# Patient Record
Sex: FEMALE | Race: WHITE | NOT HISPANIC OR LATINO | Employment: FULL TIME | URBAN - NONMETROPOLITAN AREA
[De-identification: names, ages, dates, MRNs, and addresses within clinical notes are randomized per-mention and may not be internally consistent; named-entity substitution may affect disease eponyms.]

---

## 2017-03-10 ENCOUNTER — HOSPITAL ENCOUNTER (EMERGENCY)
Facility: HOSPITAL | Age: 37
Discharge: HOME | End: 2017-03-10
Attending: PHYSICIAN ASSISTANT | Admitting: PHYSICIAN ASSISTANT
Payer: COMMERCIAL

## 2017-03-10 VITALS
OXYGEN SATURATION: 100 % | TEMPERATURE: 98.6 F | SYSTOLIC BLOOD PRESSURE: 132 MMHG | DIASTOLIC BLOOD PRESSURE: 80 MMHG | RESPIRATION RATE: 20 BRPM | HEART RATE: 78 BPM

## 2017-03-10 DIAGNOSIS — J06.9 VIRAL URI WITH COUGH: ICD-10-CM

## 2017-03-10 DIAGNOSIS — L30.1 DYSHIDROTIC ECZEMA: ICD-10-CM

## 2017-03-10 DIAGNOSIS — R07.89 CHEST WALL PAIN: ICD-10-CM

## 2017-03-10 PROCEDURE — 99213 OFFICE O/P EST LOW 20 MIN: CPT | Performed by: PHYSICIAN ASSISTANT

## 2017-03-10 PROCEDURE — 99213 OFFICE O/P EST LOW 20 MIN: CPT

## 2017-03-10 PROCEDURE — 99213 OFFICE O/P EST LOW 20 MIN: CPT | Mod: 25

## 2017-03-10 PROCEDURE — 71020 ZZHC CHEST TWO VIEWS, FRONT/LAT: CPT | Mod: TC

## 2017-03-10 RX ORDER — PREDNISONE 20 MG/1
40 TABLET ORAL DAILY
Qty: 6 TABLET | Refills: 0 | Status: SHIPPED | OUTPATIENT
Start: 2017-03-10 | End: 2017-03-13

## 2017-03-10 RX ORDER — BENZONATATE 200 MG/1
200 CAPSULE ORAL 3 TIMES DAILY PRN
Qty: 21 CAPSULE | Refills: 0 | Status: SHIPPED | OUTPATIENT
Start: 2017-03-10 | End: 2017-06-29

## 2017-03-10 RX ORDER — TRIAMCINOLONE ACETONIDE 1 MG/G
CREAM TOPICAL 3 TIMES DAILY
Qty: 30 G | Refills: 0 | Status: SHIPPED | OUTPATIENT
Start: 2017-03-10 | End: 2017-03-20

## 2017-03-10 ASSESSMENT — ENCOUNTER SYMPTOMS
COUGH: 1
EYES NEGATIVE: 1
CHEST TIGHTNESS: 1
NEUROLOGICAL NEGATIVE: 1
SHORTNESS OF BREATH: 0
CONSTITUTIONAL NEGATIVE: 1
PSYCHIATRIC NEGATIVE: 1
CHILLS: 0
SORE THROAT: 0
FEVER: 0
CARDIOVASCULAR NEGATIVE: 1
SINUS PRESSURE: 0
WHEEZING: 0

## 2017-03-10 NOTE — ED AVS SNAPSHOT
[facility]    [address] [address]    Phone:  [phone]                                       Kimberley Louis   MRN: [id number]    Department:  [facility]   Date of Visit:  3/10/2017           After Visit Summary Signature Page     I have received my discharge instructions, and my questions have been answered. I have discussed any challenges I see with this plan with the nurse or doctor.    ..........................................................................................................................................  Patient/Patient Representative Signature      ..........................................................................................................................................  Patient Representative Print Name and Relationship to Patient    ..................................................               ................................................  Date                                            Time    ..........................................................................................................................................  Reviewed by Signature/Title    ...................................................              ..............................................  Date                                                            Time

## 2017-03-10 NOTE — ED NOTES
Coughing. Started as tickle in throat. States feels that there is a knot in chest that is burning. Feels like right lung is pinching or stabbing. Sore from coughing. Non-productive cough. No known fevers. Not very congested. Headaches. Right ear feels muffled, no pain. No sore throats.

## 2017-03-10 NOTE — ED AVS SNAPSHOT
[facility]    [address]    [place] [address]    Phone:  [phone]                                       Kimberley Louis   MRN: [id number]    Department[facility]  [facility]   Date of Visit:  3/10/2017           Patient Information     Date Of Birth          1980        Your diagnoses for this visit were:     Viral URI with cough     Chest wall pain        You were seen by Don Morales PA.      Follow-up Information     Follow up with Xiomara Caceres PA In 5 days.    Specialty:  Family Practice    Why:  As needed    Contact information:    [facility]  [address] [facility] [address]  [place] [address]  [phone]          Discharge Instructions       - Prednisone for chest wall pain/throat pain  - Tessalon for cough    - Deep intentional breaths and coughing helps to ensure fresh air to lung bases, so suppress cough only to sleep or if it is causing significant discomfort.   - Plenty of fluids/broth as water is the best expectorant.  - REST to help support immune function.    * Recheck with primary care in 3-7 days with poor improvement. To emergency department with worsening.      Discharge References/Attachments     CHEST WALL PAIN, COSTOCHONDRITIS (ENGLISH)    URI, VIRAL, NO ABX (ADULT) (ENGLISH)         Review of your medicines      START taking        Dose / Directions Last dose taken    benzonatate 200 MG capsule   Commonly known as:  TESSALON   Dose:  200 mg   Quantity:  21 capsule        Take 1 capsule (200 mg) by mouth 3 times daily as needed for cough   Refills:  0        predniSONE 20 MG tablet   Commonly known as:  DELTASONE   Dose:  40 mg   Quantity:  6 tablet        Take 2 tablets (40 mg) by mouth daily for 3 days   Refills:  0          Our records show that you are taking the medicines listed below. If these are incorrect, please call your family doctor or clinic.        Dose / Directions Last dose taken    albuterol 108 (90 BASE) MCG/ACT Inhaler  "  Commonly known as:  albuterol   Quantity:  8.5 g        INHALE 2 PUFFS BY MOUTH EVERY 6 HOURS AS NEEDED FOR SHORTNESS OF BREATH   Refills:  0        metoprolol 50 MG tablet   Commonly known as:  LOPRESSOR   Dose:  50 mg   Quantity:  180 tablet        Take 1 tablet (50 mg) by mouth 2 times daily   Refills:  1                Prescriptions were sent or printed at these locations (2 Prescriptions)                   [facility] - [place], [place] - [address] AT [facility] [address], [place] [address]    Telephone:  [phone]   Fax:  [phone]   Hours:                  E-Prescribed (2 of 2)         benzonatate (TESSALON) 200 MG capsule               predniSONE (DELTASONE) 20 MG tablet                Procedures and tests performed during your visit     Chest XR,  PA & LAT      Orders Needing Specimen Collection     None      Pending Results     Date and Time Order Name Status Description    3/10/2017 1514 Chest XR,  PA & LAT Preliminary             Pending Culture Results     No orders found from 3/8/2017 to 3/11/2017.            Thank you for choosing [facility]       Thank you for choosing [facility] for your care. Our goal is always to provide you with excellent care. Hearing back from our patients is one way we can continue to improve our services. Please take a few minutes to complete the written survey that you may receive in the mail after you visit with us. Thank you!        [employer] Information     [facility] [employer] lets you send messages to your doctor, view your test results, renew your prescriptions, schedule appointments and more. To sign up, go to [email or web address] . Click on \"Log in\" on the left side of the screen, which will take you to the Welcome page. Then click on \"Sign up Now\" on the right side of the page.     You will be asked to enter the access code listed below, as well as some personal information. Please follow the directions to create your username and " password.     Your access code is: [email or web address]  Expires: 2017  3:47 PM     Your access code will  in 90 days. If you need help or a new code, please call your [place] clinic or [phone].        Care EveryWhere ID     This is your Care EveryWhere ID. This could be used by other organizations to access your [place] medical records  [id number]        After Visit Summary       This is your record. Keep this with you and show to your community pharmacist(s) and doctor(s) at your next visit.

## 2017-03-10 NOTE — ED PROVIDER NOTES
History     Chief Complaint   Patient presents with     Cough     X 2 days     Chest Wall Pain     The history is provided by the patient. No  was used.     Kimberley Louis is a 37 year old female who presents on day 3 cough and chest wall pain. Cough is described as hacking and dry. Chest pain described as achy at baseline and in front of the chest, it gets sharper and sometimes in the right shoulder with deep breath or cough. NO Hx DVT/PE. NO Hx Ca. NO use OCPs. NO recent surgery/trauma. Pt is light smoker up to 1/2 PPD.    She also has an ongoing rash on the left hand. It starts with clear itchy blisters and then peels and gets scale-like. OTC moisturizer not helping.     I have reviewed the Medications, Allergies, Past Medical and Surgical History, and Social History in the Epic system.    Review of Systems   Constitutional: Negative.  Negative for chills and fever.   HENT: Positive for postnasal drip. Negative for congestion, ear pain, sinus pressure and sore throat.    Eyes: Negative.    Respiratory: Positive for cough and chest tightness. Negative for shortness of breath and wheezing.    Cardiovascular: Negative.    Skin: Positive for rash.   Neurological: Negative.    Psychiatric/Behavioral: Negative.        Physical Exam   BP: 132/80  Pulse: 78  Temp: 98.6  F (37  C)  Resp: 20  SpO2: 100 %  Physical Exam   Constitutional: She is oriented to person, place, and time. She appears well-developed and well-nourished.   HENT:   Head: Normocephalic and atraumatic.   Right Ear: External ear normal.   Left Ear: External ear normal.   Mouth/Throat: Oropharynx is clear and moist.   Eyes: Conjunctivae are normal.   Neck: Neck supple.   Cardiovascular: Normal rate.    Pulmonary/Chest: Effort normal and breath sounds normal. She has no wheezes. She has no rales. She exhibits tenderness.       LS auscultated as bases and apices.   Musculoskeletal:        Right hand: Normal.         Hands:  Neurological: She is alert and oriented to person, place, and time.   Skin: Skin is warm and dry.   Psychiatric: She has a normal mood and affect.   Nursing note and vitals reviewed.      ED Course     ED Course     Procedures    PERC Rule for risk stratifying PE to low risk (calculator)  Background  Risk stratifies patients to low risk of PE if all 8 criteria are present including age <50, heart rate <100, O2 Sat >94%, no unilateral leg edema, no hemoptysis, no recent surgery or trauma, no prior VTE, and no hormone use.  Data  37 year old  has Essential hypertension; H/O renal calculi; Enlarged kidney; ACP (advance care planning); and Advanced directives, counseling/discussion on her problem list.  @[email or web address]@   has a past surgical history that includes tubal ligation; REMOVAL OF KIDNEY STONE; REMOVAL OF KIDNEY STONE; and REMOVAL OF KIDNEY STONE.  Pulse: 78  SpO2: 100 %  Criteria   Of  8 possible items (all criteria must be present):  NEGATIVE  Age <50 years  Heart rate <100 bpm  Oxygen Saturation >94%  No unilateral leg swelling  No hemoptysis  No surgery or trauma within 4 weeks  No prior DVT or PE  No hormone use (oral, transdermal and intravaginal estrogens)  Interpretation  All eight criteria are met AND low clinical PE suspicion: No further evaluation for PE required      Assessments & Plan (with Medical Decision Making)     I have reviewed the nursing notes.    I have reviewed the findings, diagnosis, plan and need for follow up with the patient.    Discharge Medication List as of 3/10/2017  3:47 PM      START taking these medications    Details   benzonatate (TESSALON) 200 MG capsule Take 1 capsule (200 mg) by mouth 3 times daily as needed for cough, Disp-21 capsule, R-0, E-Prescribe      predniSONE (DELTASONE) 20 MG tablet Take 2 tablets (40 mg) by mouth daily for 3 days, Disp-6 tablet, R-0, E-Prescribe             Final diagnoses:   Viral URI with cough   Chest wall pain   Dyshidrotic eczema   CXR  clear. LS clear. PERC 0. Will treat cough/chest wall pain as viral URI. Pt will trial prednisone above. May use tessalon for cough for 3-5 days. She will use triamcinolone for rash on hand. F/U with PCP 3-7 days with poor progression, seeking attention sooner with worsening despite treatment. Patient verbally educated and given appropriate education sheets for each of the diagnoses and has no questions.    Don Morales PA-C   3/10/2017   4:27 PM    3/10/2017   [facility]     Don Morales PA  03/10/17 1628

## 2017-03-10 NOTE — DISCHARGE INSTRUCTIONS
- Prednisone for chest wall pain/throat pain  - Tessalon for cough    - Deep intentional breaths and coughing helps to ensure fresh air to lung bases, so suppress cough only to sleep or if it is causing significant discomfort.   - Plenty of fluids/broth as water is the best expectorant.  - REST to help support immune function.    * Recheck with primary care in 3-7 days with poor improvement. To emergency department with worsening.

## 2017-03-13 ENCOUNTER — HOSPITAL ENCOUNTER (EMERGENCY)
Facility: HOSPITAL | Age: 37
Discharge: HOME | End: 2017-03-13
Attending: NURSE PRACTITIONER | Admitting: NURSE PRACTITIONER
Payer: COMMERCIAL

## 2017-03-13 VITALS
TEMPERATURE: 97.3 F | DIASTOLIC BLOOD PRESSURE: 87 MMHG | OXYGEN SATURATION: 98 % | SYSTOLIC BLOOD PRESSURE: 129 MMHG | RESPIRATION RATE: 18 BRPM

## 2017-03-13 DIAGNOSIS — J10.1 INFLUENZA B: ICD-10-CM

## 2017-03-13 LAB
DEPRECATED S PYO AG THROAT QL EIA: NORMAL
FLUAV+FLUBV AG SPEC QL: ABNORMAL
FLUAV+FLUBV AG SPEC QL: NEGATIVE
MICRO REPORT STATUS: NORMAL
SPECIMEN SOURCE: ABNORMAL
SPECIMEN SOURCE: NORMAL

## 2017-03-13 PROCEDURE — 99213 OFFICE O/P EST LOW 20 MIN: CPT | Performed by: NURSE PRACTITIONER

## 2017-03-13 PROCEDURE — 99213 OFFICE O/P EST LOW 20 MIN: CPT

## 2017-03-13 PROCEDURE — 87081 CULTURE SCREEN ONLY: CPT | Performed by: NURSE PRACTITIONER

## 2017-03-13 PROCEDURE — 87804 INFLUENZA ASSAY W/OPTIC: CPT | Performed by: FAMILY MEDICINE

## 2017-03-13 PROCEDURE — 87880 STREP A ASSAY W/OPTIC: CPT | Performed by: NURSE PRACTITIONER

## 2017-03-13 RX ORDER — ONDANSETRON 4 MG/1
4 TABLET, ORALLY DISINTEGRATING ORAL EVERY 8 HOURS PRN
Qty: 10 TABLET | Refills: 0 | Status: SHIPPED | OUTPATIENT
Start: 2017-03-13 | End: 2017-03-16

## 2017-03-13 RX ORDER — OSELTAMIVIR PHOSPHATE 75 MG/1
75 CAPSULE ORAL 2 TIMES DAILY
Qty: 10 CAPSULE | Refills: 0 | Status: SHIPPED | OUTPATIENT
Start: 2017-03-13 | End: 2017-03-18

## 2017-03-13 ASSESSMENT — ENCOUNTER SYMPTOMS
NAUSEA: 0
ACTIVITY CHANGE: 0
SORE THROAT: 1
SHORTNESS OF BREATH: 0
COUGH: 1
TROUBLE SWALLOWING: 0
VOMITING: 0
CHILLS: 1
STRIDOR: 0
PSYCHIATRIC NEGATIVE: 1
WHEEZING: 0
DYSURIA: 0
SINUS PRESSURE: 1
APPETITE CHANGE: 0
RHINORRHEA: 1
FEVER: 1
DIARRHEA: 0

## 2017-03-13 NOTE — ED NOTES
Pt presents with worsening cough, sore throat. Was here Friday and had x-ray chest, and was put on steroids per pt. No getting better. Feels sinus congestion now. Took psuedophedrine and ibuprofen this morning. Afebrile.

## 2017-03-13 NOTE — PROVIDER NOTIFICATION
DATE:  3/13/2017   TIME OF RECEIPT FROM LAB:  4704  LAB TEST:  influenza  LAB VALUE:  Positive B   RESULTS GIVEN WITH READ-BACK TO (PROVIDER):  Margarita Moon RN   TIME LAB VALUE REPORTED TO PROVIDER:  9724

## 2017-03-13 NOTE — ED PROVIDER NOTES
History     Chief Complaint   Patient presents with     Cough     c/o cough, h/a and fever     The history is provided by the patient. No  was used.     Kimberley Louis is a 37 year old female who presents with a cough, sore throat, and head congestion. Cough started 5 days ago. Head congestion and sore throat started yesterday. She has taken ibuprofen, sudafed, and tessalon pearls with mild effectiveness. Positive for fever, chills, and night sweats. Eating and drinking well. Bowel and bladder are working well.     I have reviewed the Medications, Allergies, Past Medical and Surgical History, and Social History in the Epic system.    Review of Systems   Constitutional: Positive for chills and fever. Negative for activity change and appetite change.   HENT: Positive for congestion, ear pain, hearing loss, postnasal drip, rhinorrhea, sinus pressure and sore throat. Negative for ear discharge and trouble swallowing.         Muffled hearing.    Respiratory: Positive for cough. Negative for shortness of breath, wheezing and stridor.    Gastrointestinal: Negative for diarrhea, nausea and vomiting.   Genitourinary: Negative for dysuria.   Skin: Negative for rash.   Psychiatric/Behavioral: Negative.        Physical Exam   BP: 129/87  Heart Rate: 84  Temp: 97.3  F (36.3  C)  Resp: 18  SpO2: 98 %  Physical Exam   Constitutional: She is oriented to person, place, and time. She appears well-developed and well-nourished. No distress.   HENT:   Right Ear: External ear normal.   Left Ear: External ear normal.   Mouth/Throat: No oropharyngeal exudate.   Oropharynx with erythema without exudate.    Neck: Normal range of motion. Neck supple.   Cardiovascular: Normal rate, regular rhythm and normal heart sounds.    No murmur heard.  Pulmonary/Chest: Effort normal. No respiratory distress. She has no wheezes. She has no rales.   Abdominal: Soft. She exhibits no distension.   Musculoskeletal: Normal range of  motion.   Lymphadenopathy:     She has no cervical adenopathy.   Neurological: She is alert and oriented to person, place, and time.   Skin: Skin is warm and dry. No rash noted. She is not diaphoretic.   Psychiatric: She has a normal mood and affect. Her behavior is normal.   Nursing note and vitals reviewed.      ED Course     ED Course     Procedures    Labs Ordered and Resulted from Time of ED Arrival Up to the Time of Departure from the ED   INFLUENZA A/B ANTIGEN - Abnormal; Notable for the following:        Result Value    Influenza B   (*)     Value: Positive   Critical Value called to and read back by  Elizabeth Salcedo at 1355 by [facility]  Test results must be correlated with clinical data. If necessary, results should   be confirmed by a molecular assay or viral culture.      All other components within normal limits   RAPID STREP SCREEN   BETA STREP GROUP A CULTURE     Results for orders placed or performed during the hospital encounter of 03/13/17   Influenza A/B antigen   Result Value Ref Range    Influenza A/B Agn Specimen Nares     Influenza A Negative NEG    Influenza B (A) NEG     Positive   Critical Value called to and read back by  Elizabeth Salceod at 1355 by [facility]  Test results must be correlated with clinical data. If necessary, results should   be confirmed by a molecular assay or viral culture.     Rapid strep screen   Result Value Ref Range    Specimen Description Throat     Rapid Strep A Screen       NEGATIVE: No Group A streptococcal antigen detected by immunoassay, await   culture report.      Micro Report Status FINAL 03/13/2017        Assessments & Plan (with Medical Decision Making)     Discussed plan of care. She verbalized understanding. All questions answered.     I have reviewed the nursing notes.    I have reviewed the findings, diagnosis, plan and need for follow up with the patient.  Discharged in stable condition.     New Prescriptions    ONDANSETRON (ZOFRAN ODT) 4 MG ODT TAB    Take 1  tablet (4 mg) by mouth every 8 hours as needed for nausea    OSELTAMIVIR (TAMIFLU) 75 MG CAPSULE    Take 1 capsule (75 mg) by mouth 2 times daily for 5 days       Final diagnoses:   Influenza B     Take Tamiflu as ordered.   Take Zofran as needed for nausea.   Increase fluid intake.   Take tylenol and or ibuprofen for fever or pain.   Follow up with PCP with any increase in symptoms or concerns.  Return to urgent care or emergency department with any increase in symptoms or concerns.     LORENZO Wick  3/13/2017  3:56 PM  [facility]       Jazmyne Villafana NP  03/13/17 0509

## 2017-03-13 NOTE — ED AVS SNAPSHOT
[facility]    [address]    [place] [address]    Phone:  [phone]                                       Kimberley Louis   MRN: [id number]    Department[facility]  [facility]   Date of Visit:  3/13/2017           Patient Information     Date Of Birth          1980        Your diagnoses for this visit were:     Influenza B        You were seen by Jazmyne Villafana NP.      Follow-up Information     Follow up with Xiomara Caceres PA.    Specialty:  Family Practice    Why:  As needed, If symptoms worsen    Contact information:    [facility]  [address]  [place] [address] [phone]          Follow up with [facility].    Specialty:  EMERGENCY MEDICINE    Why:  As needed, If symptoms worsen    Contact information:    [address]  [place] [address] [phone]    Additional information:    From [facility]: Take [address]. Turn left at [address]. Turn left at the first stoplight on [address] [facility] [address]. At the first stop sign, take a right onto [address]. Take a left into the parking lot and continue through until you reach the North enterance of the building.       From [place]: Take [address]. Take the [address] ramp towards [place]. Turn left onto [address]. Take a slight right onto [address]. Turn left at the first stoplight on [address]. At the first stop sign, take a right onto [address]. Take a left into the parking lot and continue through until you reach the North enterance of the building.       From [place]: Take [address]. Take a right at [address]. At the first stop sign, take a right onto [address]. Take a left into the parking lot and continue through until you reach the North enterance of the building.         Discharge Instructions       Take Tamiflu as ordered.   Take Zofran as needed for nausea.   Increase fluid  intake.   Take tylenol and or ibuprofen for fever or pain.   Follow up with PCP with any increase in symptoms or concerns.  Return to urgent care or emergency department with any increase in symptoms or concerns.     Discharge References/Attachments     INFLUENZA  (ENGLISH)         Review of your medicines      START taking        Dose / Directions Last dose taken    ondansetron 4 MG ODT tab   Commonly known as:  ZOFRAN ODT   Dose:  4 mg   Quantity:  10 tablet        Take 1 tablet (4 mg) by mouth every 8 hours as needed for nausea   Refills:  0        oseltamivir 75 MG capsule   Commonly known as:  TAMIFLU   Dose:  75 mg   Quantity:  10 capsule        Take 1 capsule (75 mg) by mouth 2 times daily for 5 days   Refills:  0          Our records show that you are taking the medicines listed below. If these are incorrect, please call your family doctor or clinic.        Dose / Directions Last dose taken    albuterol 108 (90 BASE) MCG/ACT Inhaler   Commonly known as:  albuterol   Quantity:  8.5 g        INHALE 2 PUFFS BY MOUTH EVERY 6 HOURS AS NEEDED FOR SHORTNESS OF BREATH   Refills:  0        benzonatate 200 MG capsule   Commonly known as:  TESSALON   Dose:  200 mg   Quantity:  21 capsule        Take 1 capsule (200 mg) by mouth 3 times daily as needed for cough   Refills:  0        metoprolol 50 MG tablet   Commonly known as:  LOPRESSOR   Dose:  50 mg   Quantity:  180 tablet        Take 1 tablet (50 mg) by mouth 2 times daily   Refills:  1        predniSONE 20 MG tablet   Commonly known as:  DELTASONE   Dose:  40 mg   Quantity:  6 tablet        Take 2 tablets (40 mg) by mouth daily for 3 days   Refills:  0        triamcinolone 0.1 % cream   Commonly known as:  KENALOG   Quantity:  30 g        Apply topically 3 times daily for 10 days   Refills:  0                Prescriptions were sent or printed at these locations (2 Prescriptions)                   [facility] [address] [place], [place] - [address] AT [facility]  [address]   [address], [place] [address]    Telephone:  [phone]   Fax:  [phone]   Hours:                  E-Prescribed (2 of 2)         oseltamivir (TAMIFLU) 75 MG capsule               ondansetron (ZOFRAN ODT) 4 MG ODT tab                Procedures and tests performed during your visit     Beta strep group A culture    Influenza A/B antigen    Rapid strep screen      Orders Needing Specimen Collection     None      Pending Results     Date and Time Order Name Status Description    3/13/2017 1600 Beta strep group A culture In process             Pending Culture Results     Date and Time Order Name Status Description    3/13/2017 1600 Beta strep group A culture In process             Thank you for choosing [facility]       Thank you for choosing [facility] for your care. Our goal is always to provide you with excellent care. Hearing back from our patients is one way we can continue to improve our services. Please take a few minutes to complete the written survey that you may receive in the mail after you visit with us. Thank you!        [email or web address] Information     [email or web address] lets you send messages to your doctor, view your test results, renew your prescriptions, schedule appointments and more. To sign up, go to [email or web address] . Click on \"Log in\" on the left side of the screen, which will take you to the Welcome page. Then click on \"Sign up Now\" on the right side of the page.     You will be asked to enter the access code listed below, as well as some personal information. Please follow the directions to create your username and password.     Your access code is: [email or web address]  Expires: 2017  4:47 PM     Your access code will  in 90 days. If you need help or a new code, please call your [facility] clinic or [phone].        Care EveryWhere ID     This is your Care EveryWhere ID. This could be used by other organizations to access your [facility] medical records  [id number]        After " Visit Summary       This is your record. Keep this with you and show to your community pharmacist(s) and doctor(s) at your next visit.

## 2017-03-13 NOTE — ED AVS SNAPSHOT
[facility]    [address] [address]    Phone:  [phone]                                       Kimberley Louis   MRN: [id number]    Department[facility]  [facility]   Date of Visit:  3/13/2017           After Visit Summary Signature Page     I have received my discharge instructions, and my questions have been answered. I have discussed any challenges I see with this plan with the nurse or doctor.    ..........................................................................................................................................  Patient/Patient Representative Signature      ..........................................................................................................................................  Patient Representative Print Name and Relationship to Patient    ..................................................               ................................................  Date                                            Time    ..........................................................................................................................................  Reviewed by Signature/Title    ...................................................              ..............................................  Date                                                            Time

## 2017-03-15 LAB
BACTERIA SPEC CULT: NORMAL
MICRO REPORT STATUS: NORMAL
SPECIMEN SOURCE: NORMAL

## 2017-05-24 ENCOUNTER — HOSPITAL ENCOUNTER (EMERGENCY)
Facility: HOSPITAL | Age: 37
Discharge: HOME | End: 2017-05-24
Attending: NURSE PRACTITIONER | Admitting: NURSE PRACTITIONER
Payer: COMMERCIAL

## 2017-05-24 VITALS
HEART RATE: 73 BPM | RESPIRATION RATE: 18 BRPM | OXYGEN SATURATION: 98 % | SYSTOLIC BLOOD PRESSURE: 154 MMHG | DIASTOLIC BLOOD PRESSURE: 106 MMHG | TEMPERATURE: 98.2 F

## 2017-05-24 DIAGNOSIS — Z11.3 SCREEN FOR STD (SEXUALLY TRANSMITTED DISEASE): ICD-10-CM

## 2017-05-24 LAB
ALBUMIN UR-MCNC: 10 MG/DL
APPEARANCE UR: CLEAR
BACTERIA #/AREA URNS HPF: ABNORMAL /HPF
BILIRUB UR QL STRIP: NEGATIVE
COLOR UR AUTO: YELLOW
GLUCOSE UR STRIP-MCNC: NEGATIVE MG/DL
HCG UR QL: NEGATIVE
HGB UR QL STRIP: NEGATIVE
KETONES UR STRIP-MCNC: NEGATIVE MG/DL
LEUKOCYTE ESTERASE UR QL STRIP: NEGATIVE
MICRO REPORT STATUS: NORMAL
MUCOUS THREADS #/AREA URNS LPF: PRESENT /LPF
NITRATE UR QL: NEGATIVE
PH UR STRIP: 6 PH (ref 4.7–8)
RBC #/AREA URNS AUTO: 0 /HPF (ref 0–2)
SP GR UR STRIP: 1.01 (ref 1–1.03)
SPECIMEN SOURCE: NORMAL
SQUAMOUS #/AREA URNS AUTO: 1 /HPF (ref 0–1)
URN SPEC COLLECT METH UR: ABNORMAL
UROBILINOGEN UR STRIP-MCNC: NORMAL MG/DL (ref 0–2)
WBC #/AREA URNS AUTO: 3 /HPF (ref 0–2)
WET PREP SPEC: NORMAL

## 2017-05-24 PROCEDURE — 87491 CHLMYD TRACH DNA AMP PROBE: CPT | Performed by: NURSE PRACTITIONER

## 2017-05-24 PROCEDURE — 25000128 H RX IP 250 OP 636: Performed by: NURSE PRACTITIONER

## 2017-05-24 PROCEDURE — 81025 URINE PREGNANCY TEST: CPT | Performed by: NURSE PRACTITIONER

## 2017-05-24 PROCEDURE — 25000132 ZZH RX MED GY IP 250 OP 250 PS 637: Performed by: NURSE PRACTITIONER

## 2017-05-24 PROCEDURE — 99213 OFFICE O/P EST LOW 20 MIN: CPT | Performed by: NURSE PRACTITIONER

## 2017-05-24 PROCEDURE — 81001 URINALYSIS AUTO W/SCOPE: CPT | Performed by: NURSE PRACTITIONER

## 2017-05-24 PROCEDURE — 87210 SMEAR WET MOUNT SALINE/INK: CPT | Performed by: NURSE PRACTITIONER

## 2017-05-24 PROCEDURE — 96372 THER/PROPH/DIAG INJ SC/IM: CPT

## 2017-05-24 PROCEDURE — 87591 N.GONORRHOEAE DNA AMP PROB: CPT | Performed by: NURSE PRACTITIONER

## 2017-05-24 PROCEDURE — 99214 OFFICE O/P EST MOD 30 MIN: CPT | Mod: 25

## 2017-05-24 RX ORDER — AZITHROMYCIN 250 MG/1
1000 TABLET, FILM COATED ORAL ONCE
Status: COMPLETED | OUTPATIENT
Start: 2017-05-24 | End: 2017-05-24

## 2017-05-24 RX ORDER — CEFTRIAXONE SODIUM 1 G
250 VIAL (EA) INJECTION ONCE
Status: COMPLETED | OUTPATIENT
Start: 2017-05-24 | End: 2017-05-24

## 2017-05-24 RX ADMIN — AZITHROMYCIN 1000 MG: 250 TABLET, FILM COATED ORAL at 11:01

## 2017-05-24 RX ADMIN — CEFTRIAXONE SODIUM 250 MG: 1 INJECTION, POWDER, FOR SOLUTION INTRAMUSCULAR; INTRAVENOUS at 11:05

## 2017-05-24 NOTE — ED PROVIDER NOTES
History     Chief Complaint   Patient presents with     Vaginitis     some discharge noticed today     Exposure to STD     concerned for possible exposure     The history is provided by the patient. No  was used.     Kimberley Louis is a 37 year old female who presents with vaginal discharge and possible exposure to STD. She noticed vaginal discharge today. She broke up with her boyfriend in the past month and he was cheating on her. She is in a new relationship and is sexually active. She used a natural vaginal ph solution last week. Denies fever, chills, or night sweats. Eating and drinking well. Bowel and bladder are working well.     I have reviewed the Medications, Allergies, Past Medical and Surgical History, and Social History in the Epic system.    Review of Systems   Constitutional: Negative for activity change, appetite change, chills, fatigue and fever.   HENT: Negative for trouble swallowing.    Respiratory: Negative for cough.    Gastrointestinal: Negative for abdominal pain, diarrhea, nausea and vomiting.   Genitourinary: Positive for vaginal discharge. Negative for dyspareunia, dysuria, flank pain, frequency, hematuria, vaginal bleeding and vaginal pain.   Skin: Negative for rash.   Neurological: Negative.    Psychiatric/Behavioral: Negative.        Physical Exam   BP: (!) 154/106  Pulse: 73  Temp: 98.2  F (36.8  C)  Resp: 18  SpO2: 98 %  Physical Exam   Constitutional: She is oriented to person, place, and time. She appears well-developed and well-nourished. No distress.   HENT:   Head: Normocephalic.   Mouth/Throat: Oropharynx is clear and moist.   Cardiovascular: Normal rate, regular rhythm and normal heart sounds.    No murmur heard.  Pulmonary/Chest: Effort normal. No respiratory distress. She has no wheezes. She has no rales.   Abdominal: Soft. Bowel sounds are normal. She exhibits no distension. There is no tenderness. There is no rebound and no guarding.   Genitourinary:  Vaginal discharge found.   Genitourinary Comments: Pelvic exam completed with AMBER Siddiqi. No cervical motion tenderness. White thick vaginal discharge. Swabs obtained.    Musculoskeletal: Normal range of motion.   Neurological: She is alert and oriented to person, place, and time.   Skin: Skin is warm and dry. No rash noted. She is not diaphoretic.   Psychiatric: She has a normal mood and affect. Her behavior is normal.   Nursing note and vitals reviewed.      ED Course     ED Course     Procedures  Results for orders placed or performed during the hospital encounter of 05/24/17   UA reflex to Microscopic and Culture   Result Value Ref Range    Color Urine Yellow     Appearance Urine Clear     Glucose Urine Negative NEG mg/dL    Bilirubin Urine Negative NEG    Ketones Urine Negative NEG mg/dL    Specific Gravity Urine 1.014 1.003 - 1.035    Blood Urine Negative NEG    pH Urine 6.0 4.7 - 8.0 pH    Protein Albumin Urine 10 (A) NEG mg/dL    Urobilinogen mg/dL Normal 0.0 - 2.0 mg/dL    Nitrite Urine Negative NEG    Leukocyte Esterase Urine Negative NEG    Source Midstream Urine     RBC Urine 0 0 - 2 /HPF    WBC Urine 3 (H) 0 - 2 /HPF    Bacteria Urine None (A) NEG /HPF    Squamous Epithelial /HPF Urine 1 0 - 1 /HPF    Mucous Urine Present (A) NEG /LPF   HCG qualitative urine   Result Value Ref Range    HCG Qual Urine Negative NEG   NEISSERIA GONORRHOEA PCR   Result Value Ref Range    Specimen Descrip Vagina     N Gonorrhea PCR  NEG     Negative   Negative for N. gonorrhoeae rRNA by transcription mediated amplification.   A negative result by transcription mediated amplification does not preclude the   presence of N. gonorrhoeae infection because results are dependent on proper   and adequate collection, absence of inhibitors, and sufficient rRNA to be   detected.     CHLAMYDIA TRACHOMATIS PCR   Result Value Ref Range    Specimen Description Vagina     Chlamydia Trachomatis PCR  NEG     Negative   Negative for C.  trachomatis rRNA by transcription mediated amplification.   A negative result by transcription mediated amplification does not preclude the   presence of C. trachomatis infection because results are dependent on proper   and adequate collection, absence of inhibitors, and sufficient rRNA to be   detected.     Wet prep   Result Value Ref Range    Specimen Description Vagina     Wet Prep       Few WBC'S seen  No Trichomonas seen  No yeast seen  No clue cells seen      Micro Report Status FINAL 05/24/2017        Medications   azithromycin (ZITHROMAX) tablet 1,000 mg (1,000 mg Oral Given 5/24/17 1101)   cefTRIAXone (ROCEPHIN) injection 250 mg (250 mg Intramuscular Given 5/24/17 1105)     Monitored for 20 minutes and tolerated well.     Assessments & Plan (with Medical Decision Making)     Discussed plan of care. She verbalized understanding. All questions answered.     I have reviewed the nursing notes.    I have reviewed the findings, diagnosis, plan and need for follow up with the patient.  Discharged in stable condition.     Discharge Medication List as of 5/24/2017 11:12 AM          Final diagnoses:   Screen for STD (sexually transmitted disease)     You were treated for chlamydia and gonorrhea today.   No sexual intercourse for 10 days if positive.   A nurse will contact you next week on your results.   Follow up with PCP in 3 weeks if testing is positive.   Return to urgent care or emergency department with any increase in symptoms or concerns.     LORENZO Wick  5/24/2017  10:13 AM  URGENT CARE CLINIC       Jazmyne Villafana NP  05/28/17 7113

## 2017-05-24 NOTE — ED AVS SNAPSHOT
[facility]    [address] [address]    Phone:  [phone]                                       Kimberley Louis   MRN: [id number]    Department:  [facility]   Date of Visit:  5/24/2017           Patient Information     Date Of Birth          1980        Your diagnoses for this visit were:     Screen for STD (sexually transmitted disease)        You were seen by Jazmyne Villafana NP.      Follow-up Information     Follow up with [facility].    Specialty:  EMERGENCY MEDICINE    Why:  As needed, If symptoms worsen    Contact information:    [address]  [place] [id number] [phone]    Additional information:    From [facility]: Take [address]. Turn left at [address]. Turn left at the first stoplight on [address]. At the first stop sign, take a right onto [address]. Take a left into the parking lot and continue through until you reach the North enterance of the building.       From [place]: Take [address]. Take the [address] ramp towards [place]. Turn left onto [address]. Take a slight right onto [address]. Turn left at the first stoplight on [address]. At the first stop sign, take a right onto [address]. Take a left into the parking lot and continue through until you reach the North enterance of the building.       From [place]: Take [address]. Take a right at [address]. At the first stop sign, take a right onto [address]. Take a left into the parking lot and continue through until you reach the North enterance of the building.         Discharge Instructions       You were treated for Chlamydia and Gonorrhea today.   No sexual intercourse for 10 days after treatment if you are positive.  Your tests were send out and you will hear from the nurse next week.   Follow up with PCP in 3 weeks if testing is positive.  Return to  urgent care or emergency department with any increase in symptoms or concerns.       Discharge References/Attachments     STD, IF YOU THINK YOU HAVE (ENGLISH)    STDS (SEXUALLY TRANSMITTED DISEASES), WHAT ARE (ENGLISH)    STDS, UNDERSTANDING (ENGLISH)         Review of your medicines      Our records show that you are taking the medicines listed below. If these are incorrect, please call your family doctor or clinic.        Dose / Directions Last dose taken    albuterol 108 (90 BASE) MCG/ACT Inhaler   Commonly known as:  albuterol   Quantity:  8.5 g        INHALE 2 PUFFS BY MOUTH EVERY 6 HOURS AS NEEDED FOR SHORTNESS OF BREATH   Refills:  0        benzonatate 200 MG capsule   Commonly known as:  TESSALON   Dose:  200 mg   Quantity:  21 capsule        Take 1 capsule (200 mg) by mouth 3 times daily as needed for cough   Refills:  0        metoprolol 50 MG tablet   Commonly known as:  LOPRESSOR   Dose:  50 mg   Quantity:  180 tablet        Take 1 tablet (50 mg) by mouth 2 times daily   Refills:  1                Procedures and tests performed during your visit     CHLAMYDIA TRACHOMATIS PCR    HCG qualitative urine    NEISSERIA GONORRHOEA PCR    UA reflex to Microscopic and Culture    Wet prep      Orders Needing Specimen Collection     None      Pending Results     Date and Time Order Name Status Description    5/24/2017 1033 HCG qualitative urine In process     5/24/2017 1012 CHLAMYDIA TRACHOMATIS PCR In process     5/24/2017 1012 NEISSERIA GONORRHOEA PCR In process             Pending Culture Results     Date and Time Order Name Status Description    5/24/2017 1033 HCG qualitative urine In process     5/24/2017 1012 CHLAMYDIA TRACHOMATIS PCR In process     5/24/2017 1012 NEISSERIA GONORRHOEA PCR In process             Thank you for choosing [facility]       Thank you for choosing [facility] for your care. Our goal is always to provide you with excellent care. Hearing back from our patients is one way we can continue to  "improve our services. Please take a few minutes to complete the written survey that you may receive in the mail after you visit with us. Thank you!        [employer] Information     [employer] lets you send messages to your doctor, view your test results, renew your prescriptions, schedule appointments and more. To sign up, go to [email or web address] . Click on \"Log in\" on the left side of the screen, which will take you to the Welcome page. Then click on \"Sign up Now\" on the right side of the page.     You will be asked to enter the access code listed below, as well as some personal information. Please follow the directions to create your username and password.     Your access code is: [email or web address]  Expires: 2017  4:47 PM     Your access code will  in 90 days. If you need help or a new code, please call your [facility] clinic or [phone].        Care EveryWhere ID     This is your Care EveryWhere ID. This could be used by other organizations to access your [facility] medical records  [id number]        After Visit Summary       This is your record. Keep this with you and show to your community pharmacist(s) and doctor(s) at your next visit.                  "

## 2017-05-24 NOTE — ED AVS SNAPSHOT
[facility]    [address]    [facility] [address]    Phone:  [phone]                                       Kimberley Louis   MRN: [id number]    Department[facility]  [facility]   Date of Visit:  5/24/2017           After Visit Summary Signature Page     I have received my discharge instructions, and my questions have been answered. I have discussed any challenges I see with this plan with the nurse or doctor.    ..........................................................................................................................................  Patient/Patient Representative Signature      ..........................................................................................................................................  Patient Representative Print Name and Relationship to Patient    ..................................................               ................................................  Date                                            Time    ..........................................................................................................................................  Reviewed by Signature/Title    ...................................................              ..............................................  Date                                                            Time

## 2017-05-24 NOTE — DISCHARGE INSTRUCTIONS
You were treated for Chlamydia and Gonorrhea today.   No sexual intercourse for 10 days after treatment if you are positive.  Your tests were send out and you will hear from the nurse next week.   Follow up with PCP in 3 weeks if testing is positive.  Return to urgent care or emergency department with any increase in symptoms or concerns.

## 2017-05-24 NOTE — ED NOTES
Pt in for complaints of vaginal discharge today and is concerned about possible exposure to STI and would like to be tested.

## 2017-05-25 LAB
C TRACH DNA SPEC QL NAA+PROBE: NORMAL
N GONORRHOEA DNA SPEC QL NAA+PROBE: NORMAL
SPECIMEN SOURCE: NORMAL
SPECIMEN SOURCE: NORMAL

## 2017-05-28 ASSESSMENT — ENCOUNTER SYMPTOMS
VOMITING: 0
ACTIVITY CHANGE: 0
NAUSEA: 0
FLANK PAIN: 0
TROUBLE SWALLOWING: 0
ABDOMINAL PAIN: 0
FEVER: 0
COUGH: 0
APPETITE CHANGE: 0
FREQUENCY: 0
PSYCHIATRIC NEGATIVE: 1
HEMATURIA: 0
CHILLS: 0
NEUROLOGICAL NEGATIVE: 1
FATIGUE: 0
DYSURIA: 0
DIARRHEA: 0

## 2017-05-31 DIAGNOSIS — I10 HTN (HYPERTENSION): ICD-10-CM

## 2017-06-01 RX ORDER — METOPROLOL TARTRATE 50 MG
TABLET ORAL
Qty: 180 TABLET | Refills: 0 | Status: SHIPPED | OUTPATIENT
Start: 2017-06-01 | End: 2017-09-09

## 2017-06-05 DIAGNOSIS — E73.9 LACTOSE INTOLERANCE IN ADULT: ICD-10-CM

## 2017-06-07 RX ORDER — LACTASE 3000 UNIT
TABLET ORAL
Qty: 120 TABLET | Refills: 0 | Status: SHIPPED | OUTPATIENT
Start: 2017-06-07 | End: 2018-06-28

## 2017-06-07 NOTE — TELEPHONE ENCOUNTER
Lactaid  Last visit: 5.5.16 - No appointment scheduled  Last refill: Not on med list. Please advise. Thank you

## 2017-06-29 ENCOUNTER — HOSPITAL ENCOUNTER (EMERGENCY)
Facility: HOSPITAL | Age: 37
Discharge: HOME | End: 2017-06-29
Attending: NURSE PRACTITIONER | Admitting: NURSE PRACTITIONER
Payer: COMMERCIAL

## 2017-06-29 VITALS
SYSTOLIC BLOOD PRESSURE: 130 MMHG | RESPIRATION RATE: 18 BRPM | HEART RATE: 54 BPM | DIASTOLIC BLOOD PRESSURE: 82 MMHG | OXYGEN SATURATION: 99 % | TEMPERATURE: 97.9 F

## 2017-06-29 DIAGNOSIS — N76.0 BACTERIAL VAGINOSIS: ICD-10-CM

## 2017-06-29 DIAGNOSIS — B96.89 BACTERIAL VAGINOSIS: ICD-10-CM

## 2017-06-29 LAB
MICRO REPORT STATUS: ABNORMAL
SPECIMEN SOURCE: ABNORMAL
WET PREP SPEC: ABNORMAL

## 2017-06-29 PROCEDURE — 99213 OFFICE O/P EST LOW 20 MIN: CPT | Performed by: NURSE PRACTITIONER

## 2017-06-29 PROCEDURE — 87210 SMEAR WET MOUNT SALINE/INK: CPT | Performed by: NURSE PRACTITIONER

## 2017-06-29 PROCEDURE — 99214 OFFICE O/P EST MOD 30 MIN: CPT

## 2017-06-29 RX ORDER — FLUCONAZOLE 150 MG/1
TABLET ORAL
Qty: 1 TABLET | Refills: 0 | Status: SHIPPED | OUTPATIENT
Start: 2017-06-29 | End: 2017-07-02

## 2017-06-29 RX ORDER — METRONIDAZOLE 500 MG/1
500 TABLET ORAL 2 TIMES DAILY
Qty: 14 TABLET | Refills: 0 | Status: SHIPPED | OUTPATIENT
Start: 2017-06-29 | End: 2017-07-06

## 2017-06-29 ASSESSMENT — ENCOUNTER SYMPTOMS
GASTROINTESTINAL NEGATIVE: 1
CONSTITUTIONAL NEGATIVE: 1
DYSURIA: 0

## 2017-06-29 NOTE — ED AVS SNAPSHOT
[facility]    [address]    [facility] [address]    Phone:  [phone]                                       Kimberley Louis   MRN: [id number]    Department[facility]  [facility]   Date of Visit:  6/29/2017           After Visit Summary Signature Page     I have received my discharge instructions, and my questions have been answered. I have discussed any challenges I see with this plan with the nurse or doctor.    ..........................................................................................................................................  Patient/Patient Representative Signature      ..........................................................................................................................................  Patient Representative Print Name and Relationship to Patient    ..................................................               ................................................  Date                                            Time    ..........................................................................................................................................  Reviewed by Signature/Title    ...................................................              ..............................................  Date                                                            Time

## 2017-06-29 NOTE — ED AVS SNAPSHOT
[facility]    [address] [place] [address]    [place] [address]    Phone:  [phone]                                       Kimberley Louis   MRN: [id number]    Department:  [facility]   Date of Visit:  2017           Patient Information     Date Of Birth          1980        Your diagnoses for this visit were:     Bacterial vaginosis        You were seen by Maryann Silva NP.      Follow-up Information     Follow up with Xiomara Caceres PA In 3 days.    Specialty:  Family Practice    Why:  if symptoms not improving    Contact information:    [facility]  [address] [facility]  [place] [address]  [phone]          Discharge Instructions         Bacterial Vaginosis    You have a vaginal infection called bacterial vaginosis (BV). Both good and bad bacteria are present in a healthy vagina. BV occurs when these bacteria get out of balance. The number of bad bacteria increase. And the number of good bacteria decrease.  BV may or may not cause symptoms. If symptoms do occur, they can include:    Thin, gray, milky-white, or sometimes green discharge    Unpleasant odor or  fishy  smell    Itching, burning, or pain in or around the vagina  It is not known what causes BV, but certain factors can make the problem more likely. This can include:    Douching    Having sex with a new partner    Having sex with more than one partner  BV will sometimes go away on its own. But treatment is usually recommended. This is because untreated BV can increase the risk of more serious health problems such as:    Pelvic inflammatory disease (PID)     delivery (giving birth to a baby early if you re pregnant)    HIV and certain other sexually transmitted diseases (STDs)    Infection after surgery on the reproductive organs  Home care  General care    BV is most often treated with medicines called antibiotics. These may be given as pills or as a vaginal cream. If antibiotics are  prescribed, be sure to use them exactly as directed. Also, be sure to complete all of the medicine, even if your symptoms go away.    Avoid douching or having sex during treatment.    If you have sex with a female partner, ask your healthcare provider if she should also be treated.  Prevention    Limit or avoid douching.    Avoid having sex. If you do have sex, then take steps to lower your risk:    Use condoms when having sex.    Limit the number of partners you have sex with.  Follow-up care  Follow up with your healthcare provider, or as advised.  When to seek medical advice  Call your healthcare provider right away if:    You have a fever of 100.4 F (38 C) or higher, or as directed by your provider.    Your symptoms worsen, or they don t go away within a few days of starting treatment.    You have new pain in the lower belly or pelvic region.    You have side effects that bother you or a reaction to the pills or cream you re prescribed.    You or any partners you have sex with have new symptoms, such as a rash, joint pain, or sores.  Date Last Reviewed: 7/30/2015 2000-2017 [employer]. [address] [place] [address]. All rights reserved. This information is not intended as a substitute for professional medical care. Always follow your healthcare professional's instructions.             Review of your medicines      START taking        Dose / Directions Last dose taken    metroNIDAZOLE 500 MG tablet   Commonly known as:  FLAGYL   Dose:  500 mg   Quantity:  14 tablet        Take 1 tablet (500 mg) by mouth 2 times daily for 7 days   Refills:  0          Our records show that you are taking the medicines listed below. If these are incorrect, please call your family doctor or clinic.        Dose / Directions Last dose taken    albuterol 108 (90 BASE) MCG/ACT Inhaler   Commonly known as:  albuterol   Quantity:  8.5 g        INHALE 2 PUFFS BY MOUTH EVERY 6 HOURS AS NEEDED FOR SHORTNESS OF  "BREATH   Refills:  0        LACTAID 3000 UNIT tablet   Quantity:  120 tablet   Generic drug:  lactase        TAKE 1 TABLET BY MOUTH THREE TIMES DAILY WITH MEALS   Refills:  0        metoprolol 50 MG tablet   Commonly known as:  LOPRESSOR   Quantity:  180 tablet        TAKE ONE TABLET BY MOUTH TWICE DAILY   Refills:  0                Prescriptions were sent or printed at these locations (1 Prescription)                   [id number] - [place], [place] - [address] AT [facility] of [facility] [address]   [address], [place] [address]    Telephone:  [phone]   Fax:  [phone]   Hours:                  E-Prescribed (1 of 1)         metroNIDAZOLE (FLAGYL) 500 MG tablet                Procedures and tests performed during your visit     Wet prep      Orders Needing Specimen Collection     None      Pending Results     No orders found from 2017 to 2017.            Pending Culture Results     No orders found from 2017 to 2017.            Thank you for choosing [facility]       Thank you for choosing [facility] for your care. Our goal is always to provide you with excellent care. Hearing back from our patients is one way we can continue to improve our services. Please take a few minutes to complete the written survey that you may receive in the mail after you visit with us. Thank you!        [employer] Information     [employer] lets you send messages to your doctor, view your test results, renew your prescriptions, schedule appointments and more. To sign up, go to [email or web address] . Click on \"Log in\" on the left side of the screen, which will take you to the Welcome page. Then click on \"Sign up Now\" on the right side of the page.     You will be asked to enter the access code listed below, as well as some personal information. Please follow the directions to create your username and password.     Your access code is: [email or web address]  Expires: 2017 11:36 AM     Your access code will  " in 90 days. If you need help or a new code, please call your [facility] clinic or [phone].        Care EveryWhere ID     This is your Care EveryWhere ID. This could be used by other organizations to access your [facility] medical records  [id number]        Equal Access to Services     DEE CAMPA : Lexis chu Soolya, waaxda luqadaha, qaybta kaalmada lucille, nikki nguyen. So [facility] [phone].    ATENCIÓN: Si habla español, tiene a alex disposición servicios gratuitos de asistencia lingüística. Llame al [phone].    We comply with applicable federal civil rights laws and [place] laws. We do not discriminate on the basis of race, color, national origin, age, disability sex, sexual orientation or gender identity.            After Visit Summary       This is your record. Keep this with you and show to your community pharmacist(s) and doctor(s) at your next visit.

## 2017-06-29 NOTE — ED PROVIDER NOTES
History     Chief Complaint   Patient presents with     Vaginitis     was on antibiotics for poss std and now has a yeast infection     The history is provided by the patient. No  was used.     Kimberley Louis is a 37 year old female who presents with odorous vaginal discharge and irritation for the past 3 days. Was treated for STI recently and thinks it may have caused a yeast infection. STI results did come back negative. See chart note from [facility] 5/24/17. No belly pain or rash.     I have reviewed the Medications, Allergies, Past Medical and Surgical History, and Social History in the Epic system.      Review of Systems   Constitutional: Negative.    Gastrointestinal: Negative.    Genitourinary: Positive for vaginal discharge. Negative for dysuria, genital sores, pelvic pain, urgency, vaginal bleeding and vaginal pain.       Physical Exam   BP: 130/82  Pulse: 54  Temp: 97.9  F (36.6  C)  Resp: 18  SpO2: 99 %  Physical Exam   Constitutional: She is oriented to person, place, and time. She appears well-developed and well-nourished. No distress.   HENT:   Head: Normocephalic and atraumatic.   Nose: Nose normal.   Eyes: Conjunctivae are normal. No scleral icterus.   Cardiovascular: Normal rate.    Pulmonary/Chest: Effort normal.   Abdominal: Soft.   Genitourinary: There is no rash on the right labia. There is no rash on the left labia. Cervix exhibits discharge. Cervix exhibits no friability. There is tenderness in the vagina. No erythema or bleeding in the vagina. Vaginal discharge found.   Genitourinary Comments: Wet prep collected   Neurological: She is alert and oriented to person, place, and time.   Skin: She is not diaphoretic.   Nursing note and vitals reviewed.      ED Course     ED Course     Procedures            Labs Ordered and Resulted from Time of ED Arrival Up to the Time of Departure from the ED   WET PREP - Abnormal; Notable for the following:        Result Value    Wet Prep    (*)     Value: Few WBC'S seen  Clue cells seen  No Trichomonas seen  No yeast seen      All other components within normal limits       Assessments & Plan (with Medical Decision Making)     I have reviewed the nursing notes.  I have reviewed the findings, diagnosis, plan and need for follow up with the patient.  See discharge instructions.   New Prescriptions    FLUCONAZOLE (DIFLUCAN) 150 MG TABLET    Take 1 tablet if symptoms develo    METRONIDAZOLE (FLAGYL) 500 MG TABLET    Take 1 tablet (500 mg) by mouth 2 times daily for 7 days       Final diagnoses:   Bacterial vaginosis       6/29/2017   [facility]     Maryann Silva, GOMEZ  06/29/17 1145

## 2017-06-29 NOTE — DISCHARGE INSTRUCTIONS
Bacterial Vaginosis    You have a vaginal infection called bacterial vaginosis (BV). Both good and bad bacteria are present in a healthy vagina. BV occurs when these bacteria get out of balance. The number of bad bacteria increase. And the number of good bacteria decrease.  BV may or may not cause symptoms. If symptoms do occur, they can include:    Thin, gray, milky-white, or sometimes green discharge    Unpleasant odor or  fishy  smell    Itching, burning, or pain in or around the vagina  It is not known what causes BV, but certain factors can make the problem more likely. This can include:    Douching    Having sex with a new partner    Having sex with more than one partner  BV will sometimes go away on its own. But treatment is usually recommended. This is because untreated BV can increase the risk of more serious health problems such as:    Pelvic inflammatory disease (PID)     delivery (giving birth to a baby early if you re pregnant)    HIV and certain other sexually transmitted diseases (STDs)    Infection after surgery on the reproductive organs  Home care  General care    BV is most often treated with medicines called antibiotics. These may be given as pills or as a vaginal cream. If antibiotics are prescribed, be sure to use them exactly as directed. Also, be sure to complete all of the medicine, even if your symptoms go away.    Avoid douching or having sex during treatment.    If you have sex with a female partner, ask your healthcare provider if she should also be treated.  Prevention    Limit or avoid douching.    Avoid having sex. If you do have sex, then take steps to lower your risk:    Use condoms when having sex.    Limit the number of partners you have sex with.  Follow-up care  Follow up with your healthcare provider, or as advised.  When to seek medical advice  Call your healthcare provider right away if:    You have a fever of 100.4 F (38 C) or higher, or as directed by your  provider.    Your symptoms worsen, or they don t go away within a few days of starting treatment.    You have new pain in the lower belly or pelvic region.    You have side effects that bother you or a reaction to the pills or cream you re prescribed.    You or any partners you have sex with have new symptoms, such as a rash, joint pain, or sores.  Date Last Reviewed: 7/30/2015 2000-2017 [employer]. [address] [place] [address], [place] [address]. All rights reserved. This information is not intended as a substitute for professional medical care. Always follow your healthcare professional's instructions.

## 2017-07-09 DIAGNOSIS — J20.8 ACUTE BRONCHITIS DUE TO OTHER SPECIFIED ORGANISMS: ICD-10-CM

## 2017-07-10 RX ORDER — ALBUTEROL SULFATE 90 UG/1
AEROSOL, METERED RESPIRATORY (INHALATION)
Qty: 18 G | Refills: 0 | Status: SHIPPED | OUTPATIENT
Start: 2017-07-10 | End: 2018-04-26

## 2017-08-21 ENCOUNTER — HOSPITAL ENCOUNTER (EMERGENCY)
Facility: HOSPITAL | Age: 37
Discharge: HOME | End: 2017-08-21
Attending: PHYSICIAN ASSISTANT | Admitting: PHYSICIAN ASSISTANT
Payer: COMMERCIAL

## 2017-08-21 VITALS
SYSTOLIC BLOOD PRESSURE: 135 MMHG | OXYGEN SATURATION: 99 % | TEMPERATURE: 98.5 F | HEART RATE: 54 BPM | RESPIRATION RATE: 20 BRPM | DIASTOLIC BLOOD PRESSURE: 84 MMHG

## 2017-08-21 DIAGNOSIS — R10.9 LEFT FLANK PAIN: ICD-10-CM

## 2017-08-21 LAB
ALBUMIN UR-MCNC: NEGATIVE MG/DL
ANION GAP SERPL CALCULATED.3IONS-SCNC: 8 MMOL/L (ref 3–14)
APPEARANCE UR: CLEAR
BASOPHILS # BLD AUTO: 0.1 10E9/L (ref 0–0.2)
BASOPHILS NFR BLD AUTO: 0.5 %
BILIRUB UR QL STRIP: NEGATIVE
BUN SERPL-MCNC: 13 MG/DL (ref 7–30)
CALCIUM SERPL-MCNC: 8.8 MG/DL (ref 8.5–10.1)
CHLORIDE SERPL-SCNC: 103 MMOL/L (ref 94–109)
CO2 SERPL-SCNC: 26 MMOL/L (ref 20–32)
COLOR UR AUTO: YELLOW
CREAT SERPL-MCNC: 0.64 MG/DL (ref 0.52–1.04)
DIFFERENTIAL METHOD BLD: ABNORMAL
EOSINOPHIL # BLD AUTO: 0.2 10E9/L (ref 0–0.7)
EOSINOPHIL NFR BLD AUTO: 2 %
ERYTHROCYTE [DISTWIDTH] IN BLOOD BY AUTOMATED COUNT: 18.8 % (ref 10–15)
GFR SERPL CREATININE-BSD FRML MDRD: >90 ML/MIN/1.7M2
GLUCOSE SERPL-MCNC: 80 MG/DL (ref 70–99)
GLUCOSE UR STRIP-MCNC: NEGATIVE MG/DL
HCG UR QL: NEGATIVE
HCT VFR BLD AUTO: 34.8 % (ref 35–47)
HGB BLD-MCNC: 11.3 G/DL (ref 11.7–15.7)
HGB UR QL STRIP: NEGATIVE
IMM GRANULOCYTES # BLD: 0 10E9/L (ref 0–0.4)
IMM GRANULOCYTES NFR BLD: 0.2 %
KETONES UR STRIP-MCNC: NEGATIVE MG/DL
LEUKOCYTE ESTERASE UR QL STRIP: NEGATIVE
LYMPHOCYTES # BLD AUTO: 3.4 10E9/L (ref 0.8–5.3)
LYMPHOCYTES NFR BLD AUTO: 33.8 %
MCH RBC QN AUTO: 26 PG (ref 26.5–33)
MCHC RBC AUTO-ENTMCNC: 32.5 G/DL (ref 31.5–36.5)
MCV RBC AUTO: 80 FL (ref 78–100)
MONOCYTES # BLD AUTO: 0.6 10E9/L (ref 0–1.3)
MONOCYTES NFR BLD AUTO: 5.5 %
NEUTROPHILS # BLD AUTO: 5.8 10E9/L (ref 1.6–8.3)
NEUTROPHILS NFR BLD AUTO: 58 %
NITRATE UR QL: NEGATIVE
NRBC # BLD AUTO: 0 10*3/UL
NRBC BLD AUTO-RTO: 0 /100
PH UR STRIP: 6 PH (ref 4.7–8)
PLATELET # BLD AUTO: 480 10E9/L (ref 150–450)
POTASSIUM SERPL-SCNC: 3.5 MMOL/L (ref 3.4–5.3)
RBC # BLD AUTO: 4.35 10E12/L (ref 3.8–5.2)
SODIUM SERPL-SCNC: 137 MMOL/L (ref 133–144)
SOURCE: NORMAL
SP GR UR STRIP: 1.02 (ref 1–1.03)
UROBILINOGEN UR STRIP-MCNC: NORMAL MG/DL (ref 0–2)
WBC # BLD AUTO: 9.9 10E9/L (ref 4–11)

## 2017-08-21 PROCEDURE — 36415 COLL VENOUS BLD VENIPUNCTURE: CPT | Performed by: PHYSICIAN ASSISTANT

## 2017-08-21 PROCEDURE — 85025 COMPLETE CBC W/AUTO DIFF WBC: CPT | Performed by: PHYSICIAN ASSISTANT

## 2017-08-21 PROCEDURE — 25000128 H RX IP 250 OP 636: Performed by: PHYSICIAN ASSISTANT

## 2017-08-21 PROCEDURE — 96361 HYDRATE IV INFUSION ADD-ON: CPT

## 2017-08-21 PROCEDURE — 80048 BASIC METABOLIC PNL TOTAL CA: CPT | Performed by: PHYSICIAN ASSISTANT

## 2017-08-21 PROCEDURE — 99284 EMERGENCY DEPT VISIT MOD MDM: CPT | Performed by: PHYSICIAN ASSISTANT

## 2017-08-21 PROCEDURE — 81003 URINALYSIS AUTO W/O SCOPE: CPT | Performed by: PHYSICIAN ASSISTANT

## 2017-08-21 PROCEDURE — 81025 URINE PREGNANCY TEST: CPT | Performed by: PHYSICIAN ASSISTANT

## 2017-08-21 PROCEDURE — 99285 EMERGENCY DEPT VISIT HI MDM: CPT | Mod: 25

## 2017-08-21 PROCEDURE — 96374 THER/PROPH/DIAG INJ IV PUSH: CPT

## 2017-08-21 PROCEDURE — 74176 CT ABD & PELVIS W/O CONTRAST: CPT | Mod: TC

## 2017-08-21 RX ORDER — HYDROCODONE BITARTRATE AND ACETAMINOPHEN 5; 325 MG/1; MG/1
1-2 TABLET ORAL EVERY 4 HOURS PRN
Qty: 15 TABLET | Refills: 0 | Status: SHIPPED | OUTPATIENT
Start: 2017-08-21 | End: 2017-12-10

## 2017-08-21 RX ORDER — SODIUM CHLORIDE 9 MG/ML
1000 INJECTION, SOLUTION INTRAVENOUS CONTINUOUS
Status: DISCONTINUED | OUTPATIENT
Start: 2017-08-21 | End: 2017-08-21 | Stop reason: HOSPADM

## 2017-08-21 RX ORDER — LIDOCAINE 40 MG/G
CREAM TOPICAL
Status: DISCONTINUED | OUTPATIENT
Start: 2017-08-21 | End: 2017-08-21 | Stop reason: HOSPADM

## 2017-08-21 RX ORDER — KETOROLAC TROMETHAMINE 30 MG/ML
30 INJECTION, SOLUTION INTRAMUSCULAR; INTRAVENOUS ONCE
Status: COMPLETED | OUTPATIENT
Start: 2017-08-21 | End: 2017-08-21

## 2017-08-21 RX ADMIN — KETOROLAC TROMETHAMINE 30 MG: 30 INJECTION, SOLUTION INTRAMUSCULAR; INTRAVENOUS at 13:41

## 2017-08-21 RX ADMIN — SODIUM CHLORIDE 1000 ML: 9 INJECTION, SOLUTION INTRAVENOUS at 13:40

## 2017-08-21 ASSESSMENT — ENCOUNTER SYMPTOMS
CHILLS: 0
FREQUENCY: 1
SHORTNESS OF BREATH: 0
DIFFICULTY URINATING: 0
ABDOMINAL PAIN: 0
FLANK PAIN: 1
FEVER: 0
VOMITING: 0
NAUSEA: 1

## 2017-08-21 NOTE — DISCHARGE INSTRUCTIONS
Pain medications as needed.    Please follow-up with Xiomara Caceres for persistent pain.    Return HERE for ANY worsening symptoms or other concerns.

## 2017-08-21 NOTE — ED AVS SNAPSHOT
[facility]    [address]    [place] [address]    Phone:  [phone]                                       Kimberley Louis   MRN: [id number]    Department:  [facility]   Date of Visit:  8/21/2017           Patient Information     Date Of Birth          1980        Your diagnoses for this visit were:     Left flank pain        You were seen by Ruth Dixon PA-C.      Follow-up Information     Follow up with Xiomara Caceres PA.    Specialty:  Family Practice    Contact information:    [facility]  [address]  [place] [address] [phone]          Follow up with [facility].    Specialty:  EMERGENCY MEDICINE    Why:  If symptoms worsen    Contact information:    [address]  [place] [address] [phone]    Additional information:    From [facility]: Take [address]. Turn left at [address]. Turn left at the first stoplight on [address]. At the first stop sign, take a right onto [address]. Take a left into the parking lot and continue through until you reach the North enterance of the building.       From [place]: Take [address]. Take the [address] ramp towards [place]. Turn left onto [address]. Take a slight right onto [address]. Turn left at the first stoplight on [address]. At the first stop sign, take a right onto [address]. Take a left into the parking lot and continue through until you reach the North enterance of the building.       From [place]: Take [address]. Take a right at [address]. At the first stop sign, take a right onto [address]. Take a left into the parking lot and continue through until you reach the North enterance of the building.         Discharge Instructions       Pain medications as needed.    Please follow-up with Xiomara Caceres for persistent pain.    Return HERE for ANY worsening  symptoms or other concerns.      Discharge References/Attachments     FLANK PAIN, UNCERTAIN CAUSE (ENGLISH)         Review of your medicines      START taking        Dose / Directions Last dose taken    HYDROcodone-acetaminophen 5-325 MG per tablet   Commonly known as:  NORCO   Dose:  1-2 tablet   Quantity:  15 tablet        Take 1-2 tablets by mouth every 4 hours as needed for moderate to severe pain   Refills:  0          Our records show that you are taking the medicines listed below. If these are incorrect, please call your family doctor or clinic.        Dose / Directions Last dose taken    LACTAID 3000 UNIT tablet   Quantity:  120 tablet   Generic drug:  lactase        TAKE 1 TABLET BY MOUTH THREE TIMES DAILY WITH MEALS   Refills:  0        metoprolol 50 MG tablet   Commonly known as:  LOPRESSOR   Quantity:  180 tablet        TAKE ONE TABLET BY MOUTH TWICE DAILY   Refills:  0        VENTOLIN  (90 BASE) MCG/ACT Inhaler   Quantity:  18 g   Generic drug:  albuterol        INHALE TWO PUFFS INTO THE LUNGS EVERY 6 HOURS AS NEEDED FOR SHORTNESS OF BREATH   Refills:  0                Prescriptions were sent or printed at these locations (1 Prescription)                   [facility] [address] [place] [address] [address] AT [facility] [address]   [address] [facility] [address]    Telephone:  [phone]   Fax:  [phone]   Hours:                  Printed at Department/Unit printer (1 of 1)         HYDROcodone-acetaminophen (NORCO) 5-325 MG per tablet                Procedures and tests performed during your visit     Basic metabolic panel    CBC with platelets differential    CT Abdomen Pelvis w/o Contrast (stone protocol)    HCG qualitative urine    Peripheral IV catheter    UA reflex to Microscopic      Orders Needing Specimen Collection     None      Pending Results     Date and Time Order Name Status Description    8/21/2017 1339 CT Abdomen Pelvis w/o Contrast (stone protocol) Preliminary  "            Pending Culture Results     No orders found from 2017 to 2017.            Thank you for choosing [facility]       Thank you for choosing [facility] for your care. Our goal is always to provide you with excellent care. Hearing back from our patients is one way we can continue to improve our services. Please take a few minutes to complete the written survey that you may receive in the mail after you visit with us. Thank you!        [employer] Information     [employer] lets you send messages to your doctor, view your test results, renew your prescriptions, schedule appointments and more. To sign up, go to [email or web address] . Click on \"Log in\" on the left side of the screen, which will take you to the Welcome page. Then click on \"Sign up Now\" on the right side of the page.     You will be asked to enter the access code listed below, as well as some personal information. Please follow the directions to create your username and password.     Your access code is: [email or web address]  Expires: 2017 11:36 AM     Your access code will  in 90 days. If you need help or a new code, please call your [facility] clinic or [phone].        Care EveryWhere ID     This is your Care EveryWhere ID. This could be used by other organizations to access your [facility] medical records  [id number]        Equal Access to Services     DEE CAMPA AH: Hadganesh chu Soolya, waaxda luqadaha, qaybta kaalmada adeopal, nikki manning . So [facility] [phone].    ATENCIÓN: Si habla español, tiene a alex disposición servicios gratuitos de asistencia lingüística. Llame al [phone].    We comply with applicable federal civil rights laws and [place] laws. We do not discriminate on the basis of race, color, national origin, age, disability sex, sexual orientation or gender identity.            After Visit Summary       This is your record. Keep this with you and show to your community " pharmacist(s) and doctor(s) at your next visit.

## 2017-08-21 NOTE — ED PROVIDER NOTES
"  History     Chief Complaint   Patient presents with     Flank Pain     Hx of calculi. Pain since 0600 today and frequency yesterday     The history is provided by the patient.     Kimberley Louis is a 37 year old female who presented to the ED ambulatory for evaluation of left flank pain and urinary frequency.  No fevers or chills.  No vomiting.  No GI symptoms.  Symptoms started last night.  Long hx of kidney stones.  Pain is sharp, 6/10, and radiated to the groin.     I have reviewed the Medications, Allergies, Past Medical and Surgical History, and Social History in the Epic system.    Allergies: No Known Allergies      No current facility-administered medications on file prior to encounter.   Current Outpatient Prescriptions on File Prior to Encounter:  VENTOLIN  (90 BASE) MCG/ACT Inhaler INHALE TWO PUFFS INTO THE LUNGS EVERY 6 HOURS AS NEEDED FOR SHORTNESS OF BREATH   LACTAID 3000 UNITS tablet TAKE 1 TABLET BY MOUTH THREE TIMES DAILY WITH MEALS   metoprolol (LOPRESSOR) 50 MG tablet TAKE ONE TABLET BY MOUTH TWICE DAILY       Patient Active Problem List   Diagnosis     Essential hypertension     H/O renal calculi     Enlarged kidney     ACP (advance care planning)     Advanced directives, counseling/discussion       Past Surgical History:   Procedure Laterality Date     AS REMOVAL OF KIDNEY STONE       AS REMOVAL OF KIDNEY STONE       C REMOVAL OF KIDNEY STONE       TUBAL LIGATION         Social History   Substance Use Topics     Smoking status: Current Every Day Smoker     Packs/day: 0.50     Smokeless tobacco: Not on file     Alcohol use No         There is no immunization history on file for this patient.    BMI: Estimated body mass index is 31.46 kg/(m^2) as calculated from the following:    Height as of 5/5/16: 1.575 m (5' 2\").    Weight as of 5/5/16: 78 kg (172 lb).      Review of Systems   Constitutional: Negative for chills and fever.   Respiratory: Negative for shortness of breath.  "   Gastrointestinal: Positive for nausea. Negative for abdominal pain and vomiting.   Genitourinary: Positive for flank pain, frequency and urgency. Negative for difficulty urinating, pelvic pain and vaginal discharge.   Skin: Negative.        Physical Exam   BP: 137/94  Pulse: 71  Temp: 97.6  F (36.4  C)  Resp: 16  SpO2: 99 %  Physical Exam   Constitutional: She is oriented to person, place, and time. She appears well-developed and well-nourished. No distress.   Cardiovascular: Normal rate and regular rhythm.    Pulmonary/Chest: Effort normal.   Abdominal: Soft. There is no tenderness.   No CVA tenderness    Neurological: She is alert and oriented to person, place, and time.   Skin: Skin is warm and dry.   Psychiatric: She has a normal mood and affect.   Nursing note and vitals reviewed.      ED Course     ED Course     Procedures        Medications   lidocaine 1 % 1 mL (not administered)   lidocaine (LMX4) kit (not administered)   sodium chloride (PF) 0.9% PF flush 3 mL (not administered)   sodium chloride (PF) 0.9% PF flush 3 mL (not administered)   0.9% sodium chloride BOLUS (1,000 mLs Intravenous New Bag 8/21/17 1340)     Followed by   0.9% sodium chloride infusion (not administered)   ketorolac (TORADOL) injection 30 mg (30 mg Intravenous Given 8/21/17 1341)     Results for orders placed or performed during the hospital encounter of 08/21/17 (from the past 24 hour(s))   CBC with platelets differential   Result Value Ref Range    WBC 9.9 4.0 - 11.0 10e9/L    RBC Count 4.35 3.8 - 5.2 10e12/L    Hemoglobin 11.3 (L) 11.7 - 15.7 g/dL    Hematocrit 34.8 (L) 35.0 - 47.0 %    MCV 80 78 - 100 fl    MCH 26.0 (L) 26.5 - 33.0 pg    MCHC 32.5 31.5 - 36.5 g/dL    RDW 18.8 (H) 10.0 - 15.0 %    Platelet Count 480 (H) 150 - 450 10e9/L    Diff Method Automated Method     % Neutrophils 58.0 %    % Lymphocytes 33.8 %    % Monocytes 5.5 %    % Eosinophils 2.0 %    % Basophils 0.5 %    % Immature Granulocytes 0.2 %    Nucleated RBCs  0 0 /100    Absolute Neutrophil 5.8 1.6 - 8.3 10e9/L    Absolute Lymphocytes 3.4 0.8 - 5.3 10e9/L    Absolute Monocytes 0.6 0.0 - 1.3 10e9/L    Absolute Eosinophils 0.2 0.0 - 0.7 10e9/L    Absolute Basophils 0.1 0.0 - 0.2 10e9/L    Abs Immature Granulocytes 0.0 0 - 0.4 10e9/L    Absolute Nucleated RBC 0.0    Basic metabolic panel   Result Value Ref Range    Sodium 137 133 - 144 mmol/L    Potassium 3.5 3.4 - 5.3 mmol/L    Chloride 103 94 - 109 mmol/L    Carbon Dioxide 26 20 - 32 mmol/L    Anion Gap 8 3 - 14 mmol/L    Glucose 80 70 - 99 mg/dL    Urea Nitrogen 13 7 - 30 mg/dL    Creatinine 0.64 0.52 - 1.04 mg/dL    GFR Estimate >90 >60 mL/min/1.7m2    GFR Estimate If Black >90 >60 mL/min/1.7m2    Calcium 8.8 8.5 - 10.1 mg/dL   UA reflex to Microscopic   Result Value Ref Range    Color Urine Yellow     Appearance Urine Clear     Glucose Urine Negative NEG^Negative mg/dL    Bilirubin Urine Negative NEG^Negative    Ketones Urine Negative NEG^Negative mg/dL    Specific Gravity Urine 1.016 1.003 - 1.035    Blood Urine Negative NEG^Negative    pH Urine 6.0 4.7 - 8.0 pH    Protein Albumin Urine Negative NEG^Negative mg/dL    Urobilinogen mg/dL Normal 0.0 - 2.0 mg/dL    Nitrite Urine Negative NEG^Negative    Leukocyte Esterase Urine Negative NEG^Negative    Source Midstream Urine    HCG qualitative urine   Result Value Ref Range    HCG Qual Urine Negative NEG^Negative        Critical Care time:  none               Labs Ordered and Resulted from Time of ED Arrival Up to the Time of Departure from the ED   CBC WITH PLATELETS DIFFERENTIAL - Abnormal; Notable for the following:        Result Value    Hemoglobin 11.3 (*)     Hematocrit 34.8 (*)     MCH 26.0 (*)     RDW 18.8 (*)     Platelet Count 480 (*)     All other components within normal limits   BASIC METABOLIC PANEL   URINE MACROSCOPIC WITH REFLEX TO MICRO   HCG QUALITATIVE URINE   PERIPHERAL IV CATHETER       Assessments & Plan (with Medical Decision Making)   CT negative  for ureteral stones.  Labs as noted.  Flank pain of unknown etiology.  No chest pain or dyspnea.  Pain medications for home and close follow-up.  Return here for any worsening.  Ms. balderas voiced complete understanding and was agreeable.  She was discharged in stable and good condition, ambulatory without assistance.       I have reviewed the nursing notes.    I have reviewed the findings, diagnosis, plan and need for follow up with the patient.       New Prescriptions    HYDROCODONE-ACETAMINOPHEN (NORCO) 5-325 MG PER TABLET    Take 1-2 tablets by mouth every 4 hours as needed for moderate to severe pain       Final diagnoses:   Left flank pain       8/21/2017   [facility]     Ruth Dixon PA-C  08/21/17 4861

## 2017-08-21 NOTE — ED NOTES
Patient presents by self with complaint of flank pain that started at 0600 this am. Currently rating the pain 6/10 to left flank area, radiating to left abdomen/groin. Patient reports a history of kidney stones with several surgeries for calculi removal. Patient also reports urinary frequency with dysuria since yesterday. Denies hematuria. Denies fevers at home. Attached to monitors, IV placed and call light within reach.

## 2017-08-23 NOTE — PROGRESS NOTES
CT Abdomen Pelvis w/o Contrast (stone protocol) report routed to PCP, TOMER Bhatti.  Impression - small bilateral renal calculi, decreased in size when compared to the prior exam.  No ureteral stone or hydronephrosis.   Advised to have close follow-up.

## 2017-09-09 DIAGNOSIS — I10 ESSENTIAL HYPERTENSION: Primary | ICD-10-CM

## 2017-09-09 DIAGNOSIS — I10 HTN (HYPERTENSION): ICD-10-CM

## 2017-09-11 RX ORDER — METOPROLOL TARTRATE 50 MG
TABLET ORAL
Qty: 180 TABLET | Refills: 0 | Status: SHIPPED | OUTPATIENT
Start: 2017-09-11 | End: 2017-12-26

## 2017-09-11 NOTE — TELEPHONE ENCOUNTER
Metoprolol  Last office visit: 5/5/16  Last refill: 6/1/17 #180, 0 R.  Pharmacy requesting 90 day supply.  Medication pended.  Please advise.  Thank you.

## 2017-12-10 ENCOUNTER — HOSPITAL ENCOUNTER (EMERGENCY)
Facility: HOSPITAL | Age: 37
Discharge: HOME | End: 2017-12-10
Attending: FAMILY MEDICINE | Admitting: FAMILY MEDICINE
Payer: COMMERCIAL

## 2017-12-10 ENCOUNTER — APPOINTMENT (OUTPATIENT)
Dept: GENERAL RADIOLOGY | Facility: HOSPITAL | Age: 37
End: 2017-12-10
Attending: FAMILY MEDICINE
Payer: COMMERCIAL

## 2017-12-10 VITALS
TEMPERATURE: 98 F | RESPIRATION RATE: 16 BRPM | SYSTOLIC BLOOD PRESSURE: 109 MMHG | HEART RATE: 74 BPM | DIASTOLIC BLOOD PRESSURE: 75 MMHG | OXYGEN SATURATION: 98 %

## 2017-12-10 DIAGNOSIS — R07.89 CHEST WALL PAIN: ICD-10-CM

## 2017-12-10 LAB
ANION GAP SERPL CALCULATED.3IONS-SCNC: 7 MMOL/L (ref 3–14)
BASOPHILS # BLD AUTO: 0.1 10E9/L (ref 0–0.2)
BASOPHILS NFR BLD AUTO: 0.6 %
BUN SERPL-MCNC: 16 MG/DL (ref 7–30)
CALCIUM SERPL-MCNC: 8.4 MG/DL (ref 8.5–10.1)
CHLORIDE SERPL-SCNC: 108 MMOL/L (ref 94–109)
CO2 SERPL-SCNC: 24 MMOL/L (ref 20–32)
CREAT SERPL-MCNC: 0.53 MG/DL (ref 0.52–1.04)
DIFFERENTIAL METHOD BLD: ABNORMAL
EOSINOPHIL # BLD AUTO: 0.3 10E9/L (ref 0–0.7)
EOSINOPHIL NFR BLD AUTO: 2.4 %
ERYTHROCYTE [DISTWIDTH] IN BLOOD BY AUTOMATED COUNT: 15.8 % (ref 10–15)
GFR SERPL CREATININE-BSD FRML MDRD: >90 ML/MIN/1.7M2
GLUCOSE SERPL-MCNC: 92 MG/DL (ref 70–99)
HCT VFR BLD AUTO: 35.2 % (ref 35–47)
HGB BLD-MCNC: 11.9 G/DL (ref 11.7–15.7)
IMM GRANULOCYTES # BLD: 0 10E9/L (ref 0–0.4)
IMM GRANULOCYTES NFR BLD: 0.3 %
LIPASE SERPL-CCNC: 120 U/L (ref 73–393)
LYMPHOCYTES # BLD AUTO: 2.6 10E9/L (ref 0.8–5.3)
LYMPHOCYTES NFR BLD AUTO: 22.5 %
MCH RBC QN AUTO: 29 PG (ref 26.5–33)
MCHC RBC AUTO-ENTMCNC: 33.8 G/DL (ref 31.5–36.5)
MCV RBC AUTO: 86 FL (ref 78–100)
MONOCYTES # BLD AUTO: 0.7 10E9/L (ref 0–1.3)
MONOCYTES NFR BLD AUTO: 6.1 %
NEUTROPHILS # BLD AUTO: 7.9 10E9/L (ref 1.6–8.3)
NEUTROPHILS NFR BLD AUTO: 68.1 %
NRBC # BLD AUTO: 0 10*3/UL
NRBC BLD AUTO-RTO: 0 /100
PLATELET # BLD AUTO: 427 10E9/L (ref 150–450)
POTASSIUM SERPL-SCNC: 4.1 MMOL/L (ref 3.4–5.3)
RBC # BLD AUTO: 4.11 10E12/L (ref 3.8–5.2)
SODIUM SERPL-SCNC: 139 MMOL/L (ref 133–144)
TROPONIN I SERPL-MCNC: <0.015 UG/L (ref 0–0.04)
TROPONIN I SERPL-MCNC: <0.015 UG/L (ref 0–0.04)
WBC # BLD AUTO: 11.6 10E9/L (ref 4–11)

## 2017-12-10 PROCEDURE — 85025 COMPLETE CBC W/AUTO DIFF WBC: CPT | Performed by: FAMILY MEDICINE

## 2017-12-10 PROCEDURE — 25000132 ZZH RX MED GY IP 250 OP 250 PS 637: Performed by: FAMILY MEDICINE

## 2017-12-10 PROCEDURE — 99285 EMERGENCY DEPT VISIT HI MDM: CPT | Mod: 25

## 2017-12-10 PROCEDURE — 99285 EMERGENCY DEPT VISIT HI MDM: CPT | Performed by: FAMILY MEDICINE

## 2017-12-10 PROCEDURE — 93010 ELECTROCARDIOGRAM REPORT: CPT | Performed by: INTERNAL MEDICINE

## 2017-12-10 PROCEDURE — 71020 XR CHEST 2 VW: CPT | Mod: TC

## 2017-12-10 PROCEDURE — 80048 BASIC METABOLIC PNL TOTAL CA: CPT | Performed by: FAMILY MEDICINE

## 2017-12-10 PROCEDURE — 36415 COLL VENOUS BLD VENIPUNCTURE: CPT | Performed by: FAMILY MEDICINE

## 2017-12-10 PROCEDURE — 84484 ASSAY OF TROPONIN QUANT: CPT | Performed by: FAMILY MEDICINE

## 2017-12-10 PROCEDURE — 83690 ASSAY OF LIPASE: CPT | Performed by: FAMILY MEDICINE

## 2017-12-10 PROCEDURE — 25000125 ZZHC RX 250: Performed by: FAMILY MEDICINE

## 2017-12-10 PROCEDURE — 94640 AIRWAY INHALATION TREATMENT: CPT

## 2017-12-10 PROCEDURE — 25000128 H RX IP 250 OP 636: Performed by: FAMILY MEDICINE

## 2017-12-10 PROCEDURE — 93005 ELECTROCARDIOGRAM TRACING: CPT

## 2017-12-10 PROCEDURE — 96374 THER/PROPH/DIAG INJ IV PUSH: CPT

## 2017-12-10 PROCEDURE — 96375 TX/PRO/DX INJ NEW DRUG ADDON: CPT

## 2017-12-10 RX ORDER — ALBUTEROL SULFATE 0.83 MG/ML
2.5 SOLUTION RESPIRATORY (INHALATION) ONCE
Status: COMPLETED | OUTPATIENT
Start: 2017-12-10 | End: 2017-12-10

## 2017-12-10 RX ORDER — MORPHINE SULFATE 2 MG/ML
2 INJECTION, SOLUTION INTRAMUSCULAR; INTRAVENOUS ONCE
Status: COMPLETED | OUTPATIENT
Start: 2017-12-10 | End: 2017-12-10

## 2017-12-10 RX ORDER — ONDANSETRON 2 MG/ML
4 INJECTION INTRAMUSCULAR; INTRAVENOUS ONCE
Status: COMPLETED | OUTPATIENT
Start: 2017-12-10 | End: 2017-12-10

## 2017-12-10 RX ORDER — KETOROLAC TROMETHAMINE 30 MG/ML
30 INJECTION, SOLUTION INTRAMUSCULAR; INTRAVENOUS ONCE
Status: COMPLETED | OUTPATIENT
Start: 2017-12-10 | End: 2017-12-10

## 2017-12-10 RX ORDER — KETOROLAC TROMETHAMINE 10 MG/1
10 TABLET, FILM COATED ORAL EVERY 6 HOURS PRN
Qty: 20 TABLET | Refills: 0 | Status: SHIPPED | OUTPATIENT
Start: 2017-12-10 | End: 2018-01-02

## 2017-12-10 RX ORDER — ASPIRIN 81 MG/1
324 TABLET, CHEWABLE ORAL ONCE
Status: DISCONTINUED | OUTPATIENT
Start: 2017-12-10 | End: 2017-12-10 | Stop reason: HOSPADM

## 2017-12-10 RX ADMIN — ALBUTEROL SULFATE 2.5 MG: 2.5 SOLUTION RESPIRATORY (INHALATION) at 11:28

## 2017-12-10 RX ADMIN — LIDOCAINE HYDROCHLORIDE 30 ML: 20 SOLUTION ORAL; TOPICAL at 10:41

## 2017-12-10 RX ADMIN — MORPHINE SULFATE 2 MG: 2 INJECTION, SOLUTION INTRAMUSCULAR; INTRAVENOUS at 12:07

## 2017-12-10 RX ADMIN — KETOROLAC TROMETHAMINE 30 MG: 30 INJECTION, SOLUTION INTRAMUSCULAR at 13:32

## 2017-12-10 RX ADMIN — ONDANSETRON 4 MG: 2 INJECTION INTRAMUSCULAR; INTRAVENOUS at 12:02

## 2017-12-10 ASSESSMENT — ENCOUNTER SYMPTOMS
NAUSEA: 0
FATIGUE: 0
DIARRHEA: 0
COLOR CHANGE: 1
NERVOUS/ANXIOUS: 1
DIZZINESS: 0
VOMITING: 0
ABDOMINAL PAIN: 0
FEVER: 0
DIAPHORESIS: 0
WEAKNESS: 0
CONSTIPATION: 0
ACTIVITY CHANGE: 1
MUSCULOSKELETAL NEGATIVE: 1
DYSURIA: 0
DYSPHORIC MOOD: 1
LIGHT-HEADEDNESS: 0
SHORTNESS OF BREATH: 1

## 2017-12-10 NOTE — LETTER
[facility]  [address] [address]  Phone: [phone]    December 10, 2017        Kimberley CELAYA Kenroykaci  [address] [address]          To whom it may concern:    RE: Kimberley Louis    Kimberley Louis was seen in the emergency room and can return to work with a 10 pound lifting restriction for 5 days.    Please contact me for questions or concerns.      Sincerely,        Kiki Dye MD

## 2017-12-10 NOTE — ED AVS SNAPSHOT
[facility]    [address]    [facility] [address]    Phone:  [phone]                                       Kimberley Louis   MRN: [id number]    Department:  [facility]   Date of Visit:  12/10/2017           After Visit Summary Signature Page     I have received my discharge instructions, and my questions have been answered. I have discussed any challenges I see with this plan with the nurse or doctor.    ..........................................................................................................................................  Patient/Patient Representative Signature      ..........................................................................................................................................  Patient Representative Print Name and Relationship to Patient    ..................................................               ................................................  Date                                            Time    ..........................................................................................................................................  Reviewed by Signature/Title    ...................................................              ..............................................  Date                                                            Time

## 2017-12-10 NOTE — ED AVS SNAPSHOT
[facility]    [address] [place] [address]    [place] [address]    Phone:  [phone]                                       Kimberley Louis   MRN: [id number]    Department:  [facility]   Date of Visit:  12/10/2017           Patient Information     Date Of Birth          1980        Your diagnoses for this visit were:     Chest wall pain        You were seen by Kiki Whittaker MD.      Follow-up Information     Follow up with Xiomara Caceres PA In 3 days.    Specialty:  Family Practice    Why:  If symptoms worsen, or fail to improve    Contact information:    [facility]  [address]  [place] [address]  [phone]          Discharge Instructions         Chest Wall Pain: Costochondritis    The chest pain that you have had today is caused by costochondritis. This condition is caused by an inflammation of the cartilage joining your ribs to your breastbone. It is not caused by heart or lung problems. Your healthcare team has made sure that the chest pain you feel is not from a life threatening cause of chest pain such as heart attack, collapsed lung, blood clot in the lung, tear in the aorta, or esophageal rupture. The inflammation may have been brought on by a blow to the chest, lifting heavy objects, intense exercise, or an illness that made you cough and sneeze a lot. It often occurs during times of emotional stress. It can be painful, but it is not dangerous. It usually goes away in 1 to 2 weeks. But it may happen again. Rarely, a more serious condition may cause symptoms similar to costochondritis. That s why it s important to watch for the warning signs listed below.  Home care  Follow these guidelines when caring for yourself at home:    If you feel that emotional stress is a cause of your condition, try to figure out the sources of that stress. It may not be obvious. Learn ways to deal with the stress in your life. This can include regular  exercise, muscle relaxation, meditation, or simply taking time out for yourself.    You may use acetaminophen, ibuprofen, or naproxen to control pain, unless another pain medicine was prescribed. If you have liver or kidney disease or ever had a stomach ulcer, talk with your healthcare provider before using these medicines.    You can also help ease pain by using a hot, wet compress or heating pad. Use this with or without a medicated skin cream that helps relieves pain.    Do stretching exercise as advised by your provider.    Take any prescribed medicines as directed.  Follow-up care  Follow up with your healthcare provider, or as advised, if you do not start to get better in the next 2 days.  When to seek medical advice  Call your healthcare provider right away if any of these occur:    A change in the type of pain. Call if it feels different, becomes more serious, lasts longer, or spreads into your shoulder, arm, neck, jaw, or back.    Shortness of breath or pain gets worse when you breathe    Weakness, dizziness, or fainting    Cough with dark-colored sputum (phlegm) or blood    Abdominal pain    Dark red or black stools    Fever of 100.4 F (38 C) or higher, or as directed by your healthcare provider  Date Last Reviewed: 12/1/2016 2000-2017 [employer]. [address] [place] [address], [place] [address]. All rights reserved. This information is not intended as a substitute for professional medical care. Always follow your healthcare professional's instructions.             Review of your medicines      START taking        Dose / Directions Last dose taken    ketorolac 10 MG tablet   Commonly known as:  TORADOL   Dose:  10 mg   Quantity:  20 tablet        Take 1 tablet (10 mg) by mouth every 6 hours as needed for moderate pain   Refills:  0          Our records show that you are taking the medicines listed below. If these are incorrect, please call your family doctor or clinic.        Dose / Directions  "Last dose taken    LACTAID 3000 UNIT tablet   Quantity:  120 tablet   Generic drug:  lactase        TAKE 1 TABLET BY MOUTH THREE TIMES DAILY WITH MEALS   Refills:  0        metoprolol 50 MG tablet   Commonly known as:  LOPRESSOR   Quantity:  180 tablet        TAKE ONE TABLET BY MOUTH TWICE DAILY   Refills:  0        VENTOLIN  (90 BASE) MCG/ACT Inhaler   Quantity:  18 g   Generic drug:  albuterol        INHALE TWO PUFFS INTO THE LUNGS EVERY 6 HOURS AS NEEDED FOR SHORTNESS OF BREATH   Refills:  0                Prescriptions were sent or printed at these locations (1 Prescription)                   [facility] - [place], [place] - [address] AT [facility] [address]   [address], [facility] [address]    Telephone:  [phone]   Fax:  [phone]   Hours:                  E-Prescribed (1 of 1)         ketorolac (TORADOL) 10 MG tablet                Procedures and tests performed during your visit     Basic metabolic panel    CBC with platelets differential    Cardiac Continuous Monitoring    EKG 12-lead, tracing only    Lipase    Pulse oximetry nursing    Troponin I    Troponin I (second draw)    XR Chest 2 Views      Orders Needing Specimen Collection     None      Pending Results     No orders found from 12/8/2017 to 12/11/2017.            Pending Culture Results     No orders found from 12/8/2017 to 12/11/2017.            Thank you for choosing [facility]       Thank you for choosing [facility] for your care. Our goal is always to provide you with excellent care. Hearing back from our patients is one way we can continue to improve our services. Please take a few minutes to complete the written survey that you may receive in the mail after you visit with us. Thank you!        Roller Information     Roller lets you send messages to your doctor, view your test results, renew your prescriptions, schedule appointments and more. To sign up, go to [email or web address] . Click on \"Log in\" on " "the left side of the screen, which will take you to the Welcome page. Then click on \"Sign up Now\" on the right side of the page.     You will be asked to enter the access code listed below, as well as some personal information. Please follow the directions to create your username and password.     Your access code is: [email or web address]  Expires: 3/10/2018  1:28 PM     Your access code will  in 90 days. If you need help or a new code, please call your [facility] or [phone].        Care EveryWhere ID     This is your Care EveryWhere ID. This could be used by other organizations to access your [facility] medical records  [id number]        Equal Access to Services     DEE CAMPA : Lexis Skinner, celsa hutchins, matilde adkins, nikki nguyen. So [facility] [phone].    ATENCIÓN: Si habla español, tiene a alex disposición servicios gratuitos de asistencia lingüística. Llame al [phone].    We comply with applicable federal civil rights laws and [place] laws. We do not discriminate on the basis of race, color, national origin, age, disability, sex, sexual orientation, or gender identity.            After Visit Summary       This is your record. Keep this with you and show to your community pharmacist(s) and doctor(s) at your next visit.                  "

## 2017-12-10 NOTE — ED PROVIDER NOTES
History     Chief Complaint   Patient presents with     Chest Pain     4/10 chest to shoulder to neck pain     HPI  Kimberley Louis is a 37 year old female who has pain in her substernal area that has radiated to her jaw and left shoulder.  She has not had dyspnea, diaphoresis, syncope or nausea.  She has no history of cardiac problems, has never had pain of this sort before.  She has also had GERD recently, has had to buy TUMS to handle it.  She denies any family history of heart disease.  She does not believe her metoprolol is working as it used to, she feels she has palpitations during the day, took an extra tablet of 50mg yesterday due to this.  She has not seen her primary care in some time.    Problem List:    Patient Active Problem List    Diagnosis Date Noted     Advanced directives, counseling/discussion 05/05/2016     Priority: Medium     Advance Care Planning 5/5/2016: ACP Review of Chart / Resources Provided:  Reviewed chart for advance care plan.  Kimberley Louis has no plan or code status on file. Discussed available resources and provided with information. Confirmed code status reflects current choices pending further ACP discussions.  Confirmed/documented legally designated decision makers.  Added by SAYDA CABRERA             ACP (advance care planning) 04/13/2016     Priority: Medium     Advance Care Planning 4/13/2016: ACP Review of Chart / Resources Provided:  Reviewed chart for advance care plan.  Kimberley Louis has no plan or code status on file. Discussed available resources and provided with information. Confirmed code status reflects current choices pending further ACP discussions.  Confirmed/documented legally designated decision makers.  Added by Sandra Anne             Essential hypertension 11/19/2015     Priority: Medium     H/O renal calculi 11/19/2015     Priority: Medium     Enlarged kidney 11/19/2015     Priority: Medium        Past Medical History:    Past Medical  History:   Diagnosis Date     Enlarged kidney 11/19/2015     Essential hypertension 11/19/2015     H/O renal calculi 11/19/2015     H/O renal calculi 11/19/2015       Past Surgical History:    Past Surgical History:   Procedure Laterality Date     AS REMOVAL OF KIDNEY STONE       AS REMOVAL OF KIDNEY STONE       C REMOVAL OF KIDNEY STONE       TUBAL LIGATION         Family History:    Family History   Problem Relation Age of Onset     DIABETES Mother      Emphysema Mother      Cervical Cancer Mother      DIABETES Father      Hypertension Father      Hypertension Maternal Grandmother      DIABETES Maternal Grandmother      Schizophrenia Maternal Grandmother        Social History:  Marital Status:  Single [1]  Social History   Substance Use Topics     Smoking status: Current Every Day Smoker     Packs/day: 0.50     Smokeless tobacco: Never Used     Alcohol use Yes      Comment: occasional        Medications:      ketorolac (TORADOL) 10 MG tablet   metoprolol (LOPRESSOR) 50 MG tablet   VENTOLIN  (90 BASE) MCG/ACT Inhaler   LACTAID 3000 UNITS tablet         Review of Systems   Constitutional: Positive for activity change. Negative for diaphoresis, fatigue and fever.   HENT: Negative.    Respiratory: Positive for shortness of breath.    Cardiovascular: Positive for chest pain.        See HPI   Gastrointestinal: Negative for abdominal pain, constipation, diarrhea, nausea and vomiting.   Genitourinary: Negative for dysuria.   Musculoskeletal: Negative.    Skin: Positive for color change.   Neurological: Negative for dizziness, syncope, weakness and light-headedness.   Psychiatric/Behavioral: Positive for dysphoric mood. The patient is nervous/anxious.        Physical Exam   BP: 115/81  Pulse: 74  Heart Rate: 57  Temp: 98.7  F (37.1  C)  Resp: 18  SpO2: 97 %      Physical Exam   Constitutional: She is oriented to person, place, and time. She appears well-developed and well-nourished. No distress.   HENT:   Head:  Normocephalic and atraumatic.   Neck: Normal range of motion. Neck supple.   Cardiovascular: Normal rate, regular rhythm, normal heart sounds and intact distal pulses.    No murmur heard.  Pulmonary/Chest: Effort normal and breath sounds normal. No respiratory distress. She has no wheezes.   Abdominal: Soft. Bowel sounds are normal. She exhibits no distension. There is no tenderness.   Musculoskeletal: Normal range of motion. She exhibits no edema.   Neurological: She is alert and oriented to person, place, and time.   Skin: Skin is warm and dry.   Psychiatric: Judgment normal. Her mood appears anxious. Her affect is blunt. Cognition and memory are normal.   Nursing note and vitals reviewed.      ED Course     ED Course     Procedures         EKG Interpretation:      Interpreted by Kiki Dye  Time reviewed: 0952  Symptoms at time of EKG: chest pain   Rhythm: sinus bradycardia  Rate: 50-60  Axis: normal  Ectopy: none  Conduction: normal  ST Segments/ T Waves: No ST-T wave changes  Q Waves: none  Comparison to prior: No old EKG available    Clinical Impression: normal EKG excepting mild bradycardia    Labs Ordered and Resulted from Time of ED Arrival Up to the Time of Departure from the ED   BASIC METABOLIC PANEL - Abnormal; Notable for the following:        Result Value    Calcium 8.4 (*)     All other components within normal limits   CBC WITH PLATELETS DIFFERENTIAL - Abnormal; Notable for the following:     WBC 11.6 (*)     RDW 15.8 (*)     All other components within normal limits   TROPONIN I   LIPASE   TROPONIN I   PULSE OXIMETRY NURSING   CARDIAC CONTINUOUS MONITORING       Assessments & Plan (with Medical Decision Making)   Given NTG in ambulance which partially relieved the pain, but did not completely alleviate it.  Given GI cocktail with same result.  Given Morphine (, too low for ntg) and Zofran, which took her pain down to a 1/10.  Troponin negative x2, EKG normal, patient is not  having cardiac pain.  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    I have reviewed the nursing notes.    I have reviewed the findings, diagnosis, plan and need for follow up with the patient.  New Prescriptions    KETOROLAC (TORADOL) 10 MG TABLET    Take 1 tablet (10 mg) by mouth every 6 hours as needed for moderate pain       Final diagnoses:   Chest wall pain       12/10/2017   [facility]     Kiki Whittaker MD  12/10/17 4347

## 2017-12-10 NOTE — ED NOTES
All discharge instructions and avs discussed with patient.  Pt able to verbalize home care, follow up and medication management.  Vitals stable.  Rib pain 1/10.  Work note sent home with patient.  Prescription sent to pharmacy of choice.  All belongings sent home.  pts boyfriend here to drive pt home

## 2017-12-10 NOTE — DISCHARGE INSTRUCTIONS
Chest Wall Pain: Costochondritis    The chest pain that you have had today is caused by costochondritis. This condition is caused by an inflammation of the cartilage joining your ribs to your breastbone. It is not caused by heart or lung problems. Your healthcare team has made sure that the chest pain you feel is not from a life threatening cause of chest pain such as heart attack, collapsed lung, blood clot in the lung, tear in the aorta, or esophageal rupture. The inflammation may have been brought on by a blow to the chest, lifting heavy objects, intense exercise, or an illness that made you cough and sneeze a lot. It often occurs during times of emotional stress. It can be painful, but it is not dangerous. It usually goes away in 1 to 2 weeks. But it may happen again. Rarely, a more serious condition may cause symptoms similar to costochondritis. That s why it s important to watch for the warning signs listed below.  Home care  Follow these guidelines when caring for yourself at home:    If you feel that emotional stress is a cause of your condition, try to figure out the sources of that stress. It may not be obvious. Learn ways to deal with the stress in your life. This can include regular exercise, muscle relaxation, meditation, or simply taking time out for yourself.    You may use acetaminophen, ibuprofen, or naproxen to control pain, unless another pain medicine was prescribed. If you have liver or kidney disease or ever had a stomach ulcer, talk with your healthcare provider before using these medicines.    You can also help ease pain by using a hot, wet compress or heating pad. Use this with or without a medicated skin cream that helps relieves pain.    Do stretching exercise as advised by your provider.    Take any prescribed medicines as directed.  Follow-up care  Follow up with your healthcare provider, or as advised, if you do not start to get better in the next 2 days.  When to seek medical  advice  Call your healthcare provider right away if any of these occur:    A change in the type of pain. Call if it feels different, becomes more serious, lasts longer, or spreads into your shoulder, arm, neck, jaw, or back.    Shortness of breath or pain gets worse when you breathe    Weakness, dizziness, or fainting    Cough with dark-colored sputum (phlegm) or blood    Abdominal pain    Dark red or black stools    Fever of 100.4 F (38 C) or higher, or as directed by your healthcare provider  Date Last Reviewed: 12/1/2016 2000-2017 [employer]. [address] [place] [address]. All rights reserved. This information is not intended as a substitute for professional medical care. Always follow your healthcare professional's instructions.

## 2017-12-11 ENCOUNTER — OFFICE VISIT (OUTPATIENT)
Dept: FAMILY MEDICINE | Facility: OTHER | Age: 37
End: 2017-12-11
Attending: NURSE PRACTITIONER
Payer: COMMERCIAL

## 2017-12-11 VITALS
WEIGHT: 146 LBS | DIASTOLIC BLOOD PRESSURE: 70 MMHG | RESPIRATION RATE: 16 BRPM | BODY MASS INDEX: 26.87 KG/M2 | TEMPERATURE: 97.8 F | HEIGHT: 62 IN | OXYGEN SATURATION: 98 % | HEART RATE: 84 BPM | SYSTOLIC BLOOD PRESSURE: 124 MMHG

## 2017-12-11 DIAGNOSIS — Z71.6 ENCOUNTER FOR SMOKING CESSATION COUNSELING: ICD-10-CM

## 2017-12-11 DIAGNOSIS — R07.9 CHEST PAIN OF UNKNOWN ETIOLOGY: Primary | ICD-10-CM

## 2017-12-11 LAB
ALBUMIN SERPL-MCNC: 3.7 G/DL (ref 3.4–5)
ALP SERPL-CCNC: 84 U/L (ref 40–150)
ALT SERPL W P-5'-P-CCNC: 25 U/L (ref 0–50)
ANION GAP SERPL CALCULATED.3IONS-SCNC: 4 MMOL/L (ref 3–14)
AST SERPL W P-5'-P-CCNC: 13 U/L (ref 0–45)
BILIRUB SERPL-MCNC: 0.9 MG/DL (ref 0.2–1.3)
BUN SERPL-MCNC: 18 MG/DL (ref 7–30)
CALCIUM SERPL-MCNC: 8.4 MG/DL (ref 8.5–10.1)
CHLORIDE SERPL-SCNC: 106 MMOL/L (ref 94–109)
CO2 SERPL-SCNC: 28 MMOL/L (ref 20–32)
CREAT SERPL-MCNC: 0.69 MG/DL (ref 0.52–1.04)
GFR SERPL CREATININE-BSD FRML MDRD: >90 ML/MIN/1.7M2
GLUCOSE SERPL-MCNC: 94 MG/DL (ref 70–99)
POTASSIUM SERPL-SCNC: 4 MMOL/L (ref 3.4–5.3)
PROT SERPL-MCNC: 7.1 G/DL (ref 6.8–8.8)
SODIUM SERPL-SCNC: 138 MMOL/L (ref 133–144)
TSH SERPL DL<=0.005 MIU/L-ACNC: 1.18 MU/L (ref 0.4–4)

## 2017-12-11 PROCEDURE — 99214 OFFICE O/P EST MOD 30 MIN: CPT | Performed by: NURSE PRACTITIONER

## 2017-12-11 PROCEDURE — 36415 COLL VENOUS BLD VENIPUNCTURE: CPT | Mod: ZL | Performed by: NURSE PRACTITIONER

## 2017-12-11 PROCEDURE — 80053 COMPREHEN METABOLIC PANEL: CPT | Mod: ZL | Performed by: NURSE PRACTITIONER

## 2017-12-11 PROCEDURE — 84443 ASSAY THYROID STIM HORMONE: CPT | Mod: ZL | Performed by: NURSE PRACTITIONER

## 2017-12-11 PROCEDURE — 99212 OFFICE O/P EST SF 10 MIN: CPT

## 2017-12-11 ASSESSMENT — ANXIETY QUESTIONNAIRES
2. NOT BEING ABLE TO STOP OR CONTROL WORRYING: NOT AT ALL
1. FEELING NERVOUS, ANXIOUS, OR ON EDGE: NOT AT ALL
GAD7 TOTAL SCORE: 0
4. TROUBLE RELAXING: NOT AT ALL
5. BEING SO RESTLESS THAT IT IS HARD TO SIT STILL: NOT AT ALL
IF YOU CHECKED OFF ANY PROBLEMS ON THIS QUESTIONNAIRE, HOW DIFFICULT HAVE THESE PROBLEMS MADE IT FOR YOU TO DO YOUR WORK, TAKE CARE OF THINGS AT HOME, OR GET ALONG WITH OTHER PEOPLE: NOT DIFFICULT AT ALL
3. WORRYING TOO MUCH ABOUT DIFFERENT THINGS: NOT AT ALL
7. FEELING AFRAID AS IF SOMETHING AWFUL MIGHT HAPPEN: NOT AT ALL
6. BECOMING EASILY ANNOYED OR IRRITABLE: NOT AT ALL

## 2017-12-11 ASSESSMENT — PATIENT HEALTH QUESTIONNAIRE - PHQ9: SUM OF ALL RESPONSES TO PHQ QUESTIONS 1-9: 1

## 2017-12-11 ASSESSMENT — PAIN SCALES - GENERAL: PAINLEVEL: MILD PAIN (3)

## 2017-12-11 NOTE — MR AVS SNAPSHOT
After Visit Summary   12/11/2017    Kimberley Louis    MRN: [id number]           Patient Information     Date Of Birth          1980        Visit Information        Provider Department      12/11/2017 9:00 AM Marina Barrientos APRN CNP [facility] [place]        Today's Diagnoses     Chest pain of unknown etiology    -  1      Care Instructions       *CHEST PAIN, UNCERTAIN CAUSE    Based on your exam today, the exact cause of your chest pain is not certain. Your condition does not seem serious at this time, and your pain does not appear to be coming from your heart. However, sometimes the signs of a serious problem take more time to appear. Therefore, watch for the warning signs listed below.  HOME CARE:  1. Rest today and avoid strenuous activity.  2. Take any prescribed medicine as directed.  FOLLOW UP with your doctor in 1-3 days.   GET PROMPT MEDICAL ATTENTION if any of the following occur:    A change in the type of pain: if it feels different, becomes more severe, lasts longer, or begins to spread into your shoulder, arm, neck, jaw or back    Shortness of breath or increased pain with breathing    Weakness, dizziness, or fainting    Cough with blood or dark colored sputum (phlegm)    Fever over 101  F (38.3  C)    Swelling, pain or redness in one leg    3086-8118 [employer]. [address] [place] [address]. All rights reserved. This information is not intended as a substitute for professional medical care. Always follow your healthcare professional's instructions.  This information has been modified by your health care provider with permission from the publisher.            Follow-ups after your visit        Additional Services     CARDIOLOGY EVAL ADULT REFERRAL       Your provider has referred you to:  [facility]     Please be aware that coverage of these services is subject to the terms and limitations of your health insurance plan.  Call  member services at your health plan with any benefit or coverage questions.      Type of Referral:  New Cardiology Consult - did see cardiologist in her 20's while living in [place] Echo ordered    Timeframe requested:  Less than 1 week    Please bring the following to your appointment:  >>   Any x-rays, CTs or MRIs which have been performed.  Contact the facility where they were done to arrange for  prior to your scheduled appointment.    >>   List of current medications  >>   This referral request   >>   Any documents/labs given to you for this referral                  Follow-up notes from your care team     Return in about 2 weeks (around 12/25/2017) for cardiology and PCP.      Your next 10 appointments already scheduled     Dec 12, 2017  8:00 AM CST   Ech Complete with [address] ([facility] )    [address] [employer] [id number] [phone]           1. Please bring or wear a comfortable two-piece outfit. 2. You may eat, drink and take your normal medicines. 3. For any questions that cannot be answered, please contact the ordering physician            Jan 03, 2018 10:00 AM CST   (Arrive by 9:45 AM)   New Visit with Roly Cruz,    [facility] ([facility] )    [address]  [facility] [id number] [phone]              Future tests that were ordered for you today     Open Future Orders        Priority Expected Expires Ordered    Echocardiogram Complete Routine  12/11/2018 12/11/2017            Who to contact     If you have questions or need follow up information about today's clinic visit or your schedule please contact [facility] directly at [phone].  Normal or non-critical lab and imaging results will be communicated to you by MyChart, letter or phone within 4 business days after the clinic has received the results. If you do not hear from us within 7 days, please contact the clinic through MyChart or phone. If  "you have a critical or abnormal lab result, we will notify you by phone as soon as possible.  Submit refill requests through [employer] or call your pharmacy and they will forward the refill request to us. Please allow 3 business days for your refill to be completed.          Additional Information About Your Visit        [employer] Information     [employer] lets you send messages to your doctor, view your test results, renew your prescriptions, schedule appointments and more. To sign up, go to [email or web address] . Click on \"Log in\" on the left side of the screen, which will take you to the Welcome page. Then click on \"Sign up Now\" on the right side of the page.     You will be asked to enter the access code listed below, as well as some personal information. Please follow the directions to create your username and password.     Your access code is: [email or web address]  Expires: 3/10/2018  1:28 PM     Your access code will  in 90 days. If you need help or a new code, please call your Outlook clinic or [phone].        Care EveryWhere ID     This is your Care EveryWhere ID. This could be used by other organizations to access your Outlook medical records  [id number]        Your Vitals Were     Pulse Temperature Respirations Height Pulse Oximetry BMI (Body Mass Index)    84 97.8  F (36.6  C) (Tympanic) 16 5' 2\" (1.575 m) 98% 26.7 kg/m2       Blood Pressure from Last 3 Encounters:   17 124/70   12/10/17 109/75   17 135/84    Weight from Last 3 Encounters:   17 146 lb (66.2 kg)   16 172 lb (78 kg)   16 164 lb (74.4 kg)              We Performed the Following     CARDIOLOGY EVAL ADULT REFERRAL     Comprehensive metabolic panel (BMP + Alb, Alk Phos, ALT, AST, Total. Bili, TP)     TSH with free T4 reflex        Primary Care Provider Office Phone # Fax #    TOMER Jane [phone]       [facility] [address]  [facility] [address]        Equal " Access to Services     [facility]: Hadii aad ku hadmagenalphonse Coriolya, waisada luqadaha, qaybta kazuleimanikki agrawal. So [facility] [phone].    ATENCIÓN: Si habla español, tiene a alex disposición servicios gratuitos de asistencia lingüística. Llame al [phone].    We comply with applicable federal civil rights laws and [place] laws. We do not discriminate on the basis of race, color, national origin, age, disability, sex, sexual orientation, or gender identity.            Thank you!     Thank you for choosing [facility] [place]  for your care. Our goal is always to provide you with excellent care. Hearing back from our patients is one way we can continue to improve our services. Please take a few minutes to complete the written survey that you may receive in the mail after your visit with us. Thank you!             Your Updated Medication List - Protect others around you: Learn how to safely use, store and throw away your medicines at www.disposemymeds.org.          This list is accurate as of: 12/11/17  9:33 AM.  Always use your most recent med list.                   Brand Name Dispense Instructions for use Diagnosis    ketorolac 10 MG tablet    TORADOL    20 tablet    Take 1 tablet (10 mg) by mouth every 6 hours as needed for moderate pain        LACTAID 3000 UNIT tablet   Generic drug:  lactase     120 tablet    TAKE 1 TABLET BY MOUTH THREE TIMES DAILY WITH MEALS    Lactose intolerance in adult       metoprolol 50 MG tablet    LOPRESSOR    180 tablet    TAKE ONE TABLET BY MOUTH TWICE DAILY    Essential hypertension       VENTOLIN  (90 BASE) MCG/ACT Inhaler   Generic drug:  albuterol     18 g    INHALE TWO PUFFS INTO THE LUNGS EVERY 6 HOURS AS NEEDED FOR SHORTNESS OF BREATH    Acute bronchitis due to other specified organisms

## 2017-12-11 NOTE — NURSING NOTE
"Chief Complaint   Patient presents with     Pain     Hypertension       Initial /70 (BP Location: Right arm, Patient Position: Sitting, Cuff Size: Adult Regular)  Pulse 84  Temp 97.8  F (36.6  C) (Tympanic)  Resp 16  Ht 5' 2\" (1.575 m)  Wt 146 lb (66.2 kg)  SpO2 98%  BMI 26.7 kg/m2 Estimated body mass index is 26.7 kg/(m^2) as calculated from the following:    Height as of this encounter: 5' 2\" (1.575 m).    Weight as of this encounter: 146 lb (66.2 kg).  Medication Reconciliation: complete   Eliana Evans      "

## 2017-12-11 NOTE — PATIENT INSTRUCTIONS
*CHEST PAIN, UNCERTAIN CAUSE    Based on your exam today, the exact cause of your chest pain is not certain. Your condition does not seem serious at this time, and your pain does not appear to be coming from your heart. However, sometimes the signs of a serious problem take more time to appear. Therefore, watch for the warning signs listed below.  HOME CARE:  1. Rest today and avoid strenuous activity.  2. Take any prescribed medicine as directed.  FOLLOW UP with your doctor in 1 3 days.   GET PROMPT MEDICAL ATTENTION if any of the following occur:    A change in the type of pain: if it feels different, becomes more severe, lasts longer, or begins to spread into your shoulder, arm, neck, jaw or back    Shortness of breath or increased pain with breathing    Weakness, dizziness, or fainting    Cough with blood or dark colored sputum (phlegm)    Fever over 101  F (38.3  C)    Swelling, pain or redness in one leg    5147-1350 [employer]. [address] [place] [address]. All rights reserved. This information is not intended as a substitute for professional medical care. Always follow your healthcare professional's instructions.  This information has been modified by your health care provider with permission from the publisher.

## 2017-12-11 NOTE — Clinical Note
Kimberley No was recently seen in the emergency room for chest pain and would like to see cardiology due to history of hypertension since she was in her 20's.  I did put an order for an echo before she sees you.

## 2017-12-11 NOTE — PROGRESS NOTES
SUBJECTIVE:   Kimberley Louis is a 37 year old female who presents to clinic today for the following health issues:      ED/UC Followup:    Facility:  [facility]  Date of visit: 12-  Reason for visit: ER FU  Current Status: decrease in chest pain, but still has concerns regarding event.  Does not feel it is a muscle.  Has seen cardiologist in [place] age 24 dx HTN, with fast heart rate.  Can feel her heart beat in her ears.  No happy with ER experience.    When ambulance arrived blood pressure 187/110s, 1 nitro at that time, nausea medication, 10 minutes later at the hospital 117/70s. After 1 nitro had some relief, but continued to have pain mid chest stabbing to her back. With a deep breath pain feels tighter.  No change with pressing on the area. GI did not change pain. Morphine and Toradol did reduce some of the pressure. She has not taken.     Kimberley states she has palpitation about every couple of months - usually will clear on its own. With rest. Denies shortness of breath.     Caffeine use about 12 oz of coffee every morning with an occasional pop during the day. States she normally drinks water.              Problem list and histories reviewed & adjusted, as indicated.  Additional history: as documented    Patient Active Problem List   Diagnosis     Essential hypertension     H/O renal calculi     Enlarged kidney     ACP (advance care planning)     Advanced directives, counseling/discussion     Past Surgical History:   Procedure Laterality Date     AS REMOVAL OF KIDNEY STONE       AS REMOVAL OF KIDNEY STONE       C REMOVAL OF KIDNEY STONE       TUBAL LIGATION         Social History   Substance Use Topics     Smoking status: Current Every Day Smoker     Packs/day: 1.00     Years: 28.00     Smokeless tobacco: Never Used     Alcohol use Yes      Comment: occasional     Family History   Problem Relation Age of Onset     DIABETES Mother      Emphysema Mother      Cervical Cancer Mother      DIABETES Father   "    Hypertension Father      Hypertension Maternal Grandmother      DIABETES Maternal Grandmother      Schizophrenia Maternal Grandmother          Current Outpatient Prescriptions   Medication Sig Dispense Refill     metoprolol (LOPRESSOR) 50 MG tablet TAKE ONE TABLET BY MOUTH TWICE DAILY 180 tablet 0     VENTOLIN  (90 BASE) MCG/ACT Inhaler INHALE TWO PUFFS INTO THE LUNGS EVERY 6 HOURS AS NEEDED FOR SHORTNESS OF BREATH 18 g 0     LACTAID 3000 UNITS tablet TAKE 1 TABLET BY MOUTH THREE TIMES DAILY WITH MEALS 120 tablet 0     ketorolac (TORADOL) 10 MG tablet Take 1 tablet (10 mg) by mouth every 6 hours as needed for moderate pain (Patient not taking: Reported on 12/11/2017) 20 tablet 0     No Known Allergies      Reviewed and updated as needed this visit by clinical staffTobacco  Allergies  Meds       Reviewed and updated as needed this visit by Provider         ROS:  C: NEGATIVE for fever, chills, change in weight  I: NEGATIVE for worrisome rashes, moles or lesions  E: NEGATIVE for vision changes or irritation  E/M: NEGATIVE for ear, mouth and throat problems  RESP:occasional cough - smokes about 1PPD  CV: chest pain and palpations over the weekend - slightly better better today - does have a headache  GI: NEGATIVE for nausea, abdominal pain, heartburn, or change in bowel habits  : NEGATIVE for frequency, dysuria, or hematuria  M: NEGATIVE for significant arthralgias or myalgia  N: NEGATIVE for weakness, dizziness or paresthesias  E: NEGATIVE for temperature intolerance, skin/hair changes  H: NEGATIVE for bleeding problems  P: NEGATIVE for changes in mood or affect    OBJECTIVE:     /70 (BP Location: Right arm, Patient Position: Sitting, Cuff Size: Adult Regular)  Pulse 84  Temp 97.8  F (36.6  C) (Tympanic)  Resp 16  Ht 5' 2\" (1.575 m)  Wt 146 lb (66.2 kg)  SpO2 98%  BMI 26.7 kg/m2  Body mass index is 26.7 kg/(m^2).   GENERAL: healthy, alert and no distress  EYES: Eyes grossly normal to " inspection, PERRL and conjunctivae and sclerae normal  HENT: ear canals and TM's normal, nose and mouth without ulcers or lesions  NECK: no adenopathy, no asymmetry, masses, or scars and thyroid normal to palpation  RESP: lungs clear to auscultation - no rales, rhonchi or wheezes  CV: regular rate and rhythm, normal S1 S2, no S3 or S4, no murmur, click or rub, no peripheral edema and peripheral pulses strong  ABDOMEN: tenderness RLQ and bowel sounds normal  MS: no gross musculoskeletal defects noted, no edema  SKIN: no suspicious lesions or rashes  NEURO: Normal strength and tone, mentation intact and speech normal  PSYCH: mentation appears normal, affect normal/bright  LYMPH: normal ant/post cervical, supraclavicular nodes    Diagnostic Test Results:  Results for orders placed or performed in visit on 12/11/17 (from the past 24 hour(s))   TSH with free T4 reflex   Result Value Ref Range    TSH 1.18 0.40 - 4.00 mU/L   Comprehensive metabolic panel (BMP + Alb, Alk Phos, ALT, AST, Total. Bili, TP)   Result Value Ref Range    Sodium 138 133 - 144 mmol/L    Potassium 4.0 3.4 - 5.3 mmol/L    Chloride 106 94 - 109 mmol/L    Carbon Dioxide 28 20 - 32 mmol/L    Anion Gap 4 3 - 14 mmol/L    Glucose 94 70 - 99 mg/dL    Urea Nitrogen 18 7 - 30 mg/dL    Creatinine 0.69 0.52 - 1.04 mg/dL    GFR Estimate >90 >60 mL/min/1.7m2    GFR Estimate If Black >90 >60 mL/min/1.7m2    Calcium 8.4 (L) 8.5 - 10.1 mg/dL    Bilirubin Total 0.9 0.2 - 1.3 mg/dL    Albumin 3.7 3.4 - 5.0 g/dL    Protein Total 7.1 6.8 - 8.8 g/dL    Alkaline Phosphatase 84 40 - 150 U/L    ALT 25 0 - 50 U/L    AST 13 0 - 45 U/L         ASSESSMENT/PLAN:       PLAN:   1. Chest pain of unknown etiology  Kimberley would like to get back in with Cardiology for a follow up. Added an echo before her cardiology appointment.  Kimberley has had hypertension with palpitations since she was in her twenty's. She does not feel as if her chest pain is musculoskeletal related. Added a TSH  and repeated CMP today. She has not tried taking the Toradol at this point. Kimberley is encouraged to stop smoking, but is not interested at this time.  If chest pain worsens again and radiates into her jaw, or becomes short of breath she is encouraged to go to the emergency room again.  If Kimberley does not have any changes in her symptoms she should follow up with her PCP. Kimberley agrees with the plan.   - TSH with free T4 reflex  - Comprehensive metabolic panel (BMP + Alb, Alk Phos, ALT, AST, Total. Bili, TP)  - Echocardiogram Complete; Future  - CARDIOLOGY EVAL ADULT REFERRAL    2. Encounter for Smoking Cessation  Kimberley is not interested in smoking cessation at this time. Discussed with unknown chest pain and history of hypertension she would benefit from smoking cessation.     Tobacco Cessation:   reports that she has been smoking.  She has a 28.00 pack-year smoking history. She has never used smokeless tobacco.  Tobacco Cessation Action Plan: Information offered: Patient not interested at this time        See Patient Instructions    ZAHEER [place] [facility] DIXIE

## 2017-12-12 ENCOUNTER — HOSPITAL ENCOUNTER (OUTPATIENT)
Dept: CARDIOLOGY | Facility: HOSPITAL | Age: 37
Discharge: HOME | End: 2017-12-12
Attending: NURSE PRACTITIONER | Admitting: NURSE PRACTITIONER
Payer: COMMERCIAL

## 2017-12-12 DIAGNOSIS — R07.9 CHEST PAIN OF UNKNOWN ETIOLOGY: ICD-10-CM

## 2017-12-12 PROCEDURE — 93306 TTE W/DOPPLER COMPLETE: CPT | Mod: 26 | Performed by: INTERNAL MEDICINE

## 2017-12-12 PROCEDURE — 93306 TTE W/DOPPLER COMPLETE: CPT | Mod: TC

## 2017-12-12 ASSESSMENT — ANXIETY QUESTIONNAIRES: GAD7 TOTAL SCORE: 0

## 2017-12-15 ENCOUNTER — TELEPHONE (OUTPATIENT)
Dept: FAMILY MEDICINE | Facility: OTHER | Age: 37
End: 2017-12-15

## 2017-12-29 ENCOUNTER — PRE VISIT (OUTPATIENT)
Dept: CARDIOLOGY | Facility: OTHER | Age: 37
End: 2017-12-29

## 2017-12-29 NOTE — TELEPHONE ENCOUNTER
Office Visit     12/11/2017  [facility] Marina Schreiber APRN CNP   Family Practice    Chest pain of unknown etiology +1 more   Dx    Pain, Hypertension   Reason for visit    Progress Notes   Marina Barrientos APRN CNP (Nurse Practitioner)     Family Practice   Expand All Collapse All       SUBJECTIVE:   Kimberley Louis is a 37 year old female who presents to clinic today for the following health issues:        ED/UC Followup:     Facility:  [facility] [place]  Date of visit: 12-  Reason for visit: ER FU  Current Status: decrease in chest pain, but still has concerns regarding event.  Does not feel it is a muscle.  Has seen cardiologist in [place] age 24 dx HTN, with fast heart rate.  Can feel her heart beat in her ears.  No happy with ER experience.     When ambulance arrived blood pressure 187/110s, 1 nitro at that time, nausea medication, 10 minutes later at the hospital 117/70s. After 1 nitro had some relief, but continued to have pain mid chest stabbing to her back. With a deep breath pain feels tighter.  No change with pressing on the area. GI did not change pain. Morphine and Toradol did reduce some of the pressure. She has not taken.      Kimberley states she has palpitation about every couple of months - usually will clear on its own. With rest. Denies shortness of breath.      Caffeine use about 12 oz of coffee every morning with an occasional pop during the day. States she normally drinks water.                   Problem list and histories reviewed & adjusted, as indicated.  Additional history: as documented         Patient Active Problem List   Diagnosis     Essential hypertension     H/O renal calculi     Enlarged kidney     ACP (advance care planning)     Advanced directives, counseling/discussion      Past Surgical History            Past Surgical History:   Procedure Laterality Date     AS REMOVAL OF KIDNEY STONE         AS REMOVAL OF KIDNEY STONE         C REMOVAL OF KIDNEY  STONE         TUBAL LIGATION              Social History   Substance Use Topics     Smoking status: Current Every Day Smoker       Packs/day: 1.00       Years: 28.00     Smokeless tobacco: Never Used     Alcohol use Yes         Comment: occasional      Family History          Family History   Problem Relation Age of Onset     DIABETES Mother       Emphysema Mother       Cervical Cancer Mother       DIABETES Father       Hypertension Father       Hypertension Maternal Grandmother       DIABETES Maternal Grandmother       Schizophrenia Maternal Grandmother                Current Outpatient Prescriptions           Current Outpatient Prescriptions   Medication Sig Dispense Refill     metoprolol (LOPRESSOR) 50 MG tablet TAKE ONE TABLET BY MOUTH TWICE DAILY 180 tablet 0     VENTOLIN  (90 BASE) MCG/ACT Inhaler INHALE TWO PUFFS INTO THE LUNGS EVERY 6 HOURS AS NEEDED FOR SHORTNESS OF BREATH 18 g 0     LACTAID 3000 UNITS tablet TAKE 1 TABLET BY MOUTH THREE TIMES DAILY WITH MEALS 120 tablet 0     ketorolac (TORADOL) 10 MG tablet Take 1 tablet (10 mg) by mouth every 6 hours as needed for moderate pain (Patient not taking: Reported on 12/11/2017) 20 tablet 0         No Known Allergies        Reviewed and updated as needed this visit by clinical staffTobacco  Allergies  Meds       Reviewed and updated as needed this visit by Provider           ROS:  C: NEGATIVE for fever, chills, change in weight  I: NEGATIVE for worrisome rashes, moles or lesions  E: NEGATIVE for vision changes or irritation  E/M: NEGATIVE for ear, mouth and throat problems  RESP:occasional cough - smokes about 1PPD  CV: chest pain and palpations over the weekend - slightly better better today - does have a headache  GI: NEGATIVE for nausea, abdominal pain, heartburn, or change in bowel habits  : NEGATIVE for frequency, dysuria, or hematuria  M: NEGATIVE for significant arthralgias or myalgia  N: NEGATIVE for weakness, dizziness or paresthesias  E:  "NEGATIVE for temperature intolerance, skin/hair changes  H: NEGATIVE for bleeding problems  P: NEGATIVE for changes in mood or affect     OBJECTIVE:      /70 (BP Location: Right arm, Patient Position: Sitting, Cuff Size: Adult Regular)  Pulse 84  Temp 97.8  F (36.6  C) (Tympanic)  Resp 16  Ht 5' 2\" (1.575 m)  Wt 146 lb (66.2 kg)  SpO2 98%  BMI 26.7 kg/m2  Body mass index is 26.7 kg/(m^2).   GENERAL: healthy, alert and no distress  EYES: Eyes grossly normal to inspection, PERRL and conjunctivae and sclerae normal  HENT: ear canals and TM's normal, nose and mouth without ulcers or lesions  NECK: no adenopathy, no asymmetry, masses, or scars and thyroid normal to palpation  RESP: lungs clear to auscultation - no rales, rhonchi or wheezes  CV: regular rate and rhythm, normal S1 S2, no S3 or S4, no murmur, click or rub, no peripheral edema and peripheral pulses strong  ABDOMEN: tenderness RLQ and bowel sounds normal  MS: no gross musculoskeletal defects noted, no edema  SKIN: no suspicious lesions or rashes  NEURO: Normal strength and tone, mentation intact and speech normal  PSYCH: mentation appears normal, affect normal/bright  LYMPH: normal ant/post cervical, supraclavicular nodes     Diagnostic Test Results:        Results for orders placed or performed in visit on 12/11/17 (from the past 24 hour(s))   TSH with free T4 reflex   Result Value Ref Range     TSH 1.18 0.40 - 4.00 mU/L   Comprehensive metabolic panel (BMP + Alb, Alk Phos, ALT, AST, Total. Bili, TP)   Result Value Ref Range     Sodium 138 133 - 144 mmol/L     Potassium 4.0 3.4 - 5.3 mmol/L     Chloride 106 94 - 109 mmol/L     Carbon Dioxide 28 20 - 32 mmol/L     Anion Gap 4 3 - 14 mmol/L     Glucose 94 70 - 99 mg/dL     Urea Nitrogen 18 7 - 30 mg/dL     Creatinine 0.69 0.52 - 1.04 mg/dL     GFR Estimate >90 >60 mL/min/1.7m2     GFR Estimate If Black >90 >60 mL/min/1.7m2     Calcium 8.4 (L) 8.5 - 10.1 mg/dL     Bilirubin Total 0.9 0.2 - 1.3 " mg/dL     Albumin 3.7 3.4 - 5.0 g/dL     Protein Total 7.1 6.8 - 8.8 g/dL     Alkaline Phosphatase 84 40 - 150 U/L     ALT 25 0 - 50 U/L     AST 13 0 - 45 U/L            ASSESSMENT/PLAN:         PLAN:   1. Chest pain of unknown etiology  Kimberley would like to get back in with Cardiology for a follow up. Added an echo before her cardiology appointment.  Kimberley has had hypertension with palpitations since she was in her twenty's. She does not feel as if her chest pain is musculoskeletal related. Added a TSH and repeated CMP today. She has not tried taking the Toradol at this point. Kimberley is encouraged to stop smoking, but is not interested at this time.  If chest pain worsens again and radiates into her jaw, or becomes short of breath she is encouraged to go to the emergency room again.  If Kimberley does not have any changes in her symptoms she should follow up with her PCP. Kimberley agrees with the plan.   - TSH with free T4 reflex  - Comprehensive metabolic panel (BMP + Alb, Alk Phos, ALT, AST, Total. Bili, TP)  - Echocardiogram Complete; Future  - CARDIOLOGY EVAL ADULT REFERRAL     2. Encounter for Smoking Cessation  Kimberley is not interested in smoking cessation at this time. Discussed with unknown chest pain and history of hypertension she would benefit from smoking cessation.      Tobacco Cessation:   reports that she has been smoking.  She has a 28.00 pack-year smoking history. She has never used smokeless tobacco.  Tobacco Cessation Action Plan: Information offered: Patient not interested at this time           See Patient Instructions     [facility]         Final  Kimberley Louis         MRN:  [id number]       :    1980     [facility] Acct:  [id number] Pt Class:  Emergency   [facility] Adm:   [facility]:     [address] [place] [phone] Phone: [phone] Sex:  Female          IMAGING REPORT      Exam:  XR Chest 2 Views        ACN:[id number]      Date/Time Completed:   12/10/2017 1032                                                                    Authorizing Provider:  Kiki Whittaker  Ordering Provider:  Kiki Whittaker  Attending Provider:    OUSMANE Provider:    ______________________________________________________________________________________        PROCEDURE:  XR CHEST 2 VW     HISTORY: Chest pain; , .     COMPARISON:  3/10/2017     FINDINGS:  The cardiomediastinal contours are normal.  The trachea is midline.  No focal consolidation, effusion or pneumothorax.    No suspicious osseous lesion or subdiaphragmatic free air.         IMPRESSION:       No acute cardiopulmonary process.  Stable chest.     FRANKY WATERS MD  ______________________________________________________________________________________  End of diagnostic Imaging report for accession:  [id number]      ______________________________________________________________________________________      Read By: Franky Waters MD  Signed by: Franky Waters MD on 12/10/2017 10:38 AM          04/13/2016        Priority: Medium       Advance Care Planning 4/13/2016: ACP Review of Chart / Resources Provided:  Reviewed chart for advance care plan.  Kimberley CELAYA Missy has no plan or code status on file. Discussed available resources and provided with information. Confirmed code status reflects current choices pending further ACP discussions.  Confirmed/documented legally designated decision makers.  Added by Sandra Anne              Essential hypertension 11/19/2015       Priority: Medium     H/O renal calculi 11/19/2015       Priority: Medium     Enlarged kidney 11/19/2015       Priority: Medium         Past Medical History:         Past Medical History:   Diagnosis Date     Enlarged kidney 11/19/2015     Essential hypertension 11/19/2015     H/O renal calculi 11/19/2015     H/O renal calculi 11/19/2015         Past Surgical History:      Past Surgical History          Past Surgical History:   Procedure Laterality Date     AS REMOVAL OF KIDNEY STONE         AS REMOVAL OF KIDNEY STONE         C REMOVAL OF KIDNEY STONE         TUBAL LIGATION                Family History:     Family History          Family History   Problem Relation Age of Onset     DIABETES Mother       Emphysema Mother       Cervical Cancer Mother       DIABETES Father       Hypertension Father       Hypertension Maternal Grandmother       DIABETES Maternal Grandmother       Schizophrenia Maternal Grandmother              Social History:  Marital Status:  Single [1]          Social History   Substance Use Topics     Smoking status: Current Every Day Smoker       Packs/day: 0.50     Smokeless tobacco: Never Used     Alcohol use Yes          Comment: occasional         Medications:       ketorolac (TORADOL) 10 MG tablet   metoprolol (LOPRESSOR) 50 MG tablet   VENTOLIN  (90 BASE) MCG/ACT Inhaler   LACTAID 3000 UNITS tablet            Review of Systems   Constitutional: Positive for activity change. Negative for diaphoresis, fatigue and fever.   HENT: Negative.    Respiratory: Positive for shortness of breath.    Cardiovascular: Positive for chest pain.        See HPI   Gastrointestinal: Negative for abdominal pain, constipation, diarrhea, nausea and vomiting.   Genitourinary: Negative for dysuria.   Musculoskeletal: Negative.    Skin: Positive for color change.   Neurological: Negative for dizziness, syncope, weakness and light-headedness.   Psychiatric/Behavioral: Positive for dysphoric mood. The patient is nervous/anxious.          Physical Exam   BP: 115/81  Pulse: 74  Heart Rate: 57  Temp: 98.7  F (37.1  C)  Resp: 18  SpO2: 97 %        Physical Exam   Constitutional: She is oriented to person, place, and time. She appears well-developed and well-nourished. No distress.   HENT:   Head: Normocephalic and atraumatic.   Neck: Normal range of motion. Neck supple.   Cardiovascular:  Normal rate, regular rhythm, normal heart sounds and intact distal pulses.    No murmur heard.  Pulmonary/Chest: Effort normal and breath sounds normal. No respiratory distress. She has no wheezes.   Abdominal: Soft. Bowel sounds are normal. She exhibits no distension. There is no tenderness.   Musculoskeletal: Normal range of motion. She exhibits no edema.   Neurological: She is alert and oriented to person, place, and time.   Skin: Skin is warm and dry.   Psychiatric: Judgment normal. Her mood appears anxious. Her affect is blunt. Cognition and memory are normal.   Nursing note and vitals reviewed.        ED Course      ED Course      Procedures          EKG Interpretation:       Interpreted by Kiki Dye  Time reviewed: 0952  Symptoms at time of EKG: chest pain   Rhythm: sinus bradycardia  Rate: 50-60  Axis: normal  Ectopy: none  Conduction: normal  ST Segments/ T Waves: No ST-T wave changes  Q Waves: none  Comparison to prior: No old EKG available     Clinical Impression: normal EKG excepting mild bradycardia           Labs Ordered and Resulted from Time of ED Arrival Up to the Time of Departure from the ED   BASIC METABOLIC PANEL - Abnormal; Notable for the following:        Result Value      Calcium 8.4 (*)       All other components within normal limits   CBC WITH PLATELETS DIFFERENTIAL - Abnormal; Notable for the following:      WBC 11.6 (*)       RDW 15.8 (*)       All other components within normal limits   TROPONIN I   LIPASE   TROPONIN I   PULSE OXIMETRY NURSING   CARDIAC CONTINUOUS MONITORING         Assessments & Plan (with Medical Decision Making)   Given NTG in ambulance which partially relieved the pain, but did not completely alleviate it.  Given GI cocktail with same result.  Given Morphine (, too low for ntg) and Zofran, which took her pain down to a 1/10.  Troponin negative x2, EKG normal, patient is not having cardiac pain.  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     I have reviewed the nursing notes.     I have reviewed the findings, diagnosis, plan and need for follow up with the patient.       New Prescriptions     KETOROLAC (TORADOL) 10 MG TABLET    Take 1 tablet (10 mg) by mouth every 6 hours as needed for moderate pain             Final  Lillie Velez         MRN:  [id number]       :    1980     [facility] Echo Acct:  [id number] Pt Class:  Outpatient   [facility] Adm:   [facility]:     [address] [place] [phone] Phone: [phone] Sex:  Female          IMAGING REPORT      Exam:  Echocardiogram Complete       ACN:[id number]      Date/Time Completed:   2017 0845                                                                    Authorizing Provider:  Marina Cisneros  Ordering Provider:  Marina Cisneros  Attending Provider:    OUSMANE Provider:    ______________________________________________________________________________________        [id number]  [facility]  [id number]  [id number]^AMELIA^MARINA^JESSIKA              [facility]  Echocardiography Laboratory  [address] [address]      Name: LILLIE VELEZ  MRN: [id number]  : 1980  Study Date: 2017 08:05 AM  Age: 37 yrs  Gender: Female  Patient Location: [facility]  Reason For Study: , Chest pain of unknown etiology  History: Chest Pain  Ordering Physician: MARINA CISNEROS  Referring Physician: MARINA CISNEROS  Performed By: Anjelica Simmons      BSA: 1.7 m2  Height: 64 in  Weight: 148 lb  _____________________________________________________________________________  __          Procedure  Echocardiogram with two-dimensional, color and spectral Doppler performed. ECG  shows normal sinus rhythm.  _____________________________________________________________________________  __          Interpretation Summary  No pericardial effusion is present.  Right ventricular function, chamber size,  wall motion, and thickness are  normal.  Both atria appear normal.  Trace mitral insufficiency is present.  Aortic valve is normal in structure and function.  Trace tricuspid insufficiency is present.  Normal left ventricular filling for age.  _____________________________________________________________________________  __          Left Ventricle  Global and regional left ventricular function is normal with an EF of 60-65%.  Normal left ventricular filling for age.      Right Ventricle  Right ventricular function, chamber size, wall motion, and thickness are  normal.      Atria  Both atria appear normal.      Mitral Valve  Trace mitral insufficiency is present.      Aortic Valve  Aortic valve is normal in structure and function.          Tricuspid Valve  Trace tricuspid insufficiency is present.      Pericardium  No pericardial effusion is present.  _____________________________________________________________________________  __      MMode/2D Measurements & Calculations  IVSd: 0.95 cm  IVSs: 1.2 cm  LVIDd: 4.9 cm  LVIDs: 3.3 cm  LVPWd: 0.95 cm  LVPWs: 1.2 cm  FS: 32.8 %  EDV(Teich): 113.7 ml  ESV(Teich): 44.3 ml  LV mass(C)d: 165.4 grams  LV mass(C)dI: 96.1 grams/m2  LV mass(C)sI: 68.7 grams/m2  Ao root diam: 2.9 cm  LA dimension: 2.8 cm  LA/Ao: 0.97  LVOT diam: 2.0 cm  LVOT area: 3.1 cm2  RWT: 0.39      Time Measurements  Pulm. HR: 138.9 BPM  MM HR: 62.3 BPM          Doppler Measurements & Calculations  MV E max javan: 81.4 cm/sec  MV A max javan: 79.4 cm/sec  MV E/A: 1.0  MV dec slope: 288.9 cm/sec2  MV dec time: 0.28 sec  Ao V2 max: 134.2 cm/sec  Ao max P.2 mmHg  Ao V2 mean: 86.7 cm/sec  Ao mean PG: 3.5 mmHg  Ao V2 VTI: 27.7 cm  HUSSAIN(I,D): 2.9 cm2  HUSSAIN(V,D): 2.7 cm2  LV V1 max P.5 mmHg  LV V1 max: 117.5 cm/sec  LV V1 VTI: 25.9 cm  CO(LVOT): 14.7 l/min  CI(LVOT): 8.6 l/min/m2  SV(LVOT): 79.6 ml  SI(LVOT): 46.3 ml/m2  PA V2 max: 86.6 cm/sec  PA max PG: 3.0 mmHg  PA mean P.3 mmHg  PA V2 VTI: 22.7 cm  HUSSAIN  Index (cm2/m2): 1.7  Peak E' Carlos: 13.6 cm/sec      _____________________________________________________________________________  __              Report approved by: Choco Gomez 12/13/2017 09:04 AM      ______________________________________________________________________________________  End of diagnostic Imaging report for accession:  [id number]      ______________________________________________________________________________________      Read By: López Hobbs MD  Signed by: López Hobbs MD on 12/12/2017  8:05 AM     Comprehensive metabolic panel (BMP + Alb, Alk Phos, ALT, AST, Total. Bili, TP)   Collected:  12/11/2017  9:37 AM Order: [id number]              Ref Range & Units 2wk ago       Sodium 133 - 144 mmol/L 138     Potassium 3.4 - 5.3 mmol/L 4.0     Chloride 94 - 109 mmol/L 106     Carbon Dioxide 20 - 32 mmol/L 28     Anion Gap 3 - 14 mmol/L 4     Glucose 70 - 99 mg/dL 94     Urea Nitrogen 7 - 30 mg/dL 18     Creatinine 0.52 - 1.04 mg/dL 0.69     GFR Estimate >60 mL/min/1.7m2 >90     Comments: Non  GFR Calc     GFR Estimate If Black >60 mL/min/1.7m2 >90     Comments:  GFR Calc     Calcium 8.5 - 10.1 mg/dL 8.4 (L)     Bilirubin Total 0.2 - 1.3 mg/dL 0.9     Albumin 3.4 - 5.0 g/dL 3.7     Protein Total 6.8 - 8.8 g/dL 7.1     Alkaline Phosphatase 40 - 150 U/L 84     ALT 0 - 50 U/L 25     AST 0 - 45 U/L 13     View Full Report                        TSH with free T4 reflex   Collected:  12/11/2017  9:37 AM Order: [id number]              Ref Range & Units 2wk ago       TSH 0.40 - 4.00 mU/L 1.18     View Full Report                        Troponin I (second draw)   Collected:  12/10/2017 12:50 PM Order: [id number]          Ref Range & Units 2wk ago       Troponin I ES 0.000 - 0.045 ug/L <0.015     Comments: The 99th percentile for upper reference range is 0.045 ug/L.  Troponin values   in the range of 0.045 - 0.120 ug/L may be associated with risks of adverse    clinical events.        View Full Report                        Lipase   Collected:  12/10/2017 10:10 AM Order: [id number]          Ref Range & Units 2wk ago       Lipase 73 - 393 U/L 120     View Full Report                        Troponin I   Collected:  12/10/2017 10:10 AM Order: [id number]          Ref Range & Units 2wk ago       Troponin I ES 0.000 - 0.045 ug/L <0.015     Comments: The 99th percentile for upper reference range is 0.045 ug/L.  Troponin values   in the range of 0.045 - 0.120 ug/L may be associated with risks of adverse   clinical events.        View Full Report                       CBC with platelets differential   Collected:  12/10/2017 10:10 AM Order: [id number]          Ref Range & Units 2wk ago       WBC 4.0 - 11.0 10e9/L 11.6 (H)     RBC Count 3.8 - 5.2 10e12/L 4.11     Hemoglobin 11.7 - 15.7 g/dL 11.9     Hematocrit 35.0 - 47.0 % 35.2     MCV 78 - 100 fl 86     MCH 26.5 - 33.0 pg 29.0     MCHC 31.5 - 36.5 g/dL 33.8     RDW 10.0 - 15.0 % 15.8 (H)     Platelet Count 150 - 450 10e9/L 427     Diff Method  Automated Method     % Neutrophils % 68.1     % Lymphocytes % 22.5     % Monocytes % 6.1     % Eosinophils % 2.4     % Basophils % 0.6     % Immature Granulocytes % 0.3     Nucleated RBCs 0 /100 0     Absolute Neutrophil 1.6 - 8.3 10e9/L 7.9     Absolute Lymphocytes 0.8 - 5.3 10e9/L 2.6     Absolute Monocytes 0.0 - 1.3 10e9/L 0.7     Absolute Eosinophils 0.0 - 0.7 10e9/L 0.3     Absolute Basophils 0.0 - 0.2 10e9/L 0.1     Abs Immature Granulocytes 0 - 0.4 10e9/L 0.0     Absolute Nucleated RBC  0.0     View Full Report                       Basic metabolic panel   Collected:  12/10/2017 10:10 AM Order: [id number]          Ref Range & Units 2wk ago       Sodium 133 - 144 mmol/L 139     Potassium 3.4 - 5.3 mmol/L 4.1     Chloride 94 - 109 mmol/L 108     Carbon Dioxide 20 - 32 mmol/L 24     Anion Gap 3 - 14 mmol/L 7     Glucose 70 - 99 mg/dL 92     Urea Nitrogen 7 - 30 mg/dL 16     Creatinine 0.52  - 1.04 mg/dL 0.53     GFR Estimate >60 mL/min/1.7m2 >90     Comments: Non  GFR Calc     GFR Estimate If Black >60 mL/min/1.7m2 >90     Comments:  GFR Calc     Calcium 8.5 - 10.1 mg/dL 8.4 (L)     View Full Report                        No Known Allergies  Current Outpatient Prescriptions   Medication     metoprolol (LOPRESSOR) 50 MG tablet     ketorolac (TORADOL) 10 MG tablet     VENTOLIN  (90 BASE) MCG/ACT Inhaler     LACTAID 3000 UNITS tablet     No current facility-administered medications for this visit.      Past Medical History:   Diagnosis Date     Enlarged kidney 11/19/2015     Essential hypertension 11/19/2015     H/O renal calculi 11/19/2015     H/O renal calculi 11/19/2015     Family History   Problem Relation Age of Onset     DIABETES Mother      Emphysema Mother      Cervical Cancer Mother      DIABETES Father      Hypertension Father      Hypertension Maternal Grandmother      DIABETES Maternal Grandmother      Schizophrenia Maternal Grandmother      Social History     Social History     Marital status: Single     Spouse name: N/A     Number of children: N/A     Years of education: N/A     Occupational History     Not on file.     Social History Main Topics     Smoking status: Current Every Day Smoker     Packs/day: 1.00     Years: 28.00     Smokeless tobacco: Never Used     Alcohol use Yes      Comment: occasional     Drug use: No     Sexual activity: Not on file     Other Topics Concern     Parent/Sibling W/ Cabg, Mi Or Angioplasty Before 65f 55m? No     Social History Narrative

## 2018-01-02 ENCOUNTER — HOSPITAL ENCOUNTER (EMERGENCY)
Facility: HOSPITAL | Age: 38
Discharge: HOME | End: 2018-01-02
Attending: NURSE PRACTITIONER | Admitting: NURSE PRACTITIONER
Payer: COMMERCIAL

## 2018-01-02 VITALS
TEMPERATURE: 98 F | DIASTOLIC BLOOD PRESSURE: 87 MMHG | RESPIRATION RATE: 18 BRPM | SYSTOLIC BLOOD PRESSURE: 125 MMHG | OXYGEN SATURATION: 98 % | HEART RATE: 74 BPM

## 2018-01-02 DIAGNOSIS — K02.9 INFECTED DENTAL CARIES: ICD-10-CM

## 2018-01-02 DIAGNOSIS — K04.7 INFECTED DENTAL CARIES: ICD-10-CM

## 2018-01-02 PROCEDURE — 99213 OFFICE O/P EST LOW 20 MIN: CPT | Performed by: NURSE PRACTITIONER

## 2018-01-02 PROCEDURE — G0463 HOSPITAL OUTPT CLINIC VISIT: HCPCS

## 2018-01-02 RX ORDER — AMOXICILLIN 500 MG/1
1000 CAPSULE ORAL 2 TIMES DAILY
Qty: 40 CAPSULE | Refills: 0 | Status: SHIPPED | OUTPATIENT
Start: 2018-01-02 | End: 2018-01-12

## 2018-01-02 ASSESSMENT — ENCOUNTER SYMPTOMS
DIARRHEA: 0
FATIGUE: 0
APPETITE CHANGE: 0
VOMITING: 0
CHILLS: 0
DIAPHORESIS: 0
FEVER: 0
MYALGIAS: 0
ARTHRALGIAS: 0
COUGH: 0
NAUSEA: 0
SORE THROAT: 1
ABDOMINAL PAIN: 0

## 2018-01-02 NOTE — ED AVS SNAPSHOT
[facility]    [address] [address]    Phone:  [phone]                                       Kimberley Louis   MRN: [id number]    Department:  [facility]   Date of Visit:  1/2/2018           Patient Information     Date Of Birth          1980        Your diagnoses for this visit were:     Infected dental caries        You were seen by Solange Bianchi NP.      Follow-up Information     Follow up with [facility].    Specialty:  EMERGENCY MEDICINE    Why:  As needed, If symptoms worsen, or concerns develop    Contact information:    [address] [place] [id number] [phone]    Additional information:    From [facility]: Take [address]. Turn left at [address]. Turn left at the first stoplight on [address]. At the first stop sign, take a right onto [address]. Take a left into the parking lot and continue through until you reach the North enterance of the building.       From [place]: Take [address]. Take the [address] ramp towards [place]. Turn left onto [address]. Take a slight right onto [address]. Turn left at the first stoplight on [address]. At the first stop sign, take a right onto [address]. Take a left into the parking lot and continue through until you reach the North enterance of the building.       From [place]: Take [address]. Take a right at [address]. At the first stop sign, take a right onto [address]. Take a left into the parking lot and continue through until you reach the North enterance of the building.         Follow up with Xiomara Caceres PA.    Specialty:  Family Practice    Why:  As needed, if symptoms do not improve    Contact information:    [facility]  [address]  [place] [address]  [phone]          Discharge Instructions         Dental Abscess    An abscess is a  "pocket of pus at the tip of a tooth root in your jaw bone. It is caused by an infection at the root of the tooth. It can cause pain and swelling of the gum, cheek, or jaw. Pain may spread from the tooth to your ear or the area of your jaw on the same side. If the abscess isn t treated, it appears as a bubble or swelling on the gum near the tooth. The pressure that builds in this swelling is the source of the pain. More serious infections cause your face to swell.  An abscess can be caused by a crack in the tooth, a cavity, a gum infection, or a combination of these. Once the pulp of the tooth is exposed, bacteria can spread down the roots to the tip. If the bacteria are not stopped, they can damage the bone and soft tissue, and an abscess can form.  Home care  Follow these guidelines when caring for yourself at home:    Avoid hot and cold foods and drinks. Your tooth may be sensitive to changes in temperature. Don t chew on the side of the infected tooth.    If your tooth is chipped or cracked, or if there is a large open cavity, put oil of cloves directly on the tooth to relieve pain. You can buy oil of cloves at [facility]. Some pharmacies carry an over-the-counter \"toothache kit.\" This contains a paste that you can put on the exposed tooth to make it less sensitive.    Put a cold pack on your jaw over the sore area to help reduce pain.    You may use over-the-counter medicine to ease pain, unless another medicine was prescribed. If you have chronic liver or kidney disease, talk with your healthcare provider before using acetaminophen or ibuprofen. Also talk with your provider if you ve had a stomach ulcer or GI bleeding.    An antibiotic will be prescribed. Take it until finished, even if you are feeling better after a few days.  Follow-up care  Follow up with your dentist or an oral surgeon, or as advised. Once an infection occurs in a tooth, it will continue to be a problem until the infection is drained. This " is done through surgery or a root canal. Or you may need to have your tooth pulled.  Call 911  Call 911 if any of these occur:    Unusual drowsiness    Headache or stiff neck    Weakness or fainting    Difficulty swallowing, breathing, or opening your mouth    Swollen eyelids  When to seek medical advice  Call your healthcare provider right away if any of these occur:    Your face becomes more swollen or red    Pain gets worse or spreads to your neck    Fever of 100.4  F (38.0  C) or higher, or as directed by your healthcare provider    Pus drains from the tooth  Date Last Reviewed: 10/1/2016    2045-2285 [employer]. [address] [place], [place] [address]. All rights reserved. This information is not intended as a substitute for professional medical care. Always follow your healthcare professional's instructions.          Future Appointments        Provider Department Dept Phone Center    1/3/2018 10:00 AM Roly Cruz DO [facility] [phone] [facility]         Review of your medicines      START taking        Dose / Directions Last dose taken    amoxicillin 500 MG capsule   Commonly known as:  AMOXIL   Dose:  1000 mg   Quantity:  40 capsule        Take 2 capsules (1,000 mg) by mouth 2 times daily for 10 days   Refills:  0          Our records show that you are taking the medicines listed below. If these are incorrect, please call your family doctor or clinic.        Dose / Directions Last dose taken    LACTAID 3000 UNIT tablet   Quantity:  120 tablet   Generic drug:  lactase        TAKE 1 TABLET BY MOUTH THREE TIMES DAILY WITH MEALS   Refills:  0        metoprolol 50 MG tablet   Commonly known as:  LOPRESSOR   Quantity:  180 tablet        TAKE ONE TABLET BY MOUTH TWICE DAILY   Refills:  2        VENTOLIN  (90 BASE) MCG/ACT Inhaler   Quantity:  18 g   Generic drug:  albuterol        INHALE TWO PUFFS INTO THE LUNGS EVERY 6 HOURS AS NEEDED FOR SHORTNESS OF BREATH  "  Refills:  0                Prescriptions were sent or printed at these locations (1 Prescription)                   [facility] [place] [place] - [address] AT [facility] [address]   [address] [address]    Telephone:  [phone]   Fax:  [phone]   Hours:                  E-Prescribed (1 of 1)         amoxicillin (AMOXIL) 500 MG capsule                Procedures and tests performed during your visit     Rapid strep screen      Orders Needing Specimen Collection     None      Pending Results     No orders found from 2017 to 1/3/2018.            Pending Culture Results     No orders found from 2017 to 1/3/2018.            Thank you for choosing [facility]       Thank you for choosing [facility] for your care. Our goal is always to provide you with excellent care. Hearing back from our patients is one way we can continue to improve our services. Please take a few minutes to complete the written survey that you may receive in the mail after you visit with us. Thank you!        [facility] Information     [facility] lets you send messages to your doctor, view your test results, renew your prescriptions, schedule appointments and more. To sign up, go to [email or web address] . Click on \"Log in\" on the left side of the screen, which will take you to the Welcome page. Then click on \"Sign up Now\" on the right side of the page.     You will be asked to enter the access code listed below, as well as some personal information. Please follow the directions to create your username and password.     Your access code is: [email or web address]  Expires: 3/10/2018  1:28 PM     Your access code will  in 90 days. If you need help or a new code, please call your [facility] clinic or [phone].        Care EveryWhere ID     This is your Care EveryWhere ID. This could be used by other organizations to access your [facility] medical records  [id number]        Equal Access to Services     [facility] " LOKESH : Dmitriyii caitlin Skinner, waisada luqadaha, qaybta kateddy adkins, nikki nguyen. So [facility] [phone].    ATENCIÓN: Si habla español, tiene a alex disposición servicios gratuitos de asistencia lingüística. Llame al [phone].    We comply with applicable federal civil rights laws and [place] laws. We do not discriminate on the basis of race, color, national origin, age, disability, sex, sexual orientation, or gender identity.            After Visit Summary       This is your record. Keep this with you and show to your community pharmacist(s) and doctor(s) at your next visit.

## 2018-01-02 NOTE — ED PROVIDER NOTES
History     Chief Complaint   Patient presents with     Dental Pain     left upper.      Facial Pain     c/o left facial swelling and pain. redness noted. c/o white area on left side of throat.      The history is provided by the patient. No  was used.     Kimberley Louis is a 38 year old female who presents today with a CC of left sided sore throat, left jaw pain, sinus congestion, and PND x 1+ week.  She noted left sided facial pain this morning.  She has been using ibuprofen for pain with improvement.  No fevers measured.  She has been drinking adequate fluids.  She does not currently have a dentist that she sees.  She does have dental insurance but it is limited as to where she is able to go.      Problem List:    Patient Active Problem List    Diagnosis Date Noted     Advanced directives, counseling/discussion 05/05/2016     Priority: Medium     Advance Care Planning 5/5/2016: ACP Review of Chart / Resources Provided:  Reviewed chart for advance care plan.  Kimberley Louis has no plan or code status on file. Discussed available resources and provided with information. Confirmed code status reflects current choices pending further ACP discussions.  Confirmed/documented legally designated decision makers.  Added by SAYDA CABRERA             ACP (advance care planning) 04/13/2016     Priority: Medium     Advance Care Planning 4/13/2016: ACP Review of Chart / Resources Provided:  Reviewed chart for advance care plan.  Kimberley Louis has no plan or code status on file. Discussed available resources and provided with information. Confirmed code status reflects current choices pending further ACP discussions.  Confirmed/documented legally designated decision makers.  Added by Sandra Anne             Essential hypertension 11/19/2015     Priority: Medium     H/O renal calculi 11/19/2015     Priority: Medium     Enlarged kidney 11/19/2015     Priority: Medium        Past Medical History:     Past Medical History:   Diagnosis Date     Enlarged kidney 11/19/2015     Essential hypertension 11/19/2015     H/O renal calculi 11/19/2015     H/O renal calculi 11/19/2015       Past Surgical History:    Past Surgical History:   Procedure Laterality Date     AS REMOVAL OF KIDNEY STONE       AS REMOVAL OF KIDNEY STONE       C REMOVAL OF KIDNEY STONE       TUBAL LIGATION         Family History:    Family History   Problem Relation Age of Onset     DIABETES Mother      Emphysema Mother      Cervical Cancer Mother      DIABETES Father      Hypertension Father      Hypertension Maternal Grandmother      DIABETES Maternal Grandmother      Schizophrenia Maternal Grandmother        Social History:  Marital Status:  Single [1]  Social History   Substance Use Topics     Smoking status: Current Every Day Smoker     Packs/day: 1.00     Years: 28.00     Smokeless tobacco: Never Used     Alcohol use Yes      Comment: occasional        Medications:      amoxicillin (AMOXIL) 500 MG capsule   metoprolol (LOPRESSOR) 50 MG tablet   VENTOLIN  (90 BASE) MCG/ACT Inhaler   LACTAID 3000 UNITS tablet         Review of Systems   Constitutional: Negative for appetite change, chills, diaphoresis, fatigue and fever.   HENT: Positive for dental problem, postnasal drip and sore throat. Negative for ear pain.    Respiratory: Negative for cough.    Gastrointestinal: Negative for abdominal pain, diarrhea, nausea and vomiting.   Musculoskeletal: Negative for arthralgias and myalgias.   Skin: Negative for rash.       Physical Exam   BP: 125/87  Pulse: 74  Temp: 98  F (36.7  C)  Resp: 18  SpO2: 98 %      Physical Exam   Constitutional: She appears well-developed and well-nourished. She is cooperative. She does not appear ill.   HENT:   Head: Normocephalic and atraumatic.   Right Ear: Tympanic membrane, external ear and ear canal normal.   Left Ear: Tympanic membrane, external ear and ear canal normal.   Mouth/Throat: Uvula is midline and  "oropharynx is clear and moist. Dental caries present.       Mild left lower jaw swelling, no erythema, induration, or fluctuation  Tooth #15 is discolored, loose, TTP, no obvious abscess pocket noted   Eyes: Conjunctivae are normal.   Neck: Normal range of motion. Neck supple.   Cardiovascular: Normal rate and regular rhythm.    Pulmonary/Chest: Effort normal and breath sounds normal.   Lymphadenopathy:     She has no cervical adenopathy.   Neurological: She is alert.   Psychiatric: She has a normal mood and affect. Her behavior is normal.   Nursing note and vitals reviewed.      ED Course     ED Course     Procedures    Berkley Matt from  met with patient and set her up with an appointment for dental extraction tomorrow in [place]    Assessments & Plan (with Medical Decision Making)     I have reviewed the nursing notes.    I have reviewed the findings, diagnosis, plan and need for follow up with the patient.  ASSESSMENT / PLAN:  (K02.9,  K04.7) Infected dental caries  Comment: symptomatic, appointment for extraction for tomorrow  Plan:  Amoxicillin as prescribed for infection.   Apply a cold pack or ice compress for up to 20 minutes several times a day. This is to help reduce pain and relieve swelling. Cover it with a thin, dry cloth before putting it on your skin.     Rinse your mouth with warm saltwater several times per day. This will help reduce irritation, gum swelling, and pain.  Good oral hygiene is imperitive. Brush your at least twice a day. Use a fluoride toothpaste and soft-bristle toothbrush.   Eat a healthy diet that doesn t include many sugary foods and drinks.   Keep scheduled appointment for dentist for tomorrow   Return to ED/ if symptoms increase or concerns develop such as those discussed and listed on the \"When to go the Emergency Room\" portion of your discharge instructions.     Discharge Medication List as of 1/2/2018  1:44 PM      START taking these medications    Details   amoxicillin " (AMOXIL) 500 MG capsule Take 2 capsules (1,000 mg) by mouth 2 times daily for 10 days, Disp-40 capsule, R-0, E-Prescribe             Final diagnoses:   Infected dental caries       1/2/2018   [facility]     Solange Bianchi NP  01/02/18 [id number]

## 2018-01-02 NOTE — DISCHARGE INSTRUCTIONS
"  Dental Abscess    An abscess is a pocket of pus at the tip of a tooth root in your jaw bone. It is caused by an infection at the root of the tooth. It can cause pain and swelling of the gum, cheek, or jaw. Pain may spread from the tooth to your ear or the area of your jaw on the same side. If the abscess isn t treated, it appears as a bubble or swelling on the gum near the tooth. The pressure that builds in this swelling is the source of the pain. More serious infections cause your face to swell.  An abscess can be caused by a crack in the tooth, a cavity, a gum infection, or a combination of these. Once the pulp of the tooth is exposed, bacteria can spread down the roots to the tip. If the bacteria are not stopped, they can damage the bone and soft tissue, and an abscess can form.  Home care  Follow these guidelines when caring for yourself at home:    Avoid hot and cold foods and drinks. Your tooth may be sensitive to changes in temperature. Don t chew on the side of the infected tooth.    If your tooth is chipped or cracked, or if there is a large open cavity, put oil of cloves directly on the tooth to relieve pain. You can buy oil of cloves at drugstores. Some pharmacies carry an over-the-counter \"toothache kit.\" This contains a paste that you can put on the exposed tooth to make it less sensitive.    Put a cold pack on your jaw over the sore area to help reduce pain.    You may use over-the-counter medicine to ease pain, unless another medicine was prescribed. If you have chronic liver or kidney disease, talk with your healthcare provider before using acetaminophen or ibuprofen. Also talk with your provider if you ve had a stomach ulcer or GI bleeding.    An antibiotic will be prescribed. Take it until finished, even if you are feeling better after a few days.  Follow-up care  Follow up with your dentist or an oral surgeon, or as advised. Once an infection occurs in a tooth, it will continue to be a problem " until the infection is drained. This is done through surgery or a root canal. Or you may need to have your tooth pulled.  Call 911  Call 911 if any of these occur:    Unusual drowsiness    Headache or stiff neck    Weakness or fainting    Difficulty swallowing, breathing, or opening your mouth    Swollen eyelids  When to seek medical advice  Call your healthcare provider right away if any of these occur:    Your face becomes more swollen or red    Pain gets worse or spreads to your neck    Fever of 100.4  F (38.0  C) or higher, or as directed by your healthcare provider    Pus drains from the tooth  Date Last Reviewed: 10/1/2016    2975-3837 [employer]. [address] [place] [address], [place] [address]. All rights reserved. This information is not intended as a substitute for professional medical care. Always follow your healthcare professional's instructions.

## 2018-01-03 ENCOUNTER — OFFICE VISIT (OUTPATIENT)
Dept: CARDIOLOGY | Facility: OTHER | Age: 38
End: 2018-01-03
Attending: NURSE PRACTITIONER
Payer: COMMERCIAL

## 2018-01-03 VITALS
HEART RATE: 68 BPM | HEIGHT: 64 IN | WEIGHT: 152 LBS | TEMPERATURE: 98.5 F | SYSTOLIC BLOOD PRESSURE: 160 MMHG | RESPIRATION RATE: 16 BRPM | OXYGEN SATURATION: 99 % | DIASTOLIC BLOOD PRESSURE: 87 MMHG | BODY MASS INDEX: 25.95 KG/M2

## 2018-01-03 DIAGNOSIS — I87.9 VEIN DISORDER: ICD-10-CM

## 2018-01-03 DIAGNOSIS — G43.809 OTHER MIGRAINE WITHOUT STATUS MIGRAINOSUS, NOT INTRACTABLE: ICD-10-CM

## 2018-01-03 DIAGNOSIS — E78.00 PURE HYPERCHOLESTEROLEMIA: ICD-10-CM

## 2018-01-03 DIAGNOSIS — I10 ESSENTIAL HYPERTENSION: ICD-10-CM

## 2018-01-03 DIAGNOSIS — R00.2 PALPITATIONS: ICD-10-CM

## 2018-01-03 DIAGNOSIS — R06.09 DYSPNEA ON EXERTION: Primary | ICD-10-CM

## 2018-01-03 DIAGNOSIS — R07.89 ATYPICAL CHEST PAIN: ICD-10-CM

## 2018-01-03 DIAGNOSIS — Z72.0 TOBACCO ABUSE: ICD-10-CM

## 2018-01-03 DIAGNOSIS — R73.9 HYPERGLYCEMIA: ICD-10-CM

## 2018-01-03 DIAGNOSIS — Z71.6 TOBACCO ABUSE COUNSELING: ICD-10-CM

## 2018-01-03 LAB
CHOLEST SERPL-MCNC: 265 MG/DL
D DIMER PPP DDU-MCNC: <200 NG/ML D-DU (ref 0–300)
DEPRECATED S PYO AG THROAT QL EIA: NORMAL
DEPRECATED S PYO AG THROAT QL EIA: NORMAL
ERYTHROCYTE [SEDIMENTATION RATE] IN BLOOD BY WESTERGREN METHOD: 10 MM/H (ref 0–20)
EST. AVERAGE GLUCOSE BLD GHB EST-MCNC: 108 MG/DL
HBA1C MFR BLD: 5.4 % (ref 4.3–6)
HDLC SERPL-MCNC: 52 MG/DL
LDLC SERPL CALC-MCNC: 181 MG/DL
NONHDLC SERPL-MCNC: 213 MG/DL
SPECIMEN SOURCE: NORMAL
TRIGL SERPL-MCNC: 159 MG/DL

## 2018-01-03 PROCEDURE — G0463 HOSPITAL OUTPT CLINIC VISIT: HCPCS

## 2018-01-03 PROCEDURE — 85379 FIBRIN DEGRADATION QUANT: CPT | Mod: ZL | Performed by: INTERNAL MEDICINE

## 2018-01-03 PROCEDURE — 99000 SPECIMEN HANDLING OFFICE-LAB: CPT | Mod: ZL | Performed by: INTERNAL MEDICINE

## 2018-01-03 PROCEDURE — 99204 OFFICE O/P NEW MOD 45 MIN: CPT | Performed by: INTERNAL MEDICINE

## 2018-01-03 PROCEDURE — 86141 C-REACTIVE PROTEIN HS: CPT | Mod: ZL | Performed by: INTERNAL MEDICINE

## 2018-01-03 PROCEDURE — 36415 COLL VENOUS BLD VENIPUNCTURE: CPT | Mod: ZL | Performed by: INTERNAL MEDICINE

## 2018-01-03 PROCEDURE — G0463 HOSPITAL OUTPT CLINIC VISIT: HCPCS | Mod: 25

## 2018-01-03 PROCEDURE — 93005 ELECTROCARDIOGRAM TRACING: CPT

## 2018-01-03 PROCEDURE — 85652 RBC SED RATE AUTOMATED: CPT | Mod: ZL | Performed by: INTERNAL MEDICINE

## 2018-01-03 PROCEDURE — 80061 LIPID PANEL: CPT | Mod: ZL | Performed by: INTERNAL MEDICINE

## 2018-01-03 PROCEDURE — 40000788 ZZHCL STATISTIC ESTIMATED AVERAGE GLUCOSE: Mod: ZL | Performed by: INTERNAL MEDICINE

## 2018-01-03 PROCEDURE — 83036 HEMOGLOBIN GLYCOSYLATED A1C: CPT | Mod: ZL | Performed by: INTERNAL MEDICINE

## 2018-01-03 PROCEDURE — 93010 ELECTROCARDIOGRAM REPORT: CPT | Performed by: INTERNAL MEDICINE

## 2018-01-03 RX ORDER — AMLODIPINE BESYLATE 5 MG/1
5 TABLET ORAL DAILY
Qty: 30 TABLET | Refills: 3 | Status: SHIPPED | OUTPATIENT
Start: 2018-01-03 | End: 2018-02-05

## 2018-01-03 ASSESSMENT — PAIN SCALES - GENERAL: PAINLEVEL: MILD PAIN (3)

## 2018-01-03 NOTE — PROGRESS NOTES
[facility] HEART CARE   CARDIOLOGY CONSULT     Kimberley Louis     Xiomara Caceres     Chief Complaint   Patient presents with     Consult     Referral Marina Barrientos NP for chest pain of unknown etiology.   Echo done 12/12/2017.  Patient states that was having chest pain, shortness of breath and feeling she was going to faint on 12/10/2017 so she was transported to the ER.  Patient states that she was having high BP and chest pain in the ambulance so she was given Nitro. Patients went to a Cardiologist in [place] at age 24 for elevated BP.  Patient states she has constant pressure in chest daily and vein in left leg hurts and has a lump.        HPI:   Mrs. Louis is a 38-year-old female who is being seen by cardiology secondary to atypical chest pain, hypertension, hyperlipidemia, palpitations, uncertain, fluctuating, enlargement/swelling of the veins to her left  medial thigh, and dyspnea on exertion.    She was seen in the ER on 12/10/17 secondary to substernal chest pressure which radiated into her jaw, neck, left shoulder, and left arm. She describes her discomfort as being constant lasting days and is somewhat positional. Secondary to dyspnea, she does not exert herself frequently. She has not noted that her symptoms are exacerbated with activity. She has risk factors for CAD with hyperlipidemia, hypertension, ongoing tobacco abuse and possibly family history. She has not had a stress test previously. She points to her xiphoid area when she describes her current discomfort. She states that if she does not think about the pain or is busy doing other things, the pain is not noticed. She does have heartburn but this discomfort is different. It seems better laying down versus sitting up.     She also describes frequent almost on a daily basis of having palpitations. She describes having had palpitations for many years. Briefly, monitoring was discussed but was held and is to be revisited at her next visit  with the other testing that is being performed.    She is describing significant dyspnea. Her SOB limits her life to the point she is somewhat fearful of exerting herself. She does smoke. She describes it affecting her sex life. If she were to exert herself, she becomes short of breath and her heart will race. She has not had pulmonary function testing previously.    She is also concerned with inconsistent swelling to her left medial thigh. She states that if things are going to go wrong with her, it typically involves her left side. Just medial to her knee on her inner thigh, she will get an occasional swelling which is described as involving her veins. She is concerned that she has a blood clot. She has not noted significant swelling or pain to her lower extremities.    She struggles with hypertension. When she presented to the ER, her blood pressure was in the 180s in the ambulance. She was given one nitroglycerin secondary to her jaw, neck, and shoulder pain and her blood pressure went down to the 110s which relieved these pains but did not effect her substernal pain. She has been on metoprolol 50 mg twice a day for many years. She does have a blood pressure monitor at home but had problems with the batteries. Secondary to finances, she has not gotten her blood pressure monitor fixed and has not been checking her blood pressure at home. She was encouraged to get this fixed and start watching her blood pressures.  She is to keep a log of these.    She has hypercholesterolemia.  She had a cholesterol panel completed on 16 which showed a total cholesterol of 245, triglycerides of 259, HDL of 39, and LDL of 154. She has not been on a cholesterol medication in the past. She is fearful of cholesterol medications as her mom had problems with these medications previously and almost .  Her mom is an alcoholic.  He has no additional complaints.      IMAGING RESULTS:   Echo completed on 17.  Interpretation  Summary  No pericardial effusion is present.  Right ventricular function, chamber size, wall motion, and thickness are  normal.  Both atria appear normal.  Trace mitral insufficiency is present.  Aortic valve is normal in structure and function.  Trace tricuspid insufficiency is present.  Normal left ventricular filling for age.      PAST MEDICAL HISTORY:   Past Medical History:   Diagnosis Date     Enlarged kidney 11/19/2015     Essential hypertension 11/19/2015     H/O renal calculi 11/19/2015     H/O renal calculi 11/19/2015          FAMILY HISTORY:   Family History   Problem Relation Age of Onset     DIABETES Mother      Emphysema Mother      Cervical Cancer Mother      DIABETES Father      Hypertension Father      Hypertension Maternal Grandmother      DIABETES Maternal Grandmother      Schizophrenia Maternal Grandmother      Unknown/Adopted Maternal Grandfather      Lung Cancer Paternal Grandmother      DIABETES Paternal Grandmother      Unknown/Adopted Paternal Grandfather           PAST SURGICAL HISTORY:   Past Surgical History:   Procedure Laterality Date     AS REMOVAL OF KIDNEY STONE       AS REMOVAL OF KIDNEY STONE       C REMOVAL OF KIDNEY STONE       TUBAL LIGATION            SOCIAL HISTORY:   Social History     Social History     Marital status: Single     Spouse name: N/A     Number of children: N/A     Years of education: N/A     Social History Main Topics     Smoking status: Current Every Day Smoker     Packs/day: 1.00     Years: 28.00     Smokeless tobacco: Never Used     Alcohol use Yes      Comment: occasional     Drug use: No     Sexual activity: Not Asked     Other Topics Concern     Parent/Sibling W/ Cabg, Mi Or Angioplasty Before 65f 55m? No     Social History Narrative          CURRENT MEDICATIONS:   Current Outpatient Prescriptions   Medication     IBUPROFEN PO     amoxicillin (AMOXIL) 500 MG capsule     metoprolol (LOPRESSOR) 50 MG tablet     VENTOLIN  (90 BASE) MCG/ACT Inhaler      LACTAID 3000 UNITS tablet     No current facility-administered medications for this visit.           ALLERGIES:   No Known Allergies       ROS:   CONSTITUTIONAL: No weight loss, fever, chills, weakness or fatigue.   HEENT: Eyes: No visual changes. Ears, Nose, Throat: No hearing loss, congestion or difficulty swallowing.   CARDIOVASCULAR: (+) chest pain with chest pressure and chest discomfort. (+) palpitations with inconsistent lower extremity edema.   RESPIRATORY: (+) shortness of breath with dyspnea upon exertion, she denies a cough or sputum production.   GASTROINTESTINAL: No abdominal pain. No anorexia, nausea, vomiting or diarrhea.   NEUROLOGICAL: No headache, lightheadedness, dizziness, syncope, ataxia or weakness.   HEMATOLOGIC: No anemia, bleeding or bruising.   PSYCHIATRIC: No history of depression or anxiety.   ENDOCRINOLOGIC: No reports of sweating, cold or heat intolerance. No polyuria or polydipsia.   SKIN: No abnormal rashes or itching.       PHYSICAL EXAM:     GENERAL: The patient is a well-developed, well-nourished, in no apparent distress. Alert and oriented x3.   HEENT: Head is normocephalic and atraumatic. Eyes are symmetrical with normal visual tracking.   HEART: Regular rate and rhythm, S1S2 present without murmur, rub or gallop.   LUNGS: Respirations regular and unlabored. Clear to auscultation.   GI: Abdomen is soft and non distended.   EXTREMITIES: No peripheral edema present.   MUSCULOSKELETAL: No joint swelling.   NEUROLOGIC: Alert and oriented X3.  SKIN: No jaundice.      LAB RESULTS:   Office Visit on 12/11/2017   Component Date Value Ref Range Status     TSH 12/11/2017 1.18  0.40 - 4.00 mU/L Final     Sodium 12/11/2017 138  133 - 144 mmol/L Final     Potassium 12/11/2017 4.0  3.4 - 5.3 mmol/L Final     Chloride 12/11/2017 106  94 - 109 mmol/L Final     Carbon Dioxide 12/11/2017 28  20 - 32 mmol/L Final     Anion Gap 12/11/2017 4  3 - 14 mmol/L Final     Glucose 12/11/2017 94  70 - 99  mg/dL Final     Urea Nitrogen 12/11/2017 18  7 - 30 mg/dL Final     Creatinine 12/11/2017 0.69  0.52 - 1.04 mg/dL Final     GFR Estimate 12/11/2017 >90  >60 mL/min/1.7m2 Final     GFR Estimate If Black 12/11/2017 >90  >60 mL/min/1.7m2 Final     Calcium 12/11/2017 8.4* 8.5 - 10.1 mg/dL Final     Bilirubin Total 12/11/2017 0.9  0.2 - 1.3 mg/dL Final     Albumin 12/11/2017 3.7  3.4 - 5.0 g/dL Final     Protein Total 12/11/2017 7.1  6.8 - 8.8 g/dL Final     Alkaline Phosphatase 12/11/2017 84  40 - 150 U/L Final     ALT 12/11/2017 25  0 - 50 U/L Final     AST 12/11/2017 13  0 - 45 U/L Final          ASSESSMENT:   Mrs. oLuis is a 38-year-old female who is being seen by cardiology for:    Impression:  1.  Atypical chest pain.  2.  Hypertension.  3.  Hyperlipidemia.  4.  Palpitations.  5.  Uncertain etiology with fluctuating, enlargement/swelling of the veins to her left  medial thigh.  6.  Dyspnea on exertion.    PLAN:   1.  With SOB/dyspnea, she will have PFT's obtained.   2.  She will have an U/S of her legs looking for a DVT.  3.  Labs which include D-dimer, A1c, lipid panel, CRP, sed rate, and hemoglobin A1c will be obtained.   4.  She will have a stress echo completed with ongoing chest pain.   5.  She will start on Norvasc 5 mg for HTN and chest pain.  6.  Would consider a Holter monitor at her next visit with palpitations.  7.  She will be seen in 1 month F/U.      Thank you for allowing me to participate in the care of your patient. Please do not hesitate to contact me if you have any questions.     Roly Cruz

## 2018-01-03 NOTE — NURSING NOTE
"Chief Complaint   Patient presents with     Consult     Referral Marina Barrientos NP for chest pain of unknown etiology.   Echo done 12/12/2017.  Patient states that was having chest pain, shortness of breath and feeling she was going to faint on 12/10/2017 so she was transported to the ER.  Patient states that she was having high BP and chest pain in the ambulance so she was given Nitro. Patients went to a Cardiologist in [place] at age 24 for elevated BP.  Patient states she has constant pressure in chest daily and vein in left leg hurts and has a lump.       Initial /85 (BP Location: Left arm, Patient Position: Chair, Cuff Size: Adult Regular)  Pulse 72  Temp 98.5  F (36.9  C) (Tympanic)  Resp 16  Ht 1.626 m (5' 4\")  Wt 68.9 kg (152 lb)  SpO2 99%  BMI 26.09 kg/m2 Estimated body mass index is 26.09 kg/(m^2) as calculated from the following:    Height as of this encounter: 1.626 m (5' 4\").    Weight as of this encounter: 68.9 kg (152 lb).  Medication Reconciliation: complete   Esha Dunbar      "

## 2018-01-03 NOTE — PATIENT INSTRUCTIONS
You were seen by Dr. Cruz, 1/3/2018.     1.  You will be scheduled for a stress test, this is a test that will evaluate your heart under stress.  You will be called by hospital scheduling to schedule this appointment.     2. You will be scheduled for an ultrasound of the Left leg to rule out the possibility of blood clots. You will be called by hospital scheduling to schedule this test.     3. You will have blood tests to evaluate for Diabetes, monitor your cholesterol and rule out blood clotting.     4. Please monitor your blood pressure at home, and bring these findings to you next appointment with Dr. Cruz.     5. You will begin Norvasc 5 mg daily. This medication will work to lower your blood pressure, you may notice swelling in the lower extremities.  If this becomes a problem please call the cardiology office.      6. Please work to lower your cholesterol through diet and exercise. This will benefit your heart health.     7. You will be scheduled for pulmonary function testing. This test evaluates lung function, you will be called to schedule this appointment from the hospital scheduling department.     You will follow up with Dr. Cruz in 1 month.       Please call the cardiology office with problems, questions, or concerns at [phone].    If you experience chest pain, chest pressure, chest tightness, shortness of breath, fainting, lightheadedness, nausea, vomiting, or other concerning symptoms, please report to the Emergency Department or call 911. These symptoms may be emergent, and best treated in the Emergency Department.       Mala RANDHAWA RN-BSN  Cardiology   [facility]  [phone]      HOW TO QUIT SMOKING  Smoking is one of the hardest habits to break. About half of all those who have ever smoked have been able to quit, and most of those (about 70%) who still smoke want to quit. Here are some of the best ways to stop smoking.     KEEP TRYING:  It takes most smokers about 8 tries  before they are finally able to fully quit. So, the more often you try and fail, the better your chance of quitting the next time! So, don't give up!    GO COLD TURKEY:  Most ex-smokers quit cold turkey. Trying to cut back gradually doesn't seem to work as well, perhaps because it continues the smoking habit. Also, it is possible to fool yourself by inhaling more while smoking fewer cigarettes. This results in the same amount of nicotine in your body!    GET SUPPORT:  Support programs can make an important difference, especially for the heavy smoker. These groups offer lectures, methods to change your behavior and peer support. Call the free national Quitline for more information. 800-QUIT-NOW (936-137-4377). Low-cost or free programs are offered by many hospitals, local chapters of the American Lung Association (110-615-3715) and the American Cancer Society (328-611-4558). Support at home is important too. Non-smokers can help by offering praise and encouragement. If the smoker fails to quit, encourage them to try again!    OVER-THE-COUNTER MEDICINES:  For those who can't quit on their own, Nicotine Replacement Therapy (NRT) may make quitting much easier. Certain aids such as the nicotine patch, gum and lozenge are available without a prescription. However, it is best to use these under the guidance of your doctor. The skin patch provides a steady supply of nicotine to the body. Nicotine gum and lozenge gives temporary bursts of low levels of nicotine. Both methods take the edge off the craving for cigarettes. WARNING: If you feel symptoms of nicotine overdose, such as nausea, vomiting, dizziness, weakness, or fast heartbeat, stop using these and see your doctor.    PRESCRIPTION MEDICINES:  After evaluating your smoking patterns and prior attempts at quitting, your doctor may offer a prescription medicine such as bupropion (Zyban, Wellbutrin), varenicline (Chantix, Champix), a niocotine inhaler or nasal spray. Each  has its unique advantage and side effects which your doctor can review with you.    HEALTH BENEFITS OF QUITTING:  The benefits of quitting start right away and keep improving the longer you go without smokin minutes: blood pressure and pulse return to normal  8 hours: oxygen levels return to normal  2 days: ability to smell and taste begins to improve as damaged nerves start to regrow  2-3 weeks: circulation and lung function improves  1-9 months: decreased cough, congestion and shortness of breath; less tired  1 year: risk of heart attack decreases by half  5 years: risk of lung cancer decreases by half; risk of stroke becomes the same as a non-smoker  For information about how to quit smoking, visit the following links:  [employer] [place] ,   Clearing the Air, Quit Smoking Today   - an online booklet. http://www.smokefree.gov/pubs/clearing_the_air.pdf  Smokefree.gov http://smokefree.gov/  QuitNet http://www.quitnet.com/    1294-2884 [employer] [facility], [address] [place] [address]. All rights reserved. This information is not intended as a substitute for professional medical care. Always follow your healthcare professional's instructions.    The Benefits of Living Smoke Free  What do you want to gain from quitting? Check off some reasons to quit.  Health Benefits  ___ Reduce my risk of lung cancer, heart disease, chronic lung disease  ___ Have fewer wrinkles and softer skin  ___ Improve my sense of taste and smell  ___ For pregnant women--reduce the risk of having a miscarriage, stillbirth, premature birth, or low-birth-weight baby  Personal Benefits  ___ Feel more in control of my life  ___ Have better-smelling hair, breath, clothes, home, and car  ___ Save time by not having to take smoke breaks, buy cigarettes, or hunt for a light  ___ Have whiter teeth  Family Benefits  ___ Reduce my children s respiratory tract infections  ___ Set a good example for my children  ___ Reduce my family s  cancer risk  Financial Benefits  ___ Save hundreds of dollars each year that would be spent on cigarettes  ___ Save money on medical bills  ___ Save on life, health, and car insurance premiums    Those Dollars Add Up!  Cigarettes are expensive, and getting more expensive all the time. Do you realize how much money you are spending on cigarettes per year? What is the average amount you spend on a pack of cigarettes? What is the average number of packs that you smoke per day? Using your answers to these questions, fill in this formula to help you find out:  ($ _____ per pack) ×  ( _____ number of packs per day) × (365 days) =  $ _____ yearly cost of smoking  Besides tobacco, there are other costs, including extra cleaning bills and replacement costs for clothing and furniture; medical expenses for smoking-related illnesses; and higher health, life, and car insurance premiums.    Cigars and Pipes Count Too!  Cigars and pipes are also dangerous. So are smokeless (chewing) tobacco and snuff. All of these products contain nicotine, a highly addictive substance that has harmful effects on your body. Quitting smoking means giving up all tobacco products.      3408-0587 [facility], [address] [place] [address], [place] [address]. All rights reserved. This information is not intended as a substitute for professional medical care. Always follow your healthcare professional's instructions.  Lifestyle Changes to Control Cholesterol  You can control your cholesterol through diet, exercise, weight management, quitting smoking, stress management, and taking your medicines right. These things can also lower your risk for cardiovascular disease.    Eating healthy  Your healthcare provider will give you information on diet changes you may need to make. Your provider may recommend that you see a registered dietitian for help with diet changes. Changes may include:    Cutting back on the amount of fat and cholesterol in your  meals    Eating less salt (sodium). This is especially important if you have high blood pressure.    Eating more fresh vegetables and fruits    Eating lean proteins such as fish, poultry, beans, and peas    Eating less red meat and processed meats    Using low-fat dairy products    Using vegetable and nut oils in limited amounts    Limiting how many sweets and processed foods like chips, cookies, and baked goods that you eat     Limiting how many sugar-sweetened beverages you drink    Limiting how often you eat out  Getting exercise  Regular exercise is a good way to help your body control cholesterol. Regular exercise can help in many ways. It can:    Raise your good cholesterol    Help lower your bad cholesterol    Let blood flow better through your body    Give more oxygen to your muscles and tissues    Help you manage your weight    Help your heart pump better    Lower your blood pressure  Your healthcare provider may recommend that you get more physical activity if you haven't been active. Your provider may recommend that you get moderate to vigorous physical activity for at least 40 minutes each day. You should do this for at least 3 to 4 days each week. A few examples of moderate to vigorous activity are:    Walking at a brisk pace. This is about 3 to 4 miles per hour.    Jogging or running    Swimming or water aerobics    Hiking    Dancing    Martial arts    Tennis    Riding a bicycle or stationary bike    Dancing  Managing your weight  If you are overweight or obese, your healthcare provider will work with you to help you lose weight and lower your BMI (body mass index). Making diet changes and getting more physical activity can help. Changing your diet will help you lose weight more easily than adding exercise.  Quitting smoking  Smoking and other tobacco use can raise cholesterol and make it harder to control. Quitting is tough. But millions of people have given up tobacco for good. You can quit, too!  Think about some of the reasons below to quit smoking. Do any of them make you think twice about your smoking habit?  Stop smoking because it:    Keeps your cholesterol high, even if you make all the other changes you re supposed to    Damages your body. It especially harms your heart, lungs, skin, and blood vessels.    Makes you more likely to have a heart attack (acute myocardial infarction), stroke, or cancer    Stains your teeth    Makes your skin, clothes, and breath smell bad    Costs a lot of money  Controlling stress   Learn ways to control stress. This will help you deal with stress in your home and work life. Controlling stress can greatly lower your risk of getting cardiovascular disease.  Making the most of medicines  Healthy eating and exercise are a good start to keeping your cholesterol down. But you may need some extra help from medicine. If your doctor prescribes medicine, be sure to take it exactly as directed. Remember:    Tell your healthcare provider about all other medicines you take. This includes vitamins and herbs.    Tell your healthcare provider if you have any side effects after starting to take a medicine. Examples of side effects to watch for include muscle aches, weakness, blurred vision, rust-colored urine, yellowing of eyes or skin (jaundice), and headache.    Don t skip a dose or stop taking your medicine because you feel better or because your cholesterol numbers go down. Never stop taking your medicine unless your healthcare provider has told you it s OK.    Ask your healthcare provider if you have any questions about your medicines.  High risk groups  Some people may need to take medicines called statins to control their cholesterol. This is in addition to eating a healthy diet and getting regular exercise.  Statins can help you stay healthy. They can also help prevent a heart attack or stroke. You may need to take a statin if you are in one of these groups:    Adults who have  had a heart attack or stroke. Or adults who have had peripheral vascular disease, a ministroke (transient ischemic attack), or stable or unstable angina. This group also includes people who have had a procedure to restore blood flow through a blocked artery. These procedures include percutaneous coronary intervention, angioplasty, stent, and open-heart bypass surgery.    Adults who have diabetes. Or adults who are at higher risk of having a heart attack or stroke and have an LDL cholesterol level of 70 to 189 mg/dL    Adults who are 21 years old or older and have an LDL cholesterol level of 190 mg/dL or higher.  If you are in a high-risk group, talk with your healthcare provider about your treatment goals. Make sure you understand why these goals are important, based on your own health history and your family history of heart disease or high cholesterol.  Make a plan to have regular cholesterol checks. You may need to fast before getting this test. Also ask your provider about any side effects your medicines may cause. Let your provider know about any side effects you have. You may need to take more than one medicine to reach the cholesterol goals that you and your provider decide on.  Date Last Reviewed: 10/1/2016    5325-1390 [employer]. [address] [place] [address]. All rights reserved. This information is not intended as a substitute for professional medical care. Always follow your healthcare professional's instructions.        Low-Cholesterol Diet  Your body needs cholesterol to build new cells and create certain hormones. There are 2 kinds of cholesterol in your blood:       HDL ( good ) cholesterol. This prevents fat deposits (plaque) from building up in your arteries. In this way it protects against heart disease and stroke.    LDL ( bad ) cholesterol. This stays in your body and sticks to artery walls. Over time it may block blood flow to the heart and brain. This can cause a  heart attack or stroke.  The cholesterol in your blood comes from 2 sources: cholesterol in food that you eat and cholesterol that your liver makes. You should limit the amount of cholesterol in your diet. But the cholesterol that your body makes has the greatest disease risk. And your body makes more cholesterol when your diet is high in bad fats (saturated and trans fats). There are 2 kinds of fats you can eat:    Good fats, or unsaturated fats (mono-unsaturated and poly-unsaturated). They raise the level of good cholesterol and lower the level of bad cholesterol. Good fats are found in vegetable oils such as olive, sunflower, corn, and soybean oils, and in nuts and seeds.    Bad fats, or saturated fats (including foods high in cholesterol) and trans fats. These raise your risk of disease. They lower the good cholesterol and raise the level of bad cholesterol. Bad fats are found in animal products, including meat, whole-milk dairy products, and butter. Some plants are also high in bad fats (coconut and palm plants). Trans fats are found in hard (stick) margarines. They are also in many fast foods and commercially baked goods. Soft margarine sold in tubs has fewer trans fats and is safer to use.  High blood cholesterol is usually due to a diet high in saturated fat, along with not being physically active. In some cases, genetics plays a role in causing high cholesterol. The tips below will help you create healthy eating habits that will help lower your blood cholesterol level.  Create a diet high in good fats, low in bad fats (and low in cholesterol)  The following steps will help you create a diet high in good fats and low in bad fats:    Talk with your doctor before starting a low cholesterol diet or weight loss program.    Learn to read nutrition labels and select appropriate portion sizes.    When cooking, use plant-based unsaturated vegetable oils (sunflower, corn, soybean, canola, peanut, and olive  oils).    Avoid saturated fats found in animal products such as meat, dairy (whole-milk, cheese and ice cream), poultry skin, and egg yolks. Plants high in saturated oils include coconut oil, palm oil, and palm kernel oil.    If you eat meat, choose smaller portions and lean cuts, such as round, renetta, sirloin, or loin. Eat more meatless meals.    Replace meat with fish at least 2 times a week. Fish is an important source of the unsaturated fat called omega-3 fatty acids. This fat has potential to lower the risk of heart disease.    Replace whole-milk dairy products with low-fat or nonfat products. Try soy products. Soy helps to reduce total cholesterol.    Supplement your diet with protective fibers. Eat nuts, seeds, and whole grains rather than white rice and bread. These foods lower both cholesterol and triglyceride levels. (Triglycerides are another fat found in the blood.) Walnuts are one of the best sources of omega-3 fatty acids.    Eat plenty of fresh fruits and vegetables daily.    Avoid fast foods and commercial baked goods. Assume they contain saturated fat unless labeled otherwise.  Date Last Reviewed: 8/1/2016 2000-2017 [employer]. [address] [place] [address], [place] [address]. All rights reserved. This information is not intended as a substitute for professional medical care. Always follow your healthcare professional's instructions.

## 2018-01-03 NOTE — MR AVS SNAPSHOT
After Visit Summary   1/3/2018    Kimberley Louis    MRN: [id number]           Patient Information     Date Of Birth          1980        Visit Information        Provider Department      1/3/2018 10:00 AM Roly Cruz, DO [facility]        Today's Diagnoses     Dyspnea on exertion    -  1    Atypical chest pain        Palpitations        Tobacco abuse        Tobacco abuse counseling        Essential hypertension        Pure hypercholesterolemia        Vein disorder        Migraine         Hyperglycemia          Care Instructions    You were seen by Dr. Cruz, 1/3/2018.     1.  You will be scheduled for a stress test, this is a test that will evaluate your heart under stress.  You will be called by hospital scheduling to schedule this appointment.     2. You will be scheduled for an ultrasound of the Left leg to rule out the possibility of blood clots. You will be called by hospital scheduling to schedule this test.     3. You will have blood tests to evaluate for Diabetes, monitor your cholesterol and rule out blood clotting.     4. Please monitor your blood pressure at home, and bring these findings to you next appointment with Dr. Cruz.     5. You will begin Norvasc 5 mg daily. This medication will work to lower your blood pressure, you may notice swelling in the lower extremities.  If this becomes a problem please call the cardiology office.      6. Please work to lower your cholesterol through diet and exercise. This will benefit your heart health.     7. You will be scheduled for pulmonary function testing. This test evaluates lung function, you will be called to schedule this appointment from the hospital scheduling department.     You will follow up with Dr. Cruz in 1 month.       Please call the cardiology office with problems, questions, or concerns at [phone].    If you experience chest pain, chest pressure, chest tightness, shortness of breath, fainting,  lightheadedness, nausea, vomiting, or other concerning symptoms, please report to the Emergency Department or call 911. These symptoms may be emergent, and best treated in the Emergency Department.       Mala RANDHAWA RN-BSN  [facility]  [phone]      HOW TO QUIT SMOKING  Smoking is one of the hardest habits to break. About half of all those who have ever smoked have been able to quit, and most of those (about 70%) who still smoke want to quit. Here are some of the best ways to stop smoking.     KEEP TRYING:  It takes most smokers about 8 tries before they are finally able to fully quit. So, the more often you try and fail, the better your chance of quitting the next time! So, don't give up!    GO COLD TURKEY:  Most ex-smokers quit cold turkey. Trying to cut back gradually doesn't seem to work as well, perhaps because it continues the smoking habit. Also, it is possible to fool yourself by inhaling more while smoking fewer cigarettes. This results in the same amount of nicotine in your body!    GET SUPPORT:  Support programs can make an important difference, especially for the heavy smoker. These groups offer lectures, methods to change your behavior and peer support. Call the free national Quitline for more information. 800-QUIT-NOW (806-280-0285). Low-cost or free programs are offered by many hospitals, local chapters of the American Lung Association (653-058-5151) and the American Cancer Society (361-625-2035). Support at home is important too. Non-smokers can help by offering praise and encouragement. If the smoker fails to quit, encourage them to try again!    OVER-THE-COUNTER MEDICINES:  For those who can't quit on their own, Nicotine Replacement Therapy (NRT) may make quitting much easier. Certain aids such as the nicotine patch, gum and lozenge are available without a prescription. However, it is best to use these under the guidance of your doctor. The skin patch provides a steady  supply of nicotine to the body. Nicotine gum and lozenge gives temporary bursts of low levels of nicotine. Both methods take the edge off the craving for cigarettes. WARNING: If you feel symptoms of nicotine overdose, such as nausea, vomiting, dizziness, weakness, or fast heartbeat, stop using these and see your doctor.    PRESCRIPTION MEDICINES:  After evaluating your smoking patterns and prior attempts at quitting, your doctor may offer a prescription medicine such as bupropion (Zyban, Wellbutrin), varenicline (Chantix, Champix), a niocotine inhaler or nasal spray. Each has its unique advantage and side effects which your doctor can review with you.    HEALTH BENEFITS OF QUITTING:  The benefits of quitting start right away and keep improving the longer you go without smokin minutes: blood pressure and pulse return to normal  8 hours: oxygen levels return to normal  2 days: ability to smell and taste begins to improve as damaged nerves start to regrow  2-3 weeks: circulation and lung function improves  1-9 months: decreased cough, congestion and shortness of breath; less tired  1 year: risk of heart attack decreases by half  5 years: risk of lung cancer decreases by half; risk of stroke becomes the same as a non-smoker  For information about how to quit smoking, visit the following links:  National Cancer [place] ,   Clearing the Air, Quit Smoking Today   - an online booklet. http://www.smokefree.gov/pubs/clearing_the_air.pdf  Smokefree.gov http://smokefree.gov/  QuitNet http://www.quitnet.com/    4556-1866 [employer] [facility], [address] [place] [address], [place] [address]. All rights reserved. This information is not intended as a substitute for professional medical care. Always follow your healthcare professional's instructions.    The Benefits of Living Smoke Free  What do you want to gain from quitting? Check off some reasons to quit.  Health Benefits  ___ Reduce my risk of lung cancer, heart disease, chronic  lung disease  ___ Have fewer wrinkles and softer skin  ___ Improve my sense of taste and smell  ___ For pregnant women--reduce the risk of having a miscarriage, stillbirth, premature birth, or low-birth-weight baby  Personal Benefits  ___ Feel more in control of my life  ___ Have better-smelling hair, breath, clothes, home, and car  ___ Save time by not having to take smoke breaks, buy cigarettes, or hunt for a light  ___ Have whiter teeth  Family Benefits  ___ Reduce my children s respiratory tract infections  ___ Set a good example for my children  ___ Reduce my family s cancer risk  Financial Benefits  ___ Save hundreds of dollars each year that would be spent on cigarettes  ___ Save money on medical bills  ___ Save on life, health, and car insurance premiums    Those Dollars Add Up!  Cigarettes are expensive, and getting more expensive all the time. Do you realize how much money you are spending on cigarettes per year? What is the average amount you spend on a pack of cigarettes? What is the average number of packs that you smoke per day? Using your answers to these questions, fill in this formula to help you find out:  ($ _____ per pack) ×  ( _____ number of packs per day) × (365 days) =  $ _____ yearly cost of smoking  Besides tobacco, there are other costs, including extra cleaning bills and replacement costs for clothing and furniture; medical expenses for smoking-related illnesses; and higher health, life, and car insurance premiums.    Cigars and Pipes Count Too!  Cigars and pipes are also dangerous. So are smokeless (chewing) tobacco and snuff. All of these products contain nicotine, a highly addictive substance that has harmful effects on your body. Quitting smoking means giving up all tobacco products.      9069-2504 [employer], [address] [place] [address], [place] [address]. All rights reserved. This information is not intended as a substitute for professional medical care. Always follow your  healthcare professional's instructions.  Lifestyle Changes to Control Cholesterol  You can control your cholesterol through diet, exercise, weight management, quitting smoking, stress management, and taking your medicines right. These things can also lower your risk for cardiovascular disease.    Eating healthy  Your healthcare provider will give you information on diet changes you may need to make. Your provider may recommend that you see a registered dietitian for help with diet changes. Changes may include:    Cutting back on the amount of fat and cholesterol in your meals    Eating less salt (sodium). This is especially important if you have high blood pressure.    Eating more fresh vegetables and fruits    Eating lean proteins such as fish, poultry, beans, and peas    Eating less red meat and processed meats    Using low-fat dairy products    Using vegetable and nut oils in limited amounts    Limiting how many sweets and processed foods like chips, cookies, and baked goods that you eat     Limiting how many sugar-sweetened beverages you drink    Limiting how often you eat out  Getting exercise  Regular exercise is a good way to help your body control cholesterol. Regular exercise can help in many ways. It can:    Raise your good cholesterol    Help lower your bad cholesterol    Let blood flow better through your body    Give more oxygen to your muscles and tissues    Help you manage your weight    Help your heart pump better    Lower your blood pressure  Your healthcare provider may recommend that you get more physical activity if you haven't been active. Your provider may recommend that you get moderate to vigorous physical activity for at least 40 minutes each day. You should do this for at least 3 to 4 days each week. A few examples of moderate to vigorous activity are:    Walking at a brisk pace. This is about 3 to 4 miles per hour.    Jogging or running    Swimming or water  aerobics    Hiking    Dancing    Martial arts    Tennis    Riding a bicycle or stationary bike    Dancing  Managing your weight  If you are overweight or obese, your healthcare provider will work with you to help you lose weight and lower your BMI (body mass index). Making diet changes and getting more physical activity can help. Changing your diet will help you lose weight more easily than adding exercise.  Quitting smoking  Smoking and other tobacco use can raise cholesterol and make it harder to control. Quitting is tough. But millions of people have given up tobacco for good. You can quit, too! Think about some of the reasons below to quit smoking. Do any of them make you think twice about your smoking habit?  Stop smoking because it:    Keeps your cholesterol high, even if you make all the other changes you re supposed to    Damages your body. It especially harms your heart, lungs, skin, and blood vessels.    Makes you more likely to have a heart attack (acute myocardial infarction), stroke, or cancer    Stains your teeth    Makes your skin, clothes, and breath smell bad    Costs a lot of money  Controlling stress   Learn ways to control stress. This will help you deal with stress in your home and work life. Controlling stress can greatly lower your risk of getting cardiovascular disease.  Making the most of medicines  Healthy eating and exercise are a good start to keeping your cholesterol down. But you may need some extra help from medicine. If your doctor prescribes medicine, be sure to take it exactly as directed. Remember:    Tell your healthcare provider about all other medicines you take. This includes vitamins and herbs.    Tell your healthcare provider if you have any side effects after starting to take a medicine. Examples of side effects to watch for include muscle aches, weakness, blurred vision, rust-colored urine, yellowing of eyes or skin (jaundice), and headache.    Don t skip a dose or stop  taking your medicine because you feel better or because your cholesterol numbers go down. Never stop taking your medicine unless your healthcare provider has told you it s OK.    Ask your healthcare provider if you have any questions about your medicines.  High risk groups  Some people may need to take medicines called statins to control their cholesterol. This is in addition to eating a healthy diet and getting regular exercise.  Statins can help you stay healthy. They can also help prevent a heart attack or stroke. You may need to take a statin if you are in one of these groups:    Adults who have had a heart attack or stroke. Or adults who have had peripheral vascular disease, a ministroke (transient ischemic attack), or stable or unstable angina. This group also includes people who have had a procedure to restore blood flow through a blocked artery. These procedures include percutaneous coronary intervention, angioplasty, stent, and open-heart bypass surgery.    Adults who have diabetes. Or adults who are at higher risk of having a heart attack or stroke and have an LDL cholesterol level of 70 to 189 mg/dL    Adults who are 21 years old or older and have an LDL cholesterol level of 190 mg/dL or higher.  If you are in a high-risk group, talk with your healthcare provider about your treatment goals. Make sure you understand why these goals are important, based on your own health history and your family history of heart disease or high cholesterol.  Make a plan to have regular cholesterol checks. You may need to fast before getting this test. Also ask your provider about any side effects your medicines may cause. Let your provider know about any side effects you have. You may need to take more than one medicine to reach the cholesterol goals that you and your provider decide on.  Date Last Reviewed: 10/1/2016    7675-0586 [employer]. [address] [place] [address], [place] [address]. All rights reserved.  This information is not intended as a substitute for professional medical care. Always follow your healthcare professional's instructions.        Low-Cholesterol Diet  Your body needs cholesterol to build new cells and create certain hormones. There are 2 kinds of cholesterol in your blood:       HDL ( good ) cholesterol. This prevents fat deposits (plaque) from building up in your arteries. In this way it protects against heart disease and stroke.    LDL ( bad ) cholesterol. This stays in your body and sticks to artery walls. Over time it may block blood flow to the heart and brain. This can cause a heart attack or stroke.  The cholesterol in your blood comes from 2 sources: cholesterol in food that you eat and cholesterol that your liver makes. You should limit the amount of cholesterol in your diet. But the cholesterol that your body makes has the greatest disease risk. And your body makes more cholesterol when your diet is high in bad fats (saturated and trans fats). There are 2 kinds of fats you can eat:    Good fats, or unsaturated fats (mono-unsaturated and poly-unsaturated). They raise the level of good cholesterol and lower the level of bad cholesterol. Good fats are found in vegetable oils such as olive, sunflower, corn, and soybean oils, and in nuts and seeds.    Bad fats, or saturated fats (including foods high in cholesterol) and trans fats. These raise your risk of disease. They lower the good cholesterol and raise the level of bad cholesterol. Bad fats are found in animal products, including meat, whole-milk dairy products, and butter. Some plants are also high in bad fats (coconut and palm plants). Trans fats are found in hard (stick) margarines. They are also in many fast foods and commercially baked goods. Soft margarine sold in tubs has fewer trans fats and is safer to use.  High blood cholesterol is usually due to a diet high in saturated fat, along with not being physically active. In some  cases, genetics plays a role in causing high cholesterol. The tips below will help you create healthy eating habits that will help lower your blood cholesterol level.  Create a diet high in good fats, low in bad fats (and low in cholesterol)  The following steps will help you create a diet high in good fats and low in bad fats:    Talk with your doctor before starting a low cholesterol diet or weight loss program.    Learn to read nutrition labels and select appropriate portion sizes.    When cooking, use plant-based unsaturated vegetable oils (sunflower, corn, soybean, canola, peanut, and olive oils).    Avoid saturated fats found in animal products such as meat, dairy (whole-milk, cheese and ice cream), poultry skin, and egg yolks. Plants high in saturated oils include coconut oil, palm oil, and palm kernel oil.    If you eat meat, choose smaller portions and lean cuts, such as round, renetta, sirloin, or loin. Eat more meatless meals.    Replace meat with fish at least 2 times a week. Fish is an important source of the unsaturated fat called omega-3 fatty acids. This fat has potential to lower the risk of heart disease.    Replace whole-milk dairy products with low-fat or nonfat products. Try soy products. Soy helps to reduce total cholesterol.    Supplement your diet with protective fibers. Eat nuts, seeds, and whole grains rather than white rice and bread. These foods lower both cholesterol and triglyceride levels. (Triglycerides are another fat found in the blood.) Walnuts are one of the best sources of omega-3 fatty acids.    Eat plenty of fresh fruits and vegetables daily.    Avoid fast foods and commercial baked goods. Assume they contain saturated fat unless labeled otherwise.  Date Last Reviewed: 8/1/2016 2000-2017 [employer]. [address] [place] [address], [place] [address]. All rights reserved. This information is not intended as a substitute for professional medical care. Always follow your  "healthcare professional's instructions.                Follow-ups after your visit        Follow-up notes from your care team     Return in about 4 weeks (around 1/31/2018) for Routine Visit.      Your next 10 appointments already scheduled     Jan 31, 2018 10:00 AM CST   (Arrive by 9:45 AM)   Return Visit with Royl Cruz,    [facility] [place] ([facility] [place] )    [address]  [place] [address]   [phone]              Future tests that were ordered for you today     Open Future Orders        Priority Expected Expires Ordered    US Lower Extremity Venous Duplex Left Routine 1/10/2018 1/3/2019 1/3/2018            Who to contact     If you have questions or need follow up information about today's clinic visit or your schedule please contact [facility] directly at [phone].  Normal or non-critical lab and imaging results will be communicated to you by MyChart, letter or phone within 4 business days after the clinic has received the results. If you do not hear from us within 7 days, please contact the clinic through MyChart or phone. If you have a critical or abnormal lab result, we will notify you by phone as soon as possible.  Submit refill requests through [employer] or call your pharmacy and they will forward the refill request to us. Please allow 3 business days for your refill to be completed.          Additional Information About Your Visit        MyChart Information     [employer] lets you send messages to your doctor, view your test results, renew your prescriptions, schedule appointments and more. To sign up, go to [email or web address] . Click on \"Log in\" on the left side of the screen, which will take you to the Welcome page. Then click on \"Sign up Now\" on the right side of the page.     You will be asked to enter the access code listed below, as well as some personal information. Please follow the directions to create your username and password.   " "  Your access code is: [email or web address]  Expires: 3/10/2018  1:28 PM     Your access code will  in 90 days. If you need help or a new code, please call your [place] clinic or [phone].        Care EveryWhere ID     This is your Care EveryWhere ID. This could be used by other organizations to access your [place] medical records  [id number]        Your Vitals Were     Pulse Temperature Respirations Height Pulse Oximetry BMI (Body Mass Index)    68 98.5  F (36.9  C) (Tympanic) 16 1.626 m (5' 4\") 99% 26.09 kg/m2       Blood Pressure from Last 3 Encounters:   18 160/87   18 125/87   17 124/70    Weight from Last 3 Encounters:   18 68.9 kg (152 lb)   17 66.2 kg (146 lb)   16 78 kg (172 lb)              We Performed the Following     CRP cardiac risk     D dimer quantitative     EKG 12-lead complete w/read - (Clinic Performed)     ESR: Erythrocyte sedimentation rate     Exercise Stress Echocardiogram     Hemoglobin A1c     Lipid Profile     Tobacco Cessation - Order to Satisfy Health Maintenance          Today's Medication Changes          These changes are accurate as of: 1/3/18 11:32 AM.  If you have any questions, ask your nurse or doctor.               Start taking these medicines.        Dose/Directions    amLODIPine 5 MG tablet   Commonly known as:  NORVASC   Used for:  Atypical chest pain, Essential hypertension   Started by:  Roly Cruz, DO        Dose:  5 mg   Take 1 tablet (5 mg) by mouth daily   Quantity:  30 tablet   Refills:  3            Where to get your medicines      These medications were sent to [id number] - [place] - [address] AT [facility] [address]  [address], [place] [address]     Phone:  [phone]     amLODIPine 5 MG tablet                Primary Care Provider Office Phone # Fax #    TOMER Jane [phone]       [facility] [address]  [place] [address]      "   Equal Access to Services     [facility]: Hadii caitlin morales barialphonse Toyaali, waaxda luqadaha, qaybta kaalmanikki agrawal. So [facility] [phone].    ATENCIÓN: Si habla español, tiene a alex disposición servicios gratuitos de asistencia lingüística. Orlandoame al [phone].    We comply with applicable federal civil rights laws and [place] laws. We do not discriminate on the basis of race, color, national origin, age, disability, sex, sexual orientation, or gender identity.            Thank you!     Thank you for choosing [facility]  for your care. Our goal is always to provide you with excellent care. Hearing back from our patients is one way we can continue to improve our services. Please take a few minutes to complete the written survey that you may receive in the mail after your visit with us. Thank you!             Your Updated Medication List - Protect others around you: Learn how to safely use, store and throw away your medicines at www.disposemymeds.org.          This list is accurate as of: 1/3/18 11:32 AM.  Always use your most recent med list.                   Brand Name Dispense Instructions for use Diagnosis    amLODIPine 5 MG tablet    NORVASC    30 tablet    Take 1 tablet (5 mg) by mouth daily    Atypical chest pain, Essential hypertension       amoxicillin 500 MG capsule    AMOXIL    40 capsule    Take 2 capsules (1,000 mg) by mouth 2 times daily for 10 days        IBUPROFEN PO      Take 200 mg by mouth Takes 2 tablets every 6 hours PRN tooth pain        LACTAID 3000 UNIT tablet   Generic drug:  lactase     120 tablet    TAKE 1 TABLET BY MOUTH THREE TIMES DAILY WITH MEALS    Lactose intolerance in adult       metoprolol 50 MG tablet    LOPRESSOR    180 tablet    TAKE ONE TABLET BY MOUTH TWICE DAILY    Essential hypertension       VENTOLIN  (90 BASE) MCG/ACT Inhaler   Generic drug:  albuterol     18 g    INHALE TWO PUFFS INTO THE LUNGS EVERY 6  HOURS AS NEEDED FOR SHORTNESS OF BREATH    Acute bronchitis due to other specified organisms

## 2018-01-05 ENCOUNTER — HOSPITAL ENCOUNTER (OUTPATIENT)
Dept: ULTRASOUND IMAGING | Facility: HOSPITAL | Age: 38
Discharge: HOME | End: 2018-01-05
Attending: INTERNAL MEDICINE | Admitting: INTERNAL MEDICINE
Payer: COMMERCIAL

## 2018-01-05 DIAGNOSIS — I87.9 VEIN DISORDER: ICD-10-CM

## 2018-01-05 LAB — CRP SERPL HS-MCNC: 3.7 MG/L

## 2018-01-05 PROCEDURE — 93971 EXTREMITY STUDY: CPT | Mod: TC,LT

## 2018-01-09 ENCOUNTER — HOSPITAL ENCOUNTER (OUTPATIENT)
Dept: RESPIRATORY THERAPY | Facility: HOSPITAL | Age: 38
Discharge: HOME | End: 2018-01-09
Attending: INTERNAL MEDICINE | Admitting: INTERNAL MEDICINE
Payer: COMMERCIAL

## 2018-01-09 LAB
COHGB MFR BLD: 5.9 % (ref 0–2)
HGB BLD-MCNC: 12.2 G/DL (ref 11.7–15.7)

## 2018-01-09 PROCEDURE — 85018 HEMOGLOBIN: CPT | Performed by: INTERNAL MEDICINE

## 2018-01-09 PROCEDURE — 82375 ASSAY CARBOXYHB QUANT: CPT | Performed by: INTERNAL MEDICINE

## 2018-01-09 PROCEDURE — 25000125 ZZHC RX 250: Performed by: INTERNAL MEDICINE

## 2018-01-09 PROCEDURE — 94726 PLETHYSMOGRAPHY LUNG VOLUMES: CPT | Mod: 26 | Performed by: INTERNAL MEDICINE

## 2018-01-09 PROCEDURE — 94729 DIFFUSING CAPACITY: CPT | Mod: 26 | Performed by: INTERNAL MEDICINE

## 2018-01-09 PROCEDURE — 94060 EVALUATION OF WHEEZING: CPT | Mod: 26 | Performed by: INTERNAL MEDICINE

## 2018-01-09 PROCEDURE — 94729 DIFFUSING CAPACITY: CPT

## 2018-01-09 PROCEDURE — 94726 PLETHYSMOGRAPHY LUNG VOLUMES: CPT

## 2018-01-09 PROCEDURE — 94060 EVALUATION OF WHEEZING: CPT

## 2018-01-09 PROCEDURE — 36415 COLL VENOUS BLD VENIPUNCTURE: CPT | Performed by: INTERNAL MEDICINE

## 2018-01-09 RX ORDER — ALBUTEROL SULFATE 0.83 MG/ML
2.5 SOLUTION RESPIRATORY (INHALATION) ONCE
Status: COMPLETED | OUTPATIENT
Start: 2018-01-09 | End: 2018-01-09

## 2018-01-09 RX ADMIN — ALBUTEROL SULFATE 2.5 MG: 2.5 SOLUTION RESPIRATORY (INHALATION) at 07:56

## 2018-01-22 ENCOUNTER — DOCUMENTATION ONLY (OUTPATIENT)
Dept: FAMILY MEDICINE | Facility: OTHER | Age: 38
End: 2018-01-22

## 2018-01-22 ENCOUNTER — HOSPITAL ENCOUNTER (OUTPATIENT)
Dept: CARDIOLOGY | Facility: HOSPITAL | Age: 38
Discharge: HOME | End: 2018-01-22
Attending: INTERNAL MEDICINE | Admitting: INTERNAL MEDICINE
Payer: COMMERCIAL

## 2018-01-22 DIAGNOSIS — R06.09 DYSPNEA ON EXERTION: ICD-10-CM

## 2018-01-22 PROCEDURE — 93350 STRESS TTE ONLY: CPT | Mod: 26 | Performed by: INTERNAL MEDICINE

## 2018-01-22 PROCEDURE — 93325 DOPPLER ECHO COLOR FLOW MAPG: CPT | Mod: 26 | Performed by: INTERNAL MEDICINE

## 2018-01-22 PROCEDURE — 93016 CV STRESS TEST SUPVJ ONLY: CPT | Performed by: INTERNAL MEDICINE

## 2018-01-22 PROCEDURE — 93321 DOPPLER ECHO F-UP/LMTD STD: CPT | Mod: 26 | Performed by: INTERNAL MEDICINE

## 2018-01-22 PROCEDURE — 93017 CV STRESS TEST TRACING ONLY: CPT

## 2018-01-22 PROCEDURE — 93018 CV STRESS TEST I&R ONLY: CPT | Performed by: INTERNAL MEDICINE

## 2018-02-05 ENCOUNTER — OFFICE VISIT (OUTPATIENT)
Dept: CARDIOLOGY | Facility: OTHER | Age: 38
End: 2018-02-05
Attending: INTERNAL MEDICINE
Payer: COMMERCIAL

## 2018-02-05 VITALS
SYSTOLIC BLOOD PRESSURE: 127 MMHG | BODY MASS INDEX: 26.46 KG/M2 | RESPIRATION RATE: 16 BRPM | WEIGHT: 155 LBS | TEMPERATURE: 98.3 F | DIASTOLIC BLOOD PRESSURE: 71 MMHG | HEART RATE: 80 BPM | OXYGEN SATURATION: 98 % | HEIGHT: 64 IN

## 2018-02-05 DIAGNOSIS — R00.2 PALPITATIONS: ICD-10-CM

## 2018-02-05 DIAGNOSIS — Z72.0 TOBACCO ABUSE: ICD-10-CM

## 2018-02-05 DIAGNOSIS — Z71.6 TOBACCO ABUSE COUNSELING: ICD-10-CM

## 2018-02-05 DIAGNOSIS — R06.09 DYSPNEA ON EXERTION: Primary | ICD-10-CM

## 2018-02-05 DIAGNOSIS — R07.89 ATYPICAL CHEST PAIN: ICD-10-CM

## 2018-02-05 DIAGNOSIS — I45.10 INCOMPLETE RIGHT BUNDLE BRANCH BLOCK: ICD-10-CM

## 2018-02-05 DIAGNOSIS — E78.00 PURE HYPERCHOLESTEROLEMIA: ICD-10-CM

## 2018-02-05 DIAGNOSIS — I87.9 VEIN DISORDER: ICD-10-CM

## 2018-02-05 DIAGNOSIS — I10 ESSENTIAL HYPERTENSION: ICD-10-CM

## 2018-02-05 PROCEDURE — G0463 HOSPITAL OUTPT CLINIC VISIT: HCPCS

## 2018-02-05 PROCEDURE — 99214 OFFICE O/P EST MOD 30 MIN: CPT | Performed by: INTERNAL MEDICINE

## 2018-02-05 RX ORDER — METOPROLOL TARTRATE 25 MG/1
25 TABLET, FILM COATED ORAL 2 TIMES DAILY
Qty: 60 TABLET | Refills: 11 | Status: SHIPPED | OUTPATIENT
Start: 2018-02-05 | End: 2019-02-08

## 2018-02-05 RX ORDER — AMLODIPINE BESYLATE 10 MG/1
10 TABLET ORAL DAILY
Qty: 30 TABLET | Refills: 11 | Status: SHIPPED | OUTPATIENT
Start: 2018-02-05 | End: 2019-02-08

## 2018-02-05 ASSESSMENT — PAIN SCALES - GENERAL: PAINLEVEL: NO PAIN (0)

## 2018-02-05 NOTE — PATIENT INSTRUCTIONS
You were seen by Dr. Cruz, 2/5/2018.     1.  Work toward lowering your cholesterol.  Diet and exercise can lower your cholesterol.      2. Please work toward smoking cessation, this will be the greatest benefit to your health.     3. Please continue all other medications as they have been prescribed.     4. You will increase the Norvasc to 10 mg daily.     5. Please decrease the dose of Metoprolol to 25 mg daily.         You will follow up with Dr. Cruz in 3 months.       Please call the cardiology office with problems, questions, or concerns at [phone].    If you experience chest pain, chest pressure, chest tightness, shortness of breath, fainting, lightheadedness, nausea, vomiting, or other concerning symptoms, please report to the Emergency Department or call 911. These symptoms may be emergent, and best treated in the Emergency Department.       Mala RANDHAWA RN-BSN  Cardiology   [facility]  [phone]      HOW TO QUIT SMOKING  Smoking is one of the hardest habits to break. About half of all those who have ever smoked have been able to quit, and most of those (about 70%) who still smoke want to quit. Here are some of the best ways to stop smoking.     KEEP TRYING:  It takes most smokers about 8 tries before they are finally able to fully quit. So, the more often you try and fail, the better your chance of quitting the next time! So, don't give up!    GO COLD TURKEY:  Most ex-smokers quit cold turkey. Trying to cut back gradually doesn't seem to work as well, perhaps because it continues the smoking habit. Also, it is possible to fool yourself by inhaling more while smoking fewer cigarettes. This results in the same amount of nicotine in your body!    GET SUPPORT:  Support programs can make an important difference, especially for the heavy smoker. These groups offer lectures, methods to change your behavior and peer support. Call the free national Quitline for more information. 800-QUIT-NOW  (508.435.7695). Low-cost or free programs are offered by many hospitals, local chapters of the American Lung Association (695-077-1221) and the American Cancer Society (713-512-1979). Support at home is important too. Non-smokers can help by offering praise and encouragement. If the smoker fails to quit, encourage them to try again!    OVER-THE-COUNTER MEDICINES:  For those who can't quit on their own, Nicotine Replacement Therapy (NRT) may make quitting much easier. Certain aids such as the nicotine patch, gum and lozenge are available without a prescription. However, it is best to use these under the guidance of your doctor. The skin patch provides a steady supply of nicotine to the body. Nicotine gum and lozenge gives temporary bursts of low levels of nicotine. Both methods take the edge off the craving for cigarettes. WARNING: If you feel symptoms of nicotine overdose, such as nausea, vomiting, dizziness, weakness, or fast heartbeat, stop using these and see your doctor.    PRESCRIPTION MEDICINES:  After evaluating your smoking patterns and prior attempts at quitting, your doctor may offer a prescription medicine such as bupropion (Zyban, Wellbutrin), varenicline (Chantix, Champix), a niocotine inhaler or nasal spray. Each has its unique advantage and side effects which your doctor can review with you.    HEALTH BENEFITS OF QUITTING:  The benefits of quitting start right away and keep improving the longer you go without smokin minutes: blood pressure and pulse return to normal  8 hours: oxygen levels return to normal  2 days: ability to smell and taste begins to improve as damaged nerves start to regrow  2-3 weeks: circulation and lung function improves  1-9 months: decreased cough, congestion and shortness of breath; less tired  1 year: risk of heart attack decreases by half  5 years: risk of lung cancer decreases by half; risk of stroke becomes the same as a non-smoker  For information about how to quit  smoking, visit the following links:  National [employer] [place] ,   Clearing the Air, Quit Smoking Today   - an online booklet. http://www.smokefree.gov/pubs/clearing_the_air.pdf  Smokefree.gov http://smokefree.gov/  QuitNet http://www.quitnet.com/    2852-4763 [employer], [address] [place] [address], [place] [address]. All rights reserved. This information is not intended as a substitute for professional medical care. Always follow your healthcare professional's instructions.    The Benefits of Living Smoke Free  What do you want to gain from quitting? Check off some reasons to quit.  Health Benefits  ___ Reduce my risk of lung cancer, heart disease, chronic lung disease  ___ Have fewer wrinkles and softer skin  ___ Improve my sense of taste and smell  ___ For pregnant women--reduce the risk of having a miscarriage, stillbirth, premature birth, or low-birth-weight baby  Personal Benefits  ___ Feel more in control of my life  ___ Have better-smelling hair, breath, clothes, home, and car  ___ Save time by not having to take smoke breaks, buy cigarettes, or hunt for a light  ___ Have whiter teeth  Family Benefits  ___ Reduce my children s respiratory tract infections  ___ Set a good example for my children  ___ Reduce my family s cancer risk  Financial Benefits  ___ Save hundreds of dollars each year that would be spent on cigarettes  ___ Save money on medical bills  ___ Save on life, health, and car insurance premiums    Those Dollars Add Up!  Cigarettes are expensive, and getting more expensive all the time. Do you realize how much money you are spending on cigarettes per year? What is the average amount you spend on a pack of cigarettes? What is the average number of packs that you smoke per day? Using your answers to these questions, fill in this formula to help you find out:  ($ _____ per pack) ×  ( _____ number of packs per day) × (365 days) =  $ _____ yearly cost of smoking  Besides tobacco, there are other  costs, including extra cleaning bills and replacement costs for clothing and furniture; medical expenses for smoking-related illnesses; and higher health, life, and car insurance premiums.    Cigars and Pipes Count Too!  Cigars and pipes are also dangerous. So are smokeless (chewing) tobacco and snuff. All of these products contain nicotine, a highly addictive substance that has harmful effects on your body. Quitting smoking means giving up all tobacco products.      0571-4375 Jorge \A Chronology of [place] [facility]\"", [address] [place] [address], [place] [id number]. All rights reserved. This information is not intended as a substitute for professional medical care. Always follow your healthcare professional's instructions.  Lifestyle Changes to Control Cholesterol  You can control your cholesterol through diet, exercise, weight management, quitting smoking, stress management, and taking your medicines right. These things can also lower your risk for cardiovascular disease.    Eating healthy  Your healthcare provider will give you information on diet changes you may need to make. Your provider may recommend that you see a registered dietitian for help with diet changes. Changes may include:    Cutting back on the amount of fat and cholesterol in your meals    Eating less salt (sodium). This is especially important if you have high blood pressure.    Eating more fresh vegetables and fruits    Eating lean proteins such as fish, poultry, beans, and peas    Eating less red meat and processed meats    Using low-fat dairy products    Using vegetable and nut oils in limited amounts    Limiting how many sweets and processed foods like chips, cookies, and baked goods that you eat     Limiting how many sugar-sweetened beverages you drink    Limiting how often you eat out  Getting exercise  Regular exercise is a good way to help your body control cholesterol. Regular exercise can help in many ways. It can:    Raise your good cholesterol    Help lower your  bad cholesterol    Let blood flow better through your body    Give more oxygen to your muscles and tissues    Help you manage your weight    Help your heart pump better    Lower your blood pressure  Your healthcare provider may recommend that you get more physical activity if you haven't been active. Your provider may recommend that you get moderate to vigorous physical activity for at least 40 minutes each day. You should do this for at least 3 to 4 days each week. A few examples of moderate to vigorous activity are:    Walking at a brisk pace. This is about 3 to 4 miles per hour.    Jogging or running    Swimming or water aerobics    Hiking    Dancing    Martial arts    Tennis    Riding a bicycle or stationary bike    Dancing  Managing your weight  If you are overweight or obese, your healthcare provider will work with you to help you lose weight and lower your BMI (body mass index). Making diet changes and getting more physical activity can help. Changing your diet will help you lose weight more easily than adding exercise.  Quitting smoking  Smoking and other tobacco use can raise cholesterol and make it harder to control. Quitting is tough. But millions of people have given up tobacco for good. You can quit, too! Think about some of the reasons below to quit smoking. Do any of them make you think twice about your smoking habit?  Stop smoking because it:    Keeps your cholesterol high, even if you make all the other changes you re supposed to    Damages your body. It especially harms your heart, lungs, skin, and blood vessels.    Makes you more likely to have a heart attack (acute myocardial infarction), stroke, or cancer    Stains your teeth    Makes your skin, clothes, and breath smell bad    Costs a lot of money  Controlling stress   Learn ways to control stress. This will help you deal with stress in your home and work life. Controlling stress can greatly lower your risk of getting cardiovascular  disease.  Making the most of medicines  Healthy eating and exercise are a good start to keeping your cholesterol down. But you may need some extra help from medicine. If your doctor prescribes medicine, be sure to take it exactly as directed. Remember:    Tell your healthcare provider about all other medicines you take. This includes vitamins and herbs.    Tell your healthcare provider if you have any side effects after starting to take a medicine. Examples of side effects to watch for include muscle aches, weakness, blurred vision, rust-colored urine, yellowing of eyes or skin (jaundice), and headache.    Don t skip a dose or stop taking your medicine because you feel better or because your cholesterol numbers go down. Never stop taking your medicine unless your healthcare provider has told you it s OK.    Ask your healthcare provider if you have any questions about your medicines.  High risk groups  Some people may need to take medicines called statins to control their cholesterol. This is in addition to eating a healthy diet and getting regular exercise.  Statins can help you stay healthy. They can also help prevent a heart attack or stroke. You may need to take a statin if you are in one of these groups:    Adults who have had a heart attack or stroke. Or adults who have had peripheral vascular disease, a ministroke (transient ischemic attack), or stable or unstable angina. This group also includes people who have had a procedure to restore blood flow through a blocked artery. These procedures include percutaneous coronary intervention, angioplasty, stent, and open-heart bypass surgery.    Adults who have diabetes. Or adults who are at higher risk of having a heart attack or stroke and have an LDL cholesterol level of 70 to 189 mg/dL    Adults who are 21 years old or older and have an LDL cholesterol level of 190 mg/dL or higher.  If you are in a high-risk group, talk with your healthcare provider about your  treatment goals. Make sure you understand why these goals are important, based on your own health history and your family history of heart disease or high cholesterol.  Make a plan to have regular cholesterol checks. You may need to fast before getting this test. Also ask your provider about any side effects your medicines may cause. Let your provider know about any side effects you have. You may need to take more than one medicine to reach the cholesterol goals that you and your provider decide on.  Date Last Reviewed: 10/1/2016    [id number] [employer]. [address] [place] [address], [place] [address]. All rights reserved. This information is not intended as a substitute for professional medical care. Always follow your healthcare professional's instructions.        Low-Cholesterol Diet  Your body needs cholesterol to build new cells and create certain hormones. There are 2 kinds of cholesterol in your blood:       HDL ( good ) cholesterol. This prevents fat deposits (plaque) from building up in your arteries. In this way it protects against heart disease and stroke.    LDL ( bad ) cholesterol. This stays in your body and sticks to artery walls. Over time it may block blood flow to the heart and brain. This can cause a heart attack or stroke.  The cholesterol in your blood comes from 2 sources: cholesterol in food that you eat and cholesterol that your liver makes. You should limit the amount of cholesterol in your diet. But the cholesterol that your body makes has the greatest disease risk. And your body makes more cholesterol when your diet is high in bad fats (saturated and trans fats). There are 2 kinds of fats you can eat:    Good fats, or unsaturated fats (mono-unsaturated and poly-unsaturated). They raise the level of good cholesterol and lower the level of bad cholesterol. Good fats are found in vegetable oils such as olive, sunflower, corn, and soybean oils, and in nuts and seeds.    Bad fats, or  saturated fats (including foods high in cholesterol) and trans fats. These raise your risk of disease. They lower the good cholesterol and raise the level of bad cholesterol. Bad fats are found in animal products, including meat, whole-milk dairy products, and butter. Some plants are also high in bad fats (coconut and palm plants). Trans fats are found in hard (stick) margarines. They are also in many fast foods and commercially baked goods. Soft margarine sold in tubs has fewer trans fats and is safer to use.  High blood cholesterol is usually due to a diet high in saturated fat, along with not being physically active. In some cases, genetics plays a role in causing high cholesterol. The tips below will help you create healthy eating habits that will help lower your blood cholesterol level.  Create a diet high in good fats, low in bad fats (and low in cholesterol)  The following steps will help you create a diet high in good fats and low in bad fats:    Talk with your doctor before starting a low cholesterol diet or weight loss program.    Learn to read nutrition labels and select appropriate portion sizes.    When cooking, use plant-based unsaturated vegetable oils (sunflower, corn, soybean, canola, peanut, and olive oils).    Avoid saturated fats found in animal products such as meat, dairy (whole-milk, cheese and ice cream), poultry skin, and egg yolks. Plants high in saturated oils include coconut oil, palm oil, and palm kernel oil.    If you eat meat, choose smaller portions and lean cuts, such as round, renetta, sirloin, or loin. Eat more meatless meals.    Replace meat with fish at least 2 times a week. Fish is an important source of the unsaturated fat called omega-3 fatty acids. This fat has potential to lower the risk of heart disease.    Replace whole-milk dairy products with low-fat or nonfat products. Try soy products. Soy helps to reduce total cholesterol.    Supplement your diet with protective fibers.  Eat nuts, seeds, and whole grains rather than white rice and bread. These foods lower both cholesterol and triglyceride levels. (Triglycerides are another fat found in the blood.) Walnuts are one of the best sources of omega-3 fatty acids.    Eat plenty of fresh fruits and vegetables daily.    Avoid fast foods and commercial baked goods. Assume they contain saturated fat unless labeled otherwise.  Date Last Reviewed: 8/1/2016 2000-2017 [employer]. [address] [place] [address], [place] [address]. All rights reserved. This information is not intended as a substitute for professional medical care. Always follow your healthcare professional's instructions.        Kicking the Smoking Habit  If you smoke, quitting is one of the best changes you can make for your heart and your overall health. Your risk of heart attack goes down within one day of putting out that last cigarette. As you go longer without smoking, your risk goes down even more. Quitting isn t easy, but millions of people have done it. You can, too. It s never too late to quit.  Getting started  Boost your chances of success by deciding on your  quit plan.  Your health care provider and cardiac rehab team can help you develop this plan. Even if you ve already quit, it s easy to slip back into smoking.  Your plan can help you avoid and recover from relapse.  In any case, start by setting a date to quit within a month, and do it.    Keys to your quit plan    Talk to your healthcare provider about prescription medicines and nicotine replacement products that help stop the urge to smoke.     Join a support group or quit smoking program. Talking with others about the challenges of quitting can help you get through them.    Ask other smokers in your household to quit with you.    Look for the cues in your life that you associate with smoking and avoid them.  Track your triggers  What gives you that  W-dgsb-m-cigarette  feeling? List all the situations  that make you want a cigarette. Then think of other ways to deal with these situations. Here are some examples:  Situation How I'll handle it   Finishing a meal Get up from the table and take a walk   Having an argument Find a quiet place and breathe deeply   Feeling lonely or bored Call a friend to talk         Tips for quitting successfully    List the benefits of quitting such as reducing heart risks and saving money. Keep this list and review it whenever you feel like smoking.    Get support. Let your friends know you may call them to chat when you have an urge to smoke.    If you ve tried to quit before without success, this time avoid the triggers that may cause the relapse.    Make the most of slip-ups. Try to learn from them, and then get back on track.    Be accountable to your friends and your calendar so that you stay on track.  For family and friends    Be supportive and patient. Quitting smoking can be difficult and stressful.    If you smoke, now s a great time to quit. Even if you don t quit, never smoke around your loved one. Secondhand smoke is dangerous to his or her heart.    The best goals are accomplished in teams. Remember that when your loved one states he or she wants to stop smoking.  Date Last Reviewed: 7/1/2016 2000-2017 [employer]. [address] [facility], [place] [address]. All rights reserved. This information is not intended as a substitute for professional medical care. Always follow your healthcare professional's instructions.

## 2018-02-05 NOTE — NURSING NOTE
"Chief Complaint   Patient presents with     RECHECK     1 month follow-up, test results       Initial /71 (BP Location: Left arm, Patient Position: Chair, Cuff Size: Adult Regular)  Pulse 80  Temp 98.3  F (36.8  C) (Tympanic)  Resp 16  Ht 1.626 m (5' 4\")  Wt 70.3 kg (155 lb)  SpO2 98%  BMI 26.61 kg/m2 Estimated body mass index is 26.61 kg/(m^2) as calculated from the following:    Height as of this encounter: 1.626 m (5' 4\").    Weight as of this encounter: 70.3 kg (155 lb).  Medication Reconciliation: complete   Esha Dunbar      "

## 2018-02-05 NOTE — MR AVS SNAPSHOT
After Visit Summary   2/5/2018    Kimberley Louis    MRN: [id number]           Patient Information     Date Of Birth          1980        Visit Information        Provider Department      2/5/2018 3:00 PM Roly Cruz, DO [facility] [place]        Today's Diagnoses     Tobacco abuse        Tobacco abuse counseling        Atypical chest pain        Essential hypertension          Care Instructions    You were seen by Dr. Cruz, 2/5/2018.     1.  Work toward lowering your cholesterol.  Diet and exercise can lower your cholesterol.      2. Please work toward smoking cessation, this will be the greatest benefit to your health.     3. Please continue all other medications as they have been prescribed.     4. You will increase the Norvasc to 10 mg daily.     5. Please decrease the dose of Metoprolol to 25 mg daily.         You will follow up with Dr. Cruz in 3 months.       Please call the cardiology office with problems, questions, or concerns at [phone].    If you experience chest pain, chest pressure, chest tightness, shortness of breath, fainting, lightheadedness, nausea, vomiting, or other concerning symptoms, please report to the Emergency Department or call 911. These symptoms may be emergent, and best treated in the Emergency Department.       Mala RANDHAWA RN-BSN  Cardiology   [facility] [place]  [phone]      HOW TO QUIT SMOKING  Smoking is one of the hardest habits to break. About half of all those who have ever smoked have been able to quit, and most of those (about 70%) who still smoke want to quit. Here are some of the best ways to stop smoking.     KEEP TRYING:  It takes most smokers about 8 tries before they are finally able to fully quit. So, the more often you try and fail, the better your chance of quitting the next time! So, don't give up!    GO COLD TURKEY:  Most ex-smokers quit cold turkey. Trying to cut back gradually doesn't seem to work as well, perhaps  because it continues the smoking habit. Also, it is possible to fool yourself by inhaling more while smoking fewer cigarettes. This results in the same amount of nicotine in your body!    GET SUPPORT:  Support programs can make an important difference, especially for the heavy smoker. These groups offer lectures, methods to change your behavior and peer support. Call the free national Quitline for more information. 800-QUIT-NOW (871-473-7104). Low-cost or free programs are offered by many hospitals, local chapters of the American Lung Association (643-489-6375) and the American Cancer Society (466-177-6936). Support at home is important too. Non-smokers can help by offering praise and encouragement. If the smoker fails to quit, encourage them to try again!    OVER-THE-COUNTER MEDICINES:  For those who can't quit on their own, Nicotine Replacement Therapy (NRT) may make quitting much easier. Certain aids such as the nicotine patch, gum and lozenge are available without a prescription. However, it is best to use these under the guidance of your doctor. The skin patch provides a steady supply of nicotine to the body. Nicotine gum and lozenge gives temporary bursts of low levels of nicotine. Both methods take the edge off the craving for cigarettes. WARNING: If you feel symptoms of nicotine overdose, such as nausea, vomiting, dizziness, weakness, or fast heartbeat, stop using these and see your doctor.    PRESCRIPTION MEDICINES:  After evaluating your smoking patterns and prior attempts at quitting, your doctor may offer a prescription medicine such as bupropion (Zyban, Wellbutrin), varenicline (Chantix, Champix), a niocotine inhaler or nasal spray. Each has its unique advantage and side effects which your doctor can review with you.    HEALTH BENEFITS OF QUITTING:  The benefits of quitting start right away and keep improving the longer you go without smokin minutes: blood pressure and pulse return to normal  8  hours: oxygen levels return to normal  2 days: ability to smell and taste begins to improve as damaged nerves start to regrow  2-3 weeks: circulation and lung function improves  1-9 months: decreased cough, congestion and shortness of breath; less tired  1 year: risk of heart attack decreases by half  5 years: risk of lung cancer decreases by half; risk of stroke becomes the same as a non-smoker  For information about how to quit smoking, visit the following links:  National Cancer [employer] ,   Clearing the Air, Quit Smoking Today   - an online booklet. http://www.smokefree.gov/pubs/clearing_the_air.pdf  Smokefree.gov http://smokefree.gov/  QuitNet http://www.quitnet.com/    0555-7787 Jorge Sol[employer], [address] [place] [address], [place] [address]. All rights reserved. This information is not intended as a substitute for professional medical care. Always follow your healthcare professional's instructions.    The Benefits of Living Smoke Free  What do you want to gain from quitting? Check off some reasons to quit.  Health Benefits  ___ Reduce my risk of lung cancer, heart disease, chronic lung disease  ___ Have fewer wrinkles and softer skin  ___ Improve my sense of taste and smell  ___ For pregnant women--reduce the risk of having a miscarriage, stillbirth, premature birth, or low-birth-weight baby  Personal Benefits  ___ Feel more in control of my life  ___ Have better-smelling hair, breath, clothes, home, and car  ___ Save time by not having to take smoke breaks, buy cigarettes, or hunt for a light  ___ Have whiter teeth  Family Benefits  ___ Reduce my children s respiratory tract infections  ___ Set a good example for my children  ___ Reduce my family s cancer risk  Financial Benefits  ___ Save hundreds of dollars each year that would be spent on cigarettes  ___ Save money on medical bills  ___ Save on life, health, and car insurance premiums    Those Dollars Add Up!  Cigarettes are expensive, and getting more  expensive all the time. Do you realize how much money you are spending on cigarettes per year? What is the average amount you spend on a pack of cigarettes? What is the average number of packs that you smoke per day? Using your answers to these questions, fill in this formula to help you find out:  ($ _____ per pack) ×  ( _____ number of packs per day) × (365 days) =  $ _____ yearly cost of smoking  Besides tobacco, there are other costs, including extra cleaning bills and replacement costs for clothing and furniture; medical expenses for smoking-related illnesses; and higher health, life, and car insurance premiums.    Cigars and Pipes Count Too!  Cigars and pipes are also dangerous. So are smokeless (chewing) tobacco and snuff. All of these products contain nicotine, a highly addictive substance that has harmful effects on your body. Quitting smoking means giving up all tobacco products.      2582-6205 [place] [address]. All rights reserved. This information is not intended as a substitute for professional medical care. Always follow your healthcare professional's instructions.  Lifestyle Changes to Control Cholesterol  You can control your cholesterol through diet, exercise, weight management, quitting smoking, stress management, and taking your medicines right. These things can also lower your risk for cardiovascular disease.    Eating healthy  Your healthcare provider will give you information on diet changes you may need to make. Your provider may recommend that you see a registered dietitian for help with diet changes. Changes may include:    Cutting back on the amount of fat and cholesterol in your meals    Eating less salt (sodium). This is especially important if you have high blood pressure.    Eating more fresh vegetables and fruits    Eating lean proteins such as fish, poultry, beans, and peas    Eating less red meat and processed meats    Using low-fat dairy  products    Using vegetable and nut oils in limited amounts    Limiting how many sweets and processed foods like chips, cookies, and baked goods that you eat     Limiting how many sugar-sweetened beverages you drink    Limiting how often you eat out  Getting exercise  Regular exercise is a good way to help your body control cholesterol. Regular exercise can help in many ways. It can:    Raise your good cholesterol    Help lower your bad cholesterol    Let blood flow better through your body    Give more oxygen to your muscles and tissues    Help you manage your weight    Help your heart pump better    Lower your blood pressure  Your healthcare provider may recommend that you get more physical activity if you haven't been active. Your provider may recommend that you get moderate to vigorous physical activity for at least 40 minutes each day. You should do this for at least 3 to 4 days each week. A few examples of moderate to vigorous activity are:    Walking at a brisk pace. This is about 3 to 4 miles per hour.    Jogging or running    Swimming or water aerobics    Hiking    Dancing    Martial arts    Tennis    Riding a bicycle or stationary bike    Dancing  Managing your weight  If you are overweight or obese, your healthcare provider will work with you to help you lose weight and lower your BMI (body mass index). Making diet changes and getting more physical activity can help. Changing your diet will help you lose weight more easily than adding exercise.  Quitting smoking  Smoking and other tobacco use can raise cholesterol and make it harder to control. Quitting is tough. But millions of people have given up tobacco for good. You can quit, too! Think about some of the reasons below to quit smoking. Do any of them make you think twice about your smoking habit?  Stop smoking because it:    Keeps your cholesterol high, even if you make all the other changes you re supposed to    Damages your body. It especially harms  your heart, lungs, skin, and blood vessels.    Makes you more likely to have a heart attack (acute myocardial infarction), stroke, or cancer    Stains your teeth    Makes your skin, clothes, and breath smell bad    Costs a lot of money  Controlling stress   Learn ways to control stress. This will help you deal with stress in your home and work life. Controlling stress can greatly lower your risk of getting cardiovascular disease.  Making the most of medicines  Healthy eating and exercise are a good start to keeping your cholesterol down. But you may need some extra help from medicine. If your doctor prescribes medicine, be sure to take it exactly as directed. Remember:    Tell your healthcare provider about all other medicines you take. This includes vitamins and herbs.    Tell your healthcare provider if you have any side effects after starting to take a medicine. Examples of side effects to watch for include muscle aches, weakness, blurred vision, rust-colored urine, yellowing of eyes or skin (jaundice), and headache.    Don t skip a dose or stop taking your medicine because you feel better or because your cholesterol numbers go down. Never stop taking your medicine unless your healthcare provider has told you it s OK.    Ask your healthcare provider if you have any questions about your medicines.  High risk groups  Some people may need to take medicines called statins to control their cholesterol. This is in addition to eating a healthy diet and getting regular exercise.  Statins can help you stay healthy. They can also help prevent a heart attack or stroke. You may need to take a statin if you are in one of these groups:    Adults who have had a heart attack or stroke. Or adults who have had peripheral vascular disease, a ministroke (transient ischemic attack), or stable or unstable angina. This group also includes people who have had a procedure to restore blood flow through a blocked artery. These procedures  include percutaneous coronary intervention, angioplasty, stent, and open-heart bypass surgery.    Adults who have diabetes. Or adults who are at higher risk of having a heart attack or stroke and have an LDL cholesterol level of 70 to 189 mg/dL    Adults who are 21 years old or older and have an LDL cholesterol level of 190 mg/dL or higher.  If you are in a high-risk group, talk with your healthcare provider about your treatment goals. Make sure you understand why these goals are important, based on your own health history and your family history of heart disease or high cholesterol.  Make a plan to have regular cholesterol checks. You may need to fast before getting this test. Also ask your provider about any side effects your medicines may cause. Let your provider know about any side effects you have. You may need to take more than one medicine to reach the cholesterol goals that you and your provider decide on.  Date Last Reviewed: 10/1/2016    2144-6096 [employer]. [address] [place] [address]. All rights reserved. This information is not intended as a substitute for professional medical care. Always follow your healthcare professional's instructions.        Low-Cholesterol Diet  Your body needs cholesterol to build new cells and create certain hormones. There are 2 kinds of cholesterol in your blood:       HDL ( good ) cholesterol. This prevents fat deposits (plaque) from building up in your arteries. In this way it protects against heart disease and stroke.    LDL ( bad ) cholesterol. This stays in your body and sticks to artery walls. Over time it may block blood flow to the heart and brain. This can cause a heart attack or stroke.  The cholesterol in your blood comes from 2 sources: cholesterol in food that you eat and cholesterol that your liver makes. You should limit the amount of cholesterol in your diet. But the cholesterol that your body makes has the greatest disease risk.  And your body makes more cholesterol when your diet is high in bad fats (saturated and trans fats). There are 2 kinds of fats you can eat:    Good fats, or unsaturated fats (mono-unsaturated and poly-unsaturated). They raise the level of good cholesterol and lower the level of bad cholesterol. Good fats are found in vegetable oils such as olive, sunflower, corn, and soybean oils, and in nuts and seeds.    Bad fats, or saturated fats (including foods high in cholesterol) and trans fats. These raise your risk of disease. They lower the good cholesterol and raise the level of bad cholesterol. Bad fats are found in animal products, including meat, whole-milk dairy products, and butter. Some plants are also high in bad fats (coconut and palm plants). Trans fats are found in hard (stick) margarines. They are also in many fast foods and commercially baked goods. Soft margarine sold in tubs has fewer trans fats and is safer to use.  High blood cholesterol is usually due to a diet high in saturated fat, along with not being physically active. In some cases, genetics plays a role in causing high cholesterol. The tips below will help you create healthy eating habits that will help lower your blood cholesterol level.  Create a diet high in good fats, low in bad fats (and low in cholesterol)  The following steps will help you create a diet high in good fats and low in bad fats:    Talk with your doctor before starting a low cholesterol diet or weight loss program.    Learn to read nutrition labels and select appropriate portion sizes.    When cooking, use plant-based unsaturated vegetable oils (sunflower, corn, soybean, canola, peanut, and olive oils).    Avoid saturated fats found in animal products such as meat, dairy (whole-milk, cheese and ice cream), poultry skin, and egg yolks. Plants high in saturated oils include coconut oil, palm oil, and palm kernel oil.    If you eat meat, choose smaller portions and lean cuts, such  as round, renetta, sirloin, or loin. Eat more meatless meals.    Replace meat with fish at least 2 times a week. Fish is an important source of the unsaturated fat called omega-3 fatty acids. This fat has potential to lower the risk of heart disease.    Replace whole-milk dairy products with low-fat or nonfat products. Try soy products. Soy helps to reduce total cholesterol.    Supplement your diet with protective fibers. Eat nuts, seeds, and whole grains rather than white rice and bread. These foods lower both cholesterol and triglyceride levels. (Triglycerides are another fat found in the blood.) Walnuts are one of the best sources of omega-3 fatty acids.    Eat plenty of fresh fruits and vegetables daily.    Avoid fast foods and commercial baked goods. Assume they contain saturated fat unless labeled otherwise.  Date Last Reviewed: 8/1/2016 2000-2017 [employer]. [address] [place] [address], [place] [address]. All rights reserved. This information is not intended as a substitute for professional medical care. Always follow your healthcare professional's instructions.        Kicking the Smoking Habit  If you smoke, quitting is one of the best changes you can make for your heart and your overall health. Your risk of heart attack goes down within one day of putting out that last cigarette. As you go longer without smoking, your risk goes down even more. Quitting isn t easy, but millions of people have done it. You can, too. It s never too late to quit.  Getting started  Boost your chances of success by deciding on your  quit plan.  Your health care provider and cardiac rehab team can help you develop this plan. Even if you ve already quit, it s easy to slip back into smoking.  Your plan can help you avoid and recover from relapse.  In any case, start by setting a date to quit within a month, and do it.    Keys to your quit plan    Talk to your healthcare provider about prescription medicines and nicotine  replacement products that help stop the urge to smoke.     Join a support group or quit smoking program. Talking with others about the challenges of quitting can help you get through them.    Ask other smokers in your household to quit with you.    Look for the cues in your life that you associate with smoking and avoid them.  Track your triggers  What gives you that  X-sujt-p-cigarette  feeling? List all the situations that make you want a cigarette. Then think of other ways to deal with these situations. Here are some examples:  Situation How I'll handle it   Finishing a meal Get up from the table and take a walk   Having an argument Find a quiet place and breathe deeply   Feeling lonely or bored Call a friend to talk         Tips for quitting successfully    List the benefits of quitting such as reducing heart risks and saving money. Keep this list and review it whenever you feel like smoking.    Get support. Let your friends know you may call them to chat when you have an urge to smoke.    If you ve tried to quit before without success, this time avoid the triggers that may cause the relapse.    Make the most of slip-ups. Try to learn from them, and then get back on track.    Be accountable to your friends and your calendar so that you stay on track.  For family and friends    Be supportive and patient. Quitting smoking can be difficult and stressful.    If you smoke, now s a great time to quit. Even if you don t quit, never smoke around your loved one. Secondhand smoke is dangerous to his or her heart.    The best goals are accomplished in teams. Remember that when your loved one states he or she wants to stop smoking.  Date Last Reviewed: 7/1/2016 2000-2017 [employer]. [address] [facility], [place] [address]. All rights reserved. This information is not intended as a substitute for professional medical care. Always follow your healthcare professional's instructions.                 "Follow-ups after your visit        Who to contact     If you have questions or need follow up information about today's clinic visit or your schedule please contact [facility] directly at [phone].  Normal or non-critical lab and imaging results will be communicated to you by MyChart, letter or phone within 4 business days after the clinic has received the results. If you do not hear from us within 7 days, please contact the clinic through MyChart or phone. If you have a critical or abnormal lab result, we will notify you by phone as soon as possible.  Submit refill requests through [employer] or call your pharmacy and they will forward the refill request to us. Please allow 3 business days for your refill to be completed.          Additional Information About Your Visit        [employer] Information     [employer] lets you send messages to your doctor, view your test results, renew your prescriptions, schedule appointments and more. To sign up, go to [email or web address] . Click on \"Log in\" on the left side of the screen, which will take you to the Welcome page. Then click on \"Sign up Now\" on the right side of the page.     You will be asked to enter the access code listed below, as well as some personal information. Please follow the directions to create your username and password.     Your access code is: [email or web address]  Expires: 3/10/2018  1:28 PM     Your access code will  in 90 days. If you need help or a new code, please call your [facility] clinic or [phone].        Care EveryWhere ID     This is your Care EveryWhere ID. This could be used by other organizations to access your [facility] medical records  [id number]        Your Vitals Were     Pulse Temperature Respirations Height Pulse Oximetry BMI (Body Mass Index)    80 98.3  F (36.8  C) (Tympanic) 16 1.626 m (5' 4\") 98% 26.61 kg/m2       Blood Pressure from Last 3 Encounters:   18 127/71   18 160/87   18 125/87    " Weight from Last 3 Encounters:   02/05/18 70.3 kg (155 lb)   01/03/18 68.9 kg (152 lb)   12/11/17 66.2 kg (146 lb)              Today, you had the following     No orders found for display       Primary Care Provider Office Phone # Fax #    Xiomaar TOMER Viera [phone]       [facility] [address]  [place] [address]        Equal Access to Services     DEE CAMPA : Hadii aad ku hadasho Soomaali, waaxda luqadaha, qaybta kaalmada adeegyada, waxay idiin hayaan adeeg kharash la'aan . So [facility] [phone].    ATENCIÓN: Si habla español, tiene a alex disposición servicios gratuitos de asistencia lingüística. [facility] [phone].    We comply with applicable federal civil rights laws and [place] laws. We do not discriminate on the basis of race, color, national origin, age, disability, sex, sexual orientation, or gender identity.            Thank you!     Thank you for choosing [facility] [place]  for your care. Our goal is always to provide you with excellent care. Hearing back from our patients is one way we can continue to improve our services. Please take a few minutes to complete the written survey that you may receive in the mail after your visit with us. Thank you!             Your Updated Medication List - Protect others around you: Learn how to safely use, store and throw away your medicines at www.disposemymeds.org.          This list is accurate as of 2/5/18  3:47 PM.  Always use your most recent med list.                   Brand Name Dispense Instructions for use Diagnosis    amLODIPine 5 MG tablet    NORVASC    30 tablet    Take 1 tablet (5 mg) by mouth daily    Atypical chest pain, Essential hypertension       IBUPROFEN PO      Take 200 mg by mouth Takes 2 tablets every 6 hours PRN tooth pain        LACTAID 3000 UNIT tablet   Generic drug:  lactase     120 tablet    TAKE 1 TABLET BY MOUTH THREE TIMES DAILY WITH MEALS    Lactose intolerance in adult        metoprolol tartrate 50 MG tablet    LOPRESSOR    180 tablet    TAKE ONE TABLET BY MOUTH TWICE DAILY    Essential hypertension       SUDAFED PO      Takes one tablet daily PRN cough and congestion        VENTOLIN  (90 BASE) MCG/ACT Inhaler   Generic drug:  albuterol     18 g    INHALE TWO PUFFS INTO THE LUNGS EVERY 6 HOURS AS NEEDED FOR SHORTNESS OF BREATH    Acute bronchitis due to other specified organisms

## 2018-02-06 NOTE — PROGRESS NOTES
Cardiology Progress Note     Assessment & Plan   Mrs. Louis is a 38-year-old female who is being seen by cardiology for:     Impression:  1.  Atypical chest pain.  2.  Hypertension.  3.  Hyperlipidemia.  4.  Palpitations.  5.  Uncertain etiology with fluctuating, enlargement/swelling of the veins to her left  medial thigh.  6.  Dyspnea on exertion.     PLAN:   1.  With ongoing dyspnea with unclear etiology and potential benefits from inhalers, she will be scheduled to see pulmonary.  2.  Consideration can be made that her shortness of breath is coming from the metoprolol.  She has been on this for a significant amount of time.  The metoprolol will be decreased from 50 mg twice a day to 25 mg twice a day.  If she has improvement with her shortness of breath, consideration could be made for discontinuing metoprolol in the future.  3.  She will increase Norvasc from 5 mg daily to 10 mg daily.  4.  She was given the results of the PFTs which showed minimal COPD.  5.  She was given the results of the ultrasound of her lower extremities looking for DVT which was negative.  6.  The stress echo was negative for evidence of stress-induced ischemia.  7.  She has ongoing palpitations and it was suggested she have a Holter monitor to assess this finding.  This was turned down today.  8.  Labs which include hemoglobin A1c, lipid panel, sed rate, CRP, and d-dimer were normal with the exception of a severely elevated lipid panel.  9.  With her elevated lipid panel with a total cholesterol of 265, it was suggested she increase her activity and watch her diet as she will likely end up on a lipid lowering medication in the future if this does not improve.  She reports having had high lipid panel since her 20's.  10.  She will be seen approximately 3 months fall up.        Roly Cruz    Interval History   Mrs. Louis is a 38-year-old female who is being seen by cardiology secondary to atypical chest pain, uncontrolled  hypertension with an ER visit, hyperlipidemia, palpitations, uncertain, fluctuating, enlargement/swelling of the veins to her left  medial thigh, and dyspnea on exertion.     She was seen in the ER on 12/10/17 secondary to substernal chest pressure which radiated into her jaw, neck, left shoulder, and left arm.  She was found to have elevated blood pressures. She describes her discomfort as being constant lasting days.  Palpation does seem to reproduce her symptoms.  It is also somewhat positional as she moves around and appears to exacerbate her discomfort.  She describes it as constant lasting all day long.  Secondary to dyspnea, she does not exert herself frequently.  She has concerns with shortness of breath which may be secondary to deconditioning.  She has risk factors for CAD with hyperlipidemia, hypertension, ongoing tobacco abuse and possibly family history. She has not had a stress test previously. She points to her xiphoid area when she describes her current discomfort. She states that if she does not think about the pain or is busy doing other things, the pain is not noticed.  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  She does have heartburn but this discomfort is different.      She also describes frequent almost on a daily basis of having palpitations. She describes having had palpitations for many years. Briefly, monitoring was discussed but was held and is to be revisited at her next visit with the other testing that is being performed.     She is describing significant dyspnea. Her SOB limits her life to the point she is somewhat fearful of exerting herself. She does smoke. She describes it affecting her sex life.  She is rather sedentary and does not exercise.  She had normal PFTs, stress test, ultrasound of her leg for DVT, d-dimer, sed rate, and CRP.  If she were to exert herself, she becomes short of breath and her heart will race.      She is also concerned with  inconsistent swelling to her left medial thigh. She states that if things are going to go wrong with her, it typically involves her left side. Just medial to her knee on her inner thigh, she will get an occasional swelling which is described as involving her veins. She is concerned that she has a blood clot. She has not noted significant swelling or pain to her lower extremities.  She had an ultrasound of her lower extremities was negative for DVT.     She struggles with hypertension. When she presented to the ER, her blood pressure was in the 180's in the ambulance. She was given one nitroglycerin secondary to her jaw, neck, and shoulder pain and her blood pressure went down to the 110's which relieved these pains but did not effect her substernal pain. She has been on metoprolol 50 mg twice a day for many years. She does have a blood pressure monitor at home but had problems with the batteries. Secondary to finances, she has not gotten her blood pressure monitor fixed and has not been checking her blood pressure at home. She was encouraged to get this fixed and start watching her blood pressures.  She is to keep a log of these.  Her blood pressure has been better controlled on metoprolol 50 mg twice a day and Norvasc 5 mg daily.  Her blood pressure today is 127/71 with heart rate of 80.      She has hypercholesterolemia. Previously, she had a cholesterol panel completed on 16 which showed a total cholesterol of 245, triglycerides of 259, HDL of 39, and LDL of 154. She has not been on a cholesterol medication in the past. She is fearful of cholesterol medications as her mom had problems with these medications previously and almost .  Her mom is an alcoholic.    Her repeat lipid panel was elevated.      She had a total cholesterol of 265, HDL of 52, LDL of 181, triglycerides of 159.  Again, her need for statin was redressed today.  She was encouraged to improve her diet and increase her activity and attempt  to remain off a statin.    Physical Exam   Temp: 98.3  F (36.8  C) Temp src: Tympanic BP: 127/71 Pulse: 80   Resp: 16 SpO2: 98 %      Vitals:    02/05/18 1505   Weight: 70.3 kg (155 lb)     Vital Signs with Ranges  Temp:  [98.3  F (36.8  C)] 98.3  F (36.8  C)  Pulse:  [80] 80  Resp:  [16] 16  BP: (127)/(71) 127/71  SpO2:  [98 %] 98 %  ROS is negative except that which was noted in the HPI.         Constitutional: awake, alert, cooperative, no apparent distress, and appears stated age  Eyes: Lids and lashes normal, pupils equal, sclera clear, conjunctiva normal  ENT: Normocephalic, without obvious abnormality.  Respiratory: No increased work of breathing, good air exchange, clear to auscultation bilaterally, no crackles or wheezing  Cardiovascular: Normal apical impulse, regular rate and rhythm, normal S1 and S2, no S3 or S4, and no murmur noted  GI: No scars.  Musculoskeletal: no lower extremity pitting edema present  Neurologic: Awake, alert, oriented to name, place and time. Neuropsychiatric: General: normal, calm and normal eye contact    Medications         Data   No results found for this or any previous visit (from the past 24 hour(s)).

## 2018-02-07 ENCOUNTER — APPOINTMENT (OUTPATIENT)
Dept: OCCUPATIONAL MEDICINE | Facility: OTHER | Age: 38
End: 2018-02-07

## 2018-02-07 PROCEDURE — 99000 SPECIMEN HANDLING OFFICE-LAB: CPT

## 2018-02-27 ENCOUNTER — OFFICE VISIT (OUTPATIENT)
Dept: SLEEP MEDICINE | Facility: HOSPITAL | Age: 38
End: 2018-02-27
Attending: INTERNAL MEDICINE
Payer: COMMERCIAL

## 2018-02-27 VITALS
SYSTOLIC BLOOD PRESSURE: 120 MMHG | RESPIRATION RATE: 14 BRPM | HEART RATE: 80 BPM | BODY MASS INDEX: 26.46 KG/M2 | HEIGHT: 64 IN | DIASTOLIC BLOOD PRESSURE: 70 MMHG | WEIGHT: 155 LBS | OXYGEN SATURATION: 99 %

## 2018-02-27 DIAGNOSIS — J45.30 MILD PERSISTENT ASTHMA WITHOUT COMPLICATION: Primary | ICD-10-CM

## 2018-02-27 PROCEDURE — G0463 HOSPITAL OUTPT CLINIC VISIT: HCPCS

## 2018-02-27 PROCEDURE — 99241 ZZC OFFICE CONSULTATION,LEVEL I: CPT | Performed by: INTERNAL MEDICINE

## 2018-02-27 NOTE — NURSING NOTE
Patient ID checked with name and date of birth. Reviewed allergies and home medications. Took Vitals and brief medical history.

## 2018-02-27 NOTE — PROGRESS NOTES
"37 y/o referred by Dr Cruz with dyspnea. Pt is a 1+ppd smoker, has a hx of exertional dyspnea for the last few years. She has yearly bouts of 'bronchitis', subacute worsening of symptoms, oft requiring antibiotics and steroids. She has airways hypersensitivity to perfumes, odors, high humidity. She also has chronic sinus congestion. Recent cardiac work up neg, stress echo normal, CXR normal. She has been treated with albuterol which has been helpful.    PMH renal stones, hypertension     works at [employer]    PE  /70  Pulse 80  Resp 14  Ht 5' 4\" (1.626 m)  Wt 155 lb (70.3 kg)  SpO2 99%  BMI 26.61 kg/m2                     Resp mild exp wheezing                             Cor rrr o mgr              PFTs  Mild obstruc, airtrapping, sl decr in DLCO, some reversibility    A/ Prob asthma, complicated by smoking, we talked about this frankly, prednisone 20 mg for effect, prob add of ICS/LABA when she returns.     "

## 2018-02-27 NOTE — MR AVS SNAPSHOT
"              After Visit Summary   2/27/2018    Kimberley Louis    MRN: [id number]           Patient Information     Date Of Birth          1980        Visit Information        Provider Department      2/27/2018 11:00 AM Kirit Mcclendon MD [facility]        Today's Diagnoses     Mild persistent asthma without complication    -  1       Follow-ups after your visit        Your next 10 appointments already scheduled     Mar 13, 2018  3:00 PM CDT   Return Visit with Kirit Mcclendon MD   [facility] ([facility] )    [address] [address]   [phone]            May 04, 2018 11:00 AM CDT   (Arrive by 10:45 AM)   Return Visit with Roly Cruz,    [facility] )    [address] [place] [id number] [phone]              Who to contact     If you have questions or need follow up information about today's clinic visit or your schedule please contact [facility] directly at [phone].  Normal or non-critical lab and imaging results will be communicated to you by MyChart, letter or phone within 4 business days after the clinic has received the results. If you do not hear from us within 7 days, please contact the clinic through [facility] Lab[facility]art or phone. If you have a critical or abnormal lab result, we will notify you by phone as soon as possible.  Submit refill requests through [employer] or call your pharmacy and they will forward the refill request to us. Please allow 3 business days for your refill to be completed.          Additional Information About Your Visit        SR Labshar[facility]Spring Information     [employer] Software lets you send messages to your doctor, view your test results, renew your prescriptions, schedule appointments and more. To sign up, go to [email or web address] . Click on \"Log in\" on the left side of the screen, which will take you to the Welcome page. Then click on \"Sign up Now\" on the right side of the page.     You will be " "asked to enter the access code listed below, as well as some personal information. Please follow the directions to create your username and password.     Your access code is: [email or web address]  Expires: 3/10/2018  1:28 PM     Your access code will  in 90 days. If you need help or a new code, please call your [place] clinic or [phone].        Care EveryWhere ID     This is your Care EveryWhere ID. This could be used by other organizations to access your [place] medical records  [id number]        Your Vitals Were     Pulse Respirations Height Pulse Oximetry BMI (Body Mass Index)       80 14 5' 4\" (1.626 m) 99% 26.61 kg/m2        Blood Pressure from Last 3 Encounters:   18 120/70   18 127/71   18 160/87    Weight from Last 3 Encounters:   18 155 lb (70.3 kg)   18 155 lb (70.3 kg)   18 152 lb (68.9 kg)              Today, you had the following     No orders found for display       Primary Care Provider Office Phone # Fax #    TOMER Jane [phone]       [facility] [address] [place] [id number]        Equal Access to Services     DEE CAMPA AH: Hadii caitlin ku hadasho Soomaali, waaxda luqadaha, qaybta kaalmada adeegyada, nikki sharif hayjosh manning . So [facility] [phone].    ATENCIÓN: Si habla español, tiene a alex disposición servicios gratuitos de asistencia lingüística. LlVan [facility] [phone].    We comply with applicable federal civil rights laws and [place] laws. We do not discriminate on the basis of race, color, national origin, age, disability, sex, sexual orientation, or gender identity.            Thank you!     Thank you for choosing [facility]  for your care. Our goal is always to provide you with excellent care. Hearing back from our patients is one way we can continue to improve our services. Please take a few minutes to complete the written survey that you may receive in the mail after your visit with " us. Thank you!             Your Updated Medication List - Protect others around you: Learn how to safely use, store and throw away your medicines at www.disposemymeds.org.          This list is accurate as of 2/27/18 11:34 AM.  Always use your most recent med list.                   Brand Name Dispense Instructions for use Diagnosis    amLODIPine 10 MG tablet    NORVASC    30 tablet    Take 1 tablet (10 mg) by mouth daily    Atypical chest pain, Essential hypertension       IBUPROFEN PO      Take 200 mg by mouth Takes 2 tablets every 6 hours PRN tooth pain        LACTAID 3000 UNIT tablet   Generic drug:  lactase     120 tablet    TAKE 1 TABLET BY MOUTH THREE TIMES DAILY WITH MEALS    Lactose intolerance in adult       metoprolol tartrate 25 MG tablet    LOPRESSOR    60 tablet    Take 1 tablet (25 mg) by mouth 2 times daily    Essential hypertension       SUDAFED PO      Takes one tablet daily PRN cough and congestion        VENTOLIN  (90 BASE) MCG/ACT Inhaler   Generic drug:  albuterol     18 g    INHALE TWO PUFFS INTO THE LUNGS EVERY 6 HOURS AS NEEDED FOR SHORTNESS OF BREATH    Acute bronchitis due to other specified organisms

## 2018-02-28 RX ORDER — PREDNISONE 20 MG/1
20 TABLET ORAL DAILY
Qty: 60 TABLET | Refills: 1 | Status: SHIPPED | OUTPATIENT
Start: 2018-02-28 | End: 2018-04-26

## 2018-04-03 ENCOUNTER — OFFICE VISIT (OUTPATIENT)
Dept: SLEEP MEDICINE | Facility: HOSPITAL | Age: 38
End: 2018-04-03
Attending: INTERNAL MEDICINE
Payer: COMMERCIAL

## 2018-04-03 VITALS
RESPIRATION RATE: 12 BRPM | HEART RATE: 87 BPM | OXYGEN SATURATION: 98 % | DIASTOLIC BLOOD PRESSURE: 80 MMHG | SYSTOLIC BLOOD PRESSURE: 156 MMHG

## 2018-04-03 DIAGNOSIS — J45.20 INTERMITTENT ASTHMA WITHOUT COMPLICATION, UNSPECIFIED ASTHMA SEVERITY: Primary | ICD-10-CM

## 2018-04-03 PROCEDURE — G0463 HOSPITAL OUTPT CLINIC VISIT: HCPCS

## 2018-04-03 PROCEDURE — 99211 OFF/OP EST MAY X REQ PHY/QHP: CPT | Performed by: INTERNAL MEDICINE

## 2018-04-03 RX ORDER — BUDESONIDE AND FORMOTEROL FUMARATE DIHYDRATE 160; 4.5 UG/1; UG/1
2 AEROSOL RESPIRATORY (INHALATION) 2 TIMES DAILY
Qty: 1 INHALER | Refills: 3 | Status: SHIPPED | OUTPATIENT
Start: 2018-04-03 | End: 2018-06-28

## 2018-04-03 NOTE — MR AVS SNAPSHOT
"              After Visit Summary   4/3/2018    Kimberley Louis    MRN: [id number]           Patient Information     Date Of Birth          1980        Visit Information        Provider Department      4/3/2018 2:30 PM Kirit Mcclendon MD [facility]        Today's Diagnoses     Intermittent asthma without complication, unspecified asthma severity    -  1       Follow-ups after your visit        Your next 10 appointments already scheduled     May 04, 2018 11:00 AM CDT   (Arrive by 10:45 AM)   Return Visit with Roly Cruz,    [facility] [place] ([facility] [place] )    [address]  [place] [address]   [phone]              Who to contact     If you have questions or need follow up information about today's clinic visit or your schedule please contact [facility] directly at [phone].  Normal or non-critical lab and imaging results will be communicated to you by MyChart, letter or phone within 4 business days after the clinic has received the results. If you do not hear from us within 7 days, please contact the clinic through MyChart or phone. If you have a critical or abnormal lab result, we will notify you by phone as soon as possible.  Submit refill requests through [email or web address] or call your pharmacy and they will forward the refill request to us. Please allow 3 business days for your refill to be completed.          Additional Information About Your Visit        MyChart Information     [email or web address] lets you send messages to your doctor, view your test results, renew your prescriptions, schedule appointments and more. To sign up, go to [email or web address] . Click on \"Log in\" on the left side of the screen, which will take you to the Welcome page. Then click on \"Sign up Now\" on the right side of the page.     You will be asked to enter the access code listed below, as well as some personal information. Please follow the directions to create your username and password.   "   Your access code is: [email or web address]  Expires: 2018  3:10 PM     Your access code will  in 90 days. If you need help or a new code, please call your [facility] or [phone].        Care EveryWhere ID     This is your Care EveryWhere ID. This could be used by other organizations to access your [place] medical records  [id number]        Your Vitals Were     Pulse Respirations Pulse Oximetry             87 12 98%          Blood Pressure from Last 3 Encounters:   18 156/80   18 120/70   18 127/71    Weight from Last 3 Encounters:   18 155 lb (70.3 kg)   18 155 lb (70.3 kg)   18 152 lb (68.9 kg)              Today, you had the following     No orders found for display         Today's Medication Changes          These changes are accurate as of 4/3/18  2:39 PM.  If you have any questions, ask your nurse or doctor.               Start taking these medicines.        Dose/Directions    budesonide-formoterol 160-4.5 MCG/ACT Inhaler   Commonly known as:  SYMBICORT   Used for:  Intermittent asthma without complication, unspecified asthma severity        Dose:  2 puff   Inhale 2 puffs into the lungs 2 times daily   Quantity:  1 Inhaler   Refills:  3            Where to get your medicines      These medications were sent to [id number] - [place] - [address] AT [facility] [address]  [address], [place] [address]     Phone:  [phone]     budesonide-formoterol 160-4.5 MCG/ACT Inhaler                Primary Care Provider Office Phone # Fax #    TOMER Jane [phone]       [facility] [address]  [place] [address]        Equal Access to Services     [facility] LOKESH AH: Lexis candelariao Soolya, waaxda luqadaha, qaybta kaalmada adeegyasekou, nikki nguyen. So [facility] [phone].    ATENCIÓN: Si habla español, tiene a alex disposición servicios gratuitos de asistencia  lingüística. David al [phone].    We comply with applicable federal civil rights laws and [place] laws. We do not discriminate on the basis of race, color, national origin, age, disability, sex, sexual orientation, or gender identity.            Thank you!     Thank you for choosing [facility]  for your care. Our goal is always to provide you with excellent care. Hearing back from our patients is one way we can continue to improve our services. Please take a few minutes to complete the written survey that you may receive in the mail after your visit with us. Thank you!             Your Updated Medication List - Protect others around you: Learn how to safely use, store and throw away your medicines at www.disposemymeds.org.          This list is accurate as of 4/3/18  2:39 PM.  Always use your most recent med list.                   Brand Name Dispense Instructions for use Diagnosis    amLODIPine 10 MG tablet    NORVASC    30 tablet    Take 1 tablet (10 mg) by mouth daily    Atypical chest pain, Essential hypertension       budesonide-formoterol 160-4.5 MCG/ACT Inhaler    SYMBICORT    1 Inhaler    Inhale 2 puffs into the lungs 2 times daily    Intermittent asthma without complication, unspecified asthma severity       IBUPROFEN PO      Take 200 mg by mouth Takes 2 tablets every 6 hours PRN tooth pain        LACTAID 3000 UNIT tablet   Generic drug:  lactase     120 tablet    TAKE 1 TABLET BY MOUTH THREE TIMES DAILY WITH MEALS    Lactose intolerance in adult       metoprolol tartrate 25 MG tablet    LOPRESSOR    60 tablet    Take 1 tablet (25 mg) by mouth 2 times daily    Essential hypertension       predniSONE 20 MG tablet    DELTASONE    60 tablet    Take 1 tablet (20 mg) by mouth daily    Mild persistent asthma without complication       SUDAFED PO      Takes one tablet daily PRN cough and congestion        VENTOLIN  (90 BASE) MCG/ACT Inhaler   Generic drug:  albuterol     18 g    INHALE TWO PUFFS  INTO THE LUNGS EVERY 6 HOURS AS NEEDED FOR SHORTNESS OF BREATH    Acute bronchitis due to other specified organisms

## 2018-04-03 NOTE — PROGRESS NOTES
Nice response to prednisone but dyspnea and cough returned 2 weeks later. She still smokes 1ppd and I told her there may not be much we can do to help her if she can't quit.   PE   /80  Pulse 87  Resp 12  SpO2 98%             resp fairly clear, coughing                    Cor rrr      Current Outpatient Prescriptions:      budesonide-formoterol (SYMBICORT) 160-4.5 MCG/ACT Inhaler, Inhale 2 puffs into the lungs 2 times daily, Disp: 1 Inhaler, Rfl: 3     predniSONE (DELTASONE) 20 MG tablet, Take 1 tablet (20 mg) by mouth daily, Disp: 60 tablet, Rfl: 1     Pseudoephedrine HCl (SUDAFED PO), Takes one tablet daily PRN cough and congestion, Disp: , Rfl:      amLODIPine (NORVASC) 10 MG tablet, Take 1 tablet (10 mg) by mouth daily, Disp: 30 tablet, Rfl: 11     metoprolol tartrate (LOPRESSOR) 25 MG tablet, Take 1 tablet (25 mg) by mouth 2 times daily, Disp: 60 tablet, Rfl: 11     IBUPROFEN PO, Take 200 mg by mouth Takes 2 tablets every 6 hours PRN tooth pain, Disp: , Rfl:      VENTOLIN  (90 BASE) MCG/ACT Inhaler, INHALE TWO PUFFS INTO THE LUNGS EVERY 6 HOURS AS NEEDED FOR SHORTNESS OF BREATH, Disp: 18 g, Rfl: 0     LACTAID 3000 UNITS tablet, TAKE 1 TABLET BY MOUTH THREE TIMES DAILY WITH MEALS (Patient not taking: Reported on 1/3/2018), Disp: 120 tablet, Rfl: 0     A/ Asthmatic bronchitis with some emphysema apparent on PFTs, as above , will start symbicort.

## 2018-04-03 NOTE — NURSING NOTE
Patient ID checked with name and date of birth. Reviewed allergies and home medications. Took Vitals and brief medical history.   The PA was started for her Symbacort

## 2018-04-26 ENCOUNTER — HOSPITAL ENCOUNTER (EMERGENCY)
Facility: HOSPITAL | Age: 38
Discharge: HOME | End: 2018-04-26
Attending: NURSE PRACTITIONER | Admitting: NURSE PRACTITIONER
Payer: COMMERCIAL

## 2018-04-26 VITALS
DIASTOLIC BLOOD PRESSURE: 88 MMHG | BODY MASS INDEX: 26.61 KG/M2 | SYSTOLIC BLOOD PRESSURE: 137 MMHG | HEART RATE: 70 BPM | WEIGHT: 155 LBS | RESPIRATION RATE: 18 BRPM | OXYGEN SATURATION: 99 % | TEMPERATURE: 98.3 F

## 2018-04-26 DIAGNOSIS — J01.00 ACUTE MAXILLARY SINUSITIS, RECURRENCE NOT SPECIFIED: ICD-10-CM

## 2018-04-26 LAB
DEPRECATED S PYO AG THROAT QL EIA: NORMAL
SPECIMEN SOURCE: NORMAL

## 2018-04-26 PROCEDURE — 99213 OFFICE O/P EST LOW 20 MIN: CPT | Performed by: NURSE PRACTITIONER

## 2018-04-26 PROCEDURE — 87880 STREP A ASSAY W/OPTIC: CPT | Performed by: FAMILY MEDICINE

## 2018-04-26 PROCEDURE — 87081 CULTURE SCREEN ONLY: CPT | Performed by: FAMILY MEDICINE

## 2018-04-26 PROCEDURE — G0463 HOSPITAL OUTPT CLINIC VISIT: HCPCS

## 2018-04-26 RX ORDER — CETIRIZINE HYDROCHLORIDE 10 MG/1
10 TABLET ORAL DAILY
Qty: 10 TABLET | Refills: 0 | Status: SHIPPED | OUTPATIENT
Start: 2018-04-26 | End: 2019-02-18

## 2018-04-26 RX ORDER — ALBUTEROL SULFATE 90 UG/1
2 AEROSOL, METERED RESPIRATORY (INHALATION) EVERY 6 HOURS PRN
Qty: 1 INHALER | Refills: 0 | Status: SHIPPED | OUTPATIENT
Start: 2018-04-26 | End: 2018-10-08

## 2018-04-26 RX ORDER — FLUTICASONE PROPIONATE 50 MCG
1-2 SPRAY, SUSPENSION (ML) NASAL DAILY
Qty: 1 BOTTLE | Refills: 0 | Status: SHIPPED | OUTPATIENT
Start: 2018-04-26 | End: 2018-08-08

## 2018-04-26 ASSESSMENT — ENCOUNTER SYMPTOMS
COUGH: 1
SINUS PRESSURE: 1
CHILLS: 0
WHEEZING: 0
RHINORRHEA: 0
TROUBLE SWALLOWING: 0
PSYCHIATRIC NEGATIVE: 1
ACTIVITY CHANGE: 0
WEAKNESS: 0
APPETITE CHANGE: 0
SHORTNESS OF BREATH: 0
VOMITING: 0
SINUS PAIN: 1
VOICE CHANGE: 0
ABDOMINAL PAIN: 0
NAUSEA: 0
FEVER: 0
DYSURIA: 0
DIARRHEA: 0
STRIDOR: 0
SORE THROAT: 1

## 2018-04-26 NOTE — ED PROVIDER NOTES
"  History     Chief Complaint   Patient presents with     Cough     \"since Friday\"     Pharyngitis     \"since Friday\"     The history is provided by the patient. No  was used.     Kimberley Louis is a 38 year old female who presents with a cough, sinus pressure, sinus pain,  and sore throat that started 6 days ago. She's taken Zyrtec and tylenol with mild effectiveness. Denies fever, chills, or night sweats. Drinking well. Decreased appetite. Bowel and bladder are working well. No antibiotic use in the past 30 days. She is a tobacco user and smokes 1 PPD of cigarettes.       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  Kimberley Louis has no plan or code status on file. Discussed available resources and provided with information. Confirmed code status reflects current choices pending further ACP discussions.  Confirmed/documented legally designated decision makers.  Added by SAYDA CABRERA             ACP (advance care planning) 04/13/2016     Priority: Medium     Advance Care Planning 4/13/2016: ACP Review of Chart / Resources Provided:  Reviewed chart for advance care plan.  Kimberley Louis has no plan or code status on file. Discussed available resources and provided with " information. Confirmed code status reflects current choices pending further ACP discussions.  Confirmed/documented legally designated decision makers.  Added by Sandra Anne             Essential hypertension 11/19/2015     Priority: Medium     H/O renal calculi 11/19/2015     Priority: Medium     Enlarged kidney 11/19/2015     Priority: Medium        Past Medical History:    Past Medical History:   Diagnosis Date     Enlarged kidney 11/19/2015     Essential hypertension 11/19/2015     H/O renal calculi 11/19/2015     H/O renal calculi 11/19/2015       Past Surgical History:    Past Surgical History:   Procedure Laterality Date     AS REMOVAL OF KIDNEY STONE       AS REMOVAL OF KIDNEY STONE       C REMOVAL OF KIDNEY STONE       TUBAL LIGATION         Family History:    Family History   Problem Relation Age of Onset     DIABETES Mother      Emphysema Mother      Cervical Cancer Mother      DIABETES Father      Hypertension Father      Hypertension Maternal Grandmother      DIABETES Maternal Grandmother      Schizophrenia Maternal Grandmother      Unknown/Adopted Maternal Grandfather      Lung Cancer Paternal Grandmother      DIABETES Paternal Grandmother      Unknown/Adopted Paternal Grandfather        Social History:  Marital Status:  Single [1]  Social History   Substance Use Topics     Smoking status: Current Every Day Smoker     Packs/day: 1.00     Years: 28.00     Smokeless tobacco: Never Used     Alcohol use Yes      Comment: occasional        Medications:      albuterol (PROAIR HFA/PROVENTIL HFA/VENTOLIN HFA) 108 (90 Base) MCG/ACT Inhaler   amLODIPine (NORVASC) 10 MG tablet   amoxicillin-clavulanate (AUGMENTIN) 875-125 MG per tablet   budesonide-formoterol (SYMBICORT) 160-4.5 MCG/ACT Inhaler   cetirizine (ZYRTEC) 10 MG tablet   fluticasone (FLONASE) 50 MCG/ACT spray   IBUPROFEN PO   metoprolol tartrate (LOPRESSOR) 25 MG tablet   LACTAID 3000 UNITS tablet   Pseudoephedrine HCl (SUDAFED PO)   [DISCONTINUED]  VENTOLIN  (90 BASE) MCG/ACT Inhaler         Review of Systems   Constitutional: Negative for activity change, appetite change, chills and fever.   HENT: Positive for congestion, postnasal drip, sinus pain, sinus pressure and sore throat. Negative for ear discharge, ear pain, rhinorrhea, trouble swallowing and voice change.    Respiratory: Positive for cough. Negative for shortness of breath, wheezing and stridor.    Cardiovascular: Negative for chest pain.   Gastrointestinal: Negative for abdominal pain, diarrhea, nausea and vomiting.   Genitourinary: Negative for dysuria.   Skin: Negative for rash.   Neurological: Negative for weakness.   Psychiatric/Behavioral: Negative.        Physical Exam   BP: 137/88  Pulse: 70  Temp: 98.3  F (36.8  C)  Resp: 18  Weight: 70.3 kg (155 lb)  SpO2: 99 %      Physical Exam   Constitutional: She is oriented to person, place, and time. She appears well-developed and well-nourished. No distress.   HENT:   Head: Normocephalic.   Right Ear: External ear normal.   Left Ear: External ear normal.   Mouth/Throat: Oropharynx is clear and moist. No oropharyngeal exudate.   TTP to maxillary sinuses.    Neck: Normal range of motion. Neck supple.   Cardiovascular: Normal rate, regular rhythm and normal heart sounds.    No murmur heard.  Pulmonary/Chest: Effort normal. No respiratory distress. She has no wheezes. She has no rales.   Abdominal: Soft. She exhibits no distension.   Musculoskeletal: Normal range of motion.   Lymphadenopathy:     She has cervical adenopathy.   Neurological: She is alert and oriented to person, place, and time. She exhibits normal muscle tone.   Skin: Skin is warm and dry. No rash noted. She is not diaphoretic.   Psychiatric: She has a normal mood and affect. Her behavior is normal.   Nursing note and vitals reviewed.      ED Course     ED Course     Procedures     Results for orders placed or performed during the hospital encounter of 04/26/18   Rapid strep  screen   Result Value Ref Range    Specimen Description Throat     Rapid Strep A Screen       NEGATIVE: No Group A streptococcal antigen detected by immunoassay, await culture report.   Beta strep group A culture   Result Value Ref Range    Specimen Description Throat     Culture Micro Culture negative monitoring continues        Assessments & Plan (with Medical Decision Making)     Discussed plan of care. She verbalized understanding. All questions answered.     I have reviewed the nursing notes.    I have reviewed the findings, diagnosis, plan and need for follow up with the patient.  Discharged in stable condition.     New Prescriptions    ALBUTEROL (PROAIR HFA/PROVENTIL HFA/VENTOLIN HFA) 108 (90 BASE) MCG/ACT INHALER    Inhale 2 puffs into the lungs every 6 hours as needed for shortness of breath / dyspnea or wheezing    AMOXICILLIN-CLAVULANATE (AUGMENTIN) 875-125 MG PER TABLET    Take 1 tablet by mouth 2 times daily for 10 days    CETIRIZINE (ZYRTEC) 10 MG TABLET    Take 1 tablet (10 mg) by mouth daily for 10 days    FLUTICASONE (FLONASE) 50 MCG/ACT SPRAY    Spray 1-2 sprays into both nostrils daily       Final diagnoses:   Acute maxillary sinusitis, recurrence not specified     Take antibiotics as ordered.   Eat a yogurt daily while taking antibiotics.   Take Zyrtec as ordered.   Use Flonase as ordered as directed.   Use Albuterol inhaler as directed as needed.   Increase water intake.   Follow up with PCP with any increase in symptoms or concerns.   Return to urgent care or emergency department with any increase in symptoms or concerns.     LORENZO Wick  4/26/2018  1:20 PM  [facility]       Jazmyne Villafana NP  04/27/18 2120

## 2018-04-26 NOTE — ED AVS SNAPSHOT
[facility]    [address] [address]    Phone:  [phone]                                       Kimberley Louis   MRN: [id number]    Department:  [facility]   Date of Visit:  4/26/2018           After Visit Summary Signature Page     I have received my discharge instructions, and my questions have been answered. I have discussed any challenges I see with this plan with the nurse or doctor.    ..........................................................................................................................................  Patient/Patient Representative Signature      ..........................................................................................................................................  Patient Representative Print Name and Relationship to Patient    ..................................................               ................................................  Date                                            Time    ..........................................................................................................................................  Reviewed by Signature/Title    ...................................................              ..............................................  Date                                                            Time

## 2018-04-26 NOTE — ED AVS SNAPSHOT
[facility]    [address]    [place] [address]    Phone:  [phone]                                       Kimberley Louis   MRN: [id number]    Department:  [facility]   Date of Visit:  4/26/2018           Patient Information     Date Of Birth          1980        Your diagnoses for this visit were:     Acute maxillary sinusitis, recurrence not specified        You were seen by Jazmyne Villafana NP.      Follow-up Information     Follow up with Xiomara Caceres PA.    Specialty:  Family Practice    Why:  As needed, If symptoms worsen    Contact information:    [facility]  [address]  [place] [address] [phone]          Follow up with [facility].    Specialty:  EMERGENCY MEDICINE    Why:  As needed, If symptoms worsen    Contact information:    [address]  [place] [address] [phone]    Additional information:    From [facility]: Take [address]. Turn left at [address]. Turn left at the first stoplight on [address]. At the first stop sign, take a right onto [address]. Take a left into the parking lot and continue through until you reach the North enterance of the building.       From [place]: Take [address]. Take the [address] ramp towards [place]. Turn left onto [address]. Take a slight right onto [address]. Turn left at the first stoplight on [address]. At the first stop sign, take a right onto [address]. Take a left into the parking lot and continue through until you reach the North enterance of the building.       From [place]: Take [address]. Take a right at [address]. At the first stop sign, take a right onto [address]. Take a left into the parking lot and continue through until you reach the North enterance of the building.         Discharge Instructions       Take antibiotics as ordered.   Eat a  yogurt daily while taking antibiotics.   Take Zyrtec as ordered.   Use Flonase as ordered as directed.   Use Albuterol inhaler as directed as needed.   Increase water intake.   Follow up with PCP with any increase in symptoms or concerns.   Return to urgent care or emergency department with any increase in symptoms or concerns.     Discharge References/Attachments     SINUSITIS (ANTIBIOTIC TREATMENT) (ENGLISH)      Your next 10 appointments already scheduled     May 04, 2018 11:00 AM CDT   (Arrive by 10:45 AM)   Return Visit with Roly Cruz DO   [facility] [place] ([facility] [place] )    [address]  [place] [address]   [phone]                 Review of your medicines      START taking        Dose / Directions Last dose taken    amoxicillin-clavulanate 875-125 MG per tablet   Commonly known as:  AUGMENTIN   Dose:  1 tablet   Quantity:  20 tablet        Take 1 tablet by mouth 2 times daily for 10 days   Refills:  0        cetirizine 10 MG tablet   Commonly known as:  zyrTEC   Dose:  10 mg   Quantity:  10 tablet        Take 1 tablet (10 mg) by mouth daily for 10 days   Refills:  0        fluticasone 50 MCG/ACT spray   Commonly known as:  FLONASE   Dose:  1-2 spray   Quantity:  1 Bottle        Spray 1-2 sprays into both nostrils daily   Refills:  0          CONTINUE these medicines which may have CHANGED, or have new prescriptions. If we are uncertain of the size of tablets/capsules you have at home, strength may be listed as something that might have changed.        Dose / Directions Last dose taken    albuterol 108 (90 Base) MCG/ACT Inhaler   Commonly known as:  PROAIR HFA/PROVENTIL HFA/VENTOLIN HFA   Dose:  2 puff   What changed:  See the new instructions.   Quantity:  1 Inhaler        Inhale 2 puffs into the lungs every 6 hours as needed for shortness of breath / dyspnea or wheezing   Refills:  0          Our records show that you are taking the medicines listed below. If  these are incorrect, please call your family doctor or clinic.        Dose / Directions Last dose taken    amLODIPine 10 MG tablet   Commonly known as:  NORVASC   Dose:  10 mg   Quantity:  30 tablet        Take 1 tablet (10 mg) by mouth daily   Refills:  11        budesonide-formoterol 160-4.5 MCG/ACT Inhaler   Commonly known as:  SYMBICORT   Dose:  2 puff   Quantity:  1 Inhaler        Inhale 2 puffs into the lungs 2 times daily   Refills:  3        IBUPROFEN PO   Dose:  200 mg        Take 200 mg by mouth Takes 2 tablets every 6 hours PRN tooth pain   Refills:  0        LACTAID 3000 UNIT tablet   Quantity:  120 tablet   Generic drug:  lactase        TAKE 1 TABLET BY MOUTH THREE TIMES DAILY WITH MEALS   Refills:  0        metoprolol tartrate 25 MG tablet   Commonly known as:  LOPRESSOR   Dose:  25 mg   Quantity:  60 tablet        Take 1 tablet (25 mg) by mouth 2 times daily   Refills:  11        SUDAFED PO        Takes one tablet daily PRN cough and congestion   Refills:  0                Prescriptions were sent or printed at these locations (4 Prescriptions)                   [facility] [address] [place] [address] [facility] [place] [facility] [address] AT [facility] [address]   [address], [place] [address]    Telephone:  [phone]   Fax:  [phone]   Hours:                  E-Prescribed (4 of 4)         albuterol (PROAIR HFA/PROVENTIL HFA/VENTOLIN HFA) 108 (90 Base) MCG/ACT Inhaler               amoxicillin-clavulanate (AUGMENTIN) 875-125 MG per tablet               cetirizine (ZYRTEC) 10 MG tablet               fluticasone (FLONASE) 50 MCG/ACT spray                Procedures and tests performed during your visit     Beta strep group A culture    Rapid strep screen      Orders Needing Specimen Collection     None      Pending Results     Date and Time Order Name Status Description    4/26/2018 1321 Beta strep group A culture In process             Pending Culture Results     Date and Time Order Name Status Description  "   2018 1321 Beta strep group A culture In process             Thank you for choosing [facility]       Thank you for choosing [facility] for your care. Our goal is always to provide you with excellent care. Hearing back from our patients is one way we can continue to improve our services. Please take a few minutes to complete the written survey that you may receive in the mail after you visit with us. Thank you!        [facility] Information     [facility] lets you send messages to your doctor, view your test results, renew your prescriptions, schedule appointments and more. To sign up, go to [email or web address] . Click on \"Log in\" on the left side of the screen, which will take you to the Welcome page. Then click on \"Sign up Now\" on the right side of the page.     You will be asked to enter the access code listed below, as well as some personal information. Please follow the directions to create your username and password.     Your access code is: [email or web address]  Expires: 2018  3:10 PM     Your access code will  in 90 days. If you need help or a new code, please call your [facility] clinic or [phone].        Care EveryWhere ID     This is your Care EveryWhere ID. This could be used by other organizations to access your [facility] medical records  [id number]        Equal Access to Services     DEE CAMPA : Hadii caitlin Skinner, waaxda luqadaha, qaybta kaalmada lucille, nikki manning . So [facility] [phone].    ATENCIÓN: Si habla español, tiene a alex disposición servicios gratuitos de asistencia lingüística. Llame al [phone].    We comply with applicable federal civil rights laws and [place] laws. We do not discriminate on the basis of race, color, national origin, age, disability, sex, sexual orientation, or gender identity.            After Visit Summary       This is your record. Keep this with you and show to your community pharmacist(s) and doctor(s) at " your next visit.

## 2018-04-26 NOTE — DISCHARGE INSTRUCTIONS
Take antibiotics as ordered.   Eat a yogurt daily while taking antibiotics.   Take Zyrtec as ordered.   Use Flonase as ordered as directed.   Use Albuterol inhaler as directed as needed.   Increase water intake.   Follow up with PCP with any increase in symptoms or concerns.   Return to urgent care or emergency department with any increase in symptoms or concerns.

## 2018-04-26 NOTE — ED TRIAGE NOTES
"Pt presents today with cough and sore throat since Friday. Pt states she is also having \"head congestion\" and thinks she is getting worse.  "

## 2018-04-28 LAB
BACTERIA SPEC CULT: NORMAL
SPECIMEN SOURCE: NORMAL

## 2018-05-04 ENCOUNTER — TELEPHONE (OUTPATIENT)
Dept: CARDIOLOGY | Facility: OTHER | Age: 38
End: 2018-05-04

## 2018-05-04 NOTE — TELEPHONE ENCOUNTER
Patient was a no-show for appointment with Dr. Cruz today.  Please call patient to offer an appointment to follow-up.

## 2018-05-09 NOTE — TELEPHONE ENCOUNTER
Patient forgot about her appointment she is driving right now and she will call back to reschedule.

## 2018-05-16 NOTE — TELEPHONE ENCOUNTER
Patient has a lot going on right now the next few weeks and she is unable to reschedule this appointment. Gave her the scheduling phone number for Kimberley to set up this appointment when she is able.

## 2018-05-16 NOTE — TELEPHONE ENCOUNTER
Patient has not rescheduled her appointment from last week that she missed, can you call and get this scheduled please.  Sandra Anne LPN

## 2018-05-21 NOTE — TELEPHONE ENCOUNTER
Xiomara Caceres,  Just an FYI that patient was a no-show for her last appointment with Dr. Cruz and declined rescheduling at this time but she informed our [facility] that she will call back to schedule at a later time to reschedule.

## 2018-06-14 ENCOUNTER — TELEPHONE (OUTPATIENT)
Dept: FAMILY MEDICINE | Facility: OTHER | Age: 38
End: 2018-06-14

## 2018-06-14 NOTE — TELEPHONE ENCOUNTER
Please offer next available or another provider due to schedule being full or patient to go to Urgent[facility].   Mita Staples, CMA

## 2018-06-14 NOTE — TELEPHONE ENCOUNTER
2:59 PM    Reason for Call: OVERBOOK    Patient is having the following symptoms: abdominal pain // hard time going to the bathroom for 1 weeks.    The patient is requesting an appointment for 06/15/18 with Xiomara Caceres.    Was an appointment offered for this call? No  If yes : Appointment type              Date    Preferred method for responding to this message: Telephone Call  What is your phone number ?[phone]    If we cannot reach you directly, may we leave a detailed response at the number you provided? Yes    Can this message wait until your PCP/provider returns, if unavailable today? Not applicable, PCP is in     [facility]

## 2018-06-28 ENCOUNTER — OFFICE VISIT (OUTPATIENT)
Dept: FAMILY MEDICINE | Facility: OTHER | Age: 38
End: 2018-06-28
Attending: FAMILY MEDICINE
Payer: COMMERCIAL

## 2018-06-28 VITALS
BODY MASS INDEX: 27.14 KG/M2 | HEART RATE: 76 BPM | DIASTOLIC BLOOD PRESSURE: 80 MMHG | SYSTOLIC BLOOD PRESSURE: 122 MMHG | WEIGHT: 159 LBS | RESPIRATION RATE: 16 BRPM | OXYGEN SATURATION: 98 % | HEIGHT: 64 IN | TEMPERATURE: 98 F

## 2018-06-28 DIAGNOSIS — R30.0 DYSURIA: Primary | ICD-10-CM

## 2018-06-28 DIAGNOSIS — R10.2 PELVIC PAIN IN FEMALE: ICD-10-CM

## 2018-06-28 DIAGNOSIS — F17.200 TOBACCO USE DISORDER: ICD-10-CM

## 2018-06-28 DIAGNOSIS — Z71.6 ENCOUNTER FOR TOBACCO USE CESSATION COUNSELING: ICD-10-CM

## 2018-06-28 LAB
ALBUMIN UR-MCNC: NEGATIVE MG/DL
APPEARANCE UR: CLEAR
BACTERIA #/AREA URNS HPF: ABNORMAL /HPF
BILIRUB UR QL STRIP: NEGATIVE
COLOR UR AUTO: ABNORMAL
GLUCOSE UR STRIP-MCNC: NEGATIVE MG/DL
HGB UR QL STRIP: ABNORMAL
KETONES UR STRIP-MCNC: NEGATIVE MG/DL
LEUKOCYTE ESTERASE UR QL STRIP: NEGATIVE
NITRATE UR QL: NEGATIVE
PH UR STRIP: 6 PH (ref 4.7–8)
RBC #/AREA URNS AUTO: 1 /HPF (ref 0–2)
SOURCE: ABNORMAL
SP GR UR STRIP: 1.01 (ref 1–1.03)
SPECIMEN SOURCE: NORMAL
UROBILINOGEN UR STRIP-MCNC: NORMAL MG/DL (ref 0–2)
WBC #/AREA URNS AUTO: <1 /HPF (ref 0–5)
WET PREP SPEC: NORMAL

## 2018-06-28 PROCEDURE — 81001 URINALYSIS AUTO W/SCOPE: CPT | Mod: ZL | Performed by: FAMILY MEDICINE

## 2018-06-28 PROCEDURE — G0463 HOSPITAL OUTPT CLINIC VISIT: HCPCS

## 2018-06-28 PROCEDURE — 99213 OFFICE O/P EST LOW 20 MIN: CPT | Performed by: FAMILY MEDICINE

## 2018-06-28 PROCEDURE — 87210 SMEAR WET MOUNT SALINE/INK: CPT | Mod: ZL | Performed by: FAMILY MEDICINE

## 2018-06-28 ASSESSMENT — PAIN SCALES - GENERAL: PAINLEVEL: MODERATE PAIN (4)

## 2018-06-28 ASSESSMENT — ANXIETY QUESTIONNAIRES
1. FEELING NERVOUS, ANXIOUS, OR ON EDGE: NOT AT ALL
GAD7 TOTAL SCORE: 0
7. FEELING AFRAID AS IF SOMETHING AWFUL MIGHT HAPPEN: NOT AT ALL
6. BECOMING EASILY ANNOYED OR IRRITABLE: NOT AT ALL
4. TROUBLE RELAXING: NOT AT ALL
3. WORRYING TOO MUCH ABOUT DIFFERENT THINGS: NOT AT ALL
5. BEING SO RESTLESS THAT IT IS HARD TO SIT STILL: NOT AT ALL
2. NOT BEING ABLE TO STOP OR CONTROL WORRYING: NOT AT ALL

## 2018-06-28 NOTE — PROGRESS NOTES
SUBJECTIVE:   Kimberley Louis is a 38 year old female who presents to clinic today for the following health issues:      URINARY TRACT SYMPTOMS      Duration: 2days    Description  dysuria, frequency, urgency, odor and back pain, more left sided    Intensity:  4/10    Accompanying signs and symptoms:  Fever/chills: no   Flank pain YES  Nausea and vomiting: no vomiting, mild nausea  Vaginal symptoms: odor  Abdominal/Pelvic Pain: YES    History  History of frequent UTI's: YES - nothing recent  History of kidney stones: YES - doesn't feel the same  Sexually Active: YES  Possibility of pregnancy: No    Precipitating or alleviating factors: None    Therapies tried and outcome: none   Outcome: N/A    Has menses now        Problem list and histories reviewed & adjusted, as indicated.  Additional history: as documented    Current Outpatient Prescriptions   Medication     albuterol (PROAIR HFA/PROVENTIL HFA/VENTOLIN HFA) 108 (90 Base) MCG/ACT Inhaler     amLODIPine (NORVASC) 10 MG tablet     fluticasone (FLONASE) 50 MCG/ACT spray     IBUPROFEN PO     metoprolol tartrate (LOPRESSOR) 25 MG tablet     No current facility-administered medications for this visit.        Patient Active Problem List   Diagnosis     Essential hypertension     H/O renal calculi     Enlarged kidney     ACP (advance care planning)     Advanced directives, counseling/discussion     Pure hypercholesterolemia     Tobacco abuse counseling     Tobacco abuse     Palpitations     Atypical chest pain     Dyspnea on exertion     Vein disorder     Migraine      Incomplete right bundle branch block     Past Surgical History:   Procedure Laterality Date     AS REMOVAL OF KIDNEY STONE       AS REMOVAL OF KIDNEY STONE       C REMOVAL OF KIDNEY STONE       TUBAL LIGATION         Social History   Substance Use Topics     Smoking status: Current Every Day Smoker     Packs/day: 1.00     Years: 28.00     Smokeless tobacco: Never Used     Alcohol use Yes      Comment:  "occasional     Family History   Problem Relation Age of Onset     Diabetes Mother      Emphysema Mother      Cervical Cancer Mother      Diabetes Father      Hypertension Father      Hypertension Maternal Grandmother      Diabetes Maternal Grandmother      Schizophrenia Maternal Grandmother      Unknown/Adopted Maternal Grandfather      Lung Cancer Paternal Grandmother      Diabetes Paternal Grandmother      Unknown/Adopted Paternal Grandfather            Reviewed and updated as needed this visit by clinical staff  Tobacco  Allergies  Meds  Problems  Med Hx  Surg Hx  Fam Hx  Soc Hx        Reviewed and updated as needed this visit by Provider  Tobacco  Allergies  Meds  Med Hx  Surg Hx  Fam Hx  Soc Hx        ROS:  CONSTITUTIONAL:NEGATIVE for fever, chills, change in weight  INTEGUMENTARY/SKIN: NEGATIVE for worrisome rashes  GI: POSITIVE for abdominal pain lower and nausea and NEGATIVE for hematemesis, hematochezia, melena and vomiting  : negative for, hematuria and POSITIVE as above for dysuria, frequency, urgency  MUSCULOSKELETAL: POSITIVE  for back pain     OBJECTIVE:     /80 (BP Location: Right arm, Patient Position: Sitting, Cuff Size: Adult Regular)  Pulse 76  Temp 98  F (36.7  C) (Tympanic)  Resp 16  Ht 5' 4\" (1.626 m)  Wt 159 lb (72.1 kg)  SpO2 98%  BMI 27.29 kg/m2  Body mass index is 27.29 kg/(m^2).  GENERAL: healthy, alert and no distress  RESP: lungs clear to auscultation - no rales, rhonchi or wheezes  CV: regular rate and rhythm, normal S1 S2, no S3 or S4, no murmur, click or rub, no peripheral edema and peripheral pulses strong  ABDOMEN: soft, moderate tenderness across lower abdomen, without hepatosplenomegaly or masses, bowel sounds normal   MS: mild tenderness lower back  SKIN: no suspicious lesions or rashes  PSYCH: mentation appears normal, affect normal/bright    Diagnostic Test Results:  Results for orders placed or performed in visit on 06/28/18 (from the past 24 " hour(s))   UA reflex to Microscopic and Culture - [place]   Result Value Ref Range    Color Urine Light Yellow     Appearance Urine Clear     Glucose Urine Negative NEG^Negative mg/dL    Bilirubin Urine Negative NEG^Negative    Ketones Urine Negative NEG^Negative mg/dL    Specific Gravity Urine 1.012 1.003 - 1.035    Blood Urine Small (A) NEG^Negative    pH Urine 6.0 4.7 - 8.0 pH    Protein Albumin Urine Negative NEG^Negative mg/dL    Urobilinogen mg/dL Normal 0.0 - 2.0 mg/dL    Nitrite Urine Negative NEG^Negative    Leukocyte Esterase Urine Negative NEG^Negative    Source Midstream Urine     RBC Urine 1 0 - 2 /HPF    WBC Urine <1 0 - 5 /HPF    Bacteria Urine None (A) NEG^Negative /HPF   Wet prep   Result Value Ref Range    Specimen Description Vagina     Wet Prep Moderate  WBC'S seen       Wet Prep No Trichomonas seen     Wet Prep No clue cells seen     Wet Prep No yeast seen      Self obtained wet prep per patient preference with current menses.    ASSESSMENT/PLAN:     (R30.0) Dysuria  (primary encounter diagnosis)  Plan: UA reflex to Microscopic and Culture - [place]    (F17.200) Tobacco use disorder    (Z71.6) Encounter for tobacco use cessation counseling    (R10.2) Pelvic pain in female  Plan: Wet prep    Patient Instructions   Push fluids.  Symptomatic cares.  Follow up if ongoing symptoms after menses.      No UTI or vaginal infection.    Sandy Maxwell MD  [facility]

## 2018-06-28 NOTE — NURSING NOTE
"Chief Complaint   Patient presents with     urinary issue       Initial /80 (BP Location: Right arm, Patient Position: Sitting, Cuff Size: Adult Regular)  Pulse 76  Temp 98  F (36.7  C) (Tympanic)  Resp 16  Ht 5' 4\" (1.626 m)  Wt 159 lb (72.1 kg)  SpO2 98%  BMI 27.29 kg/m2 Estimated body mass index is 27.29 kg/(m^2) as calculated from the following:    Height as of this encounter: 5' 4\" (1.626 m).    Weight as of this encounter: 159 lb (72.1 kg).  Medication Reconciliation: complete    Phyllis De Leon LPN    "

## 2018-06-28 NOTE — MR AVS SNAPSHOT
"              After Visit Summary   6/28/2018    Kimberley Louis    MRN: [id number]           Patient Information     Date Of Birth          1980        Visit Information        Provider Department      6/28/2018 8:45 AM Sandy Santizo MD [facility]        Today's Diagnoses     Dysuria    -  1    Tobacco use disorder        Encounter for tobacco use cessation counseling        Pelvic pain in female          Care Instructions    Push fluids.  Symptomatic cares.  Follow up if ongoing symptoms after menses.            Follow-ups after your visit        Who to contact     If you have questions or need follow up information about today's clinic visit or your schedule please contact [facility] directly at [phone].  Normal or non-critical lab and imaging results will be communicated to you by MyChart, letter or phone within 4 business days after the clinic has received the results. If you do not hear from us within 7 days, please contact the clinic through MyChart or phone. If you have a critical or abnormal lab result, we will notify you by phone as soon as possible.  Submit refill requests through [employer] or call your pharmacy and they will forward the refill request to us. Please allow 3 business days for your refill to be completed.          Additional Information About Your Visit        Care EveryWhere ID     This is your Care EveryWhere ID. This could be used by other organizations to access your [facility] medical records  [id number]        Your Vitals Were     Pulse Temperature Respirations Height Pulse Oximetry BMI (Body Mass Index)    76 98  F (36.7  C) (Tympanic) 16 5' 4\" (1.626 m) 98% 27.29 kg/m2       Blood Pressure from Last 3 Encounters:   06/28/18 122/80   04/26/18 137/88   04/03/18 156/80    Weight from Last 3 Encounters:   06/28/18 159 lb (72.1 kg)   04/26/18 155 lb (70.3 kg)   02/27/18 155 lb (70.3 kg)              We Performed the Following     UA reflex to " Microscopic and Culture - [place]     Wet prep        Primary Care Provider Office Phone # Fax #    Xiomara CARRERO TOMER Caceres [phone]       [address] [place] [id number]        Equal Access to Services     DEE CAMPA : Hadganesh caitlin morales bario Soolya, waaxda luqadaha, qaybta kaalmada adeegyada, nikki gauthier kerri nguyen. So [facility] [phone].    ATENCIÓN: Si habla español, tiene a alex disposición servicios gratuitos de asistencia lingüística. Llame al [phone].    We comply with applicable federal civil rights laws and [place] laws. We do not discriminate on the basis of race, color, national origin, age, disability, sex, sexual orientation, or gender identity.            Thank you!     Thank you for choosing [facility]  for your care. Our goal is always to provide you with excellent care. Hearing back from our patients is one way we can continue to improve our services. Please take a few minutes to complete the written survey that you may receive in the mail after your visit with us. Thank you!             Your Updated Medication List - Protect others around you: Learn how to safely use, store and throw away your medicines at www.disposemymeds.org.          This list is accurate as of 6/28/18  9:27 AM.  Always use your most recent med list.                   Brand Name Dispense Instructions for use Diagnosis    albuterol 108 (90 Base) MCG/ACT Inhaler    PROAIR HFA/PROVENTIL HFA/VENTOLIN HFA    1 Inhaler    Inhale 2 puffs into the lungs every 6 hours as needed for shortness of breath / dyspnea or wheezing        amLODIPine 10 MG tablet    NORVASC    30 tablet    Take 1 tablet (10 mg) by mouth daily    Atypical chest pain, Essential hypertension       fluticasone 50 MCG/ACT spray    FLONASE    1 Bottle    Spray 1-2 sprays into both nostrils daily        IBUPROFEN PO      Take 200 mg by mouth Takes 2 tablets every 6 hours PRN tooth pain        metoprolol  tartrate 25 MG tablet    LOPRESSOR    60 tablet    Take 1 tablet (25 mg) by mouth 2 times daily    Essential hypertension

## 2018-06-29 ASSESSMENT — PATIENT HEALTH QUESTIONNAIRE - PHQ9: SUM OF ALL RESPONSES TO PHQ QUESTIONS 1-9: 0

## 2018-06-29 ASSESSMENT — ANXIETY QUESTIONNAIRES: GAD7 TOTAL SCORE: 0

## 2018-08-03 PROBLEM — N39.0 RECURRENT UTI: Status: ACTIVE | Noted: 2018-08-03

## 2018-08-03 PROBLEM — Z87.891 PERSONAL HISTORY OF TOBACCO USE, PRESENTING HAZARDS TO HEALTH: Status: ACTIVE | Noted: 2018-08-03

## 2018-08-03 PROBLEM — R10.819 LEFT FLANK TENDERNESS: Status: ACTIVE | Noted: 2018-08-03

## 2018-08-07 NOTE — PATIENT INSTRUCTIONS

## 2018-08-07 NOTE — PROGRESS NOTES
SUBJECTIVE:   Kimberley Louis is a 38 year old female who presents to clinic today for the following health issues:          Headaches-migraines      Duration: seven weeks    Description    Headache is a new problem for her.  Location: top front, and above right ear   Character: sharp pain-with cough feels like something is going to burst out of her head, otherwise and constant nagging pain    Intensity:  Worsening moderate to severe     Accompanying signs and symptoms:    Precipitating or Alleviating factors:  Nausea/vomiting: usually  Dizziness: usually  Weakness or numbness: no  Visual changes: flashing lights and blind spots (scotoma) both eyes  Fever: no   Sinus or URI symptoms YES- right side of nose plugged    History  Head trauma: no   Family history of migraines: no   Previous tests for headaches: no   Neurologist evaluations: no   Able to do daily activities when headache present: YES- pushing though  Wake with headaches: YES- starts about 15 mins after up  Daily pain medication use: YES- IBU on and off  Any changes in: none    Precipitating or Alleviating factors (light/sound/sleep/caffeine): Relates lights, sounds, minimal caffeine seem to make no difference is symptoms    Therapies tried and outcome: Ibuprofen (Advil, Motrin)    Outcome - not effective  Frequent/daily pain medication use: no     Smokes 3/4 PPD  Alcohol Friday and saturdays    Problem list and histories reviewed & adjusted, as indicated.  Additional history: as documented    Patient Active Problem List   Diagnosis     Essential hypertension     H/O renal calculi     Enlarged kidney     ACP (advance care planning)     Advanced directives, counseling/discussion     Pure hypercholesterolemia     Tobacco abuse counseling     Tobacco abuse     Palpitations     Atypical chest pain     Dyspnea on exertion     Vein disorder     Migraine      Incomplete right bundle branch block     Kidney stone on left side     Left flank tenderness      Nephrocalcinosis     Personal history of tobacco use, presenting hazards to health     Recurrent UTI     Past Surgical History:   Procedure Laterality Date     AS REMOVAL OF KIDNEY STONE       AS REMOVAL OF KIDNEY STONE       C REMOVAL OF KIDNEY STONE       TUBAL LIGATION         Social History   Substance Use Topics     Smoking status: Current Every Day Smoker     Packs/day: 0.75     Years: 28.00     Types: Cigarettes     Start date: 1/1/1989     Smokeless tobacco: Never Used     Alcohol use Yes      Comment: occasional     Family History   Problem Relation Age of Onset     Diabetes Mother      Emphysema Mother      Cervical Cancer Mother      Diabetes Father      Hypertension Father      Hypertension Maternal Grandmother      Diabetes Maternal Grandmother      Schizophrenia Maternal Grandmother      Unknown/Adopted Maternal Grandfather      Lung Cancer Paternal Grandmother      Diabetes Paternal Grandmother      Unknown/Adopted Paternal Grandfather          Current Outpatient Prescriptions   Medication Sig Dispense Refill     albuterol (PROAIR HFA/PROVENTIL HFA/VENTOLIN HFA) 108 (90 Base) MCG/ACT Inhaler Inhale 2 puffs into the lungs every 6 hours as needed for shortness of breath / dyspnea or wheezing 1 Inhaler 0     amLODIPine (NORVASC) 10 MG tablet Take 1 tablet (10 mg) by mouth daily 30 tablet 11     IBUPROFEN PO Take 200 mg by mouth Takes 2 tablets every 6 hours PRN tooth pain       metoprolol tartrate (LOPRESSOR) 25 MG tablet Take 1 tablet (25 mg) by mouth 2 times daily 60 tablet 11     No Known Allergies    Reviewed and updated as needed this visit by clinical staff       Reviewed and updated as needed this visit by Provider         ROS:  CONSTITUTIONAL:NEGATIVE for fever, chills, change in weight  INTEGUMENTARY/SKIN: rash on forehead  EYES: vision changes come and go  ENT/MOUTH: sinus pressure  RESP:Hx asthma  CV: occasional palpitation does have a history of palpitations   NEURO: chronic headache for  "about 6 weeks, slightly better in the morning, occasional numbness to the left side  Musculoskeletal: tightness mid upper back   PSYCHIATRIC: NEGATIVE for changes in mood or affect    OBJECTIVE:     /62  Pulse 86  Temp 99.9  F (37.7  C) (Tympanic)  Resp 16  Ht 5' 4\" (1.626 m)  Wt 167 lb (75.8 kg)  SpO2 97%  BMI 28.67 kg/m2  Body mass index is 28.67 kg/(m^2).   GENERAL: alert and no distress  EYES: Eyes grossly normal to inspection, PERRL and conjunctivae and sclerae normal  HENT: ear canals and TM's normal, nose and mouth without ulcers or lesions  NECK: no adenopathy, no asymmetry, masses, or scars and thyroid normal to palpation  RESP: lungs clear to auscultation - no rales, rhonchi or wheezes  CV: regular rate and rhythm, normal S1 S2, no S3 or S4, no murmur, click or rub, no peripheral edema and peripheral pulses strong  ABDOMEN: soft, nontender, no hepatosplenomegaly, no masses and bowel sounds normal  MS: no gross musculoskeletal defects noted, no edema  SKIN: no suspicious lesions or rashes  NEURO: Normal strength and tone, sensory exam grossly normal, mentation intact and cranial nerves 2-12 intact, holding the side of her head   PSYCH: mentation appears normal, affect normal/bright  LYMPH: normal ant/post cervical, supraclavicular nodes    Diagnostic Test Results:  Results for orders placed or performed in visit on 08/08/18 (from the past 24 hour(s))   Basic metabolic panel  (Ca, Cl, CO2, Creat, Gluc, K, Na, BUN)   Result Value Ref Range    Sodium 140 133 - 144 mmol/L    Potassium 3.9 3.4 - 5.3 mmol/L    Chloride 108 94 - 109 mmol/L    Carbon Dioxide 25 20 - 32 mmol/L    Anion Gap 7 3 - 14 mmol/L    Glucose 96 70 - 99 mg/dL    Urea Nitrogen 14 7 - 30 mg/dL    Creatinine 0.65 0.52 - 1.04 mg/dL    GFR Estimate >90 >60 mL/min/1.7m2    GFR Estimate If Black >90 >60 mL/min/1.7m2    Calcium 8.9 8.5 - 10.1 mg/dL   CBC with platelets and differential   Result Value Ref Range    WBC 10.2 4.0 - 11.0 " 10e9/L    RBC Count 4.39 3.8 - 5.2 10e12/L    Hemoglobin 13.2 11.7 - 15.7 g/dL    Hematocrit 38.5 35.0 - 47.0 %    MCV 88 78 - 100 fl    MCH 30.1 26.5 - 33.0 pg    MCHC 34.3 31.5 - 36.5 g/dL    RDW 14.0 10.0 - 15.0 %    Platelet Count 407 150 - 450 10e9/L    Diff Method Automated Method     % Neutrophils 56.4 %    % Lymphocytes 32.0 %    % Monocytes 7.3 %    % Eosinophils 3.2 %    % Basophils 0.7 %    % Immature Granulocytes 0.4 %    Nucleated RBCs 0 0 /100    Absolute Neutrophil 5.8 1.6 - 8.3 10e9/L    Absolute Lymphocytes 3.3 0.8 - 5.3 10e9/L    Absolute Monocytes 0.8 0.0 - 1.3 10e9/L    Absolute Eosinophils 0.3 0.0 - 0.7 10e9/L    Absolute Basophils 0.1 0.0 - 0.2 10e9/L    Abs Immature Granulocytes 0.0 0 - 0.4 10e9/L    Absolute Nucleated RBC 0.0    TSH with free T4 reflex   Result Value Ref Range    TSH 2.12 0.40 - 4.00 mU/L       ASSESSMENT/PLAN:     1. Nonintractable episodic headache, unspecified headache type  Unable to do CT scan from clinic after 5 PM. With worsening headache and change in vision plan to bring to the ER for further evaluation, CT scan and possible medications for headache. Kimberley would have preferred to go home but did agree to go to have head CT.  Kimberley was walked to the ER. She was holding the side of her head.   - Basic metabolic panel  (Ca, Cl, CO2, Creat, Gluc, K, Na, BUN)  - CBC with platelets and differential  - TSH with free T4 reflex  - CT Head w/o Contrast; Future        Brought to the ER    ZAHEER Cartagena [facility] DIXIE

## 2018-08-08 ENCOUNTER — RADIANT APPOINTMENT (OUTPATIENT)
Dept: GENERAL RADIOLOGY | Facility: OTHER | Age: 38
End: 2018-08-08
Attending: NURSE PRACTITIONER
Payer: COMMERCIAL

## 2018-08-08 ENCOUNTER — OFFICE VISIT (OUTPATIENT)
Dept: FAMILY MEDICINE | Facility: OTHER | Age: 38
End: 2018-08-08
Attending: NURSE PRACTITIONER
Payer: COMMERCIAL

## 2018-08-08 ENCOUNTER — HOSPITAL ENCOUNTER (EMERGENCY)
Facility: HOSPITAL | Age: 38
Discharge: HOME | End: 2018-08-08
Attending: PHYSICIAN ASSISTANT | Admitting: PHYSICIAN ASSISTANT
Payer: COMMERCIAL

## 2018-08-08 ENCOUNTER — APPOINTMENT (OUTPATIENT)
Dept: CT IMAGING | Facility: HOSPITAL | Age: 38
End: 2018-08-08
Attending: PHYSICIAN ASSISTANT
Payer: COMMERCIAL

## 2018-08-08 VITALS
OXYGEN SATURATION: 100 % | SYSTOLIC BLOOD PRESSURE: 121 MMHG | HEART RATE: 70 BPM | RESPIRATION RATE: 16 BRPM | TEMPERATURE: 99.3 F | DIASTOLIC BLOOD PRESSURE: 80 MMHG

## 2018-08-08 VITALS
DIASTOLIC BLOOD PRESSURE: 62 MMHG | TEMPERATURE: 99.9 F | BODY MASS INDEX: 28.51 KG/M2 | HEART RATE: 86 BPM | RESPIRATION RATE: 16 BRPM | SYSTOLIC BLOOD PRESSURE: 112 MMHG | OXYGEN SATURATION: 97 % | WEIGHT: 167 LBS | HEIGHT: 64 IN

## 2018-08-08 DIAGNOSIS — R51.9 NONINTRACTABLE EPISODIC HEADACHE, UNSPECIFIED HEADACHE TYPE: Primary | ICD-10-CM

## 2018-08-08 DIAGNOSIS — R51.9 NONINTRACTABLE EPISODIC HEADACHE, UNSPECIFIED HEADACHE TYPE: ICD-10-CM

## 2018-08-08 DIAGNOSIS — R51.9 INTRACTABLE HEADACHE, UNSPECIFIED CHRONICITY PATTERN, UNSPECIFIED HEADACHE TYPE: ICD-10-CM

## 2018-08-08 LAB
ANION GAP SERPL CALCULATED.3IONS-SCNC: 7 MMOL/L (ref 3–14)
BASOPHILS # BLD AUTO: 0.1 10E9/L (ref 0–0.2)
BASOPHILS NFR BLD AUTO: 0.7 %
BUN SERPL-MCNC: 14 MG/DL (ref 7–30)
CALCIUM SERPL-MCNC: 8.9 MG/DL (ref 8.5–10.1)
CHLORIDE SERPL-SCNC: 108 MMOL/L (ref 94–109)
CO2 SERPL-SCNC: 25 MMOL/L (ref 20–32)
CREAT SERPL-MCNC: 0.65 MG/DL (ref 0.52–1.04)
DIFFERENTIAL METHOD BLD: NORMAL
EOSINOPHIL # BLD AUTO: 0.3 10E9/L (ref 0–0.7)
EOSINOPHIL NFR BLD AUTO: 3.2 %
ERYTHROCYTE [DISTWIDTH] IN BLOOD BY AUTOMATED COUNT: 14 % (ref 10–15)
GFR SERPL CREATININE-BSD FRML MDRD: >90 ML/MIN/1.7M2
GLUCOSE SERPL-MCNC: 96 MG/DL (ref 70–99)
HCT VFR BLD AUTO: 38.5 % (ref 35–47)
HGB BLD-MCNC: 13.2 G/DL (ref 11.7–15.7)
IMM GRANULOCYTES # BLD: 0 10E9/L (ref 0–0.4)
IMM GRANULOCYTES NFR BLD: 0.4 %
LYMPHOCYTES # BLD AUTO: 3.3 10E9/L (ref 0.8–5.3)
LYMPHOCYTES NFR BLD AUTO: 32 %
MCH RBC QN AUTO: 30.1 PG (ref 26.5–33)
MCHC RBC AUTO-ENTMCNC: 34.3 G/DL (ref 31.5–36.5)
MCV RBC AUTO: 88 FL (ref 78–100)
MONOCYTES # BLD AUTO: 0.8 10E9/L (ref 0–1.3)
MONOCYTES NFR BLD AUTO: 7.3 %
NEUTROPHILS # BLD AUTO: 5.8 10E9/L (ref 1.6–8.3)
NEUTROPHILS NFR BLD AUTO: 56.4 %
NRBC # BLD AUTO: 0 10*3/UL
NRBC BLD AUTO-RTO: 0 /100
PLATELET # BLD AUTO: 407 10E9/L (ref 150–450)
POTASSIUM SERPL-SCNC: 3.9 MMOL/L (ref 3.4–5.3)
RBC # BLD AUTO: 4.39 10E12/L (ref 3.8–5.2)
SODIUM SERPL-SCNC: 140 MMOL/L (ref 133–144)
TSH SERPL DL<=0.005 MIU/L-ACNC: 2.12 MU/L (ref 0.4–4)
WBC # BLD AUTO: 10.2 10E9/L (ref 4–11)

## 2018-08-08 PROCEDURE — 80048 BASIC METABOLIC PNL TOTAL CA: CPT | Mod: ZL | Performed by: NURSE PRACTITIONER

## 2018-08-08 PROCEDURE — 84443 ASSAY THYROID STIM HORMONE: CPT | Mod: ZL | Performed by: NURSE PRACTITIONER

## 2018-08-08 PROCEDURE — 70450 CT HEAD/BRAIN W/O DYE: CPT | Mod: TC

## 2018-08-08 PROCEDURE — 99285 EMERGENCY DEPT VISIT HI MDM: CPT | Mod: 25

## 2018-08-08 PROCEDURE — 99284 EMERGENCY DEPT VISIT MOD MDM: CPT | Mod: 25

## 2018-08-08 PROCEDURE — G0463 HOSPITAL OUTPT CLINIC VISIT: HCPCS

## 2018-08-08 PROCEDURE — G0463 HOSPITAL OUTPT CLINIC VISIT: HCPCS | Mod: 25

## 2018-08-08 PROCEDURE — 25000128 H RX IP 250 OP 636: Performed by: PHYSICIAN ASSISTANT

## 2018-08-08 PROCEDURE — 96374 THER/PROPH/DIAG INJ IV PUSH: CPT

## 2018-08-08 PROCEDURE — 36415 COLL VENOUS BLD VENIPUNCTURE: CPT | Mod: ZL | Performed by: NURSE PRACTITIONER

## 2018-08-08 PROCEDURE — 96361 HYDRATE IV INFUSION ADD-ON: CPT

## 2018-08-08 PROCEDURE — 99214 OFFICE O/P EST MOD 30 MIN: CPT | Performed by: NURSE PRACTITIONER

## 2018-08-08 PROCEDURE — 99284 EMERGENCY DEPT VISIT MOD MDM: CPT | Performed by: PHYSICIAN ASSISTANT

## 2018-08-08 PROCEDURE — 72050 X-RAY EXAM NECK SPINE 4/5VWS: CPT | Mod: TC

## 2018-08-08 PROCEDURE — 96375 TX/PRO/DX INJ NEW DRUG ADDON: CPT

## 2018-08-08 PROCEDURE — 85025 COMPLETE CBC W/AUTO DIFF WBC: CPT | Mod: ZL | Performed by: NURSE PRACTITIONER

## 2018-08-08 RX ORDER — KETOROLAC TROMETHAMINE 30 MG/ML
30 INJECTION, SOLUTION INTRAMUSCULAR; INTRAVENOUS EVERY 6 HOURS PRN
Status: DISCONTINUED | OUTPATIENT
Start: 2018-08-08 | End: 2018-08-08 | Stop reason: HOSPADM

## 2018-08-08 RX ORDER — DEXAMETHASONE SODIUM PHOSPHATE 10 MG/ML
10 INJECTION, SOLUTION INTRAMUSCULAR; INTRAVENOUS ONCE
Status: COMPLETED | OUTPATIENT
Start: 2018-08-08 | End: 2018-08-08

## 2018-08-08 RX ORDER — SODIUM CHLORIDE 9 MG/ML
1000 INJECTION, SOLUTION INTRAVENOUS CONTINUOUS
Status: DISCONTINUED | OUTPATIENT
Start: 2018-08-08 | End: 2018-08-08 | Stop reason: HOSPADM

## 2018-08-08 RX ORDER — DIPHENHYDRAMINE HYDROCHLORIDE 50 MG/ML
25 INJECTION INTRAMUSCULAR; INTRAVENOUS ONCE
Status: COMPLETED | OUTPATIENT
Start: 2018-08-08 | End: 2018-08-08

## 2018-08-08 RX ADMIN — DIPHENHYDRAMINE HYDROCHLORIDE 25 MG: 50 INJECTION, SOLUTION INTRAMUSCULAR; INTRAVENOUS at 18:12

## 2018-08-08 RX ADMIN — PROCHLORPERAZINE EDISYLATE 10 MG: 5 INJECTION INTRAMUSCULAR; INTRAVENOUS at 18:10

## 2018-08-08 RX ADMIN — SODIUM CHLORIDE 1000 ML: 9 INJECTION, SOLUTION INTRAVENOUS at 18:07

## 2018-08-08 RX ADMIN — KETOROLAC TROMETHAMINE 30 MG: 30 INJECTION, SOLUTION INTRAMUSCULAR at 18:07

## 2018-08-08 RX ADMIN — DEXAMETHASONE SODIUM PHOSPHATE 10 MG: 10 INJECTION, SOLUTION INTRAMUSCULAR; INTRAVENOUS at 18:08

## 2018-08-08 ASSESSMENT — ASTHMA QUESTIONNAIRES
QUESTION_3 LAST FOUR WEEKS HOW OFTEN DID YOUR ASTHMA SYMPTOMS (WHEEZING, COUGHING, SHORTNESS OF BREATH, CHEST TIGHTNESS OR PAIN) WAKE YOU UP AT NIGHT OR EARLIER THAN USUAL IN THE MORNING: NOT AT ALL
QUESTION_5 LAST FOUR WEEKS HOW WOULD YOU RATE YOUR ASTHMA CONTROL: SOMEWHAT CONTROLLED
QUESTION_1 LAST FOUR WEEKS HOW MUCH OF THE TIME DID YOUR ASTHMA KEEP YOU FROM GETTING AS MUCH DONE AT WORK, SCHOOL OR AT HOME: NONE OF THE TIME
ACT_TOTALSCORE: 20
QUESTION_2 LAST FOUR WEEKS HOW OFTEN HAVE YOU HAD SHORTNESS OF BREATH: ONCE OR TWICE A WEEK
QUESTION_4 LAST FOUR WEEKS HOW OFTEN HAVE YOU USED YOUR RESCUE INHALER OR NEBULIZER MEDICATION (SUCH AS ALBUTEROL): TWO OR THREE TIMES PER WEEK

## 2018-08-08 ASSESSMENT — ANXIETY QUESTIONNAIRES
4. TROUBLE RELAXING: NOT AT ALL
7. FEELING AFRAID AS IF SOMETHING AWFUL MIGHT HAPPEN: NOT AT ALL
5. BEING SO RESTLESS THAT IT IS HARD TO SIT STILL: NOT AT ALL
6. BECOMING EASILY ANNOYED OR IRRITABLE: NOT AT ALL
3. WORRYING TOO MUCH ABOUT DIFFERENT THINGS: NOT AT ALL
2. NOT BEING ABLE TO STOP OR CONTROL WORRYING: NOT AT ALL
GAD7 TOTAL SCORE: 0
1. FEELING NERVOUS, ANXIOUS, OR ON EDGE: NOT AT ALL

## 2018-08-08 ASSESSMENT — ENCOUNTER SYMPTOMS
NECK PAIN: 0
CHEST TIGHTNESS: 0
SHORTNESS OF BREATH: 0
FATIGUE: 0
FEVER: 0
DIZZINESS: 0
NECK STIFFNESS: 0
CHILLS: 0
ACTIVITY CHANGE: 0
APPETITE CHANGE: 0
LIGHT-HEADEDNESS: 0
HEADACHES: 1
VOMITING: 0
NAUSEA: 0
ABDOMINAL PAIN: 0

## 2018-08-08 ASSESSMENT — PAIN SCALES - GENERAL: PAINLEVEL: SEVERE PAIN (6)

## 2018-08-08 NOTE — NURSING NOTE
"Chief Complaint   Patient presents with     Headache       Initial /62  Pulse 86  Temp 99.9  F (37.7  C) (Tympanic)  Resp 16  Ht 5' 4\" (1.626 m)  Wt 167 lb (75.8 kg)  SpO2 97%  BMI 28.67 kg/m2 Estimated body mass index is 28.67 kg/(m^2) as calculated from the following:    Height as of this encounter: 5' 4\" (1.626 m).    Weight as of this encounter: 167 lb (75.8 kg).  Medication Reconciliation: complete    Eliana Evans LPN    "

## 2018-08-08 NOTE — ED AVS SNAPSHOT
[facility]    [address]    [facility] [address]    Phone:  [phone]                                       Kimberley Louis   MRN: [id number]    Department:  [facility]   Date of Visit:  8/8/2018           Patient Information     Date Of Birth          1980        Your diagnoses for this visit were:     Intractable headache, unspecified chronicity pattern, unspecified headache type        You were seen by Ruth Dixon PA-C.      Follow-up Information     Follow up with Xiomara Caceres PA.    Specialty:  Family Practice    Why:  As needed    Contact information:    [address]  [place] [address] [phone]          Follow up with [facility].    Specialty:  EMERGENCY MEDICINE    Why:  If symptoms worsen    Contact information:    [address]  [facility] [address] [phone]    Additional information:    From [place]: Take [address]. Turn left at [address]. Turn left at the first stoplight on [address]. At the first stop sign, take a right onto [address]. Take a left into the parking lot and continue through until you reach the North enterance of the building.       From [place]: Take [address]. Take the [address] ramp towards [place]. Turn left onto [address]. Take a slight right onto [address]. Turn left at the first stoplight on [address]. At the first stop sign, take a right onto [address]. Take a left into the parking lot and continue through until you reach the North enterance of the building.       From [place]: Take [address]. Take a right at [address]. At the first stop sign, take a right onto [address]. Take a left into the parking lot and continue through until you reach the North enterance of the building.         Discharge Instructions          * HEADACHE [unspecified]    The cause of your headache today is  not clear, but it does not appear to be the sign of any serious illness.  Under stress, some people tense the muscles of their shoulder, neck and scalp without knowing it. If this condition lasts long enough, a TENSION HEADACHE can occur.  A MIGRAINE HEADACHE is caused by changes in blood flow to the brain. It can be mild or severe. A migraine attack may be triggered by emotional stress, hormone changes during the menstrual cycle, oral contraceptives, alcohol use, certain foods containing tyramine, eye strain, weather changes, missing meals, lack of sleep or oversleeping.  Other causes of headache include a viral illness, sinus, ear or throat infection, dental pain and TMJ (jaw joint) pain.  HOME CARE:      If you were given pain medicine for this headache, do not drive yourself home. Arrange for a ride, instead. When you get home, try to sleep. You should feel much better when you wake up.    If you are having nausea or vomiting, follow a light diet until your headache is relieved.    If you have a migraine type headache, use sunglasses when in the daylight or around bright indoor lighting until symptoms improve. Bright glaring light can worsen this kind of headache.  FOLLOW UP with your doctor if the headache is not better within the next 24 hours. If you have frequent headaches you should discuss a treatment plan with your primary care doctor. By being aware of the earliest signs of headache, and starting treatment right away, you may be able to stop the pain yourself.  GET PROMPT MEDICAL ATTENTION if any of the following occur:    Worsening of your head pain or no improvement within 24 hours    Repeated vomiting (unable to keep liquids down)    Fever over 101 F (38.3 C)    Stiff neck    Extreme drowsiness, confusion or fainting    Weakness of an arm or leg or one side of the face    Difficulty with speech or vision    3466-7030 [employer]. [address] [place] [address], [place] [address]. All rights  reserved. This information is not intended as a substitute for professional medical care. Always follow your healthcare professional's instructions.  This information has been modified by your health care provider with permission from the publisher.    Rest and stay hydrated.     I'm hoping that you will wake up tomorrow and be pain free.     Please follow-up in the clinic for persistent symptoms.     Please return here for ANY worsening symptoms, weakness, or other concerns.        Review of your medicines      Our records show that you are taking the medicines listed below. If these are incorrect, please call your family doctor or clinic.        Dose / Directions Last dose taken    albuterol 108 (90 Base) MCG/ACT Inhaler   Commonly known as:  PROAIR HFA/PROVENTIL HFA/VENTOLIN HFA   Dose:  2 puff   Quantity:  1 Inhaler        Inhale 2 puffs into the lungs every 6 hours as needed for shortness of breath / dyspnea or wheezing   Refills:  0        amLODIPine 10 MG tablet   Commonly known as:  NORVASC   Dose:  10 mg   Quantity:  30 tablet        Take 1 tablet (10 mg) by mouth daily   Refills:  11        IBUPROFEN PO   Dose:  200 mg        Take 200 mg by mouth Takes 2 tablets every 6 hours PRN tooth pain   Refills:  0        metoprolol tartrate 25 MG tablet   Commonly known as:  LOPRESSOR   Dose:  25 mg   Quantity:  60 tablet        Take 1 tablet (25 mg) by mouth 2 times daily   Refills:  11                Procedures and tests performed during your visit     CT Head w/o Contrast    Peripheral IV catheter      Orders Needing Specimen Collection     None      Pending Results     Date and Time Order Name Status Description    8/8/2018 1800 CT Head w/o Contrast In process             Pending Culture Results     No orders found from 8/6/2018 to 8/9/2018.            Thank you for choosing [facility]       Thank you for choosing [facility] for your care. Our goal is always to provide you with excellent care. Hearing back from our  patients is one way we can continue to improve our services. Please take a few minutes to complete the written survey that you may receive in the mail after you visit with us. Thank you!        Care EveryWhere ID     This is your Care EveryWhere ID. This could be used by other organizations to access your [facility] medical records  [id number]        Equal Access to Services     DEE CAMPA : Lexis Skinner, celsa hutchins, nikki ochoa. So [facility] [phone].    ATENCIÓN: Si habla español, tiene a alex disposición servicios gratuitos de asistencia lingüística. Llame al [phone].    We comply with applicable federal civil rights laws and [place] laws. We do not discriminate on the basis of race, color, national origin, age, disability, sex, sexual orientation, or gender identity.            After Visit Summary       This is your record. Keep this with you and show to your community pharmacist(s) and doctor(s) at your next visit.

## 2018-08-08 NOTE — LETTER
My Asthma Action Plan  Name: Kimberley Louis   YOB: 1980  Date: 8/8/2018   My doctor: ZAHEER Cartagena CNP   My clinic: [facility] [place]        My Control Medicine: None  My Rescue Medicine: Albuterol (Proair/Ventolin/Proventil) inhaler 108 mcg/actinhale 2 puffs into the luings every 6 hours as needed for shortness of breathe/dyspnew or wheezing  {AAP include Oral Steroid:714813} My Asthma Severity: { :898851}  Avoid your asthma triggers: { :500313}        {Is patient a child or adult?:893781}       GREEN ZONE   Good Control    I feel good    No cough or wheeze    Can work, sleep and play without asthma symptoms       Take your asthma control medicine every day.     1. If exercise triggers your asthma, take your rescue medication    15 minutes before exercise or sports, and    During exercise if you have asthma symptoms  2. Spacer to use with inhaler: If you have a spacer, make sure to use it with your inhaler             YELLOW ZONE Getting Worse  I have ANY of these:    I do not feel good    Cough or wheeze    Chest feels tight    Wake up at night   1. Keep taking your Green Zone medications  2. Start taking your rescue medicine:    every 20 minutes for up to 1 hour. Then every 4 hours for 24-48 hours.  3. If you stay in the Yellow Zone for more than 12-24 hours, contact your doctor.  4. If you do not return to the Green Zone in 12-24 hours or you get worse, start taking your oral steroid medicine if prescribed by your provider.           RED ZONE Medical Alert - Get Help  I have ANY of these:    I feel awful    Medicine is not helping    Breathing getting harder    Trouble walking or talking    Nose opens wide to breathe       1. Take your rescue medicine NOW  2. If your provider has prescribed an oral steroid medicine, start taking it NOW  3. Call your doctor NOW  4. If you are still in the Red Zone after 20 minutes and you have not reached your doctor:    Take your rescue  medicine again and    Call 911 or go to the emergency room right away    See your regular doctor within 2 weeks of an Emergency Room or Urgent Care visit for follow-up treatment.          Annual Reminders:  Meet with Asthma Educator,  Flu Shot in the Fall, consider Pneumonia Vaccination for patients with asthma (aged 19 and older).    Pharmacy:    [facility] - [place], [place] - [address]   [facility] - [place], [place] - [address] AT [facility] OF  [address]                      Asthma Triggers  How To Control Things That Make Your Asthma Worse    Triggers are things that make your asthma worse.  Look at the list below to help you find your triggers and what you can do about them.  You can help prevent asthma flare-ups by staying away from your triggers.      Trigger                                                          What you can do   Cigarette Smoke  Tobacco smoke can make asthma worse. Do not allow smoking in your home, car or around you.  Be sure no one smokes at a child s day care or school.  If you smoke, ask your health care provider for ways to help you quit.  Ask family members to quit too.  Ask your health care provider for a referral to Quit Plan to help you quit smoking, or call 4-696-328-PLAN.     Colds, Flu, Bronchitis  These are common triggers of asthma. Wash your hands often.  Don t touch your eyes, nose or mouth.  Get a flu shot every year.     Dust Mites  These are tiny bugs that live in cloth or carpet. They are too small to see. Wash sheets and blankets in hot water every week.   Encase pillows and mattress in dust mite proof covers.  Avoid having carpet if you can. If you have carpet, vacuum weekly.   Use a dust mask and HEPA vacuum.   Pollen and Outdoor Mold  Some people are allergic to trees, grass, or weed pollen, or molds. Try to keep your windows closed.  Limit time out doors when pollen count is high.   Ask you health care provider about taking medicine during  allergy season.     Animal Dander  Some people are allergic to skin flakes, urine or saliva from pets with fur or feathers. Keep pets with fur or feathers out of your home.    If you can t keep the pet outdoors, then keep the pet out of your bedroom.  Keep the bedroom door closed.  Keep pets off cloth furniture and away from stuffed toys.     Mice, Rats, and Cockroaches  Some people are allergic to the waste from these pests.   Cover food and garbage.  Clean up spills and food crumbs.  Store grease in the refrigerator.   Keep food out of the bedroom.   Indoor Mold  This can be a trigger if your home has high moisture. Fix leaking faucets, pipes, or other sources of water.   Clean moldy surfaces.  Dehumidify basement if it is damp and smelly.   Smoke, Strong Odors, and Sprays  These can reduce air quality. Stay away from strong odors and sprays, such as perfume, powder, hair spray, paints, smoke incense, paint, cleaning products, candles and new carpet.   Exercise or Sports  Some people with asthma have this trigger. Be active!  Ask your doctor about taking medicine before sports or exercise to prevent symptoms.    Warm up for 5-10 minutes before and after sports or exercise.     Other Triggers of Asthma  Cold air:  Cover your nose and mouth with a scarf.  Sometimes laughing or crying can be a trigger.  Some medicines and food can trigger asthma.

## 2018-08-08 NOTE — MR AVS SNAPSHOT
After Visit Summary   8/8/2018    Kimberley Louis    MRN: [id number]           Patient Information     Date Of Birth          1980        Visit Information        Provider Department      8/8/2018 4:00 PM Marina Barrientos APRN [facility]        Today's Diagnoses     Nonintractable episodic headache, unspecified headache type    -  1      Care Instructions      HOW TO QUIT SMOKING  Smoking is one of the hardest habits to break. About half of all those who have ever smoked have been able to quit, and most of those (about 70%) who still smoke want to quit. Here are some of the best ways to stop smoking.     KEEP TRYING:  It takes most smokers about 8 tries before they are finally able to fully quit. So, the more often you try and fail, the better your chance of quitting the next time! So, don't give up!    GO COLD TURKEY:  Most ex-smokers quit cold turkey. Trying to cut back gradually doesn't seem to work as well, perhaps because it continues the smoking habit. Also, it is possible to fool yourself by inhaling more while smoking fewer cigarettes. This results in the same amount of nicotine in your body!    GET SUPPORT:  Support programs can make an important difference, especially for the heavy smoker. These groups offer lectures, methods to change your behavior and peer support. Call the free national Quitline for more information. 800-QUIT-NOW (838-226-2273). Low-cost or free programs are offered by many hospitals, local chapters of the American Lung Association (487-120-2185) and the American Cancer Society (549-167-1535). Support at home is important too. Non-smokers can help by offering praise and encouragement. If the smoker fails to quit, encourage them to try again!    OVER-THE-COUNTER MEDICINES:  For those who can't quit on their own, Nicotine Replacement Therapy (NRT) may make quitting much easier. Certain aids such as the nicotine patch, gum and lozenge are  available without a prescription. However, it is best to use these under the guidance of your doctor. The skin patch provides a steady supply of nicotine to the body. Nicotine gum and lozenge gives temporary bursts of low levels of nicotine. Both methods take the edge off the craving for cigarettes. WARNING: If you feel symptoms of nicotine overdose, such as nausea, vomiting, dizziness, weakness, or fast heartbeat, stop using these and see your doctor.    PRESCRIPTION MEDICINES:  After evaluating your smoking patterns and prior attempts at quitting, your doctor may offer a prescription medicine such as bupropion (Zyban, Wellbutrin), varenicline (Chantix, Champix), a niocotine inhaler or nasal spray. Each has its unique advantage and side effects which your doctor can review with you.    HEALTH BENEFITS OF QUITTING:  The benefits of quitting start right away and keep improving the longer you go without smokin minutes: blood pressure and pulse return to normal  8 hours: oxygen levels return to normal  2 days: ability to smell and taste begins to improve as damaged nerves start to regrow  2-3 weeks: circulation and lung function improves  1-9 months: decreased cough, congestion and shortness of breath; less tired  1 year: risk of heart attack decreases by half  5 years: risk of lung cancer decreases by half; risk of stroke becomes the same as a non-smoker  For information about how to quit smoking, visit the following links:  National Cancer [place] ,   Clearing the Air, Quit Smoking Today   - an online booklet. http://www.smokefree.gov/pubs/clearing_the_air.pdf  Smokefree.gov http://smokefree.gov/  QuitNet http://www.quitnet.com/    1975-2514 [employer], [address] [place] [address], [place] [address]. All rights reserved. This information is not intended as a substitute for professional medical care. Always follow your healthcare professional's instructions.    The Benefits of Living Smoke Free  What do you  want to gain from quitting? Check off some reasons to quit.  Health Benefits  ___ Reduce my risk of lung cancer, heart disease, chronic lung disease  ___ Have fewer wrinkles and softer skin  ___ Improve my sense of taste and smell  ___ For pregnant women--reduce the risk of having a miscarriage, stillbirth, premature birth, or low-birth-weight baby  Personal Benefits  ___ Feel more in control of my life  ___ Have better-smelling hair, breath, clothes, home, and car  ___ Save time by not having to take smoke breaks, buy cigarettes, or hunt for a light  ___ Have whiter teeth  Family Benefits  ___ Reduce my children s respiratory tract infections  ___ Set a good example for my children  ___ Reduce my family s cancer risk  Financial Benefits  ___ Save hundreds of dollars each year that would be spent on cigarettes  ___ Save money on medical bills  ___ Save on life, health, and car insurance premiums    Those Dollars Add Up!  Cigarettes are expensive, and getting more expensive all the time. Do you realize how much money you are spending on cigarettes per year? What is the average amount you spend on a pack of cigarettes? What is the average number of packs that you smoke per day? Using your answers to these questions, fill in this formula to help you find out:  ($ _____ per pack) ×  ( _____ number of packs per day) × (365 days) =  $ _____ yearly cost of smoking  Besides tobacco, there are other costs, including extra cleaning bills and replacement costs for clothing and furniture; medical expenses for smoking-related illnesses; and higher health, life, and car insurance premiums.    Cigars and Pipes Count Too!  Cigars and pipes are also dangerous. So are smokeless (chewing) tobacco and snuff. All of these products contain nicotine, a highly addictive substance that has harmful effects on your body. Quitting smoking means giving up all tobacco products.      8608-3400 [employer], [address] [facility], [place], [place]  "[address]. All rights reserved. This information is not intended as a substitute for professional medical care. Always follow your healthcare professional's instructions.          Follow-ups after your visit        Future tests that were ordered for you today     Open Future Orders        Priority Expected Expires Ordered    CT Head w/o Contrast Routine  8/8/2019 8/8/2018            Who to contact     If you have questions or need follow up information about today's clinic visit or your schedule please contact [facility] [place] directly at [phone].  Normal or non-critical lab and imaging results will be communicated to you by MyChart, letter or phone within 4 business days after the clinic has received the results. If you do not hear from us within 7 days, please contact the clinic through MyChart or phone. If you have a critical or abnormal lab result, we will notify you by phone as soon as possible.  Submit refill requests through [employer] or call your pharmacy and they will forward the refill request to us. Please allow 3 business days for your refill to be completed.          Additional Information About Your Visit        Care EveryWhere ID     This is your Care EveryWhere ID. This could be used by other organizations to access your [facility] medical records  [id number]        Your Vitals Were     Pulse Temperature Respirations Height Pulse Oximetry BMI (Body Mass Index)    86 99.9  F (37.7  C) (Tympanic) 16 5' 4\" (1.626 m) 97% 28.67 kg/m2       Blood Pressure from Last 3 Encounters:   08/08/18 121/80   08/08/18 112/62   06/28/18 122/80    Weight from Last 3 Encounters:   08/08/18 167 lb (75.8 kg)   06/28/18 159 lb (72.1 kg)   04/26/18 155 lb (70.3 kg)              We Performed the Following     Basic metabolic panel  (Ca, Cl, CO2, Creat, Gluc, K, Na, BUN)     CBC with platelets and differential     Tobacco Cessation - Order to Satisfy Health Maintenance     TSH with free T4 reflex        Primary Care " Provider Office Phone # Fax #    TOMER Jane [phone]       [address] [place] [address]        Equal Access to Services     DEE CAMPA : Lexis caitlin morales bario Soolivierali, waaxda luqadaha, qaybta kaalmada bismarkda, nikki gauthier laRossjosh nguyen. So [facility] [phone].    ATENCIÓN: Si habla español, tiene a alex disposición servicios gratuitos de asistencia lingüística. Llame al [phone].    We comply with applicable federal civil rights laws and [place] laws. We do not discriminate on the basis of race, color, national origin, age, disability, sex, sexual orientation, or gender identity.            Thank you!     Thank you for choosing [facility]  for your care. Our goal is always to provide you with excellent care. Hearing back from our patients is one way we can continue to improve our services. Please take a few minutes to complete the written survey that you may receive in the mail after your visit with us. Thank you!             Your Updated Medication List - Protect others around you: Learn how to safely use, store and throw away your medicines at www.disposemymeds.org.          This list is accurate as of 8/8/18  7:53 PM.  Always use your most recent med list.                   Brand Name Dispense Instructions for use Diagnosis    albuterol 108 (90 Base) MCG/ACT Inhaler    PROAIR HFA/PROVENTIL HFA/VENTOLIN HFA    1 Inhaler    Inhale 2 puffs into the lungs every 6 hours as needed for shortness of breath / dyspnea or wheezing        amLODIPine 10 MG tablet    NORVASC    30 tablet    Take 1 tablet (10 mg) by mouth daily    Atypical chest pain, Essential hypertension       IBUPROFEN PO      Take 200 mg by mouth Takes 2 tablets every 6 hours PRN tooth pain        metoprolol tartrate 25 MG tablet    LOPRESSOR    60 tablet    Take 1 tablet (25 mg) by mouth 2 times daily    Essential hypertension

## 2018-08-08 NOTE — ED AVS SNAPSHOT
[facility]    [address] [address]    Phone:  [phone]                                       Kimberley Louis   MRN: [id number]    Department[facility]  [facility]   Date of Visit:  8/8/2018           After Visit Summary Signature Page     I have received my discharge instructions, and my questions have been answered. I have discussed any challenges I see with this plan with the nurse or doctor.    ..........................................................................................................................................  Patient/Patient Representative Signature      ..........................................................................................................................................  Patient Representative Print Name and Relationship to Patient    ..................................................               ................................................  Date                                            Time    ..........................................................................................................................................  Reviewed by Signature/Title    ...................................................              ..............................................  Date                                                            Time

## 2018-08-08 NOTE — LETTER
My Asthma Action Plan  Name: Kimberley Louis   YOB: 1980  Date: 8/7/2018   My doctor: ZAHEER Cartagena CNP   My clinic: [facility] [place]        My Control Medicine: { :979248}  My Rescue Medicine: { :312627}  {AAP include Oral Steroid:194992} My Asthma Severity: { :247132}  Avoid your asthma triggers: { :817535}        {Is patient a child or adult?:218464}       GREEN ZONE   Good Control    I feel good    No cough or wheeze    Can work, sleep and play without asthma symptoms       Take your asthma control medicine every day.     1. If exercise triggers your asthma, take your rescue medication    15 minutes before exercise or sports, and    During exercise if you have asthma symptoms  2. Spacer to use with inhaler: If you have a spacer, make sure to use it with your inhaler             YELLOW ZONE Getting Worse  I have ANY of these:    I do not feel good    Cough or wheeze    Chest feels tight    Wake up at night   1. Keep taking your Green Zone medications  2. Start taking your rescue medicine:    every 20 minutes for up to 1 hour. Then every 4 hours for 24-48 hours.  3. If you stay in the Yellow Zone for more than 12-24 hours, contact your doctor.  4. If you do not return to the Green Zone in 12-24 hours or you get worse, start taking your oral steroid medicine if prescribed by your provider.           RED ZONE Medical Alert - Get Help  I have ANY of these:    I feel awful    Medicine is not helping    Breathing getting harder    Trouble walking or talking    Nose opens wide to breathe       1. Take your rescue medicine NOW  2. If your provider has prescribed an oral steroid medicine, start taking it NOW  3. Call your doctor NOW  4. If you are still in the Red Zone after 20 minutes and you have not reached your doctor:    Take your rescue medicine again and    Call 911 or go to the emergency room right away    See your regular doctor within 2 weeks of an Emergency Room or Urgent  Care visit for follow-up treatment.          Annual Reminders:  Meet with Asthma Educator,  Flu Shot in the Fall, consider Pneumonia Vaccination for patients with asthma (aged 19 and older).    Pharmacy:    [facility] - [place], [place] - [id number]   [id number] - [place], [place] - [address] AT [facility] OF  [address]                      Asthma Triggers  How To Control Things That Make Your Asthma Worse    Triggers are things that make your asthma worse.  Look at the list below to help you find your triggers and what you can do about them.  You can help prevent asthma flare-ups by staying away from your triggers.      Trigger                                                          What you can do   Cigarette Smoke  Tobacco smoke can make asthma worse. Do not allow smoking in your home, car or around you.  Be sure no one smokes at a child s day care or school.  If you smoke, ask your health care provider for ways to help you quit.  Ask family members to quit too.  Ask your health care provider for a referral to Quit Plan to help you quit smoking, or call 4-921-382-PLAN.     Colds, Flu, Bronchitis  These are common triggers of asthma. Wash your hands often.  Don t touch your eyes, nose or mouth.  Get a flu shot every year.     Dust Mites  These are tiny bugs that live in cloth or carpet. They are too small to see. Wash sheets and blankets in hot water every week.   Encase pillows and mattress in dust mite proof covers.  Avoid having carpet if you can. If you have carpet, vacuum weekly.   Use a dust mask and HEPA vacuum.   Pollen and Outdoor Mold  Some people are allergic to trees, grass, or weed pollen, or molds. Try to keep your windows closed.  Limit time out doors when pollen count is high.   Ask you health care provider about taking medicine during allergy season.     Animal Dander  Some people are allergic to skin flakes, urine or saliva from pets with fur or feathers. Keep pets with fur  or feathers out of your home.    If you can t keep the pet outdoors, then keep the pet out of your bedroom.  Keep the bedroom door closed.  Keep pets off cloth furniture and away from stuffed toys.     Mice, Rats, and Cockroaches  Some people are allergic to the waste from these pests.   Cover food and garbage.  Clean up spills and food crumbs.  Store grease in the refrigerator.   Keep food out of the bedroom.   Indoor Mold  This can be a trigger if your home has high moisture. Fix leaking faucets, pipes, or other sources of water.   Clean moldy surfaces.  Dehumidify basement if it is damp and smelly.   Smoke, Strong Odors, and Sprays  These can reduce air quality. Stay away from strong odors and sprays, such as perfume, powder, hair spray, paints, smoke incense, paint, cleaning products, candles and new carpet.   Exercise or Sports  Some people with asthma have this trigger. Be active!  Ask your doctor about taking medicine before sports or exercise to prevent symptoms.    Warm up for 5-10 minutes before and after sports or exercise.     Other Triggers of Asthma  Cold air:  Cover your nose and mouth with a scarf.  Sometimes laughing or crying can be a trigger.  Some medicines and food can trigger asthma.

## 2018-08-09 ENCOUNTER — TELEPHONE (OUTPATIENT)
Dept: FAMILY MEDICINE | Facility: OTHER | Age: 38
End: 2018-08-09

## 2018-08-09 DIAGNOSIS — J01.00 ACUTE NON-RECURRENT MAXILLARY SINUSITIS: Primary | ICD-10-CM

## 2018-08-09 RX ORDER — FLUCONAZOLE 150 MG/1
150 TABLET ORAL ONCE
Qty: 1 TABLET | Refills: 0 | Status: SHIPPED | OUTPATIENT
Start: 2018-08-09 | End: 2019-02-18

## 2018-08-09 NOTE — TELEPHONE ENCOUNTER
Prescription sent to [facility] for augmentin bid  For 10 days. If not improving she needs to be seen

## 2018-08-09 NOTE — DISCHARGE INSTRUCTIONS
* HEADACHE [unspecified]    The cause of your headache today is not clear, but it does not appear to be the sign of any serious illness.  Under stress, some people tense the muscles of their shoulder, neck and scalp without knowing it. If this condition lasts long enough, a TENSION HEADACHE can occur.  A MIGRAINE HEADACHE is caused by changes in blood flow to the brain. It can be mild or severe. A migraine attack may be triggered by emotional stress, hormone changes during the menstrual cycle, oral contraceptives, alcohol use, certain foods containing tyramine, eye strain, weather changes, missing meals, lack of sleep or oversleeping.  Other causes of headache include a viral illness, sinus, ear or throat infection, dental pain and TMJ (jaw joint) pain.  HOME CARE:      If you were given pain medicine for this headache, do not drive yourself home. Arrange for a ride, instead. When you get home, try to sleep. You should feel much better when you wake up.    If you are having nausea or vomiting, follow a light diet until your headache is relieved.    If you have a migraine type headache, use sunglasses when in the daylight or around bright indoor lighting until symptoms improve. Bright glaring light can worsen this kind of headache.  FOLLOW UP with your doctor if the headache is not better within the next 24 hours. If you have frequent headaches you should discuss a treatment plan with your primary care doctor. By being aware of the earliest signs of headache, and starting treatment right away, you may be able to stop the pain yourself.  GET PROMPT MEDICAL ATTENTION if any of the following occur:    Worsening of your head pain or no improvement within 24 hours    Repeated vomiting (unable to keep liquids down)    Fever over 101 F (38.3 C)    Stiff neck    Extreme drowsiness, confusion or fainting    Weakness of an arm or leg or one side of the face    Difficulty with speech or vision    0217-5824 [facility]  [employer]. [address] [place] [address], [place] [address]. All rights reserved. This information is not intended as a substitute for professional medical care. Always follow your healthcare professional's instructions.  This information has been modified by your health care provider with permission from the publisher.    Rest and stay hydrated.     I'm hoping that you will wake up tomorrow and be pain free.     Please follow-up in the clinic for persistent symptoms.     Please return here for ANY worsening symptoms, weakness, or other concerns.

## 2018-08-09 NOTE — TELEPHONE ENCOUNTER
Notified prescription done and needs follow up if not improving. Patient states needs Diflucan. Please advise.

## 2018-08-09 NOTE — TELEPHONE ENCOUNTER
9:09 AM    Reason for Call: Phone Call    Description: Pt called and states that she would like to see if she could get a call back regarding her coming in the UC yesterday and than having a sinus infection and would like to see if she could get something for this.    Was an appointment offered for this call? No  If yes : Appointment type              Date    Preferred method for responding to this message: Telephone Call  What is your phone number ?[phone]    If we cannot reach you directly, may we leave a detailed response at the number you provided? Yes    Can this message wait until your PCP/provider returns, if available today? No, pcp is out     Traci Villasenor

## 2018-08-09 NOTE — ED NOTES
Discharge instructions reviewed with patient. encouraged to return with new or worsening symptoms. No questions or concerns. Ambulated out of ED without incident.

## 2018-08-09 NOTE — ED PROVIDER NOTES
History     Chief Complaint   Patient presents with     Headache     The history is provided by the patient.     Kimberley Louis is a 38 year old female who presented to the emergency department ambulatory from the clinic for evaluation of approximately 7 weeks of a persistent headache.  Kimberley tells me that the headache is been present daily and waxes and wanes with exertion as well as coughing.  Headache is described as on the top of her head with some radiation to the frontal and occipital areas.  Sharp and pulsatile.  She does have some fleeting visual disturbances in the mornings.  Denies any recent fevers.  Denies any neck stiffness.  Denies any falls.  Denies any blood thinner usage.  Denies any recent travel.  Denies any tick bites.  Laboratory evaluation in the clinic was normal.     Problem List:    Patient Active Problem List    Diagnosis Date Noted     Left flank tenderness 08/03/2018     Priority: Medium     Personal history of tobacco use, presenting hazards to health 08/03/2018     Priority: Medium     Recurrent UTI 08/03/2018     Priority: Medium     Incomplete right bundle branch block 02/05/2018     Priority: Medium     Pure hypercholesterolemia 01/03/2018     Priority: Medium     Tobacco abuse counseling 01/03/2018     Priority: Medium     Tobacco abuse 01/03/2018     Priority: Medium     Palpitations 01/03/2018     Priority: Medium     Atypical chest pain 01/03/2018     Priority: Medium     Dyspnea on exertion 01/03/2018     Priority: Medium     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  Kimberley Louis has no plan or code status on file. Discussed available resources and provided with information. Confirmed code status reflects current choices  pending further ACP discussions.  Confirmed/documented legally designated decision makers.  Added by SAYDA CABRERA             ACP (advance care planning) 04/13/2016     Priority: Medium     Advance Care Planning 4/13/2016: ACP Review of Chart / Resources Provided:  Reviewed chart for advance care plan.  Kimberley Louis has no plan or code status on file. Discussed available resources and provided with information. Confirmed code status reflects current choices pending further ACP discussions.  Confirmed/documented legally designated decision makers.  Added by Sandra Anne             Kidney stone on left side 12/21/2015     Priority: Medium     Nephrocalcinosis 12/21/2015     Priority: Medium     Essential hypertension 11/19/2015     Priority: Medium     H/O renal calculi 11/19/2015     Priority: Medium     Enlarged kidney 11/19/2015     Priority: Medium        Past Medical History:    Past Medical History:   Diagnosis Date     Enlarged kidney 11/19/2015     Essential hypertension 11/19/2015     H/O renal calculi 11/19/2015     H/O renal calculi 11/19/2015       Past Surgical History:    Past Surgical History:   Procedure Laterality Date     AS REMOVAL OF KIDNEY STONE       AS REMOVAL OF KIDNEY STONE       C REMOVAL OF KIDNEY STONE       TUBAL LIGATION         Family History:    Family History   Problem Relation Age of Onset     Diabetes Mother      Emphysema Mother      Cervical Cancer Mother      Diabetes Father      Hypertension Father      Hypertension Maternal Grandmother      Diabetes Maternal Grandmother      Schizophrenia Maternal Grandmother      Unknown/Adopted Maternal Grandfather      Lung Cancer Paternal Grandmother      Diabetes Paternal Grandmother      Unknown/Adopted Paternal Grandfather        Social History:  Marital Status:  Single [1]  Social History   Substance Use Topics     Smoking status: Current Every Day Smoker     Packs/day: 0.75     Years: 28.00     Types: Cigarettes     Start  date: 1/1/1989     Smokeless tobacco: Never Used     Alcohol use Yes      Comment: occasional        Medications:      albuterol (PROAIR HFA/PROVENTIL HFA/VENTOLIN HFA) 108 (90 Base) MCG/ACT Inhaler   amLODIPine (NORVASC) 10 MG tablet   IBUPROFEN PO   metoprolol tartrate (LOPRESSOR) 25 MG tablet         Review of Systems   Constitutional: Negative for activity change, appetite change, chills, fatigue and fever.   Respiratory: Negative for chest tightness and shortness of breath.    Cardiovascular: Negative for chest pain.   Gastrointestinal: Negative for abdominal pain, nausea and vomiting.   Genitourinary: Negative.    Musculoskeletal: Negative for neck pain and neck stiffness.   Skin: Negative.    Neurological: Positive for headaches. Negative for dizziness and light-headedness.       Physical Exam   BP: (!) 169/101  Pulse: 70  Heart Rate: 70  Temp: 99.3  F (37.4  C)  Resp: 16  SpO2: 99 %      Physical Exam   Constitutional: She is oriented to person, place, and time. She appears well-developed and well-nourished.   Cardiovascular: Normal rate and regular rhythm.    Pulmonary/Chest: Effort normal.   Abdominal: Soft. There is no tenderness.   Neurological: She is alert and oriented to person, place, and time.   Neurological examination:  That the patient was awake and alert, the attention, orientation, concentration, language, memory and fund of knowledge were all normal.  The patient had no neglect or apraxia.    Cranial nerve examination: revealed that for cranial nerve   II: the pupils were reactive and the visual field were full  III, IV, and VI, the extraocular movements were full.    VII: facial movements are symmetric  VIII: hearing intact to voice  IX & X: the soft palate rises symmetrically   XI: shoulder movements are symmetric  XII: tongue is midline    Normal speech  Normal gait    Skin: Skin is warm and dry.   Psychiatric: She has a normal mood and affect.   Nursing note and vitals reviewed.      ED  Course     ED Course     Procedures               Critical Care time:  none               Results for orders placed or performed in visit on 08/08/18 (from the past 24 hour(s))   XR CERVICAL SPINE G/E 4 VIEWS (Clinic Performed)    Narrative    Procedure: XR CERVICAL SPINE G/E 4 VW    HISTORY:  ; Nonintractable episodic headache, unspecified headache  type.     TECHNIQUE: Cervical spine 5 views.    COMPARISON: None.    FINDINGS:    There is normal alignment.  Vertebral body heights are maintained.   Disc spaces are maintained. There are mild degenerative endplate  changes at several levels.      Impression    IMPRESSION: Mild degenerative disease.      BENNETT NÑIO MD       Medications   0.9% sodium chloride BOLUS (0 mLs Intravenous Stopped 8/8/18 1909)     Followed by   sodium chloride 0.9% infusion (not administered)   ketorolac (TORADOL) injection 30 mg (30 mg Intravenous Given 8/8/18 1807)   prochlorperazine (COMPAZINE) injection 10 mg (10 mg Intravenous Given 8/8/18 1810)   diphenhydrAMINE (BENADRYL) injection 25 mg (25 mg Intravenous Given 8/8/18 1812)   dexamethasone PF (DECADRON) injection 10 mg (10 mg Intravenous Given 8/8/18 1808)       Assessments & Plan (with Medical Decision Making)   CT head negative.  Feels significantly improved after fluids and migraine medications.  Pain 2/10 scale.  She would like to go home and this is certainly reasonable.  Follow-up in the clinic for persistent symptoms.  Return here for ANY worsening symptoms or other concerns whatsoever.  History of present illness, exam, and workup is not consistent with subarachnoid hemorrhage, subdural hematoma, meningitis, encephalitis, central venous thrombosis, temporal arteritis, carotid or vertebral dissection, or other intracranial catastrophe.  Kimberley voiced complete understanding and was happy and agreeable.  She was found playing on her cell phone on recheck, pleasant and talkative.      I have reviewed the nursing notes.    I  have reviewed the findings, diagnosis, plan and need for follow up with the patient.          New Prescriptions    No medications on file       Final diagnoses:   Intractable headache, unspecified chronicity pattern, unspecified headache type       8/8/2018   [facility]     Ruth Dixon PA-C  08/08/18 [id number]

## 2018-08-10 ASSESSMENT — ANXIETY QUESTIONNAIRES: GAD7 TOTAL SCORE: 0

## 2018-08-10 ASSESSMENT — PATIENT HEALTH QUESTIONNAIRE - PHQ9: SUM OF ALL RESPONSES TO PHQ QUESTIONS 1-9: 0

## 2018-08-10 ASSESSMENT — ASTHMA QUESTIONNAIRES: ACT_TOTALSCORE: 20

## 2018-08-14 ENCOUNTER — TELEPHONE (OUTPATIENT)
Dept: FAMILY MEDICINE | Facility: OTHER | Age: 38
End: 2018-08-14

## 2018-08-14 NOTE — TELEPHONE ENCOUNTER
Pt saw  for her headaches on 8/8/18, got sent to ED for further testing. After being seen and treated in the ED she had relief from 8/8/18 evening until 8/11/18 evening. Headaches slowly started to come back and is now as bad as it was before treated in ED. She was also prescribed Augmentin 875-125 mg BID and stated that there has been no improvement. Patient wondering what to do next.   Fariha Zaldivar LPN

## 2018-08-14 NOTE — TELEPHONE ENCOUNTER
Spoke to patient, would like an in clinic appointment. Transferred to scheduling.   Fariha Zaldivar LPN

## 2018-08-14 NOTE — TELEPHONE ENCOUNTER
8:11 AM    Reason for Call: Phone Call    Description: state headache came back and is worse that ever.  Please call back to discuss options/next steps    Was an appointment offered for this call? No  If yes : Appointment type              Date    Preferred method for responding to this message: Telephone Call  What is your phone number ? [phone]    If we cannot reach you directly, may we leave a detailed response at the number you provided? Yes    Can this message wait until your PCP/provider returns, if available today? No, provider is out but call back today if possible    Nelia Mccullough
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
## 2018-08-21 ENCOUNTER — OFFICE VISIT (OUTPATIENT)
Dept: FAMILY MEDICINE | Facility: OTHER | Age: 38
End: 2018-08-21
Attending: NURSE PRACTITIONER
Payer: COMMERCIAL

## 2018-08-21 VITALS
SYSTOLIC BLOOD PRESSURE: 113 MMHG | HEIGHT: 64 IN | DIASTOLIC BLOOD PRESSURE: 81 MMHG | TEMPERATURE: 98 F | HEART RATE: 80 BPM | WEIGHT: 163 LBS | OXYGEN SATURATION: 97 % | BODY MASS INDEX: 27.83 KG/M2

## 2018-08-21 DIAGNOSIS — L20.9 ATOPIC DERMATITIS, UNSPECIFIED TYPE: ICD-10-CM

## 2018-08-21 DIAGNOSIS — J01.01 ACUTE RECURRENT MAXILLARY SINUSITIS: Primary | ICD-10-CM

## 2018-08-21 PROCEDURE — 99213 OFFICE O/P EST LOW 20 MIN: CPT | Performed by: NURSE PRACTITIONER

## 2018-08-21 PROCEDURE — G0463 HOSPITAL OUTPT CLINIC VISIT: HCPCS

## 2018-08-21 RX ORDER — FLUTICASONE PROPIONATE 50 MCG
1-2 SPRAY, SUSPENSION (ML) NASAL DAILY
Qty: 1 BOTTLE | Refills: 11 | Status: SHIPPED | OUTPATIENT
Start: 2018-08-21 | End: 2018-12-17

## 2018-08-21 ASSESSMENT — ANXIETY QUESTIONNAIRES
5. BEING SO RESTLESS THAT IT IS HARD TO SIT STILL: NOT AT ALL
6. BECOMING EASILY ANNOYED OR IRRITABLE: NOT AT ALL
4. TROUBLE RELAXING: NOT AT ALL
2. NOT BEING ABLE TO STOP OR CONTROL WORRYING: NOT AT ALL
GAD7 TOTAL SCORE: 0
7. FEELING AFRAID AS IF SOMETHING AWFUL MIGHT HAPPEN: NOT AT ALL
1. FEELING NERVOUS, ANXIOUS, OR ON EDGE: NOT AT ALL
3. WORRYING TOO MUCH ABOUT DIFFERENT THINGS: NOT AT ALL

## 2018-08-21 ASSESSMENT — PAIN SCALES - GENERAL: PAINLEVEL: MODERATE PAIN (4)

## 2018-08-21 NOTE — NURSING NOTE
"Chief Complaint   Patient presents with     FU After ER Visit       Initial /81 (BP Location: Right arm, Patient Position: Chair, Cuff Size: Adult Large)  Pulse 80  Temp 98  F (36.7  C) (Tympanic)  Ht 5' 4\" (1.626 m)  Wt 163 lb (73.9 kg)  SpO2 97%  BMI 27.98 kg/m2 Estimated body mass index is 27.98 kg/(m^2) as calculated from the following:    Height as of this encounter: 5' 4\" (1.626 m).    Weight as of this encounter: 163 lb (73.9 kg).  Medication Reconciliation: complete    Liliana Freire LPN  "

## 2018-08-21 NOTE — MR AVS SNAPSHOT
After Visit Summary   8/21/2018    Kimberley Louis    MRN: [id number]           Patient Information     Date Of Birth          1980        Visit Information        Provider Department      8/21/2018 8:20 AM Marina Barrientos APRN [facility]        Today's Diagnoses     Acute recurrent maxillary sinusitis    -  1      Care Instructions      Self-Care for Sinusitis     Drinking plenty of water can help sinuses drain.   Sinusitis can often be managed with self-care. Self-care can keep sinuses moist and make you feel more comfortable. Remember to follow your doctor's instructions closely. This can make a big difference in getting your sinus problem under control.  Drink fluids  Drinking extra fluids helps thin your mucus. This lets it drain from your sinuses more easily. Have a glass of water every hour or two. A humidifier helps in much the same way. Fluids can also offset the drying effects of certain medicines. If you use a humidifier, follow the product maker's instructions on how to use it. Clean it on a regular schedule.  Use saltwater rinses  Rinses help keep your sinuses and nose moist. Mix a teaspoon of salt in 8 ounces of fresh, warm water. Use a bulb syringe to gently squirt the water into your nose a few times a day. You can also buy ready-made saline nasal sprays.  Apply hot or cold packs  Applying heat to the area surrounding your sinuses may make you feel more comfortable. Use a hot water bottle or a hand towel dipped in hot water. Some people also find ice packs effective for relieving pain.  Medicines  Your doctor may prescribe medications to help treat your sinusitis. If you have an infection, antibiotics can help clear it up. If you are prescribed antibiotics, take all pills on schedule until they are gone, even if you feel better. Decongestants help relieve swelling. Use decongestant sprays for short periods only under the direction of your doctor. If you  "have allergies, your doctor may prescribe medications to help relieve them.   Date Last Reviewed: 10/1/2016    3249-1434 [employer]. [address] [place] [address], [place] [address]. All rights reserved. This information is not intended as a substitute for professional medical care. Always follow your healthcare professional's instructions.    Try a daily antihistamine morning and night for the next week. Once symptoms are more controlled you can decrease to once a day.             Follow-ups after your visit        Follow-up notes from your care team     Return if symptoms worsen or fail to improve.      Who to contact     If you have questions or need follow up information about today's clinic visit or your schedule please contact [facility] directly at [phone].  Normal or non-critical lab and imaging results will be communicated to you by MyChart, letter or phone within 4 business days after the clinic has received the results. If you do not hear from us within 7 days, please contact the clinic through MyChart or phone. If you have a critical or abnormal lab result, we will notify you by phone as soon as possible.  Submit refill requests through [employer] or call your pharmacy and they will forward the refill request to us. Please allow 3 business days for your refill to be completed.          Additional Information About Your Visit        Care EveryWhere ID     This is your Care EveryWhere ID. This could be used by other organizations to access your [facility] medical records  [id number]        Your Vitals Were     Pulse Temperature Height Pulse Oximetry BMI (Body Mass Index)       80 98  F (36.7  C) (Tympanic) 5' 4\" (1.626 m) 97% 27.98 kg/m2        Blood Pressure from Last 3 Encounters:   08/21/18 113/81   08/08/18 121/80   08/08/18 112/62    Weight from Last 3 Encounters:   08/21/18 163 lb (73.9 kg)   08/08/18 167 lb (75.8 kg)   06/28/18 159 lb (72.1 kg)              Today, you had the " following     No orders found for display         Today's Medication Changes          These changes are accurate as of 8/21/18  8:30 AM.  If you have any questions, ask your nurse or doctor.               Start taking these medicines.        Dose/Directions    amoxicillin-clavulanate 875-125 MG per tablet   Commonly known as:  AUGMENTIN   Used for:  Acute recurrent maxillary sinusitis   Started by:  Marina Barrientos APRN CNP        Dose:  1 tablet   Take 1 tablet by mouth 2 times daily for 7 days   Quantity:  14 tablet   Refills:  0       fluticasone 50 MCG/ACT spray   Commonly known as:  FLONASE   Used for:  Acute recurrent maxillary sinusitis   Started by:  Marina Barrientos APRN CNP        Dose:  1-2 spray   Spray 1-2 sprays into both nostrils daily   Quantity:  1 Bottle   Refills:  11            Where to get your medicines      These medications were sent to [facility] - [place], [place] - [address] AT [facility] [address]  [address], [facility] [address]     Phone:  [phone]     amoxicillin-clavulanate 875-125 MG per tablet    fluticasone 50 MCG/ACT spray                Primary Care Provider Office Phone # Fax #    TOMER Jane [phone]       [address] [place] [address]        Equal Access to Services     [facility] AH: Hadii caitlin ku hadasho Soomaali, waaxda luqadaha, qaybta kaalmada adeegyada, waxay chante manning . So [facility] [phone].    ATENCIÓN: Si habla español, tiene a alex disposición servicios gratuitos de asistencia lingüística. ame al [phone].    We comply with applicable federal civil rights laws and [place] laws. We do not discriminate on the basis of race, color, national origin, age, disability, sex, sexual orientation, or gender identity.            Thank you!     Thank you for choosing [facility]  for your care. Our goal is always to provide you with excellent care.  Hearing back from our patients is one way we can continue to improve our services. Please take a few minutes to complete the written survey that you may receive in the mail after your visit with us. Thank you!             Your Updated Medication List - Protect others around you: Learn how to safely use, store and throw away your medicines at www.disposemymeds.org.          This list is accurate as of 8/21/18  8:30 AM.  Always use your most recent med list.                   Brand Name Dispense Instructions for use Diagnosis    albuterol 108 (90 Base) MCG/ACT inhaler    PROAIR HFA/PROVENTIL HFA/VENTOLIN HFA    1 Inhaler    Inhale 2 puffs into the lungs every 6 hours as needed for shortness of breath / dyspnea or wheezing        amLODIPine 10 MG tablet    NORVASC    30 tablet    Take 1 tablet (10 mg) by mouth daily    Atypical chest pain, Essential hypertension       amoxicillin-clavulanate 875-125 MG per tablet    AUGMENTIN    14 tablet    Take 1 tablet by mouth 2 times daily for 7 days    Acute recurrent maxillary sinusitis       fluticasone 50 MCG/ACT spray    FLONASE    1 Bottle    Spray 1-2 sprays into both nostrils daily    Acute recurrent maxillary sinusitis       IBUPROFEN PO      Take 200 mg by mouth Takes 2 tablets every 6 hours PRN tooth pain        metoprolol tartrate 25 MG tablet    LOPRESSOR    60 tablet    Take 1 tablet (25 mg) by mouth 2 times daily    Essential hypertension

## 2018-08-21 NOTE — PATIENT INSTRUCTIONS
Self-Care for Sinusitis     Drinking plenty of water can help sinuses drain.   Sinusitis can often be managed with self-care. Self-care can keep sinuses moist and make you feel more comfortable. Remember to follow your doctor's instructions closely. This can make a big difference in getting your sinus problem under control.  Drink fluids  Drinking extra fluids helps thin your mucus. This lets it drain from your sinuses more easily. Have a glass of water every hour or two. A humidifier helps in much the same way. Fluids can also offset the drying effects of certain medicines. If you use a humidifier, follow the product maker's instructions on how to use it. Clean it on a regular schedule.  Use saltwater rinses  Rinses help keep your sinuses and nose moist. Mix a teaspoon of salt in 8 ounces of fresh, warm water. Use a bulb syringe to gently squirt the water into your nose a few times a day. You can also buy ready-made saline nasal sprays.  Apply hot or cold packs  Applying heat to the area surrounding your sinuses may make you feel more comfortable. Use a hot water bottle or a hand towel dipped in hot water. Some people also find ice packs effective for relieving pain.  Medicines  Your doctor may prescribe medications to help treat your sinusitis. If you have an infection, antibiotics can help clear it up. If you are prescribed antibiotics, take all pills on schedule until they are gone, even if you feel better. Decongestants help relieve swelling. Use decongestant sprays for short periods only under the direction of your doctor. If you have allergies, your doctor may prescribe medications to help relieve them.   Date Last Reviewed: 10/1/2016    0537-9498 [employer]. [address] [place] [address]. All rights reserved. This information is not intended as a substitute for professional medical care. Always follow your healthcare professional's instructions.    Try a daily antihistamine  morning and night for the next week. Once symptoms are more controlled you can decrease to once a day.

## 2018-08-22 ASSESSMENT — PATIENT HEALTH QUESTIONNAIRE - PHQ9: SUM OF ALL RESPONSES TO PHQ QUESTIONS 1-9: 0

## 2018-08-22 ASSESSMENT — ANXIETY QUESTIONNAIRES: GAD7 TOTAL SCORE: 0

## 2018-10-08 DIAGNOSIS — J20.8 ACUTE BRONCHITIS DUE TO OTHER SPECIFIED ORGANISMS: Primary | ICD-10-CM

## 2018-10-08 NOTE — TELEPHONE ENCOUNTER
albuterol (PROAIR HFA/PROVENTIL HFA/VENTOLIN HFA) 108 (90 Base) MCG/ACT Inhaler        Last Written Prescription Date:  4/26/18  Last Fill Quantity: 1 inhaler ,   # refills: 0  Last Office Visit: 8/21/18  Future Office visit:

## 2018-10-11 RX ORDER — ALBUTEROL SULFATE 90 UG/1
2 AEROSOL, METERED RESPIRATORY (INHALATION) EVERY 6 HOURS PRN
Qty: 1 INHALER | Refills: 0 | Status: SHIPPED | OUTPATIENT
Start: 2018-10-11 | End: 2019-02-18

## 2018-12-17 ENCOUNTER — HOSPITAL ENCOUNTER (EMERGENCY)
Facility: HOSPITAL | Age: 38
Discharge: HOME | End: 2018-12-17
Attending: FAMILY MEDICINE | Admitting: FAMILY MEDICINE
Payer: COMMERCIAL

## 2018-12-17 VITALS — OXYGEN SATURATION: 99 % | TEMPERATURE: 98.5 F | RESPIRATION RATE: 16 BRPM

## 2018-12-17 DIAGNOSIS — J02.9 ACUTE PHARYNGITIS, UNSPECIFIED ETIOLOGY: ICD-10-CM

## 2018-12-17 LAB
DEPRECATED S PYO AG THROAT QL EIA: NORMAL
SPECIMEN SOURCE: NORMAL

## 2018-12-17 PROCEDURE — 87880 STREP A ASSAY W/OPTIC: CPT | Performed by: FAMILY MEDICINE

## 2018-12-17 PROCEDURE — 99212 OFFICE O/P EST SF 10 MIN: CPT | Performed by: FAMILY MEDICINE

## 2018-12-17 PROCEDURE — G0463 HOSPITAL OUTPT CLINIC VISIT: HCPCS

## 2018-12-17 PROCEDURE — 87081 CULTURE SCREEN ONLY: CPT | Performed by: FAMILY MEDICINE

## 2018-12-17 NOTE — ED AVS SNAPSHOT
[facility]  [address] [address]  Phone:  [phone]                                    Kimberley Louis   MRN: [id number]    Department:  [facility]   Date of Visit:  12/17/2018           After Visit Summary Signature Page    I have received my discharge instructions, and my questions have been answered. I have discussed any challenges I see with this plan with the nurse or doctor.    ..........................................................................................................................................  Patient/Patient Representative Signature      ..........................................................................................................................................  Patient Representative Print Name and Relationship to Patient    ..................................................               ................................................  Date                                   Time    ..........................................................................................................................................  Reviewed by Signature/Title    ...................................................              ..............................................  Date                                               Time          22EPIC Rev 08/18

## 2018-12-17 NOTE — LETTER
December 17, 2018      To Whom It May Concern:      Kimberley Louis was seen in our Emergency Department today, 12/17/18.  I expect her condition to improve over the next 3 days.  She may return to work/school when improved.    Sincerely,        More Azul MD

## 2018-12-17 NOTE — ED TRIAGE NOTES
Pt presents today with c/o sore throat headache, chest pains from coughing, states it feel like something stuck in throat. Started yesterday. Loss of voice. Rates pain at 6.

## 2018-12-18 NOTE — ED PROVIDER NOTES
eMERGENCY dEPARTMENT eNCOUnter        CHIEF COMPLAINT    Chief Complaint   Patient presents with     Cough     URI     Pharyngitis       HPI    Kimberley Louis is a 38 year old female who presents with cough, sore throat for the last couple of days.      REVIEW OF SYSTEMS    Cardiac: No palpitations, No syncope  Respiratory:no SOB  Neurologic: No syncope or LOC  GI: No Vomiting, No Diarrhea  General: No Fever, No Chills  All other systems reviewed and are negative.    PAST MEDICAL & SURGICAL HISTORY    Past Medical History:   Diagnosis Date     Enlarged kidney 11/19/2015     Essential hypertension 11/19/2015     H/O renal calculi 11/19/2015     H/O renal calculi 11/19/2015     Past Surgical History:   Procedure Laterality Date     AS REMOVAL OF KIDNEY STONE       AS REMOVAL OF KIDNEY STONE       C REMOVAL OF KIDNEY STONE       TUBAL LIGATION         CURRENT MEDICATIONS    Current Outpatient Rx   Medication Sig Dispense Refill     amLODIPine (NORVASC) 10 MG tablet Take 1 tablet (10 mg) by mouth daily 30 tablet 11     IBUPROFEN PO Take 200 mg by mouth Takes 2 tablets every 6 hours PRN tooth pain       metoprolol tartrate (LOPRESSOR) 25 MG tablet Take 1 tablet (25 mg) by mouth 2 times daily 60 tablet 11     albuterol (PROAIR HFA/PROVENTIL HFA/VENTOLIN HFA) 108 (90 Base) MCG/ACT inhaler Inhale 2 puffs into the lungs every 6 hours as needed for shortness of breath / dyspnea or wheezing 1 Inhaler 0       ALLERGIES    No Known Allergies    SOCIAL & FAMILY HISTORY    Social History     Socioeconomic History     Marital status: Single     Spouse name: Not on file     Number of children: Not on file     Years of education: Not on file     Highest education level: Not on file   Social Needs     Financial resource strain: Not on file     Food insecurity - worry: Not on file     Food insecurity - inability: Not on file     Transportation needs - medical: Not on file     Transportation needs - non-medical: Not on file    Occupational History     Not on file   Tobacco Use     Smoking status: Current Every Day Smoker     Packs/day: 0.75     Years: 28.00     Pack years: 21.00     Types: Cigarettes     Start date: 1/1/1989     Smokeless tobacco: Never Used   Substance and Sexual Activity     Alcohol use: Yes     Comment: occasional     Drug use: No     Sexual activity: Not on file   Other Topics Concern     Parent/sibling w/ CABG, MI or angioplasty before 65F 55M? No   Social History Narrative    ** Merged History Encounter **          Family History   Problem Relation Age of Onset     Diabetes Mother      Emphysema Mother      Cervical Cancer Mother      Diabetes Father      Hypertension Father      Hypertension Maternal Grandmother      Diabetes Maternal Grandmother      Schizophrenia Maternal Grandmother      Unknown/Adopted Maternal Grandfather      Lung Cancer Paternal Grandmother      Diabetes Paternal Grandmother      Unknown/Adopted Paternal Grandfather        PHYSICAL EXAM    VITAL SIGNS: Temp 98.5  F (36.9  C) (Tympanic)   Resp 16   SpO2 99%    Constitutional:  Well developed, well nourished, no acute distress   HENT:  Atraumatic, moist mucus membranes  Neck: supple, no JVD   Respiratory:  Lungs clear, no retractions   Cardiovascular:  regular rate, no murmurs  GI:  Soft, nontender, normal bowel sounds  Musculoskeletal:  No edema, no acute deformities  Integument:  Skin is warm and dry, no petechiae   Neurologic:  Alert & oriented, no slurred speech  Psych: Pleasant affect, no hallucinations      ED COURSE & MEDICAL DECISION MAKING    Pertinent Labs & Imaging studies reviewed and interpreted. (See chart for details)    See chart for details of medications given during the ED stay.    Vitals:    12/17/18 0930   Resp: 16   Temp: 98.5  F (36.9  C)   TempSrc: Tympanic   SpO2: 99%           FINAL IMPRESSION    1. Acute pharyngitis, unspecified etiology Acute       PLAN  Strep is negative.  Recommend symptomatic care, follow up  as needed.     More Azul MD  12/17/18 [id number]

## 2018-12-19 LAB
BACTERIA SPEC CULT: NORMAL
SPECIMEN SOURCE: NORMAL

## 2019-02-07 DIAGNOSIS — R07.89 ATYPICAL CHEST PAIN: ICD-10-CM

## 2019-02-07 DIAGNOSIS — I10 ESSENTIAL HYPERTENSION: ICD-10-CM

## 2019-02-07 NOTE — TELEPHONE ENCOUNTER
amLODIPine (NORVASC) 10 MG tablet  Last Written Prescription Date:  2/5/18  Last Fill Quantity: 30,   # refills: 11  Last Office Visit: 8/21/18    metoprolol       Last Written Prescription Date:  2/5/18  Last Fill Quantity: 60,   # refills: 11  Last Office Visit: 8/21/18  Future Office visit:

## 2019-02-08 RX ORDER — METOPROLOL TARTRATE 25 MG/1
25 TABLET, FILM COATED ORAL 2 TIMES DAILY
Qty: 60 TABLET | Refills: 5 | Status: SHIPPED | OUTPATIENT
Start: 2019-02-08 | End: 2019-09-06

## 2019-02-08 RX ORDER — AMLODIPINE BESYLATE 10 MG/1
10 TABLET ORAL DAILY
Qty: 30 TABLET | Refills: 5 | Status: SHIPPED | OUTPATIENT
Start: 2019-02-08 | End: 2019-09-06

## 2019-02-18 ENCOUNTER — HOSPITAL ENCOUNTER (EMERGENCY)
Facility: HOSPITAL | Age: 39
Discharge: HOME | End: 2019-02-18
Attending: PHYSICIAN ASSISTANT | Admitting: PHYSICIAN ASSISTANT
Payer: COMMERCIAL

## 2019-02-18 VITALS
SYSTOLIC BLOOD PRESSURE: 132 MMHG | HEART RATE: 86 BPM | TEMPERATURE: 98.7 F | OXYGEN SATURATION: 99 % | DIASTOLIC BLOOD PRESSURE: 94 MMHG | WEIGHT: 165 LBS | BODY MASS INDEX: 28.32 KG/M2 | RESPIRATION RATE: 18 BRPM

## 2019-02-18 DIAGNOSIS — H92.02 LEFT EAR PAIN: ICD-10-CM

## 2019-02-18 DIAGNOSIS — J20.9 ACUTE BRONCHITIS: ICD-10-CM

## 2019-02-18 DIAGNOSIS — Z13.9 SCREENING FOR CONDITION: ICD-10-CM

## 2019-02-18 DIAGNOSIS — J01.00 ACUTE MAXILLARY SINUSITIS, RECURRENCE NOT SPECIFIED: ICD-10-CM

## 2019-02-18 DIAGNOSIS — J02.9 SORE THROAT: ICD-10-CM

## 2019-02-18 PROCEDURE — G0463 HOSPITAL OUTPT CLINIC VISIT: HCPCS

## 2019-02-18 PROCEDURE — 99213 OFFICE O/P EST LOW 20 MIN: CPT | Performed by: PHYSICIAN ASSISTANT

## 2019-02-18 RX ORDER — PREDNISONE 20 MG/1
TABLET ORAL
Qty: 10 TABLET | Refills: 0 | Status: SHIPPED | OUTPATIENT
Start: 2019-02-18 | End: 2019-04-05

## 2019-02-18 RX ORDER — CEFDINIR 300 MG/1
300 CAPSULE ORAL 2 TIMES DAILY
Qty: 20 CAPSULE | Refills: 0 | Status: SHIPPED | OUTPATIENT
Start: 2019-02-18 | End: 2019-04-05

## 2019-02-18 RX ORDER — CLOTRIMAZOLE 1 %
1 CREAM WITH APPLICATOR VAGINAL DAILY
Qty: 45 G | Refills: 0 | Status: SHIPPED | OUTPATIENT
Start: 2019-02-18 | End: 2019-04-05

## 2019-02-18 ASSESSMENT — ENCOUNTER SYMPTOMS
APPETITE CHANGE: 0
DIARRHEA: 0
VOICE CHANGE: 0
NECK STIFFNESS: 0
TROUBLE SWALLOWING: 0
NECK PAIN: 0
LIGHT-HEADEDNESS: 0
PSYCHIATRIC NEGATIVE: 1
CARDIOVASCULAR NEGATIVE: 1
NAUSEA: 0
EYE DISCHARGE: 0
COUGH: 1
VOMITING: 0
ABDOMINAL PAIN: 0
EYE REDNESS: 0
DIZZINESS: 0

## 2019-02-18 NOTE — ED PROVIDER NOTES
History     Chief Complaint   Patient presents with     URI     The history is provided by the patient. No  was used.     Kimberley Louis is a 39 year old female who has 4 days of cough/congestion, sinus pain/pressure with headache, sore throat, decreased energy and left ear pain. No n/v/d. No rash. No change in b/b habits    Allergies:  No Known Allergies    Problem List:    Patient Active Problem List    Diagnosis Date Noted     Left flank tenderness 08/03/2018     Priority: Medium     Personal history of tobacco use, presenting hazards to health 08/03/2018     Priority: Medium     Recurrent UTI 08/03/2018     Priority: Medium     Incomplete right bundle branch block 02/05/2018     Priority: Medium     Pure hypercholesterolemia 01/03/2018     Priority: Medium     Tobacco abuse counseling 01/03/2018     Priority: Medium     Tobacco abuse 01/03/2018     Priority: Medium     Palpitations 01/03/2018     Priority: Medium     Atypical chest pain 01/03/2018     Priority: Medium     Dyspnea on exertion 01/03/2018     Priority: Medium     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  Kimberley Louis has no plan or code status on file. Discussed available resources and provided with information. Confirmed code status reflects current choices pending further ACP discussions.  Confirmed/documented legally designated decision makers.  Added by SAYDA CABRERA             ACP (advance care planning) 04/13/2016     Priority: Medium     Advance Care Planning 4/13/2016: ACP Review of Chart / Resources Provided:  Reviewed chart for advance care plan.  Kimberley Louis has no plan or code status on file. Discussed available resources and provided with information.  Confirmed code status reflects current choices pending further ACP discussions.  Confirmed/documented legally designated decision makers.  Added by Sandra Anne             Kidney stone on left side 12/21/2015     Priority: Medium     Nephrocalcinosis 12/21/2015     Priority: Medium     Essential hypertension 11/19/2015     Priority: Medium     H/O renal calculi 11/19/2015     Priority: Medium     Enlarged kidney 11/19/2015     Priority: Medium        Past Medical History:    Past Medical History:   Diagnosis Date     Enlarged kidney 11/19/2015     Essential hypertension 11/19/2015     H/O renal calculi 11/19/2015     H/O renal calculi 11/19/2015       Past Surgical History:    Past Surgical History:   Procedure Laterality Date     AS REMOVAL OF KIDNEY STONE       AS REMOVAL OF KIDNEY STONE       C REMOVAL OF KIDNEY STONE       TUBAL LIGATION         Family History:    Family History   Problem Relation Age of Onset     Diabetes Mother      Emphysema Mother      Cervical Cancer Mother      Diabetes Father      Hypertension Father      Hypertension Maternal Grandmother      Diabetes Maternal Grandmother      Schizophrenia Maternal Grandmother      Unknown/Adopted Maternal Grandfather      Lung Cancer Paternal Grandmother      Diabetes Paternal Grandmother      Unknown/Adopted Paternal Grandfather        Social History:  Marital Status:  Single [1]  Social History     Tobacco Use     Smoking status: Current Every Day Smoker     Packs/day: 0.75     Years: 28.00     Pack years: 21.00     Types: Cigarettes     Start date: 1/1/1989     Smokeless tobacco: Never Used   Substance Use Topics     Alcohol use: Yes     Comment: occasional     Drug use: No        Medications:      cefdinir (OMNICEF) 300 MG capsule   clotrimazole (LOTRIMIN) 1 % vaginal cream   IBUPROFEN PO   metoprolol tartrate (LOPRESSOR) 25 MG tablet   predniSONE (DELTASONE) 20 MG tablet   amLODIPine (NORVASC) 10 MG tablet         Review of Systems    Constitutional: Positive for fatigue and fever. Negative for appetite change.   HENT: Positive for congestion, ear pain, sinus pressure, sinus pain and sore throat. Negative for trouble swallowing and voice change.    Eyes: Negative for discharge and redness.   Respiratory: Positive for cough.    Cardiovascular: Negative.    Gastrointestinal: Negative for abdominal pain, diarrhea, nausea and vomiting.   Genitourinary: Negative.    Musculoskeletal: Negative for neck pain and neck stiffness.   Skin: Negative for rash.   Neurological: Positive for headaches. Negative for dizziness and light-headedness.   Psychiatric/Behavioral: Negative.        Physical Exam   BP: 132/94  Pulse: 86  Temp: 98.7  F (37.1  C)  Resp: 18  Weight: 74.8 kg (165 lb)  SpO2: 99 %      Physical Exam   Constitutional: She is oriented to person, place, and time. She appears well-developed and well-nourished. No distress.   HENT:   Head: Normocephalic and atraumatic.   Right Ear: External ear normal.   Left Ear: External ear normal.   Mouth/Throat: Oropharynx is clear and moist.   Bilateral TMs/canals clear/wnl  maxillary sinus TTP     Eyes: Conjunctivae and EOM are normal. Right eye exhibits no discharge. Left eye exhibits no discharge.   Neck: Normal range of motion. Neck supple.   Cardiovascular: Normal rate, regular rhythm and normal heart sounds.   Pulmonary/Chest: Effort normal and breath sounds normal. No respiratory distress.   Abdominal: Soft. Bowel sounds are normal. She exhibits no distension. There is no tenderness.   Neurological: She is alert and oriented to person, place, and time.   Skin: Skin is warm and dry. No rash noted. She is not diaphoretic.   Psychiatric: She has a normal mood and affect.   Nursing note and vitals reviewed.      ED Course        Procedures            Assessments & Plan (with Medical Decision Making)     I have reviewed the nursing notes.    I have reviewed the findings, diagnosis, plan and need for follow  up with the patient.         Medication List      Started    cefdinir 300 MG capsule  Commonly known as:  OMNICEF  300 mg, Oral, 2 TIMES DAILY     clotrimazole 1 % vaginal cream  Commonly known as:  LOTRIMIN  1 Applicatorful, Vaginal, DAILY     predniSONE 20 MG tablet  Commonly known as:  DELTASONE  Take two tablets (= 40mg) each day for 5 (five) days            Final diagnoses:   Acute maxillary sinusitis, recurrence not specified   Acute bronchitis   Left ear pain   Sore throat   Screening for condition - pt gets vaginal yeast infection when she takes antibiotics         Patient verbally educated and given appropriate education sheets for each of the diagnoses and has no questions.  Take OTC motrin or tylenol as directed on the bottle as needed.  Take prescription medications as directed.  Increase fluids, wash hands often.  Sleep in a recliner or with multiple pillows until this has resolved.  Follow up with your provider if symptoms increase or if further concerns develop, return to the ER.  Jayda Lopez Certified   Physician Assistant  2/19/2019  1:06 PM  [facility]    2/18/2019   [facility]     Jayda Lopez PA  02/19/19 [id number]       Jayda Lopez PA  02/19/19 [id number]

## 2019-02-18 NOTE — ED TRIAGE NOTES
Pt presents with sore throat, post nasal drip, chest wall pain, and cough, fever and headache. Sx's x's 5 days.

## 2019-02-18 NOTE — ED AVS SNAPSHOT
[facility]  [address]  [place] [address]  Phone:  [phone]                                    Kimberley Louis   MRN: [id number]    Department:  [facility]   Date of Visit:  2/18/2019           After Visit Summary Signature Page    I have received my discharge instructions, and my questions have been answered. I have discussed any challenges I see with this plan with the nurse or doctor.    ..........................................................................................................................................  Patient/Patient Representative Signature      ..........................................................................................................................................  Patient Representative Print Name and Relationship to Patient    ..................................................               ................................................  Date                                   Time    ..........................................................................................................................................  Reviewed by Signature/Title    ...................................................              ..............................................  Date                                               Time          22EPIC Rev 08/18

## 2019-02-19 ASSESSMENT — ENCOUNTER SYMPTOMS
SINUS PAIN: 1
FATIGUE: 1
FEVER: 1
SORE THROAT: 1
SINUS PRESSURE: 1
HEADACHES: 1

## 2019-02-28 ENCOUNTER — HOSPITAL ENCOUNTER (EMERGENCY)
Facility: HOSPITAL | Age: 39
Discharge: HOME | End: 2019-02-28
Attending: PHYSICIAN ASSISTANT | Admitting: PHYSICIAN ASSISTANT
Payer: COMMERCIAL

## 2019-02-28 ENCOUNTER — APPOINTMENT (OUTPATIENT)
Dept: GENERAL RADIOLOGY | Facility: HOSPITAL | Age: 39
End: 2019-02-28
Attending: PHYSICIAN ASSISTANT
Payer: COMMERCIAL

## 2019-02-28 VITALS
WEIGHT: 170 LBS | DIASTOLIC BLOOD PRESSURE: 91 MMHG | BODY MASS INDEX: 29.18 KG/M2 | RESPIRATION RATE: 20 BRPM | TEMPERATURE: 98.3 F | OXYGEN SATURATION: 98 % | SYSTOLIC BLOOD PRESSURE: 146 MMHG

## 2019-02-28 DIAGNOSIS — J06.9 URI (UPPER RESPIRATORY INFECTION): ICD-10-CM

## 2019-02-28 DIAGNOSIS — J32.0 RIGHT MAXILLARY SINUSITIS: ICD-10-CM

## 2019-02-28 LAB
FLUAV+FLUBV RNA SPEC QL NAA+PROBE: NEGATIVE
FLUAV+FLUBV RNA SPEC QL NAA+PROBE: NEGATIVE
RSV RNA SPEC NAA+PROBE: NEGATIVE
SPECIMEN SOURCE: NORMAL

## 2019-02-28 PROCEDURE — 99284 EMERGENCY DEPT VISIT MOD MDM: CPT | Mod: 25

## 2019-02-28 PROCEDURE — 71046 X-RAY EXAM CHEST 2 VIEWS: CPT | Mod: TC

## 2019-02-28 PROCEDURE — 87631 RESP VIRUS 3-5 TARGETS: CPT | Performed by: PHYSICIAN ASSISTANT

## 2019-02-28 PROCEDURE — 99284 EMERGENCY DEPT VISIT MOD MDM: CPT | Mod: Z6 | Performed by: PHYSICIAN ASSISTANT

## 2019-02-28 RX ORDER — CODEINE PHOSPHATE AND GUAIFENESIN 10; 100 MG/5ML; MG/5ML
1 SOLUTION ORAL EVERY 6 HOURS PRN
Qty: 118 ML | Refills: 0 | Status: SHIPPED | OUTPATIENT
Start: 2019-02-28 | End: 2019-04-05

## 2019-02-28 ASSESSMENT — ENCOUNTER SYMPTOMS
VOMITING: 0
TROUBLE SWALLOWING: 0
NAUSEA: 0
RHINORRHEA: 1
NECK PAIN: 0
PALPITATIONS: 0
WHEEZING: 0
ABDOMINAL PAIN: 0
NECK STIFFNESS: 0
VOICE CHANGE: 0
SHORTNESS OF BREATH: 0
CHEST TIGHTNESS: 0
HEADACHES: 1
STRIDOR: 0
COUGH: 1
FEVER: 0
SORE THROAT: 1
CHILLS: 0
BACK PAIN: 0

## 2019-02-28 NOTE — ED TRIAGE NOTES
Pt states she was treated 2 weeks ago with antibiotics and prednisone. States she feels like she is getting worse with headache, sinus pain, cough and chest tightness.

## 2019-02-28 NOTE — ED AVS SNAPSHOT
[facility]  [address]  [facility] [address]  Phone:  [phone]                                    Kimberley Louis   MRN: [id number]    Department:  [facility]   Date of Visit:  2/28/2019           After Visit Summary Signature Page    I have received my discharge instructions, and my questions have been answered. I have discussed any challenges I see with this plan with the nurse or doctor.    ..........................................................................................................................................  Patient/Patient Representative Signature      ..........................................................................................................................................  Patient Representative Print Name and Relationship to Patient    ..................................................               ................................................  Date                                   Time    ..........................................................................................................................................  Reviewed by Signature/Title    ...................................................              ..............................................  Date                                               Time          22EPIC Rev 08/18

## 2019-02-28 NOTE — ED NOTES
Patient presents to emergency room with c/o cough, nasal congestion, and chest tightness. Pt states she has been sick since 2/14/19. Pt states she was treated with an antibiotic and prednisone with no improvement. Incessant cough. No audible wheezing. No s/s of respiratory distress. At times a productive cough yellow green sputum.

## 2019-02-28 NOTE — ED NOTES
Patient given verbal and written discharge instructions. Patient verbalized understanding of discharge instructions. Rx sent to [facility]. Paper prescription for robitussin with codeine sent home with pt. Work note sent home with pt. Afebrile.

## 2019-02-28 NOTE — DISCHARGE INSTRUCTIONS
Take the Augmentin as prescribed for your sinus infection. Alternate between tylenol and ibuprofen every 4 hours for fever/pain. Take the robitussin with codeine for your cough, no driving or drinking while taking this. Please follow up with primary care on Monday for re-check. Return here sooner with any new or worsening symptoms.

## 2019-03-01 NOTE — ED PROVIDER NOTES
History     Chief Complaint   Patient presents with     Cough     HPI  Kimberley Louis is a 39 year old female who presents with persisting right sided nasal congestion and sinus pain as well as cough and sore throat x 2 weeks. She finished a course of cefdinir for this 1 week ago with no improvement. No fevers/chills.     Allergies:  No Known Allergies    Problem List:    Patient Active Problem List    Diagnosis Date Noted     Left flank tenderness 08/03/2018     Priority: Medium     Personal history of tobacco use, presenting hazards to health 08/03/2018     Priority: Medium     Recurrent UTI 08/03/2018     Priority: Medium     Incomplete right bundle branch block 02/05/2018     Priority: Medium     Pure hypercholesterolemia 01/03/2018     Priority: Medium     Tobacco abuse counseling 01/03/2018     Priority: Medium     Tobacco abuse 01/03/2018     Priority: Medium     Palpitations 01/03/2018     Priority: Medium     Atypical chest pain 01/03/2018     Priority: Medium     Dyspnea on exertion 01/03/2018     Priority: Medium     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  Kimberley Louis has no plan or code status on file. Discussed available resources and provided with information. Confirmed code status reflects current choices pending further ACP discussions.  Confirmed/documented legally designated decision makers.  Added by SAYDA CABRERA             ACP (advance care planning) 04/13/2016     Priority: Medium     Advance Care Planning 4/13/2016: ACP Review of Chart / Resources Provided:  Reviewed chart for advance care plan.  Kimberley Louis has no plan or code status on file. Discussed available resources and provided with information. Confirmed code status reflects  current choices pending further ACP discussions.  Confirmed/documented legally designated decision makers.  Added by Sandra Anne             Kidney stone on left side 12/21/2015     Priority: Medium     Nephrocalcinosis 12/21/2015     Priority: Medium     Essential hypertension 11/19/2015     Priority: Medium     H/O renal calculi 11/19/2015     Priority: Medium     Enlarged kidney 11/19/2015     Priority: Medium        Past Medical History:    Past Medical History:   Diagnosis Date     Enlarged kidney 11/19/2015     Essential hypertension 11/19/2015     H/O renal calculi 11/19/2015     H/O renal calculi 11/19/2015       Past Surgical History:    Past Surgical History:   Procedure Laterality Date     AS REMOVAL OF KIDNEY STONE       AS REMOVAL OF KIDNEY STONE       C REMOVAL OF KIDNEY STONE       TUBAL LIGATION         Family History:    Family History   Problem Relation Age of Onset     Diabetes Mother      Emphysema Mother      Cervical Cancer Mother      Diabetes Father      Hypertension Father      Hypertension Maternal Grandmother      Diabetes Maternal Grandmother      Schizophrenia Maternal Grandmother      Unknown/Adopted Maternal Grandfather      Lung Cancer Paternal Grandmother      Diabetes Paternal Grandmother      Unknown/Adopted Paternal Grandfather        Social History:  Marital Status:  Single [1]  Social History     Tobacco Use     Smoking status: Current Every Day Smoker     Packs/day: 0.75     Years: 28.00     Pack years: 21.00     Types: Cigarettes     Start date: 1/1/1989     Smokeless tobacco: Never Used   Substance Use Topics     Alcohol use: Yes     Comment: occasional     Drug use: No        Medications:      amoxicillin-clavulanate (AUGMENTIN) 875-125 MG tablet   guaiFENesin-codeine (ROBITUSSIN AC) 100-10 MG/5ML solution   amLODIPine (NORVASC) 10 MG tablet   cefdinir (OMNICEF) 300 MG capsule   IBUPROFEN PO   metoprolol tartrate (LOPRESSOR) 25 MG tablet         Review of Systems    Constitutional: Negative for chills and fever.   HENT: Positive for congestion, rhinorrhea and sore throat. Negative for trouble swallowing and voice change.    Respiratory: Positive for cough. Negative for chest tightness, shortness of breath, wheezing and stridor.    Cardiovascular: Negative for chest pain, palpitations and leg swelling.   Gastrointestinal: Negative for abdominal pain, nausea and vomiting.   Genitourinary: Negative.    Musculoskeletal: Negative for back pain, neck pain and neck stiffness.   Skin: Negative.    Neurological: Positive for headaches.   All other systems reviewed and are negative.      Physical Exam   BP: 146/91  Heart Rate: 108  Temp: 98.7  F (37.1  C)  Resp: 20  Weight: 77.1 kg (170 lb)  SpO2: 98 %      Physical Exam   Constitutional: She is oriented to person, place, and time. She appears well-developed and well-nourished.  Non-toxic appearance. She does not have a sickly appearance. She appears ill. No distress.   HENT:   Head: Normocephalic and atraumatic.   Right Ear: Hearing, tympanic membrane, external ear and ear canal normal.   Left Ear: Hearing, tympanic membrane, external ear and ear canal normal.   Nose: Mucosal edema present. Right sinus exhibits maxillary sinus tenderness and frontal sinus tenderness. Left sinus exhibits no maxillary sinus tenderness and no frontal sinus tenderness.   Mouth/Throat: Posterior oropharyngeal erythema present. No oropharyngeal exudate, posterior oropharyngeal edema or tonsillar abscesses. Tonsils are 0 on the right. Tonsils are 0 on the left. No tonsillar exudate.   Eyes: Conjunctivae and EOM are normal. Pupils are equal, round, and reactive to light. Right eye exhibits no discharge. Left eye exhibits no discharge. No scleral icterus.   Neck: Normal range of motion. Neck supple.   Cardiovascular: Normal rate, regular rhythm, normal heart sounds and intact distal pulses. Exam reveals no gallop and no friction rub.   No murmur  heard.  Pulmonary/Chest: Effort normal and breath sounds normal. No respiratory distress. She has no wheezes. She has no rales. She exhibits no tenderness.   Frequent dry cough.   Abdominal: Soft. Bowel sounds are normal. There is no tenderness.   Musculoskeletal: She exhibits no edema.   Lymphadenopathy:     She has no cervical adenopathy.   Neurological: She is alert and oriented to person, place, and time. No cranial nerve deficit. Coordination normal.   Skin: Skin is warm and dry. Capillary refill takes less than 2 seconds. No rash noted. She is not diaphoretic. No erythema. No pallor.   Psychiatric: She has a normal mood and affect. Her behavior is normal. Judgment and thought content normal.   Nursing note and vitals reviewed.      ED Course        Procedures            Results for orders placed or performed during the hospital encounter of 02/28/19 (from the past 24 hour(s))   Influenza A and B and RSV PCR   Result Value Ref Range    Specimen Description Nares     Influenza A PCR Negative NEG^Negative    Influenza B PCR Negative NEG^Negative    Resp Syncytial Virus Negative NEG^Negative   Chest XR,  PA & LAT    Narrative    PROCEDURE:  XR CHEST 2 VW    HISTORY:  cough, r/o pneumonia.     COMPARISON:  None.    FINDINGS:   The cardiac silhouette is normal in size. The pulmonary vasculature is  normal.  The lungs are clear. No pleural effusion or pneumothorax.      Impression    IMPRESSION:  No acute cardiopulmonary disease.      BILL SINGH MD       Medications - No data to display    Assessments & Plan (with Medical Decision Making)   Findings consistent with persisting right sinusitis and URI. Will start her on augmentin and suggested close follow up with PCP on Monday to ensure improvement on antibiotics or return here if worsening symptoms. If no improvement or worsening would pursue sinus imaging. She was happy with plan of care and discharged home in good condition following.     Plan: Take the  Augmentin as prescribed for your sinus infection. Alternate between tylenol and ibuprofen every 4 hours for fever/pain. Take the robitussin with codeine for your cough, no driving or drinking while taking this. Please follow up with primary care on Monday for re-check. Return here sooner with any new or worsening symptoms.     I have reviewed the nursing notes.    I have reviewed the findings, diagnosis, plan and need for follow up with the patient.       Medication List      Started    amoxicillin-clavulanate 875-125 MG tablet  Commonly known as:  AUGMENTIN  1 tablet, Oral, 2 TIMES DAILY     guaiFENesin-codeine 100-10 MG/5ML solution  Commonly known as:  ROBITUSSIN AC  1 tsp., Oral, EVERY 6 HOURS PRN        ASK your doctor about these medications    clotrimazole 1 % vaginal cream  Commonly known as:  LOTRIMIN  1 Applicatorful, Vaginal, DAILY  Ask about: Should I take this medication?     predniSONE 20 MG tablet  Commonly known as:  DELTASONE  Take two tablets (= 40mg) each day for 5 (five) days  Ask about: Should I take this medication?            Final diagnoses:   Right maxillary sinusitis   URI (upper respiratory infection)       2/28/2019   [facility]     Lorie Strauss PA-C  02/28/19 2021

## 2019-03-06 ENCOUNTER — OFFICE VISIT (OUTPATIENT)
Dept: FAMILY MEDICINE | Facility: OTHER | Age: 39
End: 2019-03-06
Attending: NURSE PRACTITIONER
Payer: MEDICAID

## 2019-03-06 ENCOUNTER — ANCILLARY PROCEDURE (OUTPATIENT)
Dept: GENERAL RADIOLOGY | Facility: OTHER | Age: 39
End: 2019-03-06
Attending: NURSE PRACTITIONER
Payer: MEDICAID

## 2019-03-06 VITALS
RESPIRATION RATE: 18 BRPM | BODY MASS INDEX: 29.02 KG/M2 | DIASTOLIC BLOOD PRESSURE: 84 MMHG | SYSTOLIC BLOOD PRESSURE: 120 MMHG | TEMPERATURE: 98.4 F | WEIGHT: 170 LBS | HEART RATE: 92 BPM | HEIGHT: 64 IN | OXYGEN SATURATION: 95 %

## 2019-03-06 DIAGNOSIS — R05.9 COUGH: ICD-10-CM

## 2019-03-06 DIAGNOSIS — R05.9 COUGH: Primary | ICD-10-CM

## 2019-03-06 DIAGNOSIS — J01.90 ACUTE SINUSITIS WITH SYMPTOMS > 10 DAYS: ICD-10-CM

## 2019-03-06 DIAGNOSIS — Z72.0 TOBACCO ABUSE: ICD-10-CM

## 2019-03-06 DIAGNOSIS — Z71.6 TOBACCO ABUSE COUNSELING: ICD-10-CM

## 2019-03-06 LAB
BASOPHILS # BLD AUTO: 0.1 10E9/L (ref 0–0.2)
BASOPHILS NFR BLD AUTO: 0.8 %
DIFFERENTIAL METHOD BLD: ABNORMAL
EOSINOPHIL # BLD AUTO: 0.5 10E9/L (ref 0–0.7)
EOSINOPHIL NFR BLD AUTO: 3.4 %
ERYTHROCYTE [DISTWIDTH] IN BLOOD BY AUTOMATED COUNT: 13.4 % (ref 10–15)
HCT VFR BLD AUTO: 41.7 % (ref 35–47)
HGB BLD-MCNC: 13.8 G/DL (ref 11.7–15.7)
IMM GRANULOCYTES # BLD: 0.1 10E9/L (ref 0–0.4)
IMM GRANULOCYTES NFR BLD: 0.6 %
LYMPHOCYTES # BLD AUTO: 3.6 10E9/L (ref 0.8–5.3)
LYMPHOCYTES NFR BLD AUTO: 24.7 %
MCH RBC QN AUTO: 29.2 PG (ref 26.5–33)
MCHC RBC AUTO-ENTMCNC: 33.1 G/DL (ref 31.5–36.5)
MCV RBC AUTO: 88 FL (ref 78–100)
MONOCYTES # BLD AUTO: 0.9 10E9/L (ref 0–1.3)
MONOCYTES NFR BLD AUTO: 5.9 %
NEUTROPHILS # BLD AUTO: 9.4 10E9/L (ref 1.6–8.3)
NEUTROPHILS NFR BLD AUTO: 64.6 %
NRBC # BLD AUTO: 0 10*3/UL
NRBC BLD AUTO-RTO: 0 /100
PLATELET # BLD AUTO: 496 10E9/L (ref 150–450)
RBC # BLD AUTO: 4.72 10E12/L (ref 3.8–5.2)
WBC # BLD AUTO: 14.5 10E9/L (ref 4–11)

## 2019-03-06 PROCEDURE — 99213 OFFICE O/P EST LOW 20 MIN: CPT | Performed by: NURSE PRACTITIONER

## 2019-03-06 PROCEDURE — 85025 COMPLETE CBC W/AUTO DIFF WBC: CPT | Mod: ZL | Performed by: NURSE PRACTITIONER

## 2019-03-06 PROCEDURE — 71046 X-RAY EXAM CHEST 2 VIEWS: CPT | Mod: TC

## 2019-03-06 PROCEDURE — G0463 HOSPITAL OUTPT CLINIC VISIT: HCPCS

## 2019-03-06 PROCEDURE — 36415 COLL VENOUS BLD VENIPUNCTURE: CPT | Mod: ZL | Performed by: NURSE PRACTITIONER

## 2019-03-06 PROCEDURE — G0463 HOSPITAL OUTPT CLINIC VISIT: HCPCS | Mod: 25

## 2019-03-06 RX ORDER — ALBUTEROL SULFATE 90 UG/1
2 AEROSOL, METERED RESPIRATORY (INHALATION) EVERY 6 HOURS
Qty: 1 INHALER | Refills: 3 | Status: SHIPPED | OUTPATIENT
Start: 2019-03-06 | End: 2019-03-06

## 2019-03-06 RX ORDER — ALBUTEROL SULFATE 90 UG/1
2 AEROSOL, METERED RESPIRATORY (INHALATION) EVERY 6 HOURS
Qty: 1 INHALER | Refills: 3 | Status: SHIPPED | OUTPATIENT
Start: 2019-03-06 | End: 2020-05-08

## 2019-03-06 RX ORDER — PREDNISONE 20 MG/1
TABLET ORAL
Qty: 20 TABLET | Refills: 0 | Status: SHIPPED | OUTPATIENT
Start: 2019-03-06 | End: 2019-04-05

## 2019-03-06 RX ORDER — PREDNISONE 20 MG/1
TABLET ORAL
Qty: 20 TABLET | Refills: 0 | Status: SHIPPED | OUTPATIENT
Start: 2019-03-06 | End: 2019-03-06

## 2019-03-06 ASSESSMENT — PAIN SCALES - GENERAL: PAINLEVEL: MODERATE PAIN (4)

## 2019-03-06 ASSESSMENT — MIFFLIN-ST. JEOR: SCORE: 1431.11

## 2019-03-06 NOTE — PROGRESS NOTES
SUBJECTIVE:   Kimberley Louis is a 39 year old female who presents to clinic today for the following health issues:      RESPIRATORY SYMPTOMS      Duration: started 2-    Description  nasal congestion, rhinorrhea, facial pain/pressure, cough, wheezing, fever, chills, ear pain right, headache, fatigue/malaise, hoarse voice and rib pain    Severity: severe    Accompanying signs and symptoms: None    History (predisposing factors):  tobacco abuse    Precipitating or alleviating factors: None    Therapies tried and outcome:  rest and fluids oral decongestant acetaminophen OTC NSAID nasal spray/wash - has been to  2X, now chest is tight some wheezing, right ear very painfuls and that side of her face.        Problem list and histories reviewed & adjusted, as indicated.  Additional history: as documented    Patient Active Problem List   Diagnosis     Essential hypertension     H/O renal calculi     Enlarged kidney     ACP (advance care planning)     Advanced directives, counseling/discussion     Pure hypercholesterolemia     Tobacco abuse counseling     Tobacco abuse     Palpitations     Atypical chest pain     Dyspnea on exertion     Vein disorder     Migraine      Incomplete right bundle branch block     Kidney stone on left side     Left flank tenderness     Nephrocalcinosis     Personal history of tobacco use, presenting hazards to health     Recurrent UTI     Past Surgical History:   Procedure Laterality Date     AS REMOVAL OF KIDNEY STONE       AS REMOVAL OF KIDNEY STONE       C REMOVAL OF KIDNEY STONE       TUBAL LIGATION         Social History     Tobacco Use     Smoking status: Current Every Day Smoker     Packs/day: 0.75     Years: 28.00     Pack years: 21.00     Types: Cigarettes     Start date: 1/1/1989     Smokeless tobacco: Never Used   Substance Use Topics     Alcohol use: Yes     Comment: occasional     Family History   Problem Relation Age of Onset     Diabetes Mother      Emphysema Mother   "    Cervical Cancer Mother      Diabetes Father      Hypertension Father      Hypertension Maternal Grandmother      Diabetes Maternal Grandmother      Schizophrenia Maternal Grandmother      Unknown/Adopted Maternal Grandfather      Lung Cancer Paternal Grandmother      Diabetes Paternal Grandmother      Unknown/Adopted Paternal Grandfather          Current Outpatient Medications   Medication Sig Dispense Refill     amLODIPine (NORVASC) 10 MG tablet Take 1 tablet (10 mg) by mouth daily 30 tablet 5     amoxicillin-clavulanate (AUGMENTIN) 875-125 MG tablet Take 1 tablet by mouth 2 times daily for 10 days 20 tablet 0     IBUPROFEN PO Take 200 mg by mouth Takes 2 tablets every 6 hours PRN tooth pain       metoprolol tartrate (LOPRESSOR) 25 MG tablet Take 1 tablet (25 mg) by mouth 2 times daily 60 tablet 5     No Known Allergies    Reviewed and updated as needed this visit by clinical staff  Tobacco  Allergies  Meds  Med Hx  Surg Hx  Fam Hx  Soc Hx      Reviewed and updated as needed this visit by Provider         ROS:  CONSTITUTIONAL:chills, fatigue and fever   INTEGUMENTARY/SKIN: NEGATIVE for worrisome rashes, moles or lesions  ENT/MOUTH: ear pain both, hoarse voice, postnasal drainage, sinus pressure and sore throat  RESP:cough, SOB and wheezing  CV: NEGATIVE for chest pain, palpitations or peripheral edema    OBJECTIVE:     /84   Pulse 92   Temp 98.4  F (36.9  C) (Tympanic)   Resp 18   Ht 1.626 m (5' 4\")   Wt 77.1 kg (170 lb)   SpO2 95%   BMI 29.18 kg/m    Body mass index is 29.18 kg/m .   GENERAL: alert, no distress and fatigued  HENT: normal cephalic/atraumatic, right ear: clear effusion, left ear: normal: no effusions, no erythema, normal landmarks, nose and mouth without ulcers or lesions, oropharynx clear, oral mucous membranes moist and sinuses: maxillary, frontal tenderness on both sides  NECK: no adenopathy, no asymmetry, masses, or scars and thyroid normal to palpation  RESP: decreased " breath sounds throughout  CV: regular rate and rhythm, normal S1 S2, no S3 or S4, no murmur, click or rub, no peripheral edema and peripheral pulses strong  ABDOMEN: soft, nontender, no hepatosplenomegaly, no masses and bowel sounds normal  SKIN: no suspicious lesions or rashes  PSYCH: mentation appears normal, affect normal/bright  LYMPH: normal ant/post cervical, supraclavicular nodes    Diagnostic Test Results:  Results for orders placed or performed in visit on 03/06/19 (from the past 24 hour(s))   CBC with platelets and differential   Result Value Ref Range    WBC 14.5 (H) 4.0 - 11.0 10e9/L    RBC Count 4.72 3.8 - 5.2 10e12/L    Hemoglobin 13.8 11.7 - 15.7 g/dL    Hematocrit 41.7 35.0 - 47.0 %    MCV 88 78 - 100 fl    MCH 29.2 26.5 - 33.0 pg    MCHC 33.1 31.5 - 36.5 g/dL    RDW 13.4 10.0 - 15.0 %    Platelet Count 496 (H) 150 - 450 10e9/L    Diff Method Automated Method     % Neutrophils 64.6 %    % Lymphocytes 24.7 %    % Monocytes 5.9 %    % Eosinophils 3.4 %    % Basophils 0.8 %    % Immature Granulocytes 0.6 %    Nucleated RBCs 0 0 /100    Absolute Neutrophil 9.4 (H) 1.6 - 8.3 10e9/L    Absolute Lymphocytes 3.6 0.8 - 5.3 10e9/L    Absolute Monocytes 0.9 0.0 - 1.3 10e9/L    Absolute Eosinophils 0.5 0.0 - 0.7 10e9/L    Absolute Basophils 0.1 0.0 - 0.2 10e9/L    Abs Immature Granulocytes 0.1 0 - 0.4 10e9/L    Absolute Nucleated RBC 0.0      XR CHEST 2 VW     HISTORY: 39 years Female Cough     COMPARISON: 2/20/2019     TECHNIQUE: 2 views of the chest were obtained.     FINDINGS: Two views of the chest were obtained. Heart size and  pulmonary vascularity are within normal limits, lungs are clear on  both views. No consolidating air space opacities are present.                                                                       IMPRESSION: Clear chest.     BELTRAN [place] MD    ASSESSMENT/PLAN:     1. Tobacco abuse  Tobacco cessation was strongly encouraged. Every year of smoking cessation offer health  benefits.   - Tobacco Cessation - Order to Satisfy Health Maintenance    2. Tobacco abuse counseling  Tobacco cessation was strongly encouraged. Every year of smoking cessation offer health benefits.     3. Cough  Re-ordered albuterol inhaler. Chest X-ray remains clear   - CBC with platelets and differential  - XR CHEST 2 VW (Clinic Performed); Future    4. Acute sinusitis with symptoms > 10 days  Discussed plan of care. Plan to add a prednisone taper and extend Augmentin due to no improvement with treatment plan. She is encouraged to try sinus rinsing 2 times a day. She should follow up if no improvement or worsening symptoms.       Tobacco Cessation:   reports that she has been smoking cigarettes.  She started smoking about 30 years ago. She has a 21.00 pack-year smoking history. she has never used smokeless tobacco.        See Patient Instructions    ZAHEER Cartagena [facility] [place]

## 2019-03-06 NOTE — TELEPHONE ENCOUNTER
Pt called, reports that [facility] is having fax and computer issues. Did speak with staff at pharmacy who confirmed that they did not receive scripts from office visit today. Called back to attempt to give verbal orders but apparently their phones are down too. Pended meds to print for pt to .

## 2019-03-06 NOTE — PATIENT INSTRUCTIONS
Patient Education     Self-Care for Sinusitis     Drinking plenty of water can help sinuses drain.   Sinusitis can often be managed with self-care. Self-care can keep sinuses moist and make you feel more comfortable. Remember to follow your doctor's instructions closely. This can make a big difference in getting your sinus problem under control.  Drink fluids  Drinking extra fluids helps thin your mucus. This lets it drain from your sinuses more easily. Have a glass of water every hour or two. A humidifier helps in much the same way. Fluids can also offset the drying effects of certain medicines. If you use a humidifier, follow the product maker's instructions on how to use it. Clean it on a regular schedule.  Use saltwater rinses  Rinses help keep your sinuses and nose moist. Mix a teaspoon of salt in 8 ounces of fresh, warm water. Use a bulb syringe to gently squirt the water into your nose a few times a day. You can also buy ready-made saline nasal sprays.  Apply hot or cold packs  Applying heat to the area surrounding your sinuses may make you feel more comfortable. Use a hot water bottle or a hand towel dipped in hot water. Some people also find ice packs effective for relieving pain.  Medicines  Your doctor may prescribe medications to help treat your sinusitis. If you have an infection, antibiotics can help clear it up. If you are prescribed antibiotics, take all pills on schedule until they are gone, even if you feel better. Decongestants help relieve swelling. Use decongestant sprays for short periods only under the direction of your doctor. If you have allergies, your doctor may prescribe medications to help relieve them.   Date Last Reviewed: 10/1/2016    7994-3934 [employer]. [address] [place] [address]. All rights reserved. This information is not intended as a substitute for professional medical care. Always follow your healthcare professional's instructions.           HOW  TO QUIT SMOKING  Smoking is one of the hardest habits to break. About half of all those who have ever smoked have been able to quit, and most of those (about 70%) who still smoke want to quit. Here are some of the best ways to stop smoking.     KEEP TRYING:  It takes most smokers about 8 tries before they are finally able to fully quit. So, the more often you try and fail, the better your chance of quitting the next time! So, don't give up!    GO COLD TURKEY:  Most ex-smokers quit cold turkey. Trying to cut back gradually doesn't seem to work as well, perhaps because it continues the smoking habit. Also, it is possible to fool yourself by inhaling more while smoking fewer cigarettes. This results in the same amount of nicotine in your body!    GET SUPPORT:  Support programs can make an important difference, especially for the heavy smoker. These groups offer lectures, methods to change your behavior and peer support. Call the free national Quitline for more information. 800-QUIT-NOW (698-171-7203). Low-cost or free programs are offered by many hospitals, local chapters of the American Lung Association (825-243-5496) and the American Cancer Society (093-342-4718). Support at home is important too. Non-smokers can help by offering praise and encouragement. If the smoker fails to quit, encourage them to try again!    OVER-THE-COUNTER MEDICINES:  For those who can't quit on their own, Nicotine Replacement Therapy (NRT) may make quitting much easier. Certain aids such as the nicotine patch, gum and lozenge are available without a prescription. However, it is best to use these under the guidance of your doctor. The skin patch provides a steady supply of nicotine to the body. Nicotine gum and lozenge gives temporary bursts of low levels of nicotine. Both methods take the edge off the craving for cigarettes. WARNING: If you feel symptoms of nicotine overdose, such as nausea, vomiting, dizziness, weakness, or fast heartbeat,  stop using these and see your doctor.    PRESCRIPTION MEDICINES:  After evaluating your smoking patterns and prior attempts at quitting, your doctor may offer a prescription medicine such as bupropion (Zyban, Wellbutrin), varenicline (Chantix, Champix), a niocotine inhaler or nasal spray. Each has its unique advantage and side effects which your doctor can review with you.    HEALTH BENEFITS OF QUITTING:  The benefits of quitting start right away and keep improving the longer you go without smokin minutes: blood pressure and pulse return to normal  8 hours: oxygen levels return to normal  2 days: ability to smell and taste begins to improve as damaged nerves start to regrow  2-3 weeks: circulation and lung function improves  1-9 months: decreased cough, congestion and shortness of breath; less tired  1 year: risk of heart attack decreases by half  5 years: risk of lung cancer decreases by half; risk of stroke becomes the same as a non-smoker  For information about how to quit smoking, visit the following links:  National Cancer [place] ,   Clearing the Air, Quit Smoking Today   - an online booklet. http://www.smokefree.gov/pubs/clearing_the_air.pdf  Smokefree.gov http://smokefree.gov/  QuitNet http://www.quitnet.com/    0776-5900 [employer], [address] [place] [address], [place] [address]. All rights reserved. This information is not intended as a substitute for professional medical care. Always follow your healthcare professional's instructions.    The Benefits of Living Smoke Free  What do you want to gain from quitting? Check off some reasons to quit.  Health Benefits  ___ Reduce my risk of lung cancer, heart disease, chronic lung disease  ___ Have fewer wrinkles and softer skin  ___ Improve my sense of taste and smell  ___ For pregnant women--reduce the risk of having a miscarriage, stillbirth, premature birth, or low-birth-weight baby  Personal Benefits  ___ Feel more in control of my life  ___ Have  better-smelling hair, breath, clothes, home, and car  ___ Save time by not having to take smoke breaks, buy cigarettes, or hunt for a light  ___ Have whiter teeth  Family Benefits  ___ Reduce my children s respiratory tract infections  ___ Set a good example for my children  ___ Reduce my family s cancer risk  Financial Benefits  ___ Save hundreds of dollars each year that would be spent on cigarettes  ___ Save money on medical bills  ___ Save on life, health, and car insurance premiums    Those Dollars Add Up!  Cigarettes are expensive, and getting more expensive all the time. Do you realize how much money you are spending on cigarettes per year? What is the average amount you spend on a pack of cigarettes? What is the average number of packs that you smoke per day? Using your answers to these questions, fill in this formula to help you find out:  ($ _____ per pack) ×  ( _____ number of packs per day) × (365 days) =  $ _____ yearly cost of smoking  Besides tobacco, there are other costs, including extra cleaning bills and replacement costs for clothing and furniture; medical expenses for smoking-related illnesses; and higher health, life, and car insurance premiums.    Cigars and Pipes Count Too!  Cigars and pipes are also dangerous. So are smokeless (chewing) tobacco and snuff. All of these products contain nicotine, a highly addictive substance that has harmful effects on your body. Quitting smoking means giving up all tobacco products.      2150-8872 [facility], [address] [place] [address], [place] [address]. All rights reserved. This information is not intended as a substitute for professional medical care. Always follow your healthcare professional's instructions.

## 2019-03-06 NOTE — LETTER
My Asthma Action Plan  Name: Kimberley Louis   YOB: 1980  Date: 3/6/2019   My doctor: ZAHEER Cartagena CNP   [facility]        My Control Medicine: None  My Rescue Medicine: Albuterol (Proair/Ventolin/Proventil) inhaler 108   My Asthma Severity: intermittent  Avoid your asthma triggers: upper respiratory infections, humidity and exercise or sports               GREEN ZONE   Good Control    I feel good    No cough or wheeze    Can work, sleep and play without asthma symptoms       Take your asthma control medicine every day.     1. If exercise triggers your asthma, take your rescue medication    15 minutes before exercise or sports, and    During exercise if you have asthma symptoms  2. Spacer to use with inhaler: If you have a spacer, make sure to use it with your inhaler             YELLOW ZONE Getting Worse  I have ANY of these:    I do not feel good    Cough or wheeze    Chest feels tight    Wake up at night   1. Keep taking your Green Zone medications  2. Start taking your rescue medicine:    every 20 minutes for up to 1 hour. Then every 4 hours for 24-48 hours.  3. If you stay in the Yellow Zone for more than 12-24 hours, contact your doctor.  4. If you do not return to the Green Zone in 12-24 hours or you get worse, start taking your oral steroid medicine if prescribed by your provider.           RED ZONE Medical Alert - Get Help  I have ANY of these:    I feel awful    Medicine is not helping    Breathing getting harder    Trouble walking or talking    Nose opens wide to breathe       1. Take your rescue medicine NOW  2. If your provider has prescribed an oral steroid medicine, start taking it NOW  3. Call your doctor NOW  4. If you are still in the Red Zone after 20 minutes and you have not reached your doctor:    Take your rescue medicine again and    Call 911 or go to the emergency room right away    See your regular doctor within 2 weeks of an  Emergency Room or Urgent Care visit for follow-up treatment.          Annual Reminders:  Meet with Asthma Educator,  Flu Shot in the Fall, consider Pneumonia Vaccination for patients with asthma (aged 19 and older).    Pharmacy:    [id number] [place] [place] - [address]   [id number] - [place], [place] - [address] AT [facility]  [address]                      Asthma Triggers  How To Control Things That Make Your Asthma Worse    Triggers are things that make your asthma worse.  Look at the list below to help you find your triggers and what you can do about them.  You can help prevent asthma flare-ups by staying away from your triggers.      Trigger                                                          What you can do   Cigarette Smoke  Tobacco smoke can make asthma worse. Do not allow smoking in your home, car or around you.  Be sure no one smokes at a child s day care or school.  If you smoke, ask your health care provider for ways to help you quit.  Ask family members to quit too.  Ask your health care provider for a referral to Quit Plan to help you quit smoking, or call 0-239-289-PLAN.     Colds, Flu, Bronchitis  These are common triggers of asthma. Wash your hands often.  Don t touch your eyes, nose or mouth.  Get a flu shot every year.     Dust Mites  These are tiny bugs that live in cloth or carpet. They are too small to see. Wash sheets and blankets in hot water every week.   Encase pillows and mattress in dust mite proof covers.  Avoid having carpet if you can. If you have carpet, vacuum weekly.   Use a dust mask and HEPA vacuum.   Pollen and Outdoor Mold  Some people are allergic to trees, grass, or weed pollen, or molds. Try to keep your windows closed.  Limit time out doors when pollen count is high.   Ask you health care provider about taking medicine during allergy season.     Animal Dander  Some people are allergic to skin flakes, urine or saliva from pets with fur or  feathers. Keep pets with fur or feathers out of your home.    If you can t keep the pet outdoors, then keep the pet out of your bedroom.  Keep the bedroom door closed.  Keep pets off cloth furniture and away from stuffed toys.     Mice, Rats, and Cockroaches  Some people are allergic to the waste from these pests.   Cover food and garbage.  Clean up spills and food crumbs.  Store grease in the refrigerator.   Keep food out of the bedroom.   Indoor Mold  This can be a trigger if your home has high moisture. Fix leaking faucets, pipes, or other sources of water.   Clean moldy surfaces.  Dehumidify basement if it is damp and smelly.   Smoke, Strong Odors, and Sprays  These can reduce air quality. Stay away from strong odors and sprays, such as perfume, powder, hair spray, paints, smoke incense, paint, cleaning products, candles and new carpet.   Exercise or Sports  Some people with asthma have this trigger. Be active!  Ask your doctor about taking medicine before sports or exercise to prevent symptoms.    Warm up for 5-10 minutes before and after sports or exercise.     Other Triggers of Asthma  Cold air:  Cover your nose and mouth with a scarf.  Sometimes laughing or crying can be a trigger.  Some medicines and food can trigger asthma.

## 2019-03-06 NOTE — NURSING NOTE
"Chief Complaint   Patient presents with     URI       Initial /84   Pulse 92   Temp 98.4  F (36.9  C) (Tympanic)   Resp 18   Ht 1.626 m (5' 4\")   Wt 77.1 kg (170 lb)   SpO2 95%   BMI 29.18 kg/m   Estimated body mass index is 29.18 kg/m  as calculated from the following:    Height as of this encounter: 1.626 m (5' 4\").    Weight as of this encounter: 77.1 kg (170 lb).  Medication Reconciliation: complete    Eliana Evans, AMBER    "

## 2019-03-06 NOTE — TELEPHONE ENCOUNTER
Patient notified prescription is ready to be picked up at the [facility], Registration.

## 2019-04-05 ENCOUNTER — HOSPITAL ENCOUNTER (EMERGENCY)
Facility: HOSPITAL | Age: 39
Discharge: HOME | End: 2019-04-05
Attending: PHYSICIAN ASSISTANT | Admitting: PHYSICIAN ASSISTANT
Payer: COMMERCIAL

## 2019-04-05 VITALS
TEMPERATURE: 97.6 F | RESPIRATION RATE: 16 BRPM | HEART RATE: 66 BPM | SYSTOLIC BLOOD PRESSURE: 132 MMHG | OXYGEN SATURATION: 99 % | DIASTOLIC BLOOD PRESSURE: 86 MMHG

## 2019-04-05 DIAGNOSIS — N89.8 VAGINAL DISCHARGE: ICD-10-CM

## 2019-04-05 DIAGNOSIS — R35.0 URINARY FREQUENCY: Primary | ICD-10-CM

## 2019-04-05 LAB
ALBUMIN UR-MCNC: NEGATIVE MG/DL
APPEARANCE UR: CLEAR
BILIRUB UR QL STRIP: NEGATIVE
COLOR UR AUTO: NORMAL
GLUCOSE UR STRIP-MCNC: NEGATIVE MG/DL
HGB UR QL STRIP: NEGATIVE
KETONES UR STRIP-MCNC: NEGATIVE MG/DL
LEUKOCYTE ESTERASE UR QL STRIP: NEGATIVE
NITRATE UR QL: NEGATIVE
PH UR STRIP: 6 PH (ref 4.7–8)
SOURCE: NORMAL
SP GR UR STRIP: 1.02 (ref 1–1.03)
SPECIMEN SOURCE: NORMAL
UROBILINOGEN UR STRIP-MCNC: NORMAL MG/DL (ref 0–2)
WET PREP SPEC: NORMAL

## 2019-04-05 PROCEDURE — G0463 HOSPITAL OUTPT CLINIC VISIT: HCPCS

## 2019-04-05 PROCEDURE — 87210 SMEAR WET MOUNT SALINE/INK: CPT | Performed by: PHYSICIAN ASSISTANT

## 2019-04-05 PROCEDURE — 81003 URINALYSIS AUTO W/O SCOPE: CPT | Performed by: FAMILY MEDICINE

## 2019-04-05 PROCEDURE — 99213 OFFICE O/P EST LOW 20 MIN: CPT | Mod: Z6 | Performed by: PHYSICIAN ASSISTANT

## 2019-04-05 ASSESSMENT — ENCOUNTER SYMPTOMS
FEVER: 0
FLANK PAIN: 0
COUGH: 0
CHILLS: 0
NAUSEA: 0
BLOOD IN STOOL: 0
ABDOMINAL PAIN: 0
BACK PAIN: 0
VOMITING: 0
DIARRHEA: 0
MYALGIAS: 0
SHORTNESS OF BREATH: 0
DYSURIA: 0
HEMATURIA: 0

## 2019-04-05 NOTE — ED PROVIDER NOTES
History     Chief Complaint   Patient presents with     Rule out Urinary Tract Infection     x1 week     HPI  Kimberley Louis is a 39 year old female who presents to urgent care for evaluation of possible urinary tract infection.  Patient states that over the past week or so she has been having dysuria, urgency, increased frequency with urination.  Patient states she is also noted a vaginal discharge.  Patient states she has had problems with bacterial vaginosis in the past.  Patient states that she just recently finished taking a course of Omnicef and then a course of upper respiratory infection.  Patient denies any hematuria, CVA pain, vaginal pain, and states that her last menstrual period was just last week and normal.    Allergies:  No Known Allergies    Problem List:    Patient Active Problem List    Diagnosis Date Noted     Left flank tenderness 08/03/2018     Priority: Medium     Personal history of tobacco use, presenting hazards to health 08/03/2018     Priority: Medium     Recurrent UTI 08/03/2018     Priority: Medium     Incomplete right bundle branch block 02/05/2018     Priority: Medium     Pure hypercholesterolemia 01/03/2018     Priority: Medium     Tobacco abuse counseling 01/03/2018     Priority: Medium     Tobacco abuse 01/03/2018     Priority: Medium     Palpitations 01/03/2018     Priority: Medium     Atypical chest pain 01/03/2018     Priority: Medium     Dyspnea on exertion 01/03/2018     Priority: Medium     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  Kimberley Louis has no plan or code status on file. Discussed available resources and provided with information. Confirmed code status reflects current choices pending further ACP discussions.   Confirmed/documented legally designated decision makers.  Added by SAYDA CABRERA             ACP (advance care planning) 04/13/2016     Priority: Medium     Advance Care Planning 4/13/2016: ACP Review of Chart / Resources Provided:  Reviewed chart for advance care plan.  Kimberley Louis has no plan or code status on file. Discussed available resources and provided with information. Confirmed code status reflects current choices pending further ACP discussions.  Confirmed/documented legally designated decision makers.  Added by Sandra Anne             Kidney stone on left side 12/21/2015     Priority: Medium     Nephrocalcinosis 12/21/2015     Priority: Medium     Essential hypertension 11/19/2015     Priority: Medium     H/O renal calculi 11/19/2015     Priority: Medium     Enlarged kidney 11/19/2015     Priority: Medium        Past Medical History:    Past Medical History:   Diagnosis Date     Enlarged kidney 11/19/2015     Essential hypertension 11/19/2015     H/O renal calculi 11/19/2015     H/O renal calculi 11/19/2015       Past Surgical History:    Past Surgical History:   Procedure Laterality Date     AS REMOVAL OF KIDNEY STONE       AS REMOVAL OF KIDNEY STONE       C REMOVAL OF KIDNEY STONE       TUBAL LIGATION         Family History:    Family History   Problem Relation Age of Onset     Diabetes Mother      Emphysema Mother      Cervical Cancer Mother      Diabetes Father      Hypertension Father      Hypertension Maternal Grandmother      Diabetes Maternal Grandmother      Schizophrenia Maternal Grandmother      Unknown/Adopted Maternal Grandfather      Lung Cancer Paternal Grandmother      Diabetes Paternal Grandmother      Unknown/Adopted Paternal Grandfather        Social History:  Marital Status:  Single [1]  Social History     Tobacco Use     Smoking status: Current Every Day Smoker     Packs/day: 0.75     Years: 28.00     Pack years: 21.00     Types: Cigarettes     Start date: 1/1/1989      Smokeless tobacco: Never Used   Substance Use Topics     Alcohol use: Yes     Comment: occasional     Drug use: No        Medications:      albuterol (PROAIR HFA/PROVENTIL HFA/VENTOLIN HFA) 108 (90 Base) MCG/ACT inhaler   amLODIPine (NORVASC) 10 MG tablet   metoprolol tartrate (LOPRESSOR) 25 MG tablet         Review of Systems   Constitutional: Negative for chills and fever.   Respiratory: Negative for cough and shortness of breath.    Gastrointestinal: Negative for abdominal pain, blood in stool, diarrhea, nausea and vomiting.   Endocrine: Positive for polyuria.   Genitourinary: Positive for urgency and vaginal discharge. Negative for decreased urine volume, dyspareunia, dysuria, flank pain, hematuria, menstrual problem, pelvic pain, vaginal bleeding and vaginal pain.   Musculoskeletal: Negative for back pain and myalgias.       Physical Exam   BP: 132/86  Pulse: 66  Temp: 97.6  F (36.4  C)  Resp: 16  SpO2: 99 %      Physical Exam   Constitutional: She is oriented to person, place, and time. She appears well-developed and well-nourished. No distress.   Eyes: Pupils are equal, round, and reactive to light.   Cardiovascular: Normal rate, regular rhythm and normal heart sounds.   Pulmonary/Chest: Effort normal and breath sounds normal. No respiratory distress.   Abdominal: Soft. There is no tenderness.   Neurological: She is alert and oriented to person, place, and time.   Nursing note and vitals reviewed.      ED Course        Procedures              Critical Care time:               No results found for this or any previous visit (from the past 24 hour(s)).    Medications - No data to display    Assessments & Plan (with Medical Decision Making)     I have reviewed the nursing notes.    I have reviewed the findings, diagnosis, plan and need for follow up with the patient.  1.  Increased urinary frequency  #2.  Vaginal discharge    Discussed exam findings as well as results from UA and wet prep with patient.  I would  like patient to continue to monitor her symptoms and if no improvement in the next 5-7 days, follow-up with primary care provider.  Patient verbalizes understanding and agreement of plan.       Medication List      There are no discharge medications for this visit.         Final diagnoses:   Urinary frequency   Vaginal discharge       4/5/2019   [facility]     Esvin Gallo PA-C  04/05/19 [id number]       Esvin Gallo PA-C  04/13/19 [id number]

## 2019-05-30 ENCOUNTER — NURSE TRIAGE (OUTPATIENT)
Dept: FAMILY MEDICINE | Facility: OTHER | Age: 39
End: 2019-05-30

## 2019-05-30 NOTE — TELEPHONE ENCOUNTER
"Advised patient to UC/ER as all providers are booked.  States she doesn't feel it is that urgent and would like to be transferred to scheduling to make an appointment for next week.    Reason for Disposition    [1] MILD-MODERATE pain AND [2] constant AND [3] present > 2 hours    Additional Information    Negative: Shock suspected (e.g., cold/pale/clammy skin, too weak to stand, low BP, rapid pulse)    Negative: Difficult to awaken or acting confused (e.g., disoriented, slurred speech)    Negative: Passed out (i.e., lost consciousness, collapsed and was not responding)    Negative: Sounds like a life-threatening emergency to the triager    Negative: Chest pain    Negative: Pain is mainly in upper abdomen  (if needed ask: \"is it mainly above the belly button?\")    Negative: Followed an abdomen (stomach) injury    Negative: [1] Abdominal pain AND [2] pregnant < 20 weeks    Negative: [1] Abdominal pain AND [2] pregnant > 20 weeks    Negative: [1] Abdominal pain AND [2] postpartum < 1 month since delivery    Negative: [1] SEVERE pain (e.g., excruciating) AND [2] present > 1 hour    Negative: [1] SEVERE pain AND [2] age > 60    Negative: [1] Vomiting AND [2] contains red blood or black (\"coffee ground\") material  (Exception: few red streaks in vomit that only happened once)    Negative: Blood in bowel movements   (Exception: blood on surface of BM with constipation)    Negative: Black or tarry bowel movements  (Exception: chronic-unchanged  black-grey bowel movements AND is taking iron pills or Pepto-bismol)    Negative: Patient sounds very sick or weak to the triager    Answer Assessment - Initial Assessment Questions  1. LOCATION: \"Where does it hurt?\"       *lower abdomen left side**  2. RADIATION: \"Does the pain shoot anywhere else?\" (e.g., chest, back)      no  3. ONSET: \"When did the pain begin?\" (e.g., minutes, hours or days ago)       A couple months  4. SUDDEN: \"Gradual or sudden onset?\"      Cramping going on " "today with discharge, yellowish in color  5. PATTERN \"Does the pain come and go, or is it constant?\"     - If constant: \"Is it getting better, staying the same, or worsening?\"       (Note: Constant means the pain never goes away completely; most serious pain is constant and it progresses)      - If intermittent: \"How long does it last?\" \"Do you have pain now?\"      (Note: Intermittent means the pain goes away completely between bouts)      yes  6. SEVERITY: \"How bad is the pain?\"  (e.g., Scale 1-10; mild, moderate, or severe)    - MILD (1-3): doesn't interfere with normal activities, abdomen soft and not tender to touch     - MODERATE (4-7): interferes with normal activities or awakens from sleep, tender to touch     - SEVERE (8-10): excruciating pain, doubled over, unable to do any normal activities       4/10  7. RECURRENT SYMPTOM: \"Have you ever had this type of abdominal pain before?\" If so, ask: \"When was the last time?\" and \"What happened that time?\"       ongoing  8. CAUSE: \"What do you think is causing the abdominal pain?\"      Female problems  9. RELIEVING/AGGRAVATING FACTORS: \"What makes it better or worse?\" (e.g., movement, antacids, bowel movement)        10. OTHER SYMPTOMS: \"Has there been any vomiting, diarrhea, constipation, or urine problems?\"        no  11. PREGNANCY: \"Is there any chance you are pregnant?\" \"When was your last menstrual period?\"    Protocols used: ABDOMINAL PAIN - FEMALE-A-AH      "

## 2019-07-27 DIAGNOSIS — R05.9 COUGH: ICD-10-CM

## 2019-07-29 RX ORDER — ALBUTEROL SULFATE 90 UG/1
2 AEROSOL, METERED RESPIRATORY (INHALATION) EVERY 6 HOURS
Qty: 18 G | Refills: 0 | Status: SHIPPED | OUTPATIENT
Start: 2019-07-29 | End: 2020-01-22

## 2019-09-04 DIAGNOSIS — R07.89 ATYPICAL CHEST PAIN: ICD-10-CM

## 2019-09-04 DIAGNOSIS — R05.9 COUGH: ICD-10-CM

## 2019-09-04 DIAGNOSIS — I10 ESSENTIAL HYPERTENSION: ICD-10-CM

## 2019-09-06 RX ORDER — AMLODIPINE BESYLATE 10 MG/1
10 TABLET ORAL DAILY
Qty: 30 TABLET | Refills: 5 | Status: SHIPPED | OUTPATIENT
Start: 2019-09-06 | End: 2020-03-12

## 2019-09-06 RX ORDER — METOPROLOL TARTRATE 25 MG/1
25 TABLET, FILM COATED ORAL 2 TIMES DAILY
Qty: 60 TABLET | Refills: 5 | Status: SHIPPED | OUTPATIENT
Start: 2019-09-06 | End: 2020-03-12

## 2019-09-06 RX ORDER — ALBUTEROL SULFATE 90 UG/1
2 AEROSOL, METERED RESPIRATORY (INHALATION) EVERY 6 HOURS
Qty: 18 G | Refills: 0 | Status: SHIPPED | OUTPATIENT
Start: 2019-09-06 | End: 2020-01-13

## 2019-11-19 ENCOUNTER — OFFICE VISIT (OUTPATIENT)
Dept: FAMILY MEDICINE | Facility: OTHER | Age: 39
End: 2019-11-19
Attending: NURSE PRACTITIONER
Payer: COMMERCIAL

## 2019-11-19 VITALS
TEMPERATURE: 98.5 F | DIASTOLIC BLOOD PRESSURE: 72 MMHG | WEIGHT: 158 LBS | HEART RATE: 83 BPM | BODY MASS INDEX: 27.12 KG/M2 | SYSTOLIC BLOOD PRESSURE: 106 MMHG | OXYGEN SATURATION: 98 % | RESPIRATION RATE: 20 BRPM

## 2019-11-19 DIAGNOSIS — J01.90 ACUTE SINUSITIS WITH SYMPTOMS > 10 DAYS: ICD-10-CM

## 2019-11-19 DIAGNOSIS — G43.909 MIGRAINE WITHOUT STATUS MIGRAINOSUS, NOT INTRACTABLE, UNSPECIFIED MIGRAINE TYPE: Primary | ICD-10-CM

## 2019-11-19 PROCEDURE — 96372 THER/PROPH/DIAG INJ SC/IM: CPT

## 2019-11-19 PROCEDURE — G0463 HOSPITAL OUTPT CLINIC VISIT: HCPCS | Mod: 25

## 2019-11-19 PROCEDURE — G0463 HOSPITAL OUTPT CLINIC VISIT: HCPCS

## 2019-11-19 PROCEDURE — 99213 OFFICE O/P EST LOW 20 MIN: CPT | Performed by: NURSE PRACTITIONER

## 2019-11-19 RX ORDER — SUMATRIPTAN 25 MG/1
25 TABLET, FILM COATED ORAL
Qty: 18 TABLET | Refills: 3 | Status: SHIPPED | OUTPATIENT
Start: 2019-11-19 | End: 2020-08-05

## 2019-11-19 RX ORDER — KETOROLAC TROMETHAMINE 30 MG/ML
60 INJECTION, SOLUTION INTRAMUSCULAR; INTRAVENOUS ONCE
Status: COMPLETED | OUTPATIENT
Start: 2019-11-19 | End: 2019-11-19

## 2019-11-19 RX ADMIN — KETOROLAC TROMETHAMINE 60 MG: 60 INJECTION, SOLUTION INTRAMUSCULAR at 12:11

## 2019-11-19 ASSESSMENT — PAIN SCALES - GENERAL: PAINLEVEL: SEVERE PAIN (6)

## 2019-11-19 NOTE — NURSING NOTE
"Chief Complaint   Patient presents with     Headache       Initial /72 (BP Location: Left arm, Patient Position: Sitting, Cuff Size: Adult Regular)   Pulse 83   Temp 98.5  F (36.9  C) (Tympanic)   Resp 20   Wt 71.7 kg (158 lb)   SpO2 98%   BMI 27.12 kg/m   Estimated body mass index is 27.12 kg/m  as calculated from the following:    Height as of 3/6/19: 1.626 m (5' 4\").    Weight as of this encounter: 71.7 kg (158 lb).  Medication Reconciliation: complete  Shannan Schmitz LPN  "

## 2019-11-19 NOTE — NURSING NOTE
Clinic Administered Medication Documentation    MEDICATION LIST:   Injectable Medication Documentation    Patient was given Ketorolac Tromethamine (Toradol). Prior to medication administration, verified patients identity using patient s name and date of birth. Please see MAR and medication order for additional information. Patient instructed to remain in clinic for 15 minutes.      Was entire vial of medication used? Yes  Vial/Syringe: Single dose vial  Expiration Date:  7/1/2020  Lot number:-[id number]  Was this medication supplied by the patient? No

## 2019-11-19 NOTE — PROGRESS NOTES
Subjective     Kimberley Louis is a 39 year old female who presents to clinic today for the following health issues:    HPI   Headaches      Duration: on and off for the last 2 years, headache every day for 10 months    Description  Location: bilateral in the frontal area, bilateral in the temporal area, bilateral in the occipital area   Character: sharp pain, constant, tingly  Frequency:  daily  Duration:  All day long    Intensity:  severe, 5-6/10    Accompanying signs and symptoms:    Precipitating or Alleviating factors:  Nausea/vomiting: usually  Dizziness: occasionally  Weakness or numbness: no  Visual changes: flashing lights, blind spots (scotoma) and bad vision of right eye  Fever: no   Sinus or URI symptoms YES- can't breathe out of right side of nose sometimes, congestion, coughing     History  Head trauma: no   Family history of migraines: no  Previous tests for headaches: no  Neurologist evaluations: no  Able to do daily activities when headache present: YES- still goes to work but gets home and falls apart  Wake with headaches: YES  Daily pain medication use: YES- Excedrin and Ibuprofen 200mg  Any changes in: None    Precipitating or Alleviating factors (light/sound/sleep/caffeine): light and sound has been bothering her    Therapies tried and outcome: Excedrin and Ibuprofen (Advil, Motrin)    Outcome - not effective  Frequent/daily pain medication use: no    CT head 8/8/2018 normal           Patient Active Problem List   Diagnosis     Essential hypertension     H/O renal calculi     Enlarged kidney     ACP (advance care planning)     Advanced directives, counseling/discussion     Pure hypercholesterolemia     Tobacco abuse counseling     Tobacco abuse     Palpitations     Atypical chest pain     Dyspnea on exertion     Vein disorder     Migraine      Incomplete right bundle branch block     Kidney stone on left side     Left flank tenderness     Nephrocalcinosis     Personal history of tobacco use,  presenting hazards to health     Recurrent UTI     Past Surgical History:   Procedure Laterality Date     AS REMOVAL OF KIDNEY STONE       AS REMOVAL OF KIDNEY STONE       C REMOVAL OF KIDNEY STONE       TUBAL LIGATION         Social History     Tobacco Use     Smoking status: Current Every Day Smoker     Packs/day: 0.75     Years: 28.00     Pack years: 21.00     Types: Cigarettes     Start date: 1/1/1989     Smokeless tobacco: Never Used   Substance Use Topics     Alcohol use: Yes     Comment: occasional     Family History   Problem Relation Age of Onset     Diabetes Mother      Emphysema Mother      Cervical Cancer Mother      Throat cancer Mother      Diabetes Father      Hypertension Father      Hypertension Maternal Grandmother      Diabetes Maternal Grandmother      Schizophrenia Maternal Grandmother      Unknown/Adopted Maternal Grandfather      Lung Cancer Paternal Grandmother      Diabetes Paternal Grandmother      Unknown/Adopted Paternal Grandfather          Current Outpatient Medications   Medication Sig Dispense Refill     albuterol (PROAIR HFA/PROVENTIL HFA/VENTOLIN HFA) 108 (90 Base) MCG/ACT inhaler Inhale 2 puffs into the lungs every 6 hours 1 Inhaler 3     amLODIPine (NORVASC) 10 MG tablet Take 1 tablet (10 mg) by mouth daily 30 tablet 5     metoprolol tartrate (LOPRESSOR) 25 MG tablet Take 1 tablet (25 mg) by mouth 2 times daily 60 tablet 5     albuterol (PROAIR HFA/PROVENTIL HFA/VENTOLIN HFA) 108 (90 Base) MCG/ACT inhaler INHALE 2 PUFFS INTO THE LUNGS EVERY 6 HOURS (Patient not taking: Reported on 11/19/2019) 18 g 0     albuterol (PROAIR HFA/PROVENTIL HFA/VENTOLIN HFA) 108 (90 Base) MCG/ACT inhaler INHALE 2 PUFFS INTO THE LUNGS EVERY 6 HOURS (Patient not taking: Reported on 11/19/2019) 18 g 0     No Known Allergies  BP Readings from Last 3 Encounters:   11/19/19 106/72   04/05/19 132/86   03/06/19 120/84    Wt Readings from Last 3 Encounters:   11/19/19 71.7 kg (158 lb)   03/06/19 77.1 kg (170 lb)    02/28/19 77.1 kg (170 lb)                    Reviewed and updated as needed this visit by Provider         Review of Systems   ROS COMP: CONSTITUTIONAL:NEGATIVE for fever, chills, change in weight  INTEGUMENTARY/SKIN: rash on face comes and goes clearing with steroid cream   EYES: right eye blurry  ENT/MOUTH: sinus pressure  RESP:chronic cough smoker for 30 years  CV: NEGATIVE for chest pain, palpitations or peripheral edema  NEURO: chronic headaches right side consistent past 3 days      Objective    /72 (BP Location: Left arm, Patient Position: Sitting, Cuff Size: Adult Regular)   Pulse 83   Temp 98.5  F (36.9  C) (Tympanic)   Resp 20   Wt 71.7 kg (158 lb)   SpO2 98%   BMI 27.12 kg/m    Body mass index is 27.12 kg/m .  Physical Exam   GENERAL: alert and no distress  EYES: Eyes grossly normal to inspection, PERRL and conjunctivae and sclerae normal  HENT: normal cephalic/atraumatic, ear canals and TM's normal, nose and mouth without ulcers or lesions, oropharynx clear, oral mucous membranes moist and sinuses: maxillary, frontal tenderness on right, maxillary, frontal swelling on right  NECK: no adenopathy, no asymmetry, masses, or scars and thyroid normal to palpation  RESP: lungs clear to auscultation - no rales, rhonchi or wheezes  CV: regular rate and rhythm, normal S1 S2, no S3 or S4, no murmur, click or rub, no peripheral edema and peripheral pulses strong  ABDOMEN: soft, nontender, no hepatosplenomegaly, no masses and bowel sounds normal  SKIN: no suspicious lesions or rashes  NEURO: Normal strength and tone, sensory exam grossly normal, mentation intact and cranial nerves 2-12 intact  PSYCH: mentation appears normal, affect normal/bright  LYMPH: normal ant/post cervical, supraclavicular nodes    Diagnostic Test Results:  none         Assessment & Plan     1. Migraine without status migrainosus, not intractable, unspecified migraine type  Did have some relief with Toradol and compazine.     Migraine vs sinus headache  Plan to give her imitrex to use as needed. Starting with a low dose.   - ketorolac (TORADOL) injection 60 mg  - prochlorperazine (COMPAZINE) injection 10 mg  - SUMAtriptan (IMITREX) 25 MG tablet; Take 1 tablet (25 mg) by mouth at onset of headache for migraine May repeat in 2 hours. Max 8 tablets/24 hours.  Dispense: 18 tablet; Refill: 3    2. Acute sinusitis with symptoms > 10 days  With worsening sinus pressure and swelling plan to give antibiotic. Discussed mediation and side effect. She has had medication in the past and tolerated it.   - amoxicillin-clavulanate (AUGMENTIN) 875-125 MG tablet; Take 1 tablet by mouth 2 times daily  Dispense: 20 tablet; Refill: 0     Tobacco Cessation:   reports that she has been smoking cigarettes. She started smoking about 30 years ago. She has a 21.00 pack-year smoking history. She has never used smokeless tobacco.  Tobacco Cessation Action Plan: Information offered: Patient not interested at this time        Due for physical and pap with HPV  Testing. Will call to schedule in the near future.   See Patient Instructions    Return for Physical Exam.    ZAHEER [place] - [place]

## 2019-11-19 NOTE — NURSING NOTE
Clinic Administered Medication Documentation    MEDICATION LIST:   Injectable Medication Documentation    Patient was given Compazine. Prior to medication administration, verified patients identity using patient s name and date of birth. Please see MAR and medication order for additional information. Patient instructed to remain in clinic for 15 minutes.      Was entire vial of medication used? Yes  Vial/Syringe: Single dose vial  Expiration Date:  11/2019  Lot number: [id number]  Was this medication supplied by the patient? No

## 2019-11-29 NOTE — DISCHARGE INSTRUCTIONS
Take Tamiflu as ordered.   Take Zofran as needed for nausea.   Increase fluid intake.   Take tylenol and or ibuprofen for fever or pain.   Follow up with PCP with any increase in symptoms or concerns.  Return to urgent care or emergency department with any increase in symptoms or concerns.    Satisfactory

## 2020-01-16 NOTE — PROGRESS NOTES
SUBJECTIVE:   CC: Kimberley Louis is an 40 year old woman who presents for preventive health visit.     Healthy Habits:    Do you get at least three servings of calcium containing foods daily (dairy, green leafy vegetables, etc.)? NO    Amount of exercise or daily activities, outside of work: 1 day(s) per week    Problems taking medications regularly No    Medication side effects: No    Have you had an eye exam in the past two years? yes    Do you see a dentist twice per year? no    Do you have sleep apnea, excessive snoring or daytime drowsiness?yes      Sinus SYMPTOMS      Duration: 1 year    Description  nasal congestion, facial pain/pressure, headache and fatigue/malaise    Severity: moderate    Accompanying signs and symptoms: None    History (predisposing factors):  tobacco abuse    Precipitating or alleviating factors: None    Therapies tried and outcome:  OTC NSAID. Occasional sudafed when going to bed      Today's PHQ-2 Score:   PHQ-2 ( 1999 Pfizer) 3/6/2019   Q1: Little interest or pleasure in doing things 0   Q2: Feeling down, depressed or hopeless 0   PHQ-2 Score 0       Abuse: Current or Past(Physical, Sexual or Emotional)- No  Do you feel safe in your environment? Yes        Social History     Tobacco Use     Smoking status: Current Every Day Smoker     Packs/day: 0.75     Years: 28.00     Pack years: 21.00     Types: Cigarettes     Start date: 1/1/1989     Smokeless tobacco: Never Used   Substance Use Topics     Alcohol use: Yes     Comment: occasional     If you drink alcohol do you typically have >3 drinks per day or >7 drinks per week? No                     Reviewed orders with patient.  Reviewed health maintenance and updated orders accordingly - Yes  Lab work is in process  Labs reviewed in EPIC  BP Readings from Last 3 Encounters:   01/22/20 122/72   11/19/19 106/72   04/05/19 132/86    Wt Readings from Last 3 Encounters:   01/22/20 76.8 kg (169 lb 6.4 oz)   11/19/19 71.7 kg (158 lb)    03/06/19 77.1 kg (170 lb)                  Current Outpatient Medications   Medication Sig Dispense Refill     albuterol (PROAIR HFA/PROVENTIL HFA/VENTOLIN HFA) 108 (90 Base) MCG/ACT inhaler Inhale 2 puffs into the lungs every 6 hours 1 Inhaler 3     amLODIPine (NORVASC) 10 MG tablet Take 1 tablet (10 mg) by mouth daily 30 tablet 5     metoprolol tartrate (LOPRESSOR) 25 MG tablet Take 1 tablet (25 mg) by mouth 2 times daily 60 tablet 5     SUMAtriptan (IMITREX) 25 MG tablet Take 1 tablet (25 mg) by mouth at onset of headache for migraine May repeat in 2 hours. Max 8 tablets/24 hours. (Patient not taking: Reported on 1/22/2020) 18 tablet 3     No Known Allergies  Recent Labs   Lab Test 08/08/18  1627 01/03/18  1152 12/11/17  0937  05/18/16  1007  11/12/15  1233   A1C  --  5.4  --   --   --   --  5.2   LDL  --  181*  --   --  154*  --   --    HDL  --  52  --   --  39*  --   --    TRIG  --  159*  --   --  259*  --   --    ALT  --   --  25  --  24  --  21   CR 0.65  --  0.69   < > 0.62  --  0.84   GFRESTIMATED >90  --  >90   < > >90  Non  GFR Calc    --  76   GFRESTBLACK >90  --  >90   < > >90  African American GFR Calc    --  >90   GFR Calc     POTASSIUM 3.9  --  4.0   < > 3.9  --  3.8   TSH 2.12  --  1.18  --  2.35   < >  --     < > = values in this interval not displayed.        Mammogram Screening: Patient under age 50, mutual decision reflected in health maintenance.      Pertinent mammograms are reviewed under the imaging tab.  History of abnormal Pap smear:   Last 3 Pap and HPV Results:   PAP / HPV 5/5/2016   PAP NIL     PAP / HPV 5/5/2016   PAP NIL     Reviewed and updated as needed this visit by clinical staff  Tobacco  Allergies  Meds  Med Hx  Surg Hx  Fam Hx  Soc Hx        Reviewed and updated as needed this visit by Provider          Past Medical History:   Diagnosis Date     Enlarged kidney 11/19/2015     Essential hypertension 11/19/2015     H/O renal calculi  "11/19/2015     H/O renal calculi 11/19/2015      Past Surgical History:   Procedure Laterality Date     AS REMOVAL OF KIDNEY STONE       AS REMOVAL OF KIDNEY STONE       C REMOVAL OF KIDNEY STONE       TUBAL LIGATION       OB History   No obstetric history on file.       ROS:  CONSTITUTIONAL: NEGATIVE for fever, chills, change in weight  INTEGUMENTARU/SKIN: positive for facial eczema very irritated and warm, moles or lesions  EYES: NEGATIVE for vision changes or irritation  ENT: facial pain and pressure on right side of her face. Maxillary in frontal area   RESP: NEGATIVE for significant cough or SOB  BREAST: NEGATIVE for masses, tenderness or discharge  CV: NEGATIVE for chest pain, palpitations or peripheral edema  GI: NEGATIVE for nausea, abdominal pain, heartburn, or change in bowel habits  : NEGATIVE for unusual urinary or vaginal symptoms. Periods are regular.  MUSCULOSKELETAL: NEGATIVE for significant arthralgias or myalgia  NEURO: NEGATIVE for weakness, dizziness or paresthesias  PSYCHIATRIC: NEGATIVE for changes in mood or affect   (female): LMP first of the month, lasted 7 bleeding irregular and heavy. Fatigued. Huge blood clots. Low back pain. Lots of nausea.   OBJECTIVE:   /72 (BP Location: Right arm, Patient Position: Chair, Cuff Size: Adult Regular)   Pulse 80   Temp 98.1  F (36.7  C) (Tympanic)   Resp 20   Ht 1.651 m (5' 5\")   Wt 76.8 kg (169 lb 6.4 oz)   LMP 01/01/2020   SpO2 99%   BMI 28.19 kg/m    EXAM:  GENERAL: healthy, alert and no distress  EYES: Eyes grossly normal to inspection, PERRL and conjunctivae and sclerae normal  HENT: ear canals and TM's normal, nose and mouth without ulcers or lesions  NECK: no adenopathy, no asymmetry, masses, or scars and thyroid normal to palpation  RESP: lungs clear to auscultation - no rales, rhonchi or wheezes  BREAST: normal without masses, tenderness or nipple discharge and no palpable axillary masses or adenopathy  CV: regular rate and " rhythm, normal S1 S2, no S3 or S4, no murmur, click or rub, no peripheral edema and peripheral pulses strong  ABDOMEN: soft, nontender, no hepatosplenomegaly, no masses and bowel sounds normal   (female): normal female external genitalia, normal urethral meatus, vaginal mucosa pink, moist, well rugated, and normal cervix/adnexa/uterus without masses or discharge  Pap taken and HPV wet prep done.   MS: no gross musculoskeletal defects noted, no edema  SKIN: no suspicious lesions or rashes  NEURO: Normal strength and tone, mentation intact and speech normal  PSYCH: mentation appears normal, affect normal/bright  LYMPH: no cervical, supraclavicular, axillary, or inguinal adenopathy    Diagnostic Test Results:  Labs reviewed in Epic  Results for orders placed or performed in visit on 01/22/20   Wet prep     Status: Abnormal   Result Value Ref Range    Specimen Description Vagina     Wet Prep Moderate  WBC'S seen       Wet Prep Clue cells seen (A)     Wet Prep No yeast seen     Wet Prep No Trichomonas seen        ASSESSMENT/PLAN:   1. Routine general medical examination at a health care facility  She is due for a pap smear and also due for below. Mammogram is recommended.  Will be doing fasting labs. Noted enalrged kidney in past.  Will be getting ultrasound on this.   - PNEUMOCOCCAL VACCINE,ADULT,SQ OR IM  - Lipid Profile; Future  - Comprehensive metabolic panel; Future  - HPV High Risk Types DNA Cervical  - A pap thin layer screen with  HPV - recommended age 30 - 65 years (select HPV order below)    2. Menorrhagia with irregular cycle  Checking labs see if this causing any secondary problems. recommended see OB  If all testing normal.   - TSH with free T4 reflex; Future  - CBC with platelets differential; Future  - UA with Microscopic reflex to Culture; Future  - Wet prep    3. Facial eczema  Trying to avoid any steroid on her face. Will be given and recheck 3 months.   - pimecrolimus (ELIDEL) 1 % external cream;  "Apply topically 2 times daily  Dispense: 60 g; Refill: 1    4. Enlarged kidney  Due for recheck on this. Referral if needed.   - Comprehensive metabolic panel; Future  - CBC with platelets differential; Future  - UA with Microscopic reflex to Culture; Future  - US Renal Complete; Future    5. Chronic maxillary sinusitis  Can't clear her sinus over the last year. discussion on use of nasal sprays.   - OTOLARYNGOLOGY REFERRAL    6. BV (bacterial vaginosis)  Returns all the time. Again is present. Will treated orally hasn't tried this   - clindamycin (CLEOCIN) 150 MG capsule; Take 1 capsule (150 mg) by mouth 3 times daily  Dispense: 30 capsule; Refill: 3    COUNSELING:   Reviewed preventive health counseling, as reflected in patient instructions       Regular exercise       Healthy diet/nutrition       Vision screening       Immunizations    Vaccinated for: Pneumococcal             Contraception       Advance Care Planning    Estimated body mass index is 28.19 kg/m  as calculated from the following:    Height as of this encounter: 1.651 m (5' 5\").    Weight as of this encounter: 76.8 kg (169 lb 6.4 oz).         reports that she has been smoking cigarettes. She started smoking about 31 years ago. She has a 21.00 pack-year smoking history. She has never used smokeless tobacco.  Tobacco Cessation Action Plan: Information offered: Patient not interested at this time    Counseling Resources:  ATP IV Guidelines  Pooled Cohorts Equation Calculator  Breast Cancer Risk Calculator  FRAX Risk Assessment  ICSI Preventive Guidelines  Dietary Guidelines for Americans, 2010  USDA's MyPlate  ASA Prophylaxis  Lung CA Screening    TOMER Dean  [facility] - [place]  "

## 2020-01-22 ENCOUNTER — OFFICE VISIT (OUTPATIENT)
Dept: FAMILY MEDICINE | Facility: OTHER | Age: 40
End: 2020-01-22
Attending: PHYSICIAN ASSISTANT
Payer: COMMERCIAL

## 2020-01-22 VITALS
OXYGEN SATURATION: 99 % | TEMPERATURE: 98.1 F | RESPIRATION RATE: 20 BRPM | SYSTOLIC BLOOD PRESSURE: 122 MMHG | BODY MASS INDEX: 28.22 KG/M2 | WEIGHT: 169.4 LBS | HEART RATE: 80 BPM | HEIGHT: 65 IN | DIASTOLIC BLOOD PRESSURE: 72 MMHG

## 2020-01-22 DIAGNOSIS — N76.0 BV (BACTERIAL VAGINOSIS): ICD-10-CM

## 2020-01-22 DIAGNOSIS — Z00.00 ROUTINE GENERAL MEDICAL EXAMINATION AT A HEALTH CARE FACILITY: Primary | ICD-10-CM

## 2020-01-22 DIAGNOSIS — R93.41 RENAL PELVIS ENLARGED ON ULTRASOUND: ICD-10-CM

## 2020-01-22 DIAGNOSIS — B96.89 BV (BACTERIAL VAGINOSIS): ICD-10-CM

## 2020-01-22 DIAGNOSIS — L30.9 FACIAL ECZEMA: ICD-10-CM

## 2020-01-22 DIAGNOSIS — J32.0 CHRONIC MAXILLARY SINUSITIS: ICD-10-CM

## 2020-01-22 DIAGNOSIS — N28.81 ENLARGED KIDNEY: ICD-10-CM

## 2020-01-22 DIAGNOSIS — N92.1 MENORRHAGIA WITH IRREGULAR CYCLE: ICD-10-CM

## 2020-01-22 LAB
SPECIMEN SOURCE: ABNORMAL
WET PREP SPEC: ABNORMAL

## 2020-01-22 PROCEDURE — 99396 PREV VISIT EST AGE 40-64: CPT | Performed by: PHYSICIAN ASSISTANT

## 2020-01-22 PROCEDURE — G0463 HOSPITAL OUTPT CLINIC VISIT: HCPCS

## 2020-01-22 PROCEDURE — 99213 OFFICE O/P EST LOW 20 MIN: CPT | Mod: 25 | Performed by: PHYSICIAN ASSISTANT

## 2020-01-22 PROCEDURE — 87210 SMEAR WET MOUNT SALINE/INK: CPT | Mod: ZL | Performed by: PHYSICIAN ASSISTANT

## 2020-01-22 PROCEDURE — G0123 SCREEN CERV/VAG THIN LAYER: HCPCS | Mod: ZL | Performed by: PHYSICIAN ASSISTANT

## 2020-01-22 PROCEDURE — G0476 HPV COMBO ASSAY CA SCREEN: HCPCS | Mod: ZL | Performed by: PHYSICIAN ASSISTANT

## 2020-01-22 PROCEDURE — 87624 HPV HI-RISK TYP POOLED RSLT: CPT | Mod: ZL | Performed by: PHYSICIAN ASSISTANT

## 2020-01-22 RX ORDER — PIMECROLIMUS 10 MG/G
CREAM TOPICAL 2 TIMES DAILY
Qty: 60 G | Refills: 1 | Status: SHIPPED | OUTPATIENT
Start: 2020-01-22 | End: 2020-01-29

## 2020-01-22 RX ORDER — CLINDAMYCIN HCL 150 MG
150 CAPSULE ORAL 3 TIMES DAILY
Qty: 30 CAPSULE | Refills: 3 | Status: SHIPPED | OUTPATIENT
Start: 2020-01-22 | End: 2020-02-07

## 2020-01-22 ASSESSMENT — MIFFLIN-ST. JEOR: SCORE: 1439.27

## 2020-01-22 ASSESSMENT — PAIN SCALES - GENERAL: PAINLEVEL: MODERATE PAIN (4)

## 2020-01-22 NOTE — NURSING NOTE
"Chief Complaint   Patient presents with     Physical       Initial /72 (BP Location: Right arm, Patient Position: Chair, Cuff Size: Adult Regular)   Pulse 80   Temp 98.1  F (36.7  C) (Tympanic)   Resp 20   Ht 1.651 m (5' 5\")   Wt 76.8 kg (169 lb 6.4 oz)   LMP 01/01/2020   SpO2 99%   BMI 28.19 kg/m   Estimated body mass index is 28.19 kg/m  as calculated from the following:    Height as of this encounter: 1.651 m (5' 5\").    Weight as of this encounter: 76.8 kg (169 lb 6.4 oz).  Medication Reconciliation: complete  Aliya Aggarwal LPN    "

## 2020-01-23 ENCOUNTER — HOSPITAL ENCOUNTER (OUTPATIENT)
Dept: ULTRASOUND IMAGING | Facility: HOSPITAL | Age: 40
Discharge: HOME | End: 2020-01-23
Attending: PHYSICIAN ASSISTANT | Admitting: PHYSICIAN ASSISTANT
Payer: COMMERCIAL

## 2020-01-23 DIAGNOSIS — N28.81 ENLARGED KIDNEY: ICD-10-CM

## 2020-01-23 DIAGNOSIS — N92.1 MENORRHAGIA WITH IRREGULAR CYCLE: ICD-10-CM

## 2020-01-23 DIAGNOSIS — Z00.00 ROUTINE GENERAL MEDICAL EXAMINATION AT A HEALTH CARE FACILITY: ICD-10-CM

## 2020-01-23 LAB
ALBUMIN SERPL-MCNC: 4.3 G/DL (ref 3.4–5)
ALBUMIN UR-MCNC: NEGATIVE MG/DL
ALP SERPL-CCNC: 79 U/L (ref 40–150)
ALT SERPL W P-5'-P-CCNC: 23 U/L (ref 0–50)
ANION GAP SERPL CALCULATED.3IONS-SCNC: 7 MMOL/L (ref 3–14)
APPEARANCE UR: CLEAR
AST SERPL W P-5'-P-CCNC: 8 U/L (ref 0–45)
BACTERIA #/AREA URNS HPF: ABNORMAL /HPF
BASOPHILS # BLD AUTO: 0.1 10E9/L (ref 0–0.2)
BASOPHILS NFR BLD AUTO: 0.7 %
BILIRUB SERPL-MCNC: 0.7 MG/DL (ref 0.2–1.3)
BILIRUB UR QL STRIP: NEGATIVE
BUN SERPL-MCNC: 10 MG/DL (ref 7–30)
CALCIUM SERPL-MCNC: 8.9 MG/DL (ref 8.5–10.1)
CHLORIDE SERPL-SCNC: 106 MMOL/L (ref 94–109)
CHOLEST SERPL-MCNC: 288 MG/DL
CO2 SERPL-SCNC: 24 MMOL/L (ref 20–32)
COLOR UR AUTO: ABNORMAL
CREAT SERPL-MCNC: 0.58 MG/DL (ref 0.52–1.04)
DIFFERENTIAL METHOD BLD: ABNORMAL
EOSINOPHIL # BLD AUTO: 0.3 10E9/L (ref 0–0.7)
EOSINOPHIL NFR BLD AUTO: 2.5 %
ERYTHROCYTE [DISTWIDTH] IN BLOOD BY AUTOMATED COUNT: 13.6 % (ref 10–15)
GFR SERPL CREATININE-BSD FRML MDRD: >90 ML/MIN/{1.73_M2}
GLUCOSE SERPL-MCNC: 85 MG/DL (ref 70–99)
GLUCOSE UR STRIP-MCNC: NEGATIVE MG/DL
HCT VFR BLD AUTO: 42.4 % (ref 35–47)
HDLC SERPL-MCNC: 41 MG/DL
HGB BLD-MCNC: 14.2 G/DL (ref 11.7–15.7)
HGB UR QL STRIP: ABNORMAL
IMM GRANULOCYTES # BLD: 0 10E9/L (ref 0–0.4)
IMM GRANULOCYTES NFR BLD: 0.2 %
KETONES UR STRIP-MCNC: NEGATIVE MG/DL
LDLC SERPL CALC-MCNC: 203 MG/DL
LEUKOCYTE ESTERASE UR QL STRIP: ABNORMAL
LYMPHOCYTES # BLD AUTO: 3.2 10E9/L (ref 0.8–5.3)
LYMPHOCYTES NFR BLD AUTO: 26.4 %
MCH RBC QN AUTO: 29.2 PG (ref 26.5–33)
MCHC RBC AUTO-ENTMCNC: 33.5 G/DL (ref 31.5–36.5)
MCV RBC AUTO: 87 FL (ref 78–100)
MONOCYTES # BLD AUTO: 0.8 10E9/L (ref 0–1.3)
MONOCYTES NFR BLD AUTO: 6.7 %
MUCOUS THREADS #/AREA URNS LPF: PRESENT /LPF
NEUTROPHILS # BLD AUTO: 7.7 10E9/L (ref 1.6–8.3)
NEUTROPHILS NFR BLD AUTO: 63.5 %
NITRATE UR QL: NEGATIVE
NONHDLC SERPL-MCNC: 247 MG/DL
NRBC # BLD AUTO: 0 10*3/UL
NRBC BLD AUTO-RTO: 0 /100
PH UR STRIP: 6 PH (ref 4.7–8)
PLATELET # BLD AUTO: 420 10E9/L (ref 150–450)
POTASSIUM SERPL-SCNC: 3.6 MMOL/L (ref 3.4–5.3)
PROT SERPL-MCNC: 7.7 G/DL (ref 6.8–8.8)
RBC # BLD AUTO: 4.86 10E12/L (ref 3.8–5.2)
RBC #/AREA URNS AUTO: 2 /HPF (ref 0–2)
SODIUM SERPL-SCNC: 137 MMOL/L (ref 133–144)
SOURCE: ABNORMAL
SP GR UR STRIP: 1.01 (ref 1–1.03)
SQUAMOUS #/AREA URNS AUTO: 3 /HPF (ref 0–1)
TRIGL SERPL-MCNC: 220 MG/DL
TSH SERPL DL<=0.005 MIU/L-ACNC: 1.63 MU/L (ref 0.4–4)
UROBILINOGEN UR STRIP-MCNC: NORMAL MG/DL (ref 0–2)
WBC # BLD AUTO: 12.2 10E9/L (ref 4–11)
WBC #/AREA URNS AUTO: 9 /HPF (ref 0–5)

## 2020-01-23 PROCEDURE — 81001 URINALYSIS AUTO W/SCOPE: CPT | Mod: ZL | Performed by: PHYSICIAN ASSISTANT

## 2020-01-23 PROCEDURE — 85025 COMPLETE CBC W/AUTO DIFF WBC: CPT | Mod: ZL | Performed by: PHYSICIAN ASSISTANT

## 2020-01-23 PROCEDURE — 80061 LIPID PANEL: CPT | Mod: ZL | Performed by: PHYSICIAN ASSISTANT

## 2020-01-23 PROCEDURE — 84443 ASSAY THYROID STIM HORMONE: CPT | Mod: ZL | Performed by: PHYSICIAN ASSISTANT

## 2020-01-23 PROCEDURE — 80053 COMPREHEN METABOLIC PANEL: CPT | Mod: ZL | Performed by: PHYSICIAN ASSISTANT

## 2020-01-23 PROCEDURE — 36415 COLL VENOUS BLD VENIPUNCTURE: CPT | Mod: ZL | Performed by: PHYSICIAN ASSISTANT

## 2020-01-23 PROCEDURE — 76770 US EXAM ABDO BACK WALL COMP: CPT | Mod: TC

## 2020-01-27 ENCOUNTER — TELEPHONE (OUTPATIENT)
Dept: FAMILY MEDICINE | Facility: OTHER | Age: 40
End: 2020-01-27

## 2020-01-27 DIAGNOSIS — L30.9 FACIAL DERMATITIS: Primary | ICD-10-CM

## 2020-01-27 NOTE — TELEPHONE ENCOUNTER
Received a PA from [employer] for Pimecrolimus 1% cream. Submitted on CMM. Waiting for a response.

## 2020-01-28 LAB
COPATH REPORT: NORMAL
PAP: NORMAL

## 2020-01-29 RX ORDER — TRIAMCINOLONE ACETONIDE 0.25 MG/G
OINTMENT TOPICAL 2 TIMES DAILY PRN
Qty: 15 G | Refills: 0 | Status: SHIPPED | OUTPATIENT
Start: 2020-01-29 | End: 2023-02-14

## 2020-01-30 LAB
FINAL DIAGNOSIS: NORMAL
HPV HR 12 DNA CVX QL NAA+PROBE: NEGATIVE
HPV16 DNA SPEC QL NAA+PROBE: NEGATIVE
HPV18 DNA SPEC QL NAA+PROBE: NEGATIVE
SPECIMEN DESCRIPTION: NORMAL
SPECIMEN SOURCE CVX/VAG CYTO: NORMAL

## 2020-02-07 ENCOUNTER — OFFICE VISIT (OUTPATIENT)
Dept: OTOLARYNGOLOGY | Facility: OTHER | Age: 40
End: 2020-02-07
Attending: PHYSICIAN ASSISTANT
Payer: COMMERCIAL

## 2020-02-07 VITALS
RESPIRATION RATE: 16 BRPM | SYSTOLIC BLOOD PRESSURE: 122 MMHG | DIASTOLIC BLOOD PRESSURE: 68 MMHG | TEMPERATURE: 97.9 F | OXYGEN SATURATION: 98 % | HEART RATE: 87 BPM

## 2020-02-07 DIAGNOSIS — R51.9 FACIAL PAIN: Primary | ICD-10-CM

## 2020-02-07 DIAGNOSIS — J32.0 CHRONIC MAXILLARY SINUSITIS: ICD-10-CM

## 2020-02-07 PROCEDURE — 31231 NASAL ENDOSCOPY DX: CPT | Performed by: NURSE PRACTITIONER

## 2020-02-07 PROCEDURE — 99213 OFFICE O/P EST LOW 20 MIN: CPT | Mod: 25 | Performed by: NURSE PRACTITIONER

## 2020-02-07 PROCEDURE — G0463 HOSPITAL OUTPT CLINIC VISIT: HCPCS | Mod: 25

## 2020-02-07 RX ORDER — BUDESONIDE 0.5 MG/2ML
INHALANT ORAL
Qty: 1 BOX | Refills: 3 | Status: SHIPPED | OUTPATIENT
Start: 2020-02-07 | End: 2020-07-16

## 2020-02-07 ASSESSMENT — PAIN SCALES - GENERAL: PAINLEVEL: MODERATE PAIN (4)

## 2020-02-07 NOTE — PATIENT INSTRUCTIONS
Budesonide rinses twice a day  Zyrtec 10 mg daily  CT sinus          Thank you for allowing Solange Bianchi NP and our ENT team to participate in your care.  If your medications are too expensive, please give the nurse a call.  We can possibly change this medication.  If you have a scheduling or an appointment question please contact our Health Unit Coordinator at their direct line [phone].   ALL nursing questions or concerns can be directed to your ENT nurse at: [phone] Fabiola Davila

## 2020-02-07 NOTE — LETTER
2/7/2020         RE: Kimberley Louis  [address] [facility] [address]        Dear Colleague,    Thank you for referring your patient, Kimberley Louis, to the [facility]. Please see a copy of my visit note below.    Otolaryngology Note         Chief Complaint:     Patient presents with:  Sinusitis: Patient referred by Xiomara JEFF for chronic maxillary sinusitis            History of Present Illness:     Kimberley Louis presents with chief complaint of right sided facial pain for the past 1 year  Symptoms have been stable over the past year  Symptoms include right sided facial pain, pressure, nasal congestion.   Patient is  having facial pain or pressure  Treatments have included several courses of augmentin, cefdinir, clindamycin, prednisone, toradol  She is a current 1 ppd smoker, she is not interested in quitting at this time  Is able to breath thru nares, right feels more plugged than left  No history of sinus injury/trauma/surgery  No leonides PND.    No dysphonia, dysphagia.   Occasional concerns with reflux, 1-2 times per month  No history of strep/ tonsillitis.   She has had bronchitis and pneumonia in thpast  History of COM, ear surgeries  No history of leonides seasonal allergies  No family history of allergies/atopy  No prior allergy testing.    She does have a history of migraines.  She has not had one in 2-3 months since she had toradol IM in clinic.  She was having them every 2-3 days that lasted for 2-3 days.  Migraines were right sided on the top of her head and neck with vision changes on the right side.             Medications:     Current Outpatient Rx   Medication Sig Dispense Refill     albuterol (PROAIR HFA/PROVENTIL HFA/VENTOLIN HFA) 108 (90 Base) MCG/ACT inhaler Inhale 2 puffs into the lungs every 6 hours 1 Inhaler 3     amLODIPine (NORVASC) 10 MG tablet Take 1 tablet (10 mg) by mouth daily 30 tablet 5     metoprolol tartrate (LOPRESSOR) 25 MG tablet Take 1  tablet (25 mg) by mouth 2 times daily 60 tablet 5     triamcinolone (KENALOG) 0.025 % external ointment Apply topically 2 times daily as needed for irritation 15 g 0     SUMAtriptan (IMITREX) 25 MG tablet Take 1 tablet (25 mg) by mouth at onset of headache for migraine May repeat in 2 hours. Max 8 tablets/24 hours. (Patient not taking: Reported on 2/7/2020) 18 tablet 3            Allergies:     Allergies: Patient has no known allergies.          Past Medical History:     Past Medical History:   Diagnosis Date     Enlarged kidney 11/19/2015     Essential hypertension 11/19/2015     H/O renal calculi 11/19/2015     H/O renal calculi 11/19/2015            Past Surgical History:     Past Surgical History:   Procedure Laterality Date     AS REMOVAL OF KIDNEY STONE       AS REMOVAL OF KIDNEY STONE       C REMOVAL OF KIDNEY STONE       TUBAL LIGATION         ENT family history reviewed         Social History:     Social History     Tobacco Use     Smoking status: Current Every Day Smoker     Packs/day: 0.75     Years: 28.00     Pack years: 21.00     Types: Cigarettes     Start date: 1/1/1989     Smokeless tobacco: Never Used   Substance Use Topics     Alcohol use: Yes     Comment: occasional     Drug use: No            Review of Systems:     ROS: See HPI         Physical Exam:     /68   Pulse 87   Temp 97.9  F (36.6  C) (Tympanic)   Resp 16   SpO2 98%     General - The patient is well nourished and well developed, and appears to have good nutritional status.  Alert and oriented to person and place, answers questions and cooperates with examination appropriately.   Head and Face - Normocephalic and atraumatic, with no gross asymmetry noted.  The facial nerve is intact, with strong symmetric movements.  Voice and Breathing - The patient was breathing comfortably without the use of accessory muscles. There was no wheezing, stridor, or stertor.  The patients voice was clear and strong, and had appropriate pitch and  quality.  Ears -The external auditory canals are patent, the tympanic membranes are intact without effusion, retraction or mass.  Bony landmarks are intact.  Eyes - Extraocular movements intact, and the pupils were reactive to light.  Sclera were not icteric or injected, conjunctiva were pink and moist.  Mouth - Examination of the oral cavity showed pink, healthy oral mucosa. No lesions or ulcerations noted.  The tongue was mobile and midline, and the dentition were in good condition.    Throat - The walls of the oropharynx were smooth, pink, moist, symmetric, and had no lesions or ulcerations.  The tonsillar pillars and soft palate were symmetric.  The uvula was midline on elevation.    Neck - Normal midline excursion of the laryngotracheal complex during swallowing.  Full range of motion on passive movement.  Palpation of the occipital, submental, submandibular, internal jugular chain, and supraclavicular nodes did not demonstrate any abnormal lymph nodes or masses.  Palpation of the thyroid was soft and smooth, with no nodules or goiter appreciated.  The trachea was mobile and midline.  Nose - External contour is symmetric, no gross deflection or scars.  Nasal mucosa is pink and moist with no abnormal mucus.  The septum was intact and the turbinates are enlarged.  No polyps, masses, or purulence noted on examination.    To evaluate the nose and sinuses, I performed rigid nasal endoscopy.  I sprayed both nares with 2 sprays lidocaine and neosynephrine.     I began with the RIGHT side using a 0 degree rigid nasal endoscope, and then similarly examined the LEFT side     Findings: mild right septal deviation  Inferior turbinates:   mildly enlarged, pale and boggy  Middle turbinate and middle meatus:   MT's are mildly enlarged. No purulence, no polyposis  Sphenoethmoidal recess on right not able to be visualized, the left appears without purulence or polypoid change  Mucosa is healthy throughout,  no polyps nor  polypoid degeneration  Nasopharynx clear, ET patent, no edema  The patient tolerated the procedure well            Assessment and Plan:       ICD-10-CM    1. Chronic maxillary sinusitis J32.0      Budesonide rinses twice a day  Zyrtec 10 mg daily  CT sinus  Consider allergy testing, will need to be off Atenolol for 3 days prior to testing, this would have to be ok'd by PCP.  Also if interested in immunotherapy would have to be off atenolol permanently.        Indications for allergy testing include:    1) Confirm suspicion of allergic rhinitis due to inhalant allergies  2) Identify the offending allergen to determine specific mode of treatment  3) In the case of chronic rhinosinusitis: when symptoms are not controlled by avoidance and pharmacotherapy  4) In the Asthma patient when exacerbations may be due to perennial allergen exposure  5) Suspect food allergy  6) Otitis Media, chronic rhinitis, atopic dermatitis, Meniere disease, headache, pharyngitis or eye symptoms      Modified quantitative testing (MQT) will be performed.  Signed consent was obtained, and the risks of immunotherapy were discussed, including the potential for anaphylaxis.     If immunotherapy (IT) is recommended, there is continued risk of anaphylaxis.   Anaphylaxis can cause death. The patient will need to be monitored for 30 minutes post injection.  They must present their epinephrine pen prior to injection.  Subcutaneous as well as sublingual immunotherapy (SLIT) were discussed as potential treatment options.  The patient was told SLIT is not approved by the FDA and is cash pay.  The general time frame of immunotherapy was discussed (generally 3-5 years, sometimes longer), and the basic immunology behind IT was discussed.      Solange GILBERT  [facility]  3:51 PM  February 7, 2020          Scope: no #    Again, thank you for allowing me to participate in the care of your patient.        Sincerely,        Solange TRAN  Tash, NP

## 2020-02-07 NOTE — NURSING NOTE
"Chief Complaint   Patient presents with     Sinusitis     Patient referred by Xiomara JEFF for chronic maxillary sinusitis        Initial /68   Pulse 87   Temp 97.9  F (36.6  C) (Tympanic)   Resp 16   SpO2 98%  Estimated body mass index is 28.19 kg/m  as calculated from the following:    Height as of 1/22/20: 1.651 m (5' 5\").    Weight as of 1/22/20: 76.8 kg (169 lb 6.4 oz).  Medication Reconciliation: complete  Solange Reddy LPN    "

## 2020-02-07 NOTE — PROGRESS NOTES
Otolaryngology Note         Chief Complaint:     Patient presents with:  Sinusitis: Patient referred by Xiomara JEFF for chronic maxillary sinusitis            History of Present Illness:     Kimberley Louis presents with chief complaint of right sided facial pain for the past 1 year  Symptoms have been stable over the past year  Symptoms include right sided facial pain, pressure, nasal congestion.   Patient is  having facial pain or pressure  Treatments have included several courses of augmentin, cefdinir, clindamycin, prednisone, toradol  She is a current 1 ppd smoker, she is not interested in quitting at this time  Is able to breath thru nares, right feels more plugged than left  No history of sinus injury/trauma/surgery  No leonides PND.    No dysphonia, dysphagia.   Occasional concerns with reflux, 1-2 times per month  No history of strep/ tonsillitis.   She has had bronchitis and pneumonia in thpast  History of COM, ear surgeries  No history of leonides seasonal allergies  No family history of allergies/atopy  No prior allergy testing.    She does have a history of migraines.  She has not had one in 2-3 months since she had toradol IM in clinic.  She was having them every 2-3 days that lasted for 2-3 days.  Migraines were right sided on the top of her head and neck with vision changes on the right side.             Medications:     Current Outpatient Rx   Medication Sig Dispense Refill     albuterol (PROAIR HFA/PROVENTIL HFA/VENTOLIN HFA) 108 (90 Base) MCG/ACT inhaler Inhale 2 puffs into the lungs every 6 hours 1 Inhaler 3     amLODIPine (NORVASC) 10 MG tablet Take 1 tablet (10 mg) by mouth daily 30 tablet 5     metoprolol tartrate (LOPRESSOR) 25 MG tablet Take 1 tablet (25 mg) by mouth 2 times daily 60 tablet 5     triamcinolone (KENALOG) 0.025 % external ointment Apply topically 2 times daily as needed for irritation 15 g 0     SUMAtriptan (IMITREX) 25 MG tablet Take 1 tablet (25 mg) by mouth at onset of  headache for migraine May repeat in 2 hours. Max 8 tablets/24 hours. (Patient not taking: Reported on 2/7/2020) 18 tablet 3            Allergies:     Allergies: Patient has no known allergies.          Past Medical History:     Past Medical History:   Diagnosis Date     Enlarged kidney 11/19/2015     Essential hypertension 11/19/2015     H/O renal calculi 11/19/2015     H/O renal calculi 11/19/2015            Past Surgical History:     Past Surgical History:   Procedure Laterality Date     AS REMOVAL OF KIDNEY STONE       AS REMOVAL OF KIDNEY STONE       C REMOVAL OF KIDNEY STONE       TUBAL LIGATION         ENT family history reviewed         Social History:     Social History     Tobacco Use     Smoking status: Current Every Day Smoker     Packs/day: 0.75     Years: 28.00     Pack years: 21.00     Types: Cigarettes     Start date: 1/1/1989     Smokeless tobacco: Never Used   Substance Use Topics     Alcohol use: Yes     Comment: occasional     Drug use: No            Review of Systems:     ROS: See HPI         Physical Exam:     /68   Pulse 87   Temp 97.9  F (36.6  C) (Tympanic)   Resp 16   SpO2 98%     General - The patient is well nourished and well developed, and appears to have good nutritional status.  Alert and oriented to person and place, answers questions and cooperates with examination appropriately.   Head and Face - Normocephalic and atraumatic, with no gross asymmetry noted.  The facial nerve is intact, with strong symmetric movements.  Voice and Breathing - The patient was breathing comfortably without the use of accessory muscles. There was no wheezing, stridor, or stertor.  The patients voice was clear and strong, and had appropriate pitch and quality.  Ears -The external auditory canals are patent, the tympanic membranes are intact without effusion, retraction or mass.  Bony landmarks are intact.  Eyes - Extraocular movements intact, and the pupils were reactive to light.  Sclera were  not icteric or injected, conjunctiva were pink and moist.  Mouth - Examination of the oral cavity showed pink, healthy oral mucosa. No lesions or ulcerations noted.  The tongue was mobile and midline, and the dentition were in good condition.    Throat - The walls of the oropharynx were smooth, pink, moist, symmetric, and had no lesions or ulcerations.  The tonsillar pillars and soft palate were symmetric.  The uvula was midline on elevation.    Neck - Normal midline excursion of the laryngotracheal complex during swallowing.  Full range of motion on passive movement.  Palpation of the occipital, submental, submandibular, internal jugular chain, and supraclavicular nodes did not demonstrate any abnormal lymph nodes or masses.  Palpation of the thyroid was soft and smooth, with no nodules or goiter appreciated.  The trachea was mobile and midline.  Nose - External contour is symmetric, no gross deflection or scars.  Nasal mucosa is pink and moist with no abnormal mucus.  The septum was intact and the turbinates are enlarged.  No polyps, masses, or purulence noted on examination.    To evaluate the nose and sinuses, I performed rigid nasal endoscopy.  I sprayed both nares with 2 sprays lidocaine and neosynephrine.     I began with the RIGHT side using a 0 degree rigid nasal endoscope, and then similarly examined the LEFT side     Findings: mild right septal deviation  Inferior turbinates:  mildly enlarged, pale and boggy  Middle turbinate and middle meatus:  MT's are mildly enlarged. No purulence, no polyposis  Sphenoethmoidal recess on right not able to be visualized, the left appears without purulence or polypoid change  Mucosa is healthy throughout,  no polyps nor polypoid degeneration  Nasopharynx clear, ET patent, no edema  The patient tolerated the procedure well            Assessment and Plan:       ICD-10-CM    1. Chronic maxillary sinusitis J32.0      Budesonide rinses twice a day  Zyrtec 10 mg daily  CT  sinus  Consider allergy testing, will need to be off Atenolol for 3 days prior to testing, this would have to be ok'd by PCP.  Also if interested in immunotherapy would have to be off atenolol permanently.        Indications for allergy testing include:    1) Confirm suspicion of allergic rhinitis due to inhalant allergies  2) Identify the offending allergen to determine specific mode of treatment  3) In the case of chronic rhinosinusitis: when symptoms are not controlled by avoidance and pharmacotherapy  4) In the Asthma patient when exacerbations may be due to perennial allergen exposure  5) Suspect food allergy  6) Otitis Media, chronic rhinitis, atopic dermatitis, Meniere disease, headache, pharyngitis or eye symptoms      Modified quantitative testing (MQT) will be performed.  Signed consent was obtained, and the risks of immunotherapy were discussed, including the potential for anaphylaxis.     If immunotherapy (IT) is recommended, there is continued risk of anaphylaxis.   Anaphylaxis can cause death. The patient will need to be monitored for 30 minutes post injection.  They must present their epinephrine pen prior to injection.  Subcutaneous as well as sublingual immunotherapy (SLIT) were discussed as potential treatment options.  The patient was told SLIT is not approved by the FDA and is cash pay.  The general time frame of immunotherapy was discussed (generally 3-5 years, sometimes longer), and the basic immunology behind IT was discussed.      Solange GILBERT  [facility]  3:51 PM  February 7, 2020

## 2020-02-13 ENCOUNTER — ANCILLARY PROCEDURE (OUTPATIENT)
Dept: MAMMOGRAPHY | Facility: OTHER | Age: 40
End: 2020-02-13
Attending: PHYSICIAN ASSISTANT
Payer: COMMERCIAL

## 2020-02-13 ENCOUNTER — HOSPITAL ENCOUNTER (OUTPATIENT)
Dept: CT IMAGING | Facility: HOSPITAL | Age: 40
Discharge: HOME | End: 2020-02-13
Attending: NURSE PRACTITIONER | Admitting: NURSE PRACTITIONER
Payer: COMMERCIAL

## 2020-02-13 DIAGNOSIS — J32.0 CHRONIC MAXILLARY SINUSITIS: ICD-10-CM

## 2020-02-13 DIAGNOSIS — R51.9 FACIAL PAIN: ICD-10-CM

## 2020-02-13 DIAGNOSIS — Z12.31 VISIT FOR SCREENING MAMMOGRAM: ICD-10-CM

## 2020-02-13 PROCEDURE — 77063 BREAST TOMOSYNTHESIS BI: CPT | Mod: TC

## 2020-02-13 PROCEDURE — 70486 CT MAXILLOFACIAL W/O DYE: CPT | Mod: TC

## 2020-02-14 ENCOUNTER — TELEPHONE (OUTPATIENT)
Dept: OTOLARYNGOLOGY | Facility: OTHER | Age: 40
End: 2020-02-14

## 2020-02-17 ENCOUNTER — TELEPHONE (OUTPATIENT)
Dept: OTOLARYNGOLOGY | Facility: OTHER | Age: 40
End: 2020-02-17

## 2020-02-17 ENCOUNTER — HOSPITAL ENCOUNTER (OUTPATIENT)
Dept: ULTRASOUND IMAGING | Facility: HOSPITAL | Age: 40
Discharge: HOME | End: 2020-02-17
Attending: PHYSICIAN ASSISTANT | Admitting: PHYSICIAN ASSISTANT
Payer: COMMERCIAL

## 2020-02-17 DIAGNOSIS — R92.8 ABNORMAL MAMMOGRAM OF RIGHT BREAST: ICD-10-CM

## 2020-02-17 PROCEDURE — 76642 ULTRASOUND BREAST LIMITED: CPT | Mod: TC,RT

## 2020-02-17 NOTE — TELEPHONE ENCOUNTER
Maria Esther,  notified Patient about your notes on CT result. Told her she needs too see PCP. She questioned if stopping Beta Blockers prior to allergy testing would 'kill' her. Explained this is question for PCP and to also discuss facial pain as a migraine variant. Patient hung up.    Lloa

## 2020-02-20 ENCOUNTER — OFFICE VISIT (OUTPATIENT)
Dept: UROLOGY | Facility: OTHER | Age: 40
End: 2020-02-20
Attending: PHYSICIAN ASSISTANT
Payer: COMMERCIAL

## 2020-02-20 VITALS
OXYGEN SATURATION: 98 % | DIASTOLIC BLOOD PRESSURE: 80 MMHG | TEMPERATURE: 97.5 F | HEART RATE: 80 BPM | SYSTOLIC BLOOD PRESSURE: 124 MMHG | HEIGHT: 65 IN | BODY MASS INDEX: 28.32 KG/M2 | WEIGHT: 170 LBS

## 2020-02-20 DIAGNOSIS — R93.41 RENAL PELVIS ENLARGED ON ULTRASOUND: ICD-10-CM

## 2020-02-20 DIAGNOSIS — E83.59 NEPHROCALCINOSIS: Primary | ICD-10-CM

## 2020-02-20 DIAGNOSIS — N29 NEPHROCALCINOSIS: Primary | ICD-10-CM

## 2020-02-20 PROCEDURE — G0463 HOSPITAL OUTPT CLINIC VISIT: HCPCS

## 2020-02-20 PROCEDURE — 99203 OFFICE O/P NEW LOW 30 MIN: CPT | Performed by: UROLOGY

## 2020-02-20 ASSESSMENT — PAIN SCALES - GENERAL: PAINLEVEL: MILD PAIN (3)

## 2020-02-20 ASSESSMENT — ENCOUNTER SYMPTOMS: HEADACHES: 1

## 2020-02-20 ASSESSMENT — MIFFLIN-ST. JEOR: SCORE: 1441.99

## 2020-02-20 NOTE — PROGRESS NOTES
History     Chief Complaint:    Referral ([facility] Referral   Renal pelvis enlarged on ultrasound)      HPI   Kimberley Louis is a 40 year old female who presents with a history of nephrolithiasis.  Kimberley recently went in for her annual visit and has been having a little bit of left flank discomfort and has not had any follow-up for her stones recently so a renal ultrasound was done.  The renal ultrasound shows bilateral nephrolithiasis without any obstruction and she was referred to me for further evaluation.  The patient has a significant history of stone disease.  She underwent a percutaneous nephrolithotomy on the left in [facility] maybe 3 or 4 years ago and then in [place] about 6 months after her [facility] neph had an emergency ureteroscopy.  Dr. Vasques did a ureteroscopic stone manipulation in January 2016 for multiple left kidney stones.  Kimberley did not appreciate the stent that was placed at the time of that procedure which is a common problem with ureteral surgery.  Stents can be very uncomfortable for these patients.  Patient denies any blood in her urine, she has had no urinary tract infections.  She has had a recent urinalysis that was unremarkable and her creatinine is 0.58.    Allergies:    No Known Allergies     Medications:      albuterol (PROAIR HFA/PROVENTIL HFA/VENTOLIN HFA) 108 (90 Base) MCG/ACT inhaler  amLODIPine (NORVASC) 10 MG tablet  budesonide (PULMICORT) 0.5 MG/2ML neb solution  cetirizine HCl 10 MG CAPS  metoprolol tartrate (LOPRESSOR) 25 MG tablet  SUMAtriptan (IMITREX) 25 MG tablet  triamcinolone (KENALOG) 0.025 % external ointment        Problem List:      Patient Active Problem List    Diagnosis Date Noted     Left flank tenderness 08/03/2018     Priority: Medium     Personal history of tobacco use, presenting hazards to health 08/03/2018     Priority: Medium     Recurrent UTI 08/03/2018     Priority: Medium     Incomplete right bundle branch block 02/05/2018      Priority: Medium     Pure hypercholesterolemia 01/03/2018     Priority: Medium     Tobacco abuse counseling 01/03/2018     Priority: Medium     Tobacco abuse 01/03/2018     Priority: Medium     Palpitations 01/03/2018     Priority: Medium     Atypical chest pain 01/03/2018     Priority: Medium     Dyspnea on exertion 01/03/2018     Priority: Medium     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  Kimberley M Missy has no plan or code status on file. Discussed available resources and provided with information. Confirmed code status reflects current choices pending further ACP discussions.  Confirmed/documented legally designated decision makers.  Added by SAYDA CABRERA             ACP (advance care planning) 04/13/2016     Priority: Medium     Advance Care Planning 4/13/2016: ACP Review of Chart / Resources Provided:  Reviewed chart for advance care plan.  Kimberley Louis has no plan or code status on file. Discussed available resources and provided with information. Confirmed code status reflects current choices pending further ACP discussions.  Confirmed/documented legally designated decision makers.  Added by Sandra Anne             Kidney stone on left side 12/21/2015     Priority: Medium     Nephrocalcinosis 12/21/2015     Priority: Medium     Essential hypertension 11/19/2015     Priority: Medium     H/O renal calculi 11/19/2015     Priority: Medium     Enlarged kidney 11/19/2015     Priority: Medium        Past Medical History:      Past Medical History:   Diagnosis Date     Enlarged kidney 11/19/2015     Essential hypertension 11/19/2015     H/O renal calculi 11/19/2015     H/O renal calculi 11/19/2015       Past Surgical History:      Past Surgical History:  "  Procedure Laterality Date     AS REMOVAL OF KIDNEY STONE       AS REMOVAL OF KIDNEY STONE       C REMOVAL OF KIDNEY STONE       TUBAL LIGATION         Family History:      Family History   Problem Relation Age of Onset     Diabetes Mother      Emphysema Mother      Cervical Cancer Mother      Throat cancer Mother      Diabetes Father      Hypertension Father      Hypertension Maternal Grandmother      Diabetes Maternal Grandmother      Schizophrenia Maternal Grandmother      Unknown/Adopted Maternal Grandfather      Lung Cancer Paternal Grandmother      Diabetes Paternal Grandmother      Unknown/Adopted Paternal Grandfather        Social History:    Marital Status:  Single [1]  Social History     Tobacco Use     Smoking status: Current Every Day Smoker     Packs/day: 1.00     Years: 28.00     Pack years: 28.00     Types: Cigarettes     Start date: 1/1/1989     Smokeless tobacco: Never Used     Tobacco comment: pt denied QP 2/20/2020   Substance Use Topics     Alcohol use: Yes     Comment: occasional     Drug use: No        Review of Systems   Skin: Positive for rash.   Neurological: Positive for headaches.   All other systems reviewed and are negative.        Physical Exam   Vitals:  /80   Pulse 80   Temp 97.5  F (36.4  C) (Tympanic)   Ht 1.651 m (5' 5\")   Wt 77.1 kg (170 lb)   SpO2 98%   BMI 28.29 kg/m        Physical Exam  Constitutional:       Appearance: Normal appearance. She is normal weight.   Cardiovascular:      Pulses: Normal pulses.   Abdominal:      General: Abdomen is flat. Bowel sounds are normal.      Palpations: Abdomen is soft. There is no mass.      Tenderness: There is abdominal tenderness.      Hernia: No hernia is present.          Comments: Patient is slightly tender in the left flank area.   Neurological:      Mental Status: She is alert.     PROCEDURE: US RENAL COMPLETE     HISTORY: . Enlarged kidneys     TECHNIQUE: Targeted sonographic assessment of the kidneys and bladder  was " performed.     COMPARISON:  None.     MEASUREMENTS:     Right renal length: 12.6 cm  Left renal length: 13.3 cm     RENAL FINDINGS: There is increased echogenicity of the renal pelves  bilaterally. This has a long differential including medullary sponge  kidney and hypercalcemic states. There appear to be some renal calculi  bilaterally. There is no hydronephrosis or focal masses.     BLADDER: The bladder is partially distended and appears normal.                                                                      IMPRESSION:  Echogenic renal pelves. This can be seen in chronic  furosemide administration, medullary sponge kidney, and hypercalcemic  states.       BILL SINGH MD      Exam Date Exam Time Accession # Results    1/23/20  1:37 PM [id number]    Specimen Information: Midstream Urine        Component Value Flag Ref Range Units Status Collected Lab   Color Urine Light Yellow     Final 01/23/2020  1:37 PM [facility]   Appearance Urine Clear     Final 01/23/2020  1:37 PM [facility]   Glucose Urine Negative   NEG^Negative mg/dL Final 01/23/2020  1:37 PM [facility]   Bilirubin Urine Negative   NEG^Negative  Final 01/23/2020  1:37 PM [facility]   Ketones Urine Negative   NEG^Negative mg/dL Final 01/23/2020  1:37 PM [facility]   Specific Gravity Urine 1.012   1.003 - 1.035  Final 01/23/2020  1:37 PM [facility]   Blood Urine Trace  Abnormal   NEG^Negative  Final 01/23/2020  1:37 PM [facility]   pH Urine 6.0   4.7 - 8.0 pH Final 01/23/2020  1:37 PM [facility]   Protein Albumin Urine Negative   NEG^Negative mg/dL Final 01/23/2020  1:37 PM [facility]   Urobilinogen mg/dL Normal   0.0 - 2.0 mg/dL Final 01/23/2020  1:37 PM [facility]   Nitrite Urine Negative   NEG^Negative  Final 01/23/2020  1:37 PM [facility]   Leukocyte Esterase Urine Trace  Abnormal   NEG^Negative  Final 01/23/2020  1:37 PM [facility]   Source     Final 01/23/2020  1:37    Midstream Urine    WBC Urine 9  High   0 - 5 /HPF Final 01/23/2020  1:37 PM [facility]   RBC Urine 2   0 - 2 /HPF Final 01/23/2020  1:37 PM [facility]   Bacteria Urine Few  Abnormal   NEG^Negative /HPF Final 01/23/2020  1:37 PM [facility]   Squamous Epithelial /HPF Urine 3  High   0 - 1 /HPF Final 01/23/2020  1:37 PM [facility]   Mucous Urine Present  Abnormal   NEG^Negative /LPF Final       Impression: Bilateral nephrolithiasis    Plan   Plan: I am get a CT stone protocol to further evaluate the size and location of her stones.  I reviewed her renal ultrasound and it is very remarkable how this is consistent with significant nephrocalcinosis.  These patients are subject to stones for their entire lives and depending on the size of the stones sometimes we just monitor them.  We will get the CT and bring her back for discussion.      Return in about 4 weeks (around 3/19/2020).    Sherry Mcpherson MD  [facility]

## 2020-02-20 NOTE — NURSING NOTE
"Chief Complaint   Patient presents with     Referral     [facility] [place] Referral   Renal pelvis enlarged on ultrasound       Initial /80   Pulse 80   Temp 97.5  F (36.4  C) (Tympanic)   Ht 1.651 m (5' 5\")   Wt 77.1 kg (170 lb)   SpO2 98%   BMI 28.29 kg/m   Estimated body mass index is 28.29 kg/m  as calculated from the following:    Height as of this encounter: 1.651 m (5' 5\").    Weight as of this encounter: 77.1 kg (170 lb).  Medication Reconciliation: complete  Dorothea Hess LPN  "

## 2020-02-25 ENCOUNTER — HOSPITAL ENCOUNTER (OUTPATIENT)
Dept: CT IMAGING | Facility: HOSPITAL | Age: 40
Discharge: HOME | End: 2020-02-25
Attending: UROLOGY | Admitting: UROLOGY
Payer: COMMERCIAL

## 2020-02-25 DIAGNOSIS — E83.59 NEPHROCALCINOSIS: ICD-10-CM

## 2020-02-25 DIAGNOSIS — N29 NEPHROCALCINOSIS: ICD-10-CM

## 2020-02-25 PROCEDURE — 74176 CT ABD & PELVIS W/O CONTRAST: CPT | Mod: TC

## 2020-03-10 DIAGNOSIS — R07.89 ATYPICAL CHEST PAIN: ICD-10-CM

## 2020-03-10 DIAGNOSIS — I10 ESSENTIAL HYPERTENSION: ICD-10-CM

## 2020-03-12 RX ORDER — AMLODIPINE BESYLATE 10 MG/1
TABLET ORAL
Qty: 30 TABLET | Refills: 5 | Status: SHIPPED | OUTPATIENT
Start: 2020-03-12 | End: 2020-09-15

## 2020-03-12 RX ORDER — METOPROLOL TARTRATE 25 MG/1
TABLET, FILM COATED ORAL
Qty: 60 TABLET | Refills: 5 | Status: SHIPPED | OUTPATIENT
Start: 2020-03-12 | End: 2020-09-15

## 2020-03-25 ENCOUNTER — VIRTUAL VISIT (OUTPATIENT)
Dept: UROLOGY | Facility: OTHER | Age: 40
End: 2020-03-25
Attending: UROLOGY
Payer: COMMERCIAL

## 2020-03-25 DIAGNOSIS — N20.0 NEPHROLITHIASIS: Primary | ICD-10-CM

## 2020-03-25 PROCEDURE — 99213 OFFICE O/P EST LOW 20 MIN: CPT | Mod: 95 | Performed by: UROLOGY

## 2020-03-25 ASSESSMENT — ENCOUNTER SYMPTOMS
FEVER: 1
FLANK PAIN: 1

## 2020-03-25 NOTE — LETTER
3/25/2020       RE: Kimberley Louis  [address] [facility] [address]     Dear Colleague,    Thank you for referring your patient, Kimberley Louis, to the [facility] at [facility]. Please see a copy of my visit note below.      History     Chief Complaint:    Telephone (Kidney stone.)      HPI   Kimberley Louis is a 40 year old female who presents with follow-up of her stone disease.  Kimberley tells me last week Tuesday she had a sudden onset of left back pain with nausea and vomiting.  This lasted about 3 hours and she thought she was likely passing a stone.  She also has a cough and a low-grade temp so she does not know if her low-grade temp is from her cough or possibly from the stone.  She has not spiked any significant fevers.  I reviewed the CT scan with Kimberley and she does have multiple stones in the left kidney with the largest being 9 mm.  She also has a small stone in the right kidney which is about 3 mm in size.  She has had multiple ureteroscopic stone manipulations in the past and also a percutaneous nephrolithotomy in the past.  Currently she is not having any flank pain.    Allergies:    No Known Allergies     Medications:      albuterol (PROAIR HFA/PROVENTIL HFA/VENTOLIN HFA) 108 (90 Base) MCG/ACT inhaler  amLODIPine (NORVASC) 10 MG tablet  cetirizine HCl 10 MG CAPS  metoprolol tartrate (LOPRESSOR) 25 MG tablet  triamcinolone (KENALOG) 0.025 % external ointment  budesonide (PULMICORT) 0.5 MG/2ML neb solution  SUMAtriptan (IMITREX) 25 MG tablet        Problem List:      Patient Active Problem List    Diagnosis Date Noted     Left flank tenderness 08/03/2018     Priority: Medium     Personal history of tobacco use, presenting hazards to health 08/03/2018     Priority: Medium     Recurrent UTI 08/03/2018     Priority: Medium     Incomplete right bundle branch block 02/05/2018     Priority: Medium     Pure hypercholesterolemia 01/03/2018      Priority: Medium     Tobacco abuse counseling 01/03/2018     Priority: Medium     Tobacco abuse 01/03/2018     Priority: Medium     Palpitations 01/03/2018     Priority: Medium     Atypical chest pain 01/03/2018     Priority: Medium     Dyspnea on exertion 01/03/2018     Priority: Medium     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  Kimberley M Missy has no plan or code status on file. Discussed available resources and provided with information. Confirmed code status reflects current choices pending further ACP discussions.  Confirmed/documented legally designated decision makers.  Added by SAYDA CABRERA             ACP (advance care planning) 04/13/2016     Priority: Medium     Advance Care Planning 4/13/2016: ACP Review of Chart / Resources Provided:  Reviewed chart for advance care plan.  Kimberley Louis has no plan or code status on file. Discussed available resources and provided with information. Confirmed code status reflects current choices pending further ACP discussions.  Confirmed/documented legally designated decision makers.  Added by Sandra Anne             Kidney stone on left side 12/21/2015     Priority: Medium     Nephrocalcinosis 12/21/2015     Priority: Medium     Essential hypertension 11/19/2015     Priority: Medium     H/O renal calculi 11/19/2015     Priority: Medium     Enlarged kidney 11/19/2015     Priority: Medium        Past Medical History:      Past Medical History:   Diagnosis Date     Enlarged kidney 11/19/2015     Essential hypertension 11/19/2015     H/O renal calculi 11/19/2015     H/O renal calculi 11/19/2015       Past Surgical History:      Past Surgical History:   Procedure Laterality Date     AS REMOVAL OF KIDNEY STONE        AS REMOVAL OF KIDNEY STONE       C REMOVAL OF KIDNEY STONE       TUBAL LIGATION         Family History:      Family History   Problem Relation Age of Onset     Diabetes Mother      Emphysema Mother      Cervical Cancer Mother      Throat cancer Mother      Diabetes Father      Hypertension Father      Hypertension Maternal Grandmother      Diabetes Maternal Grandmother      Schizophrenia Maternal Grandmother      Unknown/Adopted Maternal Grandfather      Lung Cancer Paternal Grandmother      Diabetes Paternal Grandmother      Unknown/Adopted Paternal Grandfather        Social History:    Marital Status:  Single [1]  Social History     Tobacco Use     Smoking status: Current Every Day Smoker     Packs/day: 1.00     Years: 28.00     Pack years: 28.00     Types: Cigarettes     Start date: 1/1/1989     Smokeless tobacco: Never Used     Tobacco comment: pt denied QP 2/20/2020   Substance Use Topics     Alcohol use: Yes     Comment: occasional     Drug use: No        Review of Systems   Constitutional: Positive for fever.   Genitourinary: Positive for flank pain.   All other systems reviewed and are negative.        Physical Exam   Vitals:  There were no vitals taken for this visit.      Physical Exam    Study Result     EXAMINATION: CT ABDOMEN PELVIS W/O CONTRAST, 2/25/2020 4:07 PM     TECHNIQUE:  Helical CT images from the lung bases through the  symphysis pubis were obtained  without IV contrast. Contrast dose:     COMPARISON: none     HISTORY: Flank pain, stone disease suspected; Nephrocalcinosis;  Nephrocalcinosis     FINDINGS:     The lung bases are clear.     The liver is free of masses or biliary ductal enlargement. No  calcified gallstones are seen.     The the spleen and pancreas appear normal.     The adrenal glands are normal.     The right and left kidneys are free of masses or hydronephrosis. There  are bilateral renal calculi the largest on the left measures 9 mm in  diameter. The largest on the right  measures 3 mm in diameter. There  are no ureteric calculi.     The periaortic lymph nodes are normal in caliber.     No intraperitoneal masses or inflammatory changes are noted. The  appendix appears normal.     In the pelvis the bladder and rectum appear normal. The uterus and  ovaries appear normal.     Degenerative changes are present in the thoracic spine                                                                      IMPRESSION: Bilateral renal calculi. No ureteric calculi seen.      BILL SINGH MD       Impression: Bilateral nephrolithiasis with recent episode of left flank pain  Plan   Plan: Kimberley symptoms sound very suspicious for possibly movement of 1 of her left kidney stones into the ureter.  She is currently without any symptoms.  She has a low-grade temp but she feels that probably related to her cough.  I did tell her that it certainly possible 1 of those stones could have moved and I am suspicious of that because of the vomiting.  Typically we would try to have the patient passed the stone as long as they did not have any fevers greater than 101.5 or severe pain that they cannot take it.  I told her if any of these things occur she is just getting need to go to the emergency room and we will have to intervene.  I am going to schedule a 3-week telephone follow-up with Kimberley or if we are seeing patients in the clinic we will have her come in.  I feel we can at least get a KUB with her next visit to see if we can see anything that is moved before repeating a CT.  It is likely she is going to need to have something done with the stones in the left kidney because they are getting fairly large.  She expressed understanding and again we will do a follow-up in about 3 weeks sooner if she is having significant problems.      No follow-ups on file.    Sherry Mcpherson MD  [facility] - [place]            Again, thank you for allowing me to participate in the care of your patient.       Sincerely,    Sherry Mcpherson MD

## 2020-03-25 NOTE — PROGRESS NOTES
History     Chief Complaint:    Telephone (Kidney stone.)      RACHNA   Kimberley Louis is a 40 year old female who presents with follow-up of her stone disease.  Kimberley tells me last week Tuesday she had a sudden onset of left back pain with nausea and vomiting.  This lasted about 3 hours and she thought she was likely passing a stone.  She also has a cough and a low-grade temp so she does not know if her low-grade temp is from her cough or possibly from the stone.  She has not spiked any significant fevers.  I reviewed the CT scan with Kimberley and she does have multiple stones in the left kidney with the largest being 9 mm.  She also has a small stone in the right kidney which is about 3 mm in size.  She has had multiple ureteroscopic stone manipulations in the past and also a percutaneous nephrolithotomy in the past.  Currently she is not having any flank pain.    Allergies:    No Known Allergies     Medications:      albuterol (PROAIR HFA/PROVENTIL HFA/VENTOLIN HFA) 108 (90 Base) MCG/ACT inhaler  amLODIPine (NORVASC) 10 MG tablet  cetirizine HCl 10 MG CAPS  metoprolol tartrate (LOPRESSOR) 25 MG tablet  triamcinolone (KENALOG) 0.025 % external ointment  budesonide (PULMICORT) 0.5 MG/2ML neb solution  SUMAtriptan (IMITREX) 25 MG tablet        Problem List:      Patient Active Problem List    Diagnosis Date Noted     Left flank tenderness 08/03/2018     Priority: Medium     Personal history of tobacco use, presenting hazards to health 08/03/2018     Priority: Medium     Recurrent UTI 08/03/2018     Priority: Medium     Incomplete right bundle branch block 02/05/2018     Priority: Medium     Pure hypercholesterolemia 01/03/2018     Priority: Medium     Tobacco abuse counseling 01/03/2018     Priority: Medium     Tobacco abuse 01/03/2018     Priority: Medium     Palpitations 01/03/2018     Priority: Medium     Atypical chest pain 01/03/2018     Priority: Medium     Dyspnea on exertion 01/03/2018     Priority: Medium      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  Kimberley Louis has no plan or code status on file. Discussed available resources and provided with information. Confirmed code status reflects current choices pending further ACP discussions.  Confirmed/documented legally designated decision makers.  Added by SAYDA CABRERA             ACP (advance care planning) 04/13/2016     Priority: Medium     Advance Care Planning 4/13/2016: ACP Review of Chart / Resources Provided:  Reviewed chart for advance care plan.  Kimberley CELAYA Kenroykaci has no plan or code status on file. Discussed available resources and provided with information. Confirmed code status reflects current choices pending further ACP discussions.  Confirmed/documented legally designated decision makers.  Added by Sandra Anne             Kidney stone on left side 12/21/2015     Priority: Medium     Nephrocalcinosis 12/21/2015     Priority: Medium     Essential hypertension 11/19/2015     Priority: Medium     H/O renal calculi 11/19/2015     Priority: Medium     Enlarged kidney 11/19/2015     Priority: Medium        Past Medical History:      Past Medical History:   Diagnosis Date     Enlarged kidney 11/19/2015     Essential hypertension 11/19/2015     H/O renal calculi 11/19/2015     H/O renal calculi 11/19/2015       Past Surgical History:      Past Surgical History:   Procedure Laterality Date     AS REMOVAL OF KIDNEY STONE       AS REMOVAL OF KIDNEY STONE       C REMOVAL OF KIDNEY STONE       TUBAL LIGATION         Family History:      Family History   Problem Relation Age of Onset     Diabetes Mother      Emphysema Mother      Cervical Cancer Mother      Throat cancer Mother      Diabetes Father      Hypertension  Father      Hypertension Maternal Grandmother      Diabetes Maternal Grandmother      Schizophrenia Maternal Grandmother      Unknown/Adopted Maternal Grandfather      Lung Cancer Paternal Grandmother      Diabetes Paternal Grandmother      Unknown/Adopted Paternal Grandfather        Social History:    Marital Status:  Single [1]  Social History     Tobacco Use     Smoking status: Current Every Day Smoker     Packs/day: 1.00     Years: 28.00     Pack years: 28.00     Types: Cigarettes     Start date: 1/1/1989     Smokeless tobacco: Never Used     Tobacco comment: pt denied QP 2/20/2020   Substance Use Topics     Alcohol use: Yes     Comment: occasional     Drug use: No        Review of Systems   Constitutional: Positive for fever.   Genitourinary: Positive for flank pain.   All other systems reviewed and are negative.        Physical Exam   Vitals:  There were no vitals taken for this visit.      Physical Exam    Study Result     EXAMINATION: CT ABDOMEN PELVIS W/O CONTRAST, 2/25/2020 4:07 PM     TECHNIQUE:  Helical CT images from the lung bases through the  symphysis pubis were obtained  without IV contrast. Contrast dose:     COMPARISON: none     HISTORY: Flank pain, stone disease suspected; Nephrocalcinosis;  Nephrocalcinosis     FINDINGS:     The lung bases are clear.     The liver is free of masses or biliary ductal enlargement. No  calcified gallstones are seen.     The the spleen and pancreas appear normal.     The adrenal glands are normal.     The right and left kidneys are free of masses or hydronephrosis. There  are bilateral renal calculi the largest on the left measures 9 mm in  diameter. The largest on the right measures 3 mm in diameter. There  are no ureteric calculi.     The periaortic lymph nodes are normal in caliber.     No intraperitoneal masses or inflammatory changes are noted. The  appendix appears normal.     In the pelvis the bladder and rectum appear normal. The uterus and  ovaries appear  normal.     Degenerative changes are present in the thoracic spine                                                                      IMPRESSION: Bilateral renal calculi. No ureteric calculi seen.      BILL SINGH MD       Impression: Bilateral nephrolithiasis with recent episode of left flank pain  Plan   Plan: Kimberley symptoms sound very suspicious for possibly movement of 1 of her left kidney stones into the ureter.  She is currently without any symptoms.  She has a low-grade temp but she feels that probably related to her cough.  I did tell her that it certainly possible 1 of those stones could have moved and I am suspicious of that because of the vomiting.  Typically we would try to have the patient passed the stone as long as they did not have any fevers greater than 101.5 or severe pain that they cannot take it.  I told her if any of these things occur she is just getting need to go to the emergency room and we will have to intervene.  I am going to schedule a 3-week telephone follow-up with Kimberley or if we are seeing patients in the clinic we will have her come in.  I feel we can at least get a KUB with her next visit to see if we can see anything that is moved before repeating a CT.  It is likely she is going to need to have something done with the stones in the left kidney because they are getting fairly large.  She expressed understanding and again we will do a follow-up in about 3 weeks sooner if she is having significant problems. 16 minutes were spent in this phone consultation.      No follow-ups on file.    Sherry Mcpherson MD  [facility] - [place]

## 2020-04-15 ENCOUNTER — VIRTUAL VISIT (OUTPATIENT)
Dept: UROLOGY | Facility: OTHER | Age: 40
End: 2020-04-15
Attending: UROLOGY
Payer: COMMERCIAL

## 2020-04-15 DIAGNOSIS — N20.0 NEPHROLITHIASIS: Primary | ICD-10-CM

## 2020-04-15 PROCEDURE — 99441 ZZC PHYSICIAN TELEPHONE EVALUATION 5-10 MIN: CPT | Performed by: UROLOGY

## 2020-04-15 NOTE — LETTER
4/15/2020       RE: Kimberley Lousi  [address] [facility] [address]     Dear Colleague,    Thank you for referring your patient, Kimberley Louis, to the [facility] at [facility]. Please see a copy of my visit note below.      History     Chief Complaint:    Telephone      HPI   Kimberley Louis is a 40 year old female whom I did a telephone consultation with today regarding her kidney stones.  Kimberley tells me that she has not had any further fever.  When I talked to her 3 weeks ago she had fever for about 2 days and it was on and off and the highest it ever got was 100.2 but that is completely resolved.  She does complain of random back pain which apparently can get pretty significant and she says is always on the left side but it comes and goes and she is currently not having any pain.  She said she has noted on occasion some cloudy urine but she is not having any other voiding symptoms no urgency frequency nothing that would suggest a urine infection.  She does have problems with bacterial vaginosis and she thinks it may be related to that.  She denies any hematuria.  I reviewed her CT scan with her again and she has 3 fairly good sized stones on the left the largest being 9 mm in diameter and stones on the right the largest is 3 mm.  Allergies:    No Known Allergies     Medications:      albuterol (PROAIR HFA/PROVENTIL HFA/VENTOLIN HFA) 108 (90 Base) MCG/ACT inhaler  amLODIPine (NORVASC) 10 MG tablet  budesonide (PULMICORT) 0.5 MG/2ML neb solution  cetirizine HCl 10 MG CAPS  metoprolol tartrate (LOPRESSOR) 25 MG tablet  SUMAtriptan (IMITREX) 25 MG tablet  triamcinolone (KENALOG) 0.025 % external ointment        Problem List:      Patient Active Problem List    Diagnosis Date Noted     Left flank tenderness 08/03/2018     Priority: Medium     Personal history of tobacco use, presenting hazards to health 08/03/2018     Priority: Medium      Recurrent UTI 08/03/2018     Priority: Medium     Incomplete right bundle branch block 02/05/2018     Priority: Medium     Pure hypercholesterolemia 01/03/2018     Priority: Medium     Tobacco abuse counseling 01/03/2018     Priority: Medium     Tobacco abuse 01/03/2018     Priority: Medium     Palpitations 01/03/2018     Priority: Medium     Atypical chest pain 01/03/2018     Priority: Medium     Dyspnea on exertion 01/03/2018     Priority: Medium     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  Kimberley Louis has no plan or code status on file. Discussed available resources and provided with information. Confirmed code status reflects current choices pending further ACP discussions.  Confirmed/documented legally designated decision makers.  Added by SAYDA CABRERA             ACP (advance care planning) 04/13/2016     Priority: Medium     Advance Care Planning 4/13/2016: ACP Review of Chart / Resources Provided:  Reviewed chart for advance care plan.  Kimberley Louis has no plan or code status on file. Discussed available resources and provided with information. Confirmed code status reflects current choices pending further ACP discussions.  Confirmed/documented legally designated decision makers.  Added by Sandra Anne             Kidney stone on left side 12/21/2015     Priority: Medium     Nephrocalcinosis 12/21/2015     Priority: Medium     Essential hypertension 11/19/2015     Priority: Medium     H/O renal calculi 11/19/2015     Priority: Medium     Enlarged kidney 11/19/2015     Priority: Medium        Past Medical History:      Past Medical History:   Diagnosis Date     Enlarged kidney 11/19/2015     Essential hypertension 11/19/2015     H/O renal calculi  11/19/2015     H/O renal calculi 11/19/2015       Past Surgical History:      Past Surgical History:   Procedure Laterality Date     AS REMOVAL OF KIDNEY STONE       AS REMOVAL OF KIDNEY STONE       C REMOVAL OF KIDNEY STONE       TUBAL LIGATION         Family History:      Family History   Problem Relation Age of Onset     Diabetes Mother      Emphysema Mother      Cervical Cancer Mother      Throat cancer Mother      Diabetes Father      Hypertension Father      Hypertension Maternal Grandmother      Diabetes Maternal Grandmother      Schizophrenia Maternal Grandmother      Unknown/Adopted Maternal Grandfather      Lung Cancer Paternal Grandmother      Diabetes Paternal Grandmother      Unknown/Adopted Paternal Grandfather        Social History:    Marital Status:  Single [1]  Social History     Tobacco Use     Smoking status: Current Every Day Smoker     Packs/day: 1.00     Years: 28.00     Pack years: 28.00     Types: Cigarettes     Start date: 1/1/1989     Smokeless tobacco: Never Used     Tobacco comment: pt denied QP 2/20/2020   Substance Use Topics     Alcohol use: Yes     Comment: occasional     Drug use: No        Review of Systems      Physical Exam   Vitals:  There were no vitals taken for this visit.      Physical Exam    Impression: Bilateral nephrolithiasis p  Plan   Plan: Again it sounds like Kimberley is stable. she has known stones bilaterally larger on the left which likely are going to need intervention at some time  She does not have any acute pain or acute obstruction that it sounds like.  She knows to call me if things do change if she has any suspicion of acute renal colic or infection.  Otherwise we will schedule her for an office visit in 2 months and hopefully will be able to discuss electively proceeding with stone removal on the left kidney   ureteroscopically.      No follow-ups on file.    Sherry Mcpherson MD  [facility] - [place]            Again, thank you for allowing me  to participate in the care of your patient.      Sincerely,    Sherry Mcpherson MD

## 2020-04-15 NOTE — PROGRESS NOTES
History     Chief Complaint:    Telephone      HPI   Kimberley Louis is a 40 year old female whom I did a telephone consultation with today regarding her kidney stones.  Kimberley tells me that she has not had any further fever.  When I talked to her 3 weeks ago she had fever for about 2 days and it was on and off and the highest it ever got was 100.2 but that is completely resolved.  She does complain of random back pain which apparently can get pretty significant and she says is always on the left side but it comes and goes and she is currently not having any pain.  She said she has noted on occasion some cloudy urine but she is not having any other voiding symptoms no urgency frequency nothing that would suggest a urine infection.  She does have problems with bacterial vaginosis and she thinks it may be related to that.  She denies any hematuria.  I reviewed her CT scan with her again and she has 3 fairly good sized stones on the left the largest being 9 mm in diameter and stones on the right the largest is 3 mm.  Allergies:    No Known Allergies     Medications:      albuterol (PROAIR HFA/PROVENTIL HFA/VENTOLIN HFA) 108 (90 Base) MCG/ACT inhaler  amLODIPine (NORVASC) 10 MG tablet  budesonide (PULMICORT) 0.5 MG/2ML neb solution  cetirizine HCl 10 MG CAPS  metoprolol tartrate (LOPRESSOR) 25 MG tablet  SUMAtriptan (IMITREX) 25 MG tablet  triamcinolone (KENALOG) 0.025 % external ointment        Problem List:      Patient Active Problem List    Diagnosis Date Noted     Left flank tenderness 08/03/2018     Priority: Medium     Personal history of tobacco use, presenting hazards to health 08/03/2018     Priority: Medium     Recurrent UTI 08/03/2018     Priority: Medium     Incomplete right bundle branch block 02/05/2018     Priority: Medium     Pure hypercholesterolemia 01/03/2018     Priority: Medium     Tobacco abuse counseling 01/03/2018     Priority: Medium     Tobacco abuse 01/03/2018     Priority: Medium      Palpitations 01/03/2018     Priority: Medium     Atypical chest pain 01/03/2018     Priority: Medium     Dyspnea on exertion 01/03/2018     Priority: Medium     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  Kimberley CELAYA Kenroykaci has no plan or code status on file. Discussed available resources and provided with information. Confirmed code status reflects current choices pending further ACP discussions.  Confirmed/documented legally designated decision makers.  Added by SAYDA CABRERA             ACP (advance care planning) 04/13/2016     Priority: Medium     Advance Care Planning 4/13/2016: ACP Review of Chart / Resources Provided:  Reviewed chart for advance care plan.  Kimberley CELAYA Missy has no plan or code status on file. Discussed available resources and provided with information. Confirmed code status reflects current choices pending further ACP discussions.  Confirmed/documented legally designated decision makers.  Added by Sandra Anne             Kidney stone on left side 12/21/2015     Priority: Medium     Nephrocalcinosis 12/21/2015     Priority: Medium     Essential hypertension 11/19/2015     Priority: Medium     H/O renal calculi 11/19/2015     Priority: Medium     Enlarged kidney 11/19/2015     Priority: Medium        Past Medical History:      Past Medical History:   Diagnosis Date     Enlarged kidney 11/19/2015     Essential hypertension 11/19/2015     H/O renal calculi 11/19/2015     H/O renal calculi 11/19/2015       Past Surgical History:      Past Surgical History:   Procedure Laterality Date     AS REMOVAL OF KIDNEY STONE       AS REMOVAL OF KIDNEY STONE       C REMOVAL OF KIDNEY STONE       TUBAL LIGATION         Family History:      Family History   Problem  Relation Age of Onset     Diabetes Mother      Emphysema Mother      Cervical Cancer Mother      Throat cancer Mother      Diabetes Father      Hypertension Father      Hypertension Maternal Grandmother      Diabetes Maternal Grandmother      Schizophrenia Maternal Grandmother      Unknown/Adopted Maternal Grandfather      Lung Cancer Paternal Grandmother      Diabetes Paternal Grandmother      Unknown/Adopted Paternal Grandfather        Social History:    Marital Status:  Single [1]  Social History     Tobacco Use     Smoking status: Current Every Day Smoker     Packs/day: 1.00     Years: 28.00     Pack years: 28.00     Types: Cigarettes     Start date: 1/1/1989     Smokeless tobacco: Never Used     Tobacco comment: pt denied QP 2/20/2020   Substance Use Topics     Alcohol use: Yes     Comment: occasional     Drug use: No        Review of Systems      Physical Exam   Vitals:  There were no vitals taken for this visit.      Physical Exam    Impression: Bilateral nephrolithiasis p  Plan   Plan: Again it sounds like Kimberley is stable. she has known stones bilaterally larger on the left which likely are going to need intervention at some time  She does not have any acute pain or acute obstruction that it sounds like.  She knows to call me if things do change if she has any suspicion of acute renal colic or infection.  Otherwise we will schedule her for an office visit in 2 months and hopefully will be able to discuss electively proceeding with stone removal on the left kidney   Ureteroscopically. 9 minutes were spent on this phone consultation.      No follow-ups on file.    Sherry Mcpherson MD  [facility]

## 2020-04-23 ENCOUNTER — NURSE TRIAGE (OUTPATIENT)
Dept: FAMILY MEDICINE | Facility: OTHER | Age: 40
End: 2020-04-23

## 2020-04-23 NOTE — TELEPHONE ENCOUNTER
COVID 19 Nurse Triage Plan/Patient Instructions    Please be aware that novel coronavirus (COVID-19) may be circulating in the community. If you develop symptoms such as fever, cough, or SOB or if you have concerns about the presence of another infection including coronavirus (COVID-19), please contact your health care provider or visit [email or web address].     Disposition/Instructions    Patient to stay at home and follow home care protocol based instructions.     Thank you for limiting contact with others, wearing a simple mask to cover your cough, practice good hand hygiene habits and accessing our virtual services where possible to limit the spread of this virus.    For more information about COVID19 and options for caring for yourself at home, please visit the CDC website at https://www.cdc.gov/coronavirus/2019-ncov/about/steps-when-sick.html  For more options for care at [facility], please visit our website at [email or web address]    For more information, please use the [place] Department of Health COVID-19 Website: [email or web address]/coronavirus/index.html  [place] Department of Health ([facility]) COVID-19 Hotlines (Interpreters available):      Health questions: Phone Number: [phone] or [phone] and Hours: 7 a.m. to 7 p.m.    Schools and  questions: Phone Number: [phone] or [phone] and Hours 7 a.m. to 7 p.m.      Patient called with complaints of fever, cough, extreme fatigue, body aches.  Denies shortness of breath.  She does have asthma and hasn't had to use her inhaler as of yet.  Temp 99.9, then 100.3, then 101 last night.  Not using any OTC medications because she didn't know what to use.  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  They did not wear masks.  She states he chest is heavy and her voice sounds raspy.  Instructed her on home care and isolation and  using OTC medications to treat symptoms.  Instructed her to call back with any changes to her symptoms that worsen and if short of breath go to ER.  She verbalized good understanding.              Reason for Disposition    [1] Fever > 100.0 F (37.8 C) AND [2] bedridden (e.g., nursing home patient, CVA, chronic illness, recovering from surgery)    COVID-19 Testing, questions about    COVID-19 Prevention and Healthy Living, questions about    COVID-19 Home Isolation, questions about    COVID-19, questions about    1] COVID-19 infection diagnosed or suspected AND [2] mild symptoms (fever, cough) AND [2] no trouble breathing or other complications    Additional Information    Negative: SEVERE difficulty breathing (e.g., struggling for each breath, speaks in single words)    Negative: Difficult to awaken or acting confused (e.g., disoriented, slurred speech)    Negative: Bluish (or gray) lips or face now    Negative: Shock suspected (e.g., cold/pale/clammy skin, too weak to stand, low BP, rapid pulse)    Negative: Sounds like a life-threatening emergency to the triager    Negative: [1] COVID-19 suspected (e.g., cough, fever, shortness of breath) AND [2] public health department recommends testing    Negative: [1] COVID-19 exposure AND [2] no symptoms    Negative: COVID-19 and Breastfeeding, questions about    Negative: SEVERE or constant chest pain (Exception: mild central chest pain, present only when coughing)    Negative: MODERATE difficulty breathing (e.g., speaks in phrases, SOB even at rest, pulse 100-120)    Negative: Patient sounds very sick or weak to the triager    Protocols used: CORONAVIRUS (COVID-19) DIAGNOSED OR [id number] 3.30.20

## 2020-05-08 DIAGNOSIS — R05.9 COUGH: ICD-10-CM

## 2020-05-08 RX ORDER — ALBUTEROL SULFATE 90 UG/1
2 AEROSOL, METERED RESPIRATORY (INHALATION) EVERY 6 HOURS
Qty: 18 G | Refills: 1 | Status: SHIPPED | OUTPATIENT
Start: 2020-05-08 | End: 2021-04-27

## 2020-06-08 ENCOUNTER — TELEPHONE (OUTPATIENT)
Dept: FAMILY MEDICINE | Facility: OTHER | Age: 40
End: 2020-06-08

## 2020-06-08 NOTE — TELEPHONE ENCOUNTER
Pt calling and would like the Metrogel for BV.She used two rounds of the oral and states it is way worse.Extreme odor and little itching and burning. Discharge,white maybe a little yellow at times      Pharmacy [facility] in [place]..    Call back [phone].    Please advise.    Akila Acosta RN

## 2020-06-09 ENCOUNTER — OFFICE VISIT (OUTPATIENT)
Dept: FAMILY MEDICINE | Facility: OTHER | Age: 40
End: 2020-06-09
Attending: FAMILY MEDICINE
Payer: COMMERCIAL

## 2020-06-09 VITALS
SYSTOLIC BLOOD PRESSURE: 128 MMHG | HEART RATE: 85 BPM | OXYGEN SATURATION: 95 % | WEIGHT: 162.8 LBS | TEMPERATURE: 99.2 F | BODY MASS INDEX: 27.09 KG/M2 | DIASTOLIC BLOOD PRESSURE: 78 MMHG | RESPIRATION RATE: 18 BRPM

## 2020-06-09 DIAGNOSIS — B96.89 BACTERIAL VAGINOSIS: Primary | ICD-10-CM

## 2020-06-09 DIAGNOSIS — N76.0 BACTERIAL VAGINOSIS: ICD-10-CM

## 2020-06-09 DIAGNOSIS — B96.89 BACTERIAL VAGINOSIS: ICD-10-CM

## 2020-06-09 DIAGNOSIS — N89.8 VAGINAL DISCHARGE: ICD-10-CM

## 2020-06-09 DIAGNOSIS — Z23 NEED FOR VACCINATION: Primary | ICD-10-CM

## 2020-06-09 DIAGNOSIS — N92.0 MENORRHAGIA WITH REGULAR CYCLE: ICD-10-CM

## 2020-06-09 DIAGNOSIS — N76.0 BACTERIAL VAGINOSIS: Primary | ICD-10-CM

## 2020-06-09 DIAGNOSIS — R30.0 DYSURIA: ICD-10-CM

## 2020-06-09 LAB
ALBUMIN UR-MCNC: NEGATIVE MG/DL
APPEARANCE UR: CLEAR
BACTERIA #/AREA URNS HPF: ABNORMAL /HPF
BILIRUB UR QL STRIP: NEGATIVE
COLOR UR AUTO: ABNORMAL
GLUCOSE UR STRIP-MCNC: NEGATIVE MG/DL
HGB UR QL STRIP: ABNORMAL
KETONES UR STRIP-MCNC: NEGATIVE MG/DL
LEUKOCYTE ESTERASE UR QL STRIP: NEGATIVE
MUCOUS THREADS #/AREA URNS LPF: PRESENT /LPF
NITRATE UR QL: NEGATIVE
PH UR STRIP: 6 PH (ref 4.7–8)
RBC #/AREA URNS AUTO: 9 /HPF (ref 0–2)
SOURCE: ABNORMAL
SP GR UR STRIP: 1.02 (ref 1–1.03)
SPECIMEN SOURCE: ABNORMAL
UROBILINOGEN UR STRIP-MCNC: NORMAL MG/DL (ref 0–2)
WBC #/AREA URNS AUTO: 3 /HPF (ref 0–5)
WET PREP SPEC: ABNORMAL

## 2020-06-09 PROCEDURE — 99213 OFFICE O/P EST LOW 20 MIN: CPT | Performed by: FAMILY MEDICINE

## 2020-06-09 PROCEDURE — G0463 HOSPITAL OUTPT CLINIC VISIT: HCPCS

## 2020-06-09 PROCEDURE — 87591 N.GONORRHOEAE DNA AMP PROB: CPT | Mod: ZL | Performed by: FAMILY MEDICINE

## 2020-06-09 PROCEDURE — 87210 SMEAR WET MOUNT SALINE/INK: CPT | Mod: ZL | Performed by: FAMILY MEDICINE

## 2020-06-09 PROCEDURE — 81001 URINALYSIS AUTO W/SCOPE: CPT | Mod: ZL | Performed by: FAMILY MEDICINE

## 2020-06-09 PROCEDURE — 87491 CHLMYD TRACH DNA AMP PROBE: CPT | Mod: ZL | Performed by: FAMILY MEDICINE

## 2020-06-09 RX ORDER — CETIRIZINE HYDROCHLORIDE 10 MG/1
TABLET ORAL
COMMUNITY
Start: 2020-04-12 | End: 2020-08-05

## 2020-06-09 RX ORDER — METRONIDAZOLE 500 MG/1
500 TABLET ORAL 2 TIMES DAILY
Qty: 14 TABLET | Refills: 0 | Status: SHIPPED | OUTPATIENT
Start: 2020-06-09 | End: 2020-07-16

## 2020-06-09 ASSESSMENT — ENCOUNTER SYMPTOMS
DYSURIA: 1
SHORTNESS OF BREATH: 0
PALPITATIONS: 0
NAUSEA: 0
CHILLS: 0
FEVER: 0
VOMITING: 0
CONSTIPATION: 0
ABDOMINAL PAIN: 0
HEMATURIA: 0
FLANK PAIN: 0
DIARRHEA: 0

## 2020-06-09 ASSESSMENT — PAIN SCALES - GENERAL: PAINLEVEL: NO PAIN (0)

## 2020-06-09 NOTE — TELEPHONE ENCOUNTER
Called and was unable to leave message for patient, will call back later to schedule pt to see  PCP. Rosanne Chi LPN

## 2020-06-09 NOTE — PROGRESS NOTES
Subjective     Kimberley Louis is a 40 year old female who presents to clinic today for the following health issues:    HPI     URINARY TRACT SYMPTOMS / Vaginal symptoms      Duration: 3 months    Description  frequency, urgency, odor, vaginal discharge and h/o kidney stone    Intensity:  severe    Accompanying signs and symptoms:  Fever/chills: YES- low grade fever, but does not feel feverish  Flank pain no   Nausea and vomiting: no   Vaginal symptoms: discharge, odor and itching  Abdominal/Pelvic Pain: YES- during period, uncontrollable left side for her last two periods    History  History of frequent UTI's: no   History of kidney stones: YES  Sexually Active: yes, with limited intercourse d/t odor  Possibility of pregnancy: No    Precipitating or alleviating factors: None    Therapies tried and outcome: course of antibiotics - clindamyacin x2. Last abx ~ 1 month ago.   Outcome: did not seem to help, worsened after her period which was last week  - no concerns for STDs, but is agreeable to be tested.   - wet prep (1/22/2020): positive for BV tx w/ clindamycin with 3 refills  - pap (1/22/2020): NILM, HPV negative  - h/o eczema       Patient Active Problem List   Diagnosis     Essential hypertension     H/O renal calculi     Enlarged kidney     ACP (advance care planning)     Advanced directives, counseling/discussion     Pure hypercholesterolemia     Tobacco abuse counseling     Tobacco abuse     Palpitations     Atypical chest pain     Dyspnea on exertion     Vein disorder     Migraine      Incomplete right bundle branch block     Kidney stone on left side     Left flank tenderness     Nephrocalcinosis     Personal history of tobacco use, presenting hazards to health     Recurrent UTI     Past Surgical History:   Procedure Laterality Date     AS REMOVAL OF KIDNEY STONE       AS REMOVAL OF KIDNEY STONE       C REMOVAL OF KIDNEY STONE       TUBAL LIGATION         Social History     Tobacco Use     Smoking status:  Current Every Day Smoker     Packs/day: 1.00     Years: 28.00     Pack years: 28.00     Types: Cigarettes     Start date: 1/1/1989     Smokeless tobacco: Never Used     Tobacco comment: pt denied QP 2/20/2020   Substance Use Topics     Alcohol use: Yes     Comment: occasional     Family History   Problem Relation Age of Onset     Diabetes Mother      Emphysema Mother      Cervical Cancer Mother      Throat cancer Mother      Diabetes Father      Hypertension Father      Hypertension Maternal Grandmother      Diabetes Maternal Grandmother      Schizophrenia Maternal Grandmother      Unknown/Adopted Maternal Grandfather      Lung Cancer Paternal Grandmother      Diabetes Paternal Grandmother      Unknown/Adopted Paternal Grandfather          Current Outpatient Medications   Medication Sig Dispense Refill     albuterol (PROAIR HFA/PROVENTIL HFA/VENTOLIN HFA) 108 (90 Base) MCG/ACT inhaler INHALE 2 PUFFS INTO THE LUNGS EVERY 6 HOURS 18 g 1     amLODIPine (NORVASC) 10 MG tablet TAKE 1 TABLET(10 MG) BY MOUTH DAILY 30 tablet 5     cetirizine (ZYRTEC) 10 MG tablet        metoprolol tartrate (LOPRESSOR) 25 MG tablet TAKE 1 TABLET(25 MG) BY MOUTH TWICE DAILY 60 tablet 5     SUMAtriptan (IMITREX) 25 MG tablet Take 1 tablet (25 mg) by mouth at onset of headache for migraine May repeat in 2 hours. Max 8 tablets/24 hours. 18 tablet 3     triamcinolone (KENALOG) 0.025 % external ointment Apply topically 2 times daily as needed for irritation 15 g 0     budesonide (PULMICORT) 0.5 MG/2ML neb solution Mix 240 ml tyesha med sinus rinse, add 0.5 budesonide, rinse 1/2 bottle into each nostril twice daily (Patient not taking: Reported on 6/9/2020) 1 Box 3     No Known Allergies  BP Readings from Last 3 Encounters:   06/09/20 128/78   02/20/20 124/80   02/07/20 122/68    Wt Readings from Last 3 Encounters:   06/09/20 73.8 kg (162 lb 12.8 oz)   02/20/20 77.1 kg (170 lb)   01/22/20 76.8 kg (169 lb 6.4 oz)                    Reviewed and  updated as needed this visit by Provider  Tobacco  Allergies  Meds  Problems  Med Hx  Surg Hx  Fam Hx         Review of Systems   Constitutional: Negative for chills and fever.   HENT: Negative for congestion.    Respiratory: Negative for shortness of breath.    Cardiovascular: Negative for chest pain and palpitations.   Gastrointestinal: Negative for abdominal pain, constipation, diarrhea, nausea and vomiting.   Genitourinary: Positive for dysuria and vaginal discharge. Negative for flank pain and hematuria.        Vaginal odor, discharge (white)            Objective    /78 (BP Location: Left arm, Patient Position: Chair, Cuff Size: Adult Regular)   Pulse 85   Temp 99.2  F (37.3  C) (Tympanic)   Resp 18   Wt 73.8 kg (162 lb 12.8 oz)   SpO2 95%   BMI 27.09 kg/m    Body mass index is 27.09 kg/m .  Physical Exam  Constitutional:       General: She is not in acute distress.     Appearance: She is not ill-appearing.   Cardiovascular:      Rate and Rhythm: Normal rate and regular rhythm.      Heart sounds: No murmur.   Pulmonary:      Effort: Pulmonary effort is normal. No respiratory distress.      Breath sounds: No wheezing or rales.   Abdominal:      General: Bowel sounds are normal.      Tenderness: There is no abdominal tenderness.   Genitourinary:     Pubic Area: No rash.       Labia:         Right: No rash or tenderness.         Left: No rash or tenderness.       Vagina: Vaginal discharge (physiologic and limited) present. No erythema or tenderness.      Cervix: No discharge, friability or lesion.   Neurological:      Mental Status: She is alert.          Diagnostic Test Results:  Results for orders placed or performed in visit on 06/09/20 (from the past 24 hour(s))   Wet prep    Specimen: Vagina   Result Value Ref Range    Specimen Description Vagina     Wet Prep Few  WBC'S seen       Wet Prep Clue cells seen (A)     Wet Prep No Trichomonas seen     Wet Prep No yeast seen    UA reflex to  Microscopic and Culture - [place]    Specimen: Midstream Urine   Result Value Ref Range    Color Urine Light Yellow     Appearance Urine Clear     Glucose Urine Negative NEG^Negative mg/dL    Bilirubin Urine Negative NEG^Negative    Ketones Urine Negative NEG^Negative mg/dL    Specific Gravity Urine 1.018 1.003 - 1.035    Blood Urine Small (A) NEG^Negative    pH Urine 6.0 4.7 - 8.0 pH    Protein Albumin Urine Negative NEG^Negative mg/dL    Urobilinogen mg/dL Normal 0.0 - 2.0 mg/dL    Nitrite Urine Negative NEG^Negative    Leukocyte Esterase Urine Negative NEG^Negative    Source Midstream Urine     RBC Urine 9 (H) 0 - 2 /HPF    WBC Urine 3 0 - 5 /HPF    Bacteria Urine None (A) NEG^Negative /HPF    Mucous Urine Present (A) NEG^Negative /LPF             Assessment & Plan     1. Bacterial vaginosis  Failed multiple rounds of clindamycin. Will treat w/ flagyl  - flagyl 500mg bid x7d  - if failure with flagyl, consideration for: flagyl 500mg bid x7d, followed by boric acid gelatin cap intravaginal at bedtime x21 days, followed by flagyl vaginal gel twice per week for 16 weeks.     2. Vaginal discharge  - Wet prep  - GC/Chlamydia by PCR - [facility]    3. Dysuria  - UA reflex to Microscopic and Culture - [place], with trace blood  - repeat UA in one month    4. Menorrhagia with regular cycle  Kimberley wishes to discuss definitive cares.   - OB/GYN REFERRAL        See Patient Instructions    Return if symptoms worsen or fail to improve.    Esha Banegas MD  [facility] [place]

## 2020-06-09 NOTE — NURSING NOTE
"Chief Complaint   Patient presents with     Vaginal Problem       Initial /78 (BP Location: Left arm, Patient Position: Chair, Cuff Size: Adult Regular)   Pulse 85   Temp 99.2  F (37.3  C) (Tympanic)   Resp 18   Wt 73.8 kg (162 lb 12.8 oz)   SpO2 95%   BMI 27.09 kg/m   Estimated body mass index is 27.09 kg/m  as calculated from the following:    Height as of 2/20/20: 1.651 m (5' 5\").    Weight as of this encounter: 73.8 kg (162 lb 12.8 oz).  Medication Reconciliation: complete  Anna Marie Terrell LPN  "

## 2020-06-10 LAB
C TRACH DNA SPEC QL NAA+PROBE: NOT DETECTED
N GONORRHOEA DNA SPEC QL NAA+PROBE: NOT DETECTED
SPECIMEN SOURCE: NORMAL

## 2020-06-11 ENCOUNTER — OFFICE VISIT (OUTPATIENT)
Dept: FAMILY MEDICINE | Facility: OTHER | Age: 40
End: 2020-06-11
Attending: FAMILY MEDICINE
Payer: COMMERCIAL

## 2020-06-11 ENCOUNTER — HOSPITAL ENCOUNTER (OUTPATIENT)
Dept: CT IMAGING | Facility: HOSPITAL | Age: 40
End: 2020-06-11
Attending: FAMILY MEDICINE
Payer: COMMERCIAL

## 2020-06-11 ENCOUNTER — TELEPHONE (OUTPATIENT)
Dept: FAMILY MEDICINE | Facility: OTHER | Age: 40
End: 2020-06-11

## 2020-06-11 ENCOUNTER — APPOINTMENT (OUTPATIENT)
Dept: LAB | Facility: OTHER | Age: 40
End: 2020-06-11
Attending: FAMILY MEDICINE
Payer: COMMERCIAL

## 2020-06-11 VITALS
SYSTOLIC BLOOD PRESSURE: 106 MMHG | HEART RATE: 77 BPM | RESPIRATION RATE: 19 BRPM | OXYGEN SATURATION: 97 % | WEIGHT: 162 LBS | DIASTOLIC BLOOD PRESSURE: 76 MMHG | BODY MASS INDEX: 26.96 KG/M2 | TEMPERATURE: 98.3 F

## 2020-06-11 DIAGNOSIS — Z87.442 H/O RENAL CALCULI: Primary | ICD-10-CM

## 2020-06-11 DIAGNOSIS — Z87.442 H/O RENAL CALCULI: ICD-10-CM

## 2020-06-11 DIAGNOSIS — N20.0 KIDNEY STONE: Primary | ICD-10-CM

## 2020-06-11 LAB
ALBUMIN UR-MCNC: ABNORMAL MG/DL
APPEARANCE UR: ABNORMAL
BACTERIA #/AREA URNS HPF: ABNORMAL /HPF
BILIRUB UR QL STRIP: 0
COLOR UR AUTO: ABNORMAL
GLUCOSE UR STRIP-MCNC: ABNORMAL MG/DL
HGB UR QL STRIP: ABNORMAL
KETONES UR STRIP-MCNC: ABNORMAL MG/DL
LEUKOCYTE ESTERASE UR QL STRIP: ABNORMAL
MUCOUS THREADS #/AREA URNS LPF: PRESENT /LPF
NITRATE UR QL: ABNORMAL
PH UR STRIP: ABNORMAL PH (ref 5–7)
RBC #/AREA URNS AUTO: 9 /HPF (ref 0–2)
SOURCE: ABNORMAL
SP GR UR STRIP: ABNORMAL (ref 1–1.03)
SQUAMOUS #/AREA URNS AUTO: 7 /HPF (ref 0–1)
UROBILINOGEN UR STRIP-MCNC: ABNORMAL MG/DL (ref 0–2)
WBC #/AREA URNS AUTO: 1 /HPF (ref 0–5)

## 2020-06-11 PROCEDURE — 99213 OFFICE O/P EST LOW 20 MIN: CPT | Performed by: FAMILY MEDICINE

## 2020-06-11 PROCEDURE — 81001 URINALYSIS AUTO W/SCOPE: CPT | Mod: ZL | Performed by: FAMILY MEDICINE

## 2020-06-11 PROCEDURE — G0463 HOSPITAL OUTPT CLINIC VISIT: HCPCS

## 2020-06-11 PROCEDURE — 74176 CT ABD & PELVIS W/O CONTRAST: CPT | Mod: TC

## 2020-06-11 RX ORDER — KETOROLAC TROMETHAMINE 10 MG/1
10 TABLET, FILM COATED ORAL EVERY 6 HOURS PRN
Qty: 5 TABLET | Refills: 0 | Status: SHIPPED | OUTPATIENT
Start: 2020-06-11 | End: 2020-06-11

## 2020-06-11 RX ORDER — TAMSULOSIN HYDROCHLORIDE 0.4 MG/1
0.4 CAPSULE ORAL DAILY
Qty: 30 CAPSULE | Refills: 0 | Status: SHIPPED | OUTPATIENT
Start: 2020-06-11 | End: 2020-08-05

## 2020-06-11 RX ORDER — KETOROLAC TROMETHAMINE 30 MG/ML
30 INJECTION, SOLUTION INTRAMUSCULAR; INTRAVENOUS ONCE
Status: CANCELLED | OUTPATIENT
Start: 2020-06-11 | End: 2020-06-11

## 2020-06-11 ASSESSMENT — ENCOUNTER SYMPTOMS
SHORTNESS OF BREATH: 0
PALPITATIONS: 0
ABDOMINAL PAIN: 0
FEVER: 0
CHILLS: 0

## 2020-06-11 ASSESSMENT — PAIN SCALES - GENERAL: PAINLEVEL: MODERATE PAIN (5)

## 2020-06-11 NOTE — NURSING NOTE
"Chief Complaint   Patient presents with     Urinary Problem       Initial /76 (BP Location: Left arm, Patient Position: Chair, Cuff Size: Adult Regular)   Pulse 77   Temp 98.3  F (36.8  C) (Tympanic)   Resp 19   Wt 73.5 kg (162 lb)   SpO2 97%   BMI 26.96 kg/m   Estimated body mass index is 26.96 kg/m  as calculated from the following:    Height as of 2/20/20: 1.651 m (5' 5\").    Weight as of this encounter: 73.5 kg (162 lb).  Medication Reconciliation: complete  Anna Marie Terrell LPN  "

## 2020-06-11 NOTE — TELEPHONE ENCOUNTER
Per Dr Banegas-   Encourage hydration and straining her urine. She can use ibuprofen for her pain. Please place Kimberley on my schedule for today. If her pain gets worse, has fevers, or any other concerning symptoms, please go to the urgent care of emergency room. She will need imaging to ensure her kidney stones have not moved.     Esha Banegas MD    Message text

## 2020-06-11 NOTE — PROGRESS NOTES
Subjective     Kimberley Louis is a 40 year old female who presents to clinic today for the following health issues:    HPI     URINARY TRACT SYMPTOMS      Duration: 6/11/2020 am, 20 minutes of b/l flank pain, frequent urination and bladder spasms. Bladder spasms are continuing, but pain is better.     Description  spasm    Intensity:  severe    Accompanying signs and symptoms:  Fever/chills: no   Flank pain YES  Nausea and vomiting: no   Vaginal symptoms: none  Abdominal/Pelvic Pain: YES    History  History of frequent UTI's: no   History of kidney stones: YES  Sexually Active: YES  Possibility of pregnancy: Yes    Precipitating or alleviating factors: None    Therapies tried and outcome: pyridium  and toradol   Outcome: did not relieve the pain     - h/o renal calculi with left 9mm and right 3mm. Last imaging 2/25/2020  - follows with Dr. Mcpherson, with last visit 4/15/2020. With recommendation for watchful waiting. Next appt 6/25/2020 with consideration for discussions regarding left kidney stone removal.   - presented to clinic on (6/9/2020) with vaginal discharge, dx w/ BV and started on flagyl. UA showed trace blood and 9 RBC. G/c negative    Patient Active Problem List   Diagnosis     Essential hypertension     H/O renal calculi     Enlarged kidney     ACP (advance care planning)     Advanced directives, counseling/discussion     Pure hypercholesterolemia     Tobacco abuse counseling     Tobacco abuse     Palpitations     Atypical chest pain     Dyspnea on exertion     Vein disorder     Migraine      Incomplete right bundle branch block     Kidney stone on left side     Left flank tenderness     Nephrocalcinosis     Personal history of tobacco use, presenting hazards to health     Recurrent UTI     Bacterial vaginosis     Past Surgical History:   Procedure Laterality Date     AS REMOVAL OF KIDNEY STONE       AS REMOVAL OF KIDNEY STONE       C REMOVAL OF KIDNEY STONE       TUBAL LIGATION         Social History      Tobacco Use     Smoking status: Current Every Day Smoker     Packs/day: 1.00     Years: 28.00     Pack years: 28.00     Types: Cigarettes     Start date: 1/1/1989     Smokeless tobacco: Never Used     Tobacco comment: pt denied QP 2/20/2020   Substance Use Topics     Alcohol use: Yes     Comment: occasional     Family History   Problem Relation Age of Onset     Diabetes Mother      Emphysema Mother      Cervical Cancer Mother      Throat cancer Mother      Diabetes Father      Hypertension Father      Hypertension Maternal Grandmother      Diabetes Maternal Grandmother      Schizophrenia Maternal Grandmother      Unknown/Adopted Maternal Grandfather      Lung Cancer Paternal Grandmother      Diabetes Paternal Grandmother      Unknown/Adopted Paternal Grandfather          Current Outpatient Medications   Medication Sig Dispense Refill     tamsulosin (FLOMAX) 0.4 MG capsule Take 1 capsule (0.4 mg) by mouth daily 30 capsule 0     albuterol (PROAIR HFA/PROVENTIL HFA/VENTOLIN HFA) 108 (90 Base) MCG/ACT inhaler INHALE 2 PUFFS INTO THE LUNGS EVERY 6 HOURS 18 g 1     amLODIPine (NORVASC) 10 MG tablet TAKE 1 TABLET(10 MG) BY MOUTH DAILY 30 tablet 5     budesonide (PULMICORT) 0.5 MG/2ML neb solution Mix 240 ml tyesha med sinus rinse, add 0.5 budesonide, rinse 1/2 bottle into each nostril twice daily (Patient not taking: Reported on 6/9/2020) 1 Box 3     cetirizine (ZYRTEC) 10 MG tablet        metoprolol tartrate (LOPRESSOR) 25 MG tablet TAKE 1 TABLET(25 MG) BY MOUTH TWICE DAILY 60 tablet 5     metroNIDAZOLE (FLAGYL) 500 MG tablet Take 1 tablet (500 mg) by mouth 2 times daily for 7 days 14 tablet 0     SUMAtriptan (IMITREX) 25 MG tablet Take 1 tablet (25 mg) by mouth at onset of headache for migraine May repeat in 2 hours. Max 8 tablets/24 hours. 18 tablet 3     triamcinolone (KENALOG) 0.025 % external ointment Apply topically 2 times daily as needed for irritation 15 g 0     No Known Allergies  BP Readings from Last 3  Encounters:   06/11/20 106/76   06/09/20 128/78   02/20/20 124/80    Wt Readings from Last 3 Encounters:   06/11/20 73.5 kg (162 lb)   06/09/20 73.8 kg (162 lb 12.8 oz)   02/20/20 77.1 kg (170 lb)              Reviewed and updated as needed this visit by Provider  Tobacco  Allergies  Meds  Problems  Med Hx  Surg Hx  Fam Hx         Review of Systems   Constitutional: Negative for chills and fever.   HENT: Negative for congestion.    Respiratory: Negative for shortness of breath.    Cardiovascular: Negative for chest pain and palpitations.   Gastrointestinal: Negative for abdominal pain.   Genitourinary:        Bladder spasms          Objective    /76 (BP Location: Left arm, Patient Position: Chair, Cuff Size: Adult Regular)   Pulse 77   Temp 98.3  F (36.8  C) (Tympanic)   Resp 19   Wt 73.5 kg (162 lb)   SpO2 97%   BMI 26.96 kg/m    Body mass index is 26.96 kg/m .  Physical Exam  Constitutional:       General: She is not in acute distress.     Appearance: She is not ill-appearing.   Cardiovascular:      Rate and Rhythm: Normal rate and regular rhythm.      Heart sounds: No murmur.   Pulmonary:      Effort: Pulmonary effort is normal. No respiratory distress.      Breath sounds: No wheezing or rales.   Abdominal:      General: Bowel sounds are normal.      Tenderness: There is no abdominal tenderness. There is no right CVA tenderness or left CVA tenderness.   Neurological:      Mental Status: She is alert.            Diagnostic Test Results:  Results for orders placed or performed in visit on 06/11/20 (from the past 24 hour(s))   UA with Microscopic reflex to Culture    Specimen: Midstream Urine   Result Value Ref Range    Color Urine Orange     Appearance Urine Turbid     Glucose Urine COLOR INTERFERENCE, UNABLE TO REPORT MACROSCOPIC (A) NEG^Negative mg/dL    Bilirubin Urine 0 (A) NEG^Negative    Ketones Urine COLOR INTERFERENCE, UNABLE TO REPORT MACROSCOPIC (A) NEG^Negative mg/dL    Specific  [place] Urine COLOR INTERFERENCE, UNABLE TO REPORT MACROSCOPIC 1.003 - 1.035    Blood Urine COLOR INTERFERENCE, UNABLE TO REPORT MACROSCOPIC (A) NEG^Negative    pH Urine COLOR INTERFERENCE, UNABLE TO REPORT MACROSCOPIC 5.0 - 7.0 pH    Protein Albumin Urine COLOR INTERFERENCE, UNABLE TO REPORT MACROSCOPIC (A) NEG^Negative mg/dL    Urobilinogen mg/dL COLOR INTERFERENCE, UNABLE TO REPORT MACROSCOPIC 0.0 - 2.0 mg/dL    Nitrite Urine COLOR INTERFERENCE, UNABLE TO REPORT MACROSCOPIC (A) NEG^Negative    Leukocyte Esterase Urine COLOR INTERFERENCE, UNABLE TO REPORT MACROSCOPIC (A) NEG^Negative    Source Midstream Urine     WBC Urine 1 0 - 5 /HPF    RBC Urine 9 (H) 0 - 2 /HPF    Bacteria Urine Few (A) NEG^Negative /HPF    Squamous Epithelial /HPF Urine 7 (H) 0 - 1 /HPF    Mucous Urine Present (A) NEG^Negative /LPF         CT abd/ pelvis (6/11/2020):  IMPRESSION:   1.6 mm obstructing distal right ureteral calculus located approximately 1.5 to 2 cm from the ureterovesical junction. There is no apparent abnormality that would account for the patient's left-sided pain.     Bilateral renal lithiasis. The large 8.5 mm calculus seen previously in the upper pole the left kidney is no longer identified. Other calculi are similar in appearance.      Assessment & Plan     H/O renal calculi / Kidney stone  Passed her left 8.5mm left kidney stone. Has 1.6mm obstructing stone in right ureter. Flomax to dilate ureter. No f/c. No concerning symptoms. Pain well controlled.   - tamsulosin (FLOMAX) 0.4 MG capsule; Take 1 capsule (0.4 mg) by mouth daily  Dispense: 30 capsule; Refill: 0  - hydration   - c/w toradol prn, Qty of 6 pills   - follow up with urology scheduled for 6/25/2020    See Patient Instructions    Return if symptoms worsen or fail to improve.    Esha Banegas MD  [facility] [place]

## 2020-06-11 NOTE — TELEPHONE ENCOUNTER
Pt called back regarding lab results. Reports that the flagyl has helped with odor and discharge but now she is experiencing bladder spasms and urgency. Pt has a history of kidney stones and does see Dr Mcpherson however Dr Mcpherson is out until Monday. Pt is wondering what else she can do. Reports that she is very uncomfortable. Please advise. Thank you!

## 2020-06-11 NOTE — TELEPHONE ENCOUNTER
Scheduled for appt this afternoon with Dr Banegas per note below. Pt informed of Farhat Banegas's recommendations.

## 2020-06-15 ENCOUNTER — TELEPHONE (OUTPATIENT)
Dept: FAMILY MEDICINE | Facility: OTHER | Age: 40
End: 2020-06-15

## 2020-06-15 NOTE — TELEPHONE ENCOUNTER
Patient states she is having pain still and has passed stones but believes she has one she cannot pass. Patient is requesting provider to look at her recent scans.

## 2020-06-17 NOTE — TELEPHONE ENCOUNTER
I contacted Kimberley on the phone on 6/16/2020.  She was still having some discomfort in the right lower quadrant but it was better and she passed a small fragment or a leo of tissue.  She is going to let me know if things do not improve.

## 2020-07-16 ENCOUNTER — OFFICE VISIT (OUTPATIENT)
Dept: UROLOGY | Facility: OTHER | Age: 40
End: 2020-07-16
Attending: UROLOGY
Payer: COMMERCIAL

## 2020-07-16 ENCOUNTER — ANCILLARY PROCEDURE (OUTPATIENT)
Dept: GENERAL RADIOLOGY | Facility: OTHER | Age: 40
End: 2020-07-16
Attending: UROLOGY
Payer: COMMERCIAL

## 2020-07-16 VITALS
OXYGEN SATURATION: 98 % | SYSTOLIC BLOOD PRESSURE: 120 MMHG | DIASTOLIC BLOOD PRESSURE: 60 MMHG | WEIGHT: 162 LBS | BODY MASS INDEX: 27.66 KG/M2 | HEART RATE: 86 BPM | TEMPERATURE: 99.3 F | HEIGHT: 64 IN

## 2020-07-16 DIAGNOSIS — N20.0 NEPHROLITHIASIS: Primary | ICD-10-CM

## 2020-07-16 DIAGNOSIS — N20.0 NEPHROLITHIASIS: ICD-10-CM

## 2020-07-16 PROCEDURE — 99000 SPECIMEN HANDLING OFFICE-LAB: CPT | Performed by: UROLOGY

## 2020-07-16 PROCEDURE — G0463 HOSPITAL OUTPT CLINIC VISIT: HCPCS

## 2020-07-16 PROCEDURE — 82365 CALCULUS SPECTROSCOPY: CPT | Mod: ZL | Performed by: UROLOGY

## 2020-07-16 PROCEDURE — 74018 RADEX ABDOMEN 1 VIEW: CPT | Mod: TC

## 2020-07-16 PROCEDURE — 99212 OFFICE O/P EST SF 10 MIN: CPT | Performed by: UROLOGY

## 2020-07-16 ASSESSMENT — MIFFLIN-ST. JEOR: SCORE: 1389.83

## 2020-07-16 ASSESSMENT — PAIN SCALES - GENERAL: PAINLEVEL: NO PAIN (0)

## 2020-07-16 NOTE — PROGRESS NOTES
History     Chief Complaint:    Follow Up (Kidney stones)      HPI   Kimberley Louis is a 40 year old female who presents for follow-up of her kidney stones.  I visited with Kimberley estevez in April 2020 with a virtual visit and things were fairly stable but on June 11 of 2020 she was seen by her primary care and a CT scan was done and this demonstrated a 2 mm stone in the distal right ureter.  The patient also has 2 larger stones in the left kidney which were nonobstructing.The patient has been having severe bladder spasms especially the last few days which just terrible and yesterday she finally passed that small stone on the right.  She has a stone in her car so we will get her to bring that in for analysis.  The patient also has had intermittent left flank pain and she does have 2 large stones one is about 8 mm in the lower pole of the left kidney and one is 9 mm in the midpole posterior left kidney.  And has a history of having a percutaneous nephrolithotomy in [place] 7 years ago and also had ureteroscopic stone manipulation in 2016 by Dr. Vasques at which time he inserted a number of stones in her left kidney.    Allergies:    No Known Allergies     Medications:      albuterol (PROAIR HFA/PROVENTIL HFA/VENTOLIN HFA) 108 (90 Base) MCG/ACT inhaler  amLODIPine (NORVASC) 10 MG tablet  cetirizine (ZYRTEC) 10 MG tablet  metoprolol tartrate (LOPRESSOR) 25 MG tablet  SUMAtriptan (IMITREX) 25 MG tablet  tamsulosin (FLOMAX) 0.4 MG capsule  triamcinolone (KENALOG) 0.025 % external ointment        Problem List:      Patient Active Problem List    Diagnosis Date Noted     Bacterial vaginosis 06/09/2020     Priority: Medium     Left flank tenderness 08/03/2018     Priority: Medium     Personal history of tobacco use, presenting hazards to health 08/03/2018     Priority: Medium     Recurrent UTI 08/03/2018     Priority: Medium     Incomplete right bundle branch block 02/05/2018     Priority: Medium     Pure  hypercholesterolemia 01/03/2018     Priority: Medium     Tobacco abuse counseling 01/03/2018     Priority: Medium     Tobacco abuse 01/03/2018     Priority: Medium     Palpitations 01/03/2018     Priority: Medium     Atypical chest pain 01/03/2018     Priority: Medium     Dyspnea on exertion 01/03/2018     Priority: Medium     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  Kimberley M Missy has no plan or code status on file. Discussed available resources and provided with information. Confirmed code status reflects current choices pending further ACP discussions.  Confirmed/documented legally designated decision makers.  Added by SAYDA CABRERA             ACP (advance care planning) 04/13/2016     Priority: Medium     Advance Care Planning 4/13/2016: ACP Review of Chart / Resources Provided:  Reviewed chart for advance care plan.  Kimberley Louis has no plan or code status on file. Discussed available resources and provided with information. Confirmed code status reflects current choices pending further ACP discussions.  Confirmed/documented legally designated decision makers.  Added by Sandra Anne             Kidney stone on left side 12/21/2015     Priority: Medium     Nephrocalcinosis 12/21/2015     Priority: Medium     Essential hypertension 11/19/2015     Priority: Medium     H/O renal calculi 11/19/2015     Priority: Medium     Enlarged kidney 11/19/2015     Priority: Medium        Past Medical History:      Past Medical History:   Diagnosis Date     Enlarged kidney 11/19/2015     Essential hypertension 11/19/2015     H/O renal calculi 11/19/2015     H/O renal calculi 11/19/2015       Past Surgical History:      Past Surgical History:   Procedure Laterality Date      "AS REMOVAL OF KIDNEY STONE       AS REMOVAL OF KIDNEY STONE       C REMOVAL OF KIDNEY STONE       TUBAL LIGATION         Family History:      Family History   Problem Relation Age of Onset     Diabetes Mother      Emphysema Mother      Cervical Cancer Mother      Throat cancer Mother      Diabetes Father      Hypertension Father      Hypertension Maternal Grandmother      Diabetes Maternal Grandmother      Schizophrenia Maternal Grandmother      Unknown/Adopted Maternal Grandfather      Lung Cancer Paternal Grandmother      Diabetes Paternal Grandmother      Unknown/Adopted Paternal Grandfather        Social History:    Marital Status:  Single [1]  Social History     Tobacco Use     Smoking status: Current Every Day Smoker     Packs/day: 1.00     Years: 28.00     Pack years: 28.00     Types: Cigarettes     Start date: 1/1/1989     Smokeless tobacco: Never Used     Tobacco comment: pt denied QP 2/20/2020   Substance Use Topics     Alcohol use: Yes     Comment: occasional     Drug use: No        Review of Systems      Physical Exam   Vitals:  /60 (BP Location: Right arm, Patient Position: Chair, Cuff Size: Adult Regular)   Pulse 86   Temp 99.3  F (37.4  C) (Tympanic)   Ht 1.626 m (5' 4\")   Wt 73.5 kg (162 lb)   SpO2 98%   BMI 27.81 kg/m        Physical Exam  CT ABDOMEN PELVIS W/O CONTRAST     CLINICAL HISTORY: Female, age 40 years,  h/o renal stones; H/O renal  calculi;     Comparison:  CT scan abdomen and pelvis 2/25/2020     TECHNIQUE:  CT was performed of the abdomen and pelvis without   contrast. Sagittal, coronal and axial reconstructions were reviewed.      FINDINGS:     Abdomen/Pelvis CT:  Lung Bases:  Clear     Esophagus/stomach: Normal.     Liver:  Normal.     Gallbladder: Normal.     Spleen: Normal.     Pancreas: Normal.     Adrenal Glands: Normal.     Kidneys: There are number of radiodense stones again seen within the  left kidney. The large upper pole stone seen previously is no " longer  identified within the kidney and is not located in the ureter or in  the bladder. Tiny, 1 mm calculus in the upper pole the right kidney  with dilatation of the right renal pelvis and collecting system.     Ureters: 1.6 to 2 mm obstructing calculus in the distal right ureter  located approximately 2 cm from the ureterovesical junction.     Urinary bladder: Normal.     Abdominal Aorta: Scattered atherosclerotic calcifications.  IVC: Normal.     Lymph Nodes: Normal.     Large and Small Bowel: Normal.  Appendix: Normal.     Pelvic Organs:  Normal.  Peritoneum: Minimal free fluid in the lower pelvis.  Bony structures: No acute abnormality. Mild degenerative changes of  the lumbar spine.     Other Findings:  Inguinal lymph nodes are normal.                                                                      IMPRESSION:   1.6 mm obstructing distal right ureteral calculus located  approximately 1.5 to 2 cm from the ureterovesical junction. There is  no apparent abnormality that would account for the patient's  left-sided pain.     Bilateral renal lithiasis. The large 8.5 mm calculus seen previously  in the upper pole the left kidney is no longer identified. Other  calculi are similar in appearance.     SHIMA BURNS MD     Impression: #1 right distal ureterolithiasis that has passed #2 left nephrolithiasis    Plan   Plan: 30 minutes were spent with Kimberley reviewing her current symptoms as well as reviewing previous CT reports, scans and her most recent scan.  I used diagrams straight where her stones are currently located in the left kidney and her treatment options.  I do not feel the stones are large enough for a percutaneous nephrolithotomy and she tells me that when they were doing this percutaneous procedure they had trouble getting to 1 of the stones.  Extracorporeal shockwave lithotripsy I do not feel is a good option because we would probably only be able to treat one stone because they are fairly  close together.  I feel her best option is ureteroscopy with intracorporeal lithotripsy.  I talked to her about the lower pole stone which we may have a challenge getting to depending on the angle as well as the mid pole stone which it is difficult to know if this possibly could be in a diverticuli.  We discussed risks of bleeding infection Steinstrasse and stone injury or a avulsion of the ureter anesthetic risks.  I discussed the possibility that I may not be able to get up to the stones which would require placement of a stent and return 2 weeks later and I also discussed ureteroscopy with the powdering the stone versus if I am able to get a sheath up my hope is that I can remove stone fragments so she will have less to pass.  She is aware of the stent discomfort that could occur with a stent.  Kimberley really does not want to have surgery but she does understand that she has 2 fairly large stones and she had difficulty passing a 2 mm stone so she wishes to proceed we will find a date here in the next month or 2 and get her scheduled.  She will need a metabolic work-up once we have completed her procedures      No follow-ups on file.    Sherry Mcpherson MD  [facility]

## 2020-07-16 NOTE — NURSING NOTE
"Chief Complaint   Patient presents with     Follow Up     Kidney stones       Initial /60 (BP Location: Right arm, Patient Position: Chair, Cuff Size: Adult Regular)   Pulse 86   Temp 99.3  F (37.4  C) (Tympanic)   Ht 1.626 m (5' 4\")   Wt 73.5 kg (162 lb)   SpO2 98%   BMI 27.81 kg/m   Estimated body mass index is 27.81 kg/m  as calculated from the following:    Height as of this encounter: 1.626 m (5' 4\").    Weight as of this encounter: 73.5 kg (162 lb).  Medication Reconciliation: complete  SAYDA HAMMOND LPN    Review of Systems:    Weight loss:    No     Recent fever/chills:  No   Night sweats:   No  Current skin rash:  No   Recent hair loss:  No  Heat intolerance:  No   Cold intolerance:  No  Chest pain:   No   Palpitations:   No  Shortness of breath:  No   Wheezing:   No  Constipation:    No   Diarrhea:   No   Nausea:   No   Vomiting:   No   Kidney/side pain:  yes   Back pain:   No  Frequent headaches:  No   Dizziness:     No  Leg swelling:   No   Calf pain:    No    SAYDA HAMMOND LPN      "

## 2020-07-16 NOTE — LETTER
7/16/2020      RE: Kimberley Louis  [address]  [place] [address]         History     Chief Complaint:    Follow Up (Kidney stones)      HPI   Kimberley Louis is a 40 year old female who presents for follow-up of her kidney stones.  I visited with Kimberley estevez in April 2020 with a virtual visit and things were fairly stable but on June 11 of 2020 she was seen by her primary care and a CT scan was done and this demonstrated a 2 mm stone in the distal right ureter.  The patient also has 2 larger stones in the left kidney which were nonobstructing.The patient has been having severe bladder spasms especially the last few days which just terrible and yesterday she finally passed that small stone on the right.  She has a stone in her car so we will get her to bring that in for analysis.  The patient also has had intermittent left flank pain and she does have 2 large stones one is about 8 mm in the lower pole of the left kidney and one is 9 mm in the midpole posterior left kidney.  And has a history of having a percutaneous nephrolithotomy in [place] 7 years ago and also had ureteroscopic stone manipulation in 2016 by Dr. Vasques at which time he inserted a number of stones in her left kidney.    Allergies:    No Known Allergies     Medications:      albuterol (PROAIR HFA/PROVENTIL HFA/VENTOLIN HFA) 108 (90 Base) MCG/ACT inhaler  amLODIPine (NORVASC) 10 MG tablet  cetirizine (ZYRTEC) 10 MG tablet  metoprolol tartrate (LOPRESSOR) 25 MG tablet  SUMAtriptan (IMITREX) 25 MG tablet  tamsulosin (FLOMAX) 0.4 MG capsule  triamcinolone (KENALOG) 0.025 % external ointment        Problem List:      Patient Active Problem List    Diagnosis Date Noted     Bacterial vaginosis 06/09/2020     Priority: Medium     Left flank tenderness 08/03/2018     Priority: Medium     Personal history of tobacco use, presenting hazards to health 08/03/2018     Priority: Medium     Recurrent UTI 08/03/2018     Priority: Medium     Incomplete right  bundle branch block 02/05/2018     Priority: Medium     Pure hypercholesterolemia 01/03/2018     Priority: Medium     Tobacco abuse counseling 01/03/2018     Priority: Medium     Tobacco abuse 01/03/2018     Priority: Medium     Palpitations 01/03/2018     Priority: Medium     Atypical chest pain 01/03/2018     Priority: Medium     Dyspnea on exertion 01/03/2018     Priority: Medium     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  Kimberley Louis has no plan or code status on file. Discussed available resources and provided with information. Confirmed code status reflects current choices pending further ACP discussions.  Confirmed/documented legally designated decision makers.  Added by SAYDA CABRERA             ACP (advance care planning) 04/13/2016     Priority: Medium     Advance Care Planning 4/13/2016: ACP Review of Chart / Resources Provided:  Reviewed chart for advance care plan.  Kimberley Louis has no plan or code status on file. Discussed available resources and provided with information. Confirmed code status reflects current choices pending further ACP discussions.  Confirmed/documented legally designated decision makers.  Added by Sandra Anne             Kidney stone on left side 12/21/2015     Priority: Medium     Nephrocalcinosis 12/21/2015     Priority: Medium     Essential hypertension 11/19/2015     Priority: Medium     H/O renal calculi 11/19/2015     Priority: Medium     Enlarged kidney 11/19/2015     Priority: Medium        Past Medical History:      Past Medical History:   Diagnosis Date     Enlarged kidney 11/19/2015     Essential hypertension 11/19/2015     H/O renal calculi 11/19/2015     H/O renal calculi 11/19/2015       Past Surgical History:  "     Past Surgical History:   Procedure Laterality Date     AS REMOVAL OF KIDNEY STONE       AS REMOVAL OF KIDNEY STONE       C REMOVAL OF KIDNEY STONE       TUBAL LIGATION         Family History:      Family History   Problem Relation Age of Onset     Diabetes Mother      Emphysema Mother      Cervical Cancer Mother      Throat cancer Mother      Diabetes Father      Hypertension Father      Hypertension Maternal Grandmother      Diabetes Maternal Grandmother      Schizophrenia Maternal Grandmother      Unknown/Adopted Maternal Grandfather      Lung Cancer Paternal Grandmother      Diabetes Paternal Grandmother      Unknown/Adopted Paternal Grandfather        Social History:    Marital Status:  Single [1]  Social History     Tobacco Use     Smoking status: Current Every Day Smoker     Packs/day: 1.00     Years: 28.00     Pack years: 28.00     Types: Cigarettes     Start date: 1/1/1989     Smokeless tobacco: Never Used     Tobacco comment: pt denied QP 2/20/2020   Substance Use Topics     Alcohol use: Yes     Comment: occasional     Drug use: No        Review of Systems      Physical Exam   Vitals:  /60 (BP Location: Right arm, Patient Position: Chair, Cuff Size: Adult Regular)   Pulse 86   Temp 99.3  F (37.4  C) (Tympanic)   Ht 1.626 m (5' 4\")   Wt 73.5 kg (162 lb)   SpO2 98%   BMI 27.81 kg/m        Physical Exam  CT ABDOMEN PELVIS W/O CONTRAST     CLINICAL HISTORY: Female, age 40 years,  h/o renal stones; H/O renal  calculi;     Comparison:  CT scan abdomen and pelvis 2/25/2020     TECHNIQUE:  CT was performed of the abdomen and pelvis without   contrast. Sagittal, coronal and axial reconstructions were reviewed.      FINDINGS:     Abdomen/Pelvis CT:  Lung Bases:  Clear     Esophagus/stomach: Normal.     Liver:  Normal.     Gallbladder: Normal.     Spleen: Normal.     Pancreas: Normal.     Adrenal Glands: Normal.     Kidneys: There are number of radiodense stones again seen within the  left kidney. The " large upper pole stone seen previously is no longer  identified within the kidney and is not located in the ureter or in  the bladder. Tiny, 1 mm calculus in the upper pole the right kidney  with dilatation of the right renal pelvis and collecting system.     Ureters: 1.6 to 2 mm obstructing calculus in the distal right ureter  located approximately 2 cm from the ureterovesical junction.     Urinary bladder: Normal.     Abdominal Aorta: Scattered atherosclerotic calcifications.  IVC: Normal.     Lymph Nodes: Normal.     Large and Small Bowel: Normal.  Appendix: Normal.     Pelvic Organs:  Normal.  Peritoneum: Minimal free fluid in the lower pelvis.  Bony structures: No acute abnormality. Mild degenerative changes of  the lumbar spine.     Other Findings:  Inguinal lymph nodes are normal.                                                                      IMPRESSION:   1.6 mm obstructing distal right ureteral calculus located  approximately 1.5 to 2 cm from the ureterovesical junction. There is  no apparent abnormality that would account for the patient's  left-sided pain.     Bilateral renal lithiasis. The large 8.5 mm calculus seen previously  in the upper pole the left kidney is no longer identified. Other  calculi are similar in appearance.     SHIMA BURNS MD     Impression: #1 right distal ureterolithiasis that has passed #2 left nephrolithiasis    Plan   Plan: 30 minutes were spent with Kimberley reviewing her current symptoms as well as reviewing previous CT reports, scans and her most recent scan.  I used diagrams straight where her stones are currently located in the left kidney and her treatment options.  I do not feel the stones are large enough for a percutaneous nephrolithotomy and she tells me that when they were doing this percutaneous procedure they had trouble getting to 1 of the stones.  Extracorporeal shockwave lithotripsy I do not feel is a good option because we would probably only be able to  treat one stone because they are fairly close together.  I feel her best option is ureteroscopy with intracorporeal lithotripsy.  I talked to her about the lower pole stone which we may have a challenge getting to depending on the angle as well as the mid pole stone which it is difficult to know if this possibly could be in a diverticuli.  We discussed risks of bleeding infection Steinstrasse and stone injury or a avulsion of the ureter anesthetic risks.  I discussed the possibility that I may not be able to get up to the stones which would require placement of a stent and return 2 weeks later and I also discussed ureteroscopy with the powdering the stone versus if I am able to get a sheath up my hope is that I can remove stone fragments so she will have less to pass.  She is aware of the stent discomfort that could occur with a stent.  Kimberley really does not want to have surgery but she does understand that she has 2 fairly large stones and she had difficulty passing a 2 mm stone so she wishes to proceed we will find a date here in the next month or 2 and get her scheduled.  She will need a metabolic work-up once we have completed her procedures      No follow-ups on file.    Sherry Mcpherson MD  [facility]            Sherry Mcpherson MD

## 2020-07-17 ENCOUNTER — PREP FOR PROCEDURE (OUTPATIENT)
Dept: UROLOGY | Facility: OTHER | Age: 40
End: 2020-07-17

## 2020-07-17 DIAGNOSIS — N20.0 NEPHROLITHIASIS: Primary | ICD-10-CM

## 2020-07-17 RX ORDER — CIPROFLOXACIN 2 MG/ML
400 INJECTION, SOLUTION INTRAVENOUS EVERY 12 HOURS
Status: CANCELLED | OUTPATIENT
Start: 2020-08-17

## 2020-07-21 DIAGNOSIS — Z11.59 ENCOUNTER FOR SCREENING FOR OTHER VIRAL DISEASES: Primary | ICD-10-CM

## 2020-07-21 PROBLEM — N20.0 NEPHROLITHIASIS: Status: ACTIVE | Noted: 2020-07-21

## 2020-07-22 LAB
APPEARANCE STONE: NORMAL
COMPN STONE: NORMAL
NUMBER STONE: 1
SIZE STONE: NORMAL MM
WT STONE: 12 MG

## 2020-08-03 NOTE — PROGRESS NOTES
[address]  [facility] [place] [address]  [phone]  Dept: [phone]    PRE-OP EVALUATION:  Today's date: 2020      Kimberley Louis (: 1980) presents for pre-operative evaluation assessment as requested by Dr. Mcpherson.  She requires evaluation and anesthesia risk assessment prior to undergoing surgery/procedure for treatment of CYSTOURETEROSCOPY, WITH RETROGRADE PYELOGRAM, HOLMIUM LASER LITHOTRIPSY OF URETERAL CALCULUS, AND STENT INSERTION .    Proposed Surgery/ Procedure:CYSTOURETEROSCOPY, WITH RETROGRADE PYELOGRAM, HOLMIUM LASER LITHOTRIPSY OF URETERAL CALCULUS, AND STENT INSERTION   Date of Surgery/ Procedure: 2020  Time of Surgery/ Procedure: [facility] [place]  Hospital/Surgical Facility: [facility]  Surgery Fax Number: Note does not need to be faxed, will be available electronically in Epic.  Primary Physician: Xiomara Caceres  Type of Anesthesia Anticipated: to be determined    Preoperative Questionnaire:         No - Have you ever had a heart attack or stroke?  No - Have you ever had surgery on your heart or blood vessels, such as a stent, coronary (heart) bypass, or surgery on an artery in the head, neck, heart, or legs?  No - Do you have chest pain when you are physically active?  No - Do you have a history of heart failure?  No - Do you currently have a cold, bronchitis, or symptoms of other respiratory (head and chest) infections?  No - Do you have a cough, shortness of breath, or wheezing?  No - Do you or anyone in your family have a history of blood clots?  No - Do you or anyone in your family have a serious bleeding problem, such as long-lasting bleeding after surgeries or cuts?  YES - HAVE YOU EVERY HAD ANEMIA OR BEEN TOLD TO TAKE IRON PILLS? When she was pregnant and a couple years after last son. History of heavy periods.  No - Have you had any abnormal blood loss such as black, tarry or bloody stools, or abnormal vaginal bleeding?  YES - HAVE YOU EVER HAD A BLOOD  TRANSFUSION? When she had a    Yes - Are you willing to have a blood transfusion if it is medically needed before, during, or after your surgery?  No - Have you or anyone in your family ever had problems with anesthesia (sedation for surgery)?  No - Do you have sleep apnea, excessive snoring, or daytime drowsiness?   No - Do you have any artifical heart valves or other implanted medical devices, such as a pacemaker, defibrillator, or continuous glucose monitor?  No - Do you have any artifical joints?  No - Are you allergic to latex?  No - Is there any chance that you may be pregnant?  No-Is there any chance that you may be pregnant?    METS 4+    Patient has a Health Care Directive or Living Will:  NO    HPI:     HPI related to upcoming procedure: left kidney stone      See problem list for active medical problems.  Problems all longstanding and stable, except as noted/documented.  See ROS for pertinent symptoms related to these conditions.      MEDICAL HISTORY:     Patient Active Problem List    Diagnosis Date Noted     Nephrolithiasis 2020     Priority: Medium     Added automatically from request for surgery [id number]       Bacterial vaginosis 2020     Priority: Medium     Left flank tenderness 2018     Priority: Medium     Personal history of tobacco use, presenting hazards to health 2018     Priority: Medium     Recurrent UTI 2018     Priority: Medium     Incomplete right bundle branch block 2018     Priority: Medium     Pure hypercholesterolemia 2018     Priority: Medium     Tobacco abuse counseling 2018     Priority: Medium     Tobacco abuse 2018     Priority: Medium     Palpitations 2018     Priority: Medium     Atypical chest pain 2018     Priority: Medium     Dyspnea on exertion 2018     Priority: Medium     Vein disorder 2018     Priority: Medium     Migraine  2018     Priority: Medium     Advanced directives,  counseling/discussion 05/05/2016     Priority: Medium     Advance Care Planning 5/5/2016: ACP Review of Chart / Resources Provided:  Reviewed chart for advance care plan.  Kimberley Louis has no plan or code status on file. Discussed available resources and provided with information. Confirmed code status reflects current choices pending further ACP discussions.  Confirmed/documented legally designated decision makers.  Added by SAYDA CABRERA             ACP (advance care planning) 04/13/2016     Priority: Medium     Advance Care Planning 4/13/2016: ACP Review of Chart / Resources Provided:  Reviewed chart for advance care plan.  Kimberley Louis has no plan or code status on file. Discussed available resources and provided with information. Confirmed code status reflects current choices pending further ACP discussions.  Confirmed/documented legally designated decision makers.  Added by Sandra Anne             Kidney stone on left side 12/21/2015     Priority: Medium     Nephrocalcinosis 12/21/2015     Priority: Medium     Essential hypertension 11/19/2015     Priority: Medium     H/O renal calculi 11/19/2015     Priority: Medium     Enlarged kidney 11/19/2015     Priority: Medium      Past Medical History:   Diagnosis Date     Enlarged kidney 11/19/2015     Essential hypertension 11/19/2015     H/O renal calculi 11/19/2015     H/O renal calculi 11/19/2015     Past Surgical History:   Procedure Laterality Date     AS REMOVAL OF KIDNEY STONE       AS REMOVAL OF KIDNEY STONE       C REMOVAL OF KIDNEY STONE       TUBAL LIGATION       Current Outpatient Medications   Medication Sig Dispense Refill     albuterol (PROAIR HFA/PROVENTIL HFA/VENTOLIN HFA) 108 (90 Base) MCG/ACT inhaler INHALE 2 PUFFS INTO THE LUNGS EVERY 6 HOURS 18 g 1     amLODIPine (NORVASC) 10 MG tablet TAKE 1 TABLET(10 MG) BY MOUTH DAILY 30 tablet 5     metoprolol tartrate (LOPRESSOR) 25 MG tablet TAKE 1 TABLET(25 MG) BY MOUTH TWICE DAILY 60 tablet  "5     triamcinolone (KENALOG) 0.025 % external ointment Apply topically 2 times daily as needed for irritation 15 g 0     OTC products: None    No Known Allergies   Latex Allergy: NO    Social History     Tobacco Use     Smoking status: Current Every Day Smoker     Packs/day: 1.00     Years: 28.00     Pack years: 28.00     Types: Cigarettes     Start date: 1/1/1989     Smokeless tobacco: Never Used     Tobacco comment: pt denied QP 2/20/2020   Substance Use Topics     Alcohol use: Yes     Comment: occasional     History   Drug Use No       REVIEW OF SYSTEMS:   CONSTITUTIONAL: NEGATIVE for fever, chills, change in weight  INTEGUMENTARY/SKIN: NEGATIVE for worrisome rashes, moles or lesions  EYES: NEGATIVE for vision changes or irritation  ENT/MOUTH: NEGATIVE for ear, mouth and throat problems  RESP: NEGATIVE for significant cough or SOB  BREAST: NEGATIVE for masses, tenderness or discharge  CV: NEGATIVE for chest pain, palpitations or peripheral edema. +HX HTN  GI: NEGATIVE for nausea, abdominal pain, heartburn, or change in bowel habits  : NEGATIVE for frequency, dysuria, or hematuria. +HX tubal ligation 2005  MUSCULOSKELETAL: NEGATIVE for significant arthralgias or myalgia  NEURO: NEGATIVE for weakness, dizziness or paresthesias  ENDOCRINE: NEGATIVE for temperature intolerance, skin/hair changes  HEME: NEGATIVE for bleeding problems  PSYCHIATRIC: NEGATIVE for changes in mood or affect    EXAM:   /74 (BP Location: Right arm, Patient Position: Sitting, Cuff Size: Adult Regular)   Pulse 80   Temp 99.2  F (37.3  C) (Tympanic)   Ht 1.651 m (5' 5\")   Wt 73 kg (161 lb)   LMP 07/15/2020   SpO2 98%   BMI 26.79 kg/m      GENERAL APPEARANCE: healthy, alert and no distress     EYES: EOMI, PERRL     HENT: ear canals and TM's normal and nose and mouth without ulcers or lesions     NECK: no adenopathy, no asymmetry, masses, or scars and thyroid normal to palpation     RESP: lungs clear to auscultation - no rales, " rhonchi or wheezes     CV: regular rates and rhythm, normal S1 S2, no S3 or S4 and no murmur, click or rub     ABDOMEN:  soft, nontender, no HSM or masses and bowel sounds normal     MS: extremities normal- no gross deformities noted, no evidence of inflammation in joints, FROM in all extremities.     SKIN: no suspicious lesions or rashes     NEURO: Normal strength and tone, sensory exam grossly normal, mentation intact and speech normal     PSYCH: mentation appears normal. and affect normal/bright     LYMPHATICS: No cervical adenopathy    DIAGNOSTICS:     EKG: appears normal, NSR, normal axis, normal intervals, no acute ST/T changes c/w ischemia, no LVH by voltage criteria, Incomplete Right Bundle Branch Block, unchanged from previous tracings  Labs Resulted Today:     Results for orders placed or performed in visit on 08/05/20   CBC with platelets and differential     Status: None   Result Value Ref Range    WBC 9.0 4.0 - 11.0 10e9/L    RBC Count 4.46 3.8 - 5.2 10e12/L    Hemoglobin 13.4 11.7 - 15.7 g/dL    Hematocrit 39.4 35.0 - 47.0 %    MCV 88 78 - 100 fl    MCH 30.0 26.5 - 33.0 pg    MCHC 34.0 31.5 - 36.5 g/dL    RDW 14.4 10.0 - 15.0 %    Platelet Count 419 150 - 450 10e9/L    % Neutrophils 56.5 %    % Lymphocytes 32.2 %    % Monocytes 7.7 %    % Eosinophils 3.0 %    % Basophils 0.6 %    Absolute Neutrophil 5.1 1.6 - 8.3 10e9/L    Absolute Lymphocytes 2.9 0.8 - 5.3 10e9/L    Absolute Monocytes 0.7 0.0 - 1.3 10e9/L    Absolute Eosinophils 0.3 0.0 - 0.7 10e9/L    Absolute Basophils 0.1 0.0 - 0.2 10e9/L    Diff Method Automated Method    UA with Microscopic reflex to Culture - [facility]     Status: None    Specimen: Midstream Urine   Result Value Ref Range    Color Urine Yellow     Appearance Urine Clear     Glucose Urine Negative NEG^Negative mg/dL    Bilirubin Urine Negative NEG^Negative    Ketones Urine Negative NEG^Negative mg/dL    Specific Gravity Urine 1.020 1.003 - 1.035    pH Urine 6.5 5.0 - 7.0  pH    Protein Albumin Urine Negative NEG^Negative mg/dL    Urobilinogen Urine 0.2 0.2 - 1.0 EU/dL    Nitrite Urine Negative NEG^Negative    Blood Urine Negative NEG^Negative    Leukocyte Esterase Urine Negative NEG^Negative    Source Midstream Urine     WBC Urine 0 - 5 OTO5^0 - 5 /HPF    RBC Urine O - 2 OTO2^O - 2 /HPF    Squamous Epithelial /LPF Urine Few FEW^Few /LPF   HCG qualitative urine     Status: None   Result Value Ref Range    HCG Qual Urine Negative NEG^Negative   Comprehensive metabolic panel (BMP + Alb, Alk Phos, ALT, AST, Total. Bili, TP)     Status: Abnormal   Result Value Ref Range    Sodium 137 133 - 144 mmol/L    Potassium 3.4 3.4 - 5.3 mmol/L    Chloride 108 94 - 109 mmol/L    Carbon Dioxide 23 20 - 32 mmol/L    Anion Gap 6 3 - 14 mmol/L    Glucose 82 70 - 99 mg/dL    Urea Nitrogen 12 7 - 30 mg/dL    Creatinine 0.51 (L) 0.52 - 1.04 mg/dL    GFR Estimate >90 >60 mL/min/[1.73_m2]    GFR Estimate If Black >90 >60 mL/min/[1.73_m2]    Calcium 8.7 8.5 - 10.1 mg/dL    Bilirubin Total 0.5 0.2 - 1.3 mg/dL    Albumin 4.1 3.4 - 5.0 g/dL    Protein Total 7.6 6.8 - 8.8 g/dL    Alkaline Phosphatase 84 40 - 150 U/L    ALT 26 0 - 50 U/L    AST 11 0 - 45 U/L   Urine Culture Aerobic Bacterial     Status: None (Preliminary result)    Specimen: Midstream Urine   Result Value Ref Range    Specimen Description Midstream Urine     Culture Micro Culture in progress        IMPRESSION:   Reason for surgery/procedure: nephrolithiasis   Diagnosis/reason for consult: Pre-op    The proposed surgical procedure is considered INTERMEDIATE risk.    REVISED CARDIAC RISK INDEX  The patient has the following serious cardiovascular risks for perioperative complications such as (MI, PE, VFib and 3  AV Block):  No serious cardiac risks  INTERPRETATION: 1 risks: Class II (low risk - 0.9% complication rate)    The patient has the following additional risks for perioperative complications:  No identified additional risks      ICD-10-CM     1. Preop general physical exam  Z01.818 CBC with platelets and differential     UA with Microscopic reflex to Culture - [facility]     HCG qualitative urine     EKG 12-lead complete w/read - (Clinic Performed)     Comprehensive metabolic panel (BMP + Alb, Alk Phos, ALT, AST, Total. Bili, TP)   2. Nephrolithiasis  N20.0 Urine Culture Aerobic Bacterial       RECOMMENDATIONS:       --Patient is to take all scheduled medications on the day of surgery EXCEPT for modifications listed below.    APPROVAL GIVEN to proceed with proposed procedure, without further diagnostic evaluation       Signed Electronically by: Jazmyne Villafana NP    Copy of this evaluation report is provided to requesting physician.    [facility] Preop Guidelines    Revised Cardiac Risk Index

## 2020-08-03 NOTE — H&P (VIEW-ONLY)
[address]  [facility] [address]  [phone]  Dept: [phone]    PRE-OP EVALUATION:  Today's date: 2020      Kimberley Louis (: 1980) presents for pre-operative evaluation assessment as requested by Dr. Mcpherson.  She requires evaluation and anesthesia risk assessment prior to undergoing surgery/procedure for treatment of CYSTOURETEROSCOPY, WITH RETROGRADE PYELOGRAM, HOLMIUM LASER LITHOTRIPSY OF URETERAL CALCULUS, AND STENT INSERTION .    Proposed Surgery/ Procedure:CYSTOURETEROSCOPY, WITH RETROGRADE PYELOGRAM, HOLMIUM LASER LITHOTRIPSY OF URETERAL CALCULUS, AND STENT INSERTION   Date of Surgery/ Procedure: 2020  Time of Surgery/ Procedure: [facility]  Hospital/Surgical Facility: [facility] [place]  Surgery Fax Number: Note does not need to be faxed, will be available electronically in Epic.  Primary Physician: Xiomara Caceres  Type of Anesthesia Anticipated: to be determined    Preoperative Questionnaire:         No - Have you ever had a heart attack or stroke?  No - Have you ever had surgery on your heart or blood vessels, such as a stent, coronary (heart) bypass, or surgery on an artery in the head, neck, heart, or legs?  No - Do you have chest pain when you are physically active?  No - Do you have a history of heart failure?  No - Do you currently have a cold, bronchitis, or symptoms of other respiratory (head and chest) infections?  No - Do you have a cough, shortness of breath, or wheezing?  No - Do you or anyone in your family have a history of blood clots?  No - Do you or anyone in your family have a serious bleeding problem, such as long-lasting bleeding after surgeries or cuts?  YES - HAVE YOU EVERY HAD ANEMIA OR BEEN TOLD TO TAKE IRON PILLS? When she was pregnant and a couple years after last son. History of heavy periods.  No - Have you had any abnormal blood loss such as black, tarry or bloody stools, or abnormal vaginal bleeding?  YES - HAVE YOU EVER HAD A BLOOD  TRANSFUSION? When she had a    Yes - Are you willing to have a blood transfusion if it is medically needed before, during, or after your surgery?  No - Have you or anyone in your family ever had problems with anesthesia (sedation for surgery)?  No - Do you have sleep apnea, excessive snoring, or daytime drowsiness?   No - Do you have any artifical heart valves or other implanted medical devices, such as a pacemaker, defibrillator, or continuous glucose monitor?  No - Do you have any artifical joints?  No - Are you allergic to latex?  No - Is there any chance that you may be pregnant?  No-Is there any chance that you may be pregnant?    METS 4+    Patient has a Health Care Directive or Living Will:  NO    HPI:     HPI related to upcoming procedure: left kidney stone      See problem list for active medical problems.  Problems all longstanding and stable, except as noted/documented.  See ROS for pertinent symptoms related to these conditions.      MEDICAL HISTORY:     Patient Active Problem List    Diagnosis Date Noted     Nephrolithiasis 2020     Priority: Medium     Added automatically from request for surgery [id number]       Bacterial vaginosis 2020     Priority: Medium     Left flank tenderness 2018     Priority: Medium     Personal history of tobacco use, presenting hazards to health 2018     Priority: Medium     Recurrent UTI 2018     Priority: Medium     Incomplete right bundle branch block 2018     Priority: Medium     Pure hypercholesterolemia 2018     Priority: Medium     Tobacco abuse counseling 2018     Priority: Medium     Tobacco abuse 2018     Priority: Medium     Palpitations 2018     Priority: Medium     Atypical chest pain 2018     Priority: Medium     Dyspnea on exertion 2018     Priority: Medium     Vein disorder 2018     Priority: Medium     Migraine  2018     Priority: Medium     Advanced directives,  counseling/discussion 05/05/2016     Priority: Medium     Advance Care Planning 5/5/2016: ACP Review of Chart / Resources Provided:  Reviewed chart for advance care plan.  Kimberley Louis has no plan or code status on file. Discussed available resources and provided with information. Confirmed code status reflects current choices pending further ACP discussions.  Confirmed/documented legally designated decision makers.  Added by SAYDA CABRERA             ACP (advance care planning) 04/13/2016     Priority: Medium     Advance Care Planning 4/13/2016: ACP Review of Chart / Resources Provided:  Reviewed chart for advance care plan.  Kimberley Louis has no plan or code status on file. Discussed available resources and provided with information. Confirmed code status reflects current choices pending further ACP discussions.  Confirmed/documented legally designated decision makers.  Added by Sandra Anne             Kidney stone on left side 12/21/2015     Priority: Medium     Nephrocalcinosis 12/21/2015     Priority: Medium     Essential hypertension 11/19/2015     Priority: Medium     H/O renal calculi 11/19/2015     Priority: Medium     Enlarged kidney 11/19/2015     Priority: Medium      Past Medical History:   Diagnosis Date     Enlarged kidney 11/19/2015     Essential hypertension 11/19/2015     H/O renal calculi 11/19/2015     H/O renal calculi 11/19/2015     Past Surgical History:   Procedure Laterality Date     AS REMOVAL OF KIDNEY STONE       AS REMOVAL OF KIDNEY STONE       C REMOVAL OF KIDNEY STONE       TUBAL LIGATION       Current Outpatient Medications   Medication Sig Dispense Refill     albuterol (PROAIR HFA/PROVENTIL HFA/VENTOLIN HFA) 108 (90 Base) MCG/ACT inhaler INHALE 2 PUFFS INTO THE LUNGS EVERY 6 HOURS 18 g 1     amLODIPine (NORVASC) 10 MG tablet TAKE 1 TABLET(10 MG) BY MOUTH DAILY 30 tablet 5     metoprolol tartrate (LOPRESSOR) 25 MG tablet TAKE 1 TABLET(25 MG) BY MOUTH TWICE DAILY 60 tablet  "5     triamcinolone (KENALOG) 0.025 % external ointment Apply topically 2 times daily as needed for irritation 15 g 0     OTC products: None    No Known Allergies   Latex Allergy: NO    Social History     Tobacco Use     Smoking status: Current Every Day Smoker     Packs/day: 1.00     Years: 28.00     Pack years: 28.00     Types: Cigarettes     Start date: 1/1/1989     Smokeless tobacco: Never Used     Tobacco comment: pt denied QP 2/20/2020   Substance Use Topics     Alcohol use: Yes     Comment: occasional     History   Drug Use No       REVIEW OF SYSTEMS:   CONSTITUTIONAL: NEGATIVE for fever, chills, change in weight  INTEGUMENTARY/SKIN: NEGATIVE for worrisome rashes, moles or lesions  EYES: NEGATIVE for vision changes or irritation  ENT/MOUTH: NEGATIVE for ear, mouth and throat problems  RESP: NEGATIVE for significant cough or SOB  BREAST: NEGATIVE for masses, tenderness or discharge  CV: NEGATIVE for chest pain, palpitations or peripheral edema. +HX HTN  GI: NEGATIVE for nausea, abdominal pain, heartburn, or change in bowel habits  : NEGATIVE for frequency, dysuria, or hematuria. +HX tubal ligation 2005  MUSCULOSKELETAL: NEGATIVE for significant arthralgias or myalgia  NEURO: NEGATIVE for weakness, dizziness or paresthesias  ENDOCRINE: NEGATIVE for temperature intolerance, skin/hair changes  HEME: NEGATIVE for bleeding problems  PSYCHIATRIC: NEGATIVE for changes in mood or affect    EXAM:   /74 (BP Location: Right arm, Patient Position: Sitting, Cuff Size: Adult Regular)   Pulse 80   Temp 99.2  F (37.3  C) (Tympanic)   Ht 1.651 m (5' 5\")   Wt 73 kg (161 lb)   LMP 07/15/2020   SpO2 98%   BMI 26.79 kg/m      GENERAL APPEARANCE: healthy, alert and no distress     EYES: EOMI, PERRL     HENT: ear canals and TM's normal and nose and mouth without ulcers or lesions     NECK: no adenopathy, no asymmetry, masses, or scars and thyroid normal to palpation     RESP: lungs clear to auscultation - no rales, " rhonchi or wheezes     CV: regular rates and rhythm, normal S1 S2, no S3 or S4 and no murmur, click or rub     ABDOMEN:  soft, nontender, no HSM or masses and bowel sounds normal     MS: extremities normal- no gross deformities noted, no evidence of inflammation in joints, FROM in all extremities.     SKIN: no suspicious lesions or rashes     NEURO: Normal strength and tone, sensory exam grossly normal, mentation intact and speech normal     PSYCH: mentation appears normal. and affect normal/bright     LYMPHATICS: No cervical adenopathy    DIAGNOSTICS:     EKG: appears normal, NSR, normal axis, normal intervals, no acute ST/T changes c/w ischemia, no LVH by voltage criteria, Incomplete Right Bundle Branch Block, unchanged from previous tracings  Labs Resulted Today:     Results for orders placed or performed in visit on 08/05/20   CBC with platelets and differential     Status: None   Result Value Ref Range    WBC 9.0 4.0 - 11.0 10e9/L    RBC Count 4.46 3.8 - 5.2 10e12/L    Hemoglobin 13.4 11.7 - 15.7 g/dL    Hematocrit 39.4 35.0 - 47.0 %    MCV 88 78 - 100 fl    MCH 30.0 26.5 - 33.0 pg    MCHC 34.0 31.5 - 36.5 g/dL    RDW 14.4 10.0 - 15.0 %    Platelet Count 419 150 - 450 10e9/L    % Neutrophils 56.5 %    % Lymphocytes 32.2 %    % Monocytes 7.7 %    % Eosinophils 3.0 %    % Basophils 0.6 %    Absolute Neutrophil 5.1 1.6 - 8.3 10e9/L    Absolute Lymphocytes 2.9 0.8 - 5.3 10e9/L    Absolute Monocytes 0.7 0.0 - 1.3 10e9/L    Absolute Eosinophils 0.3 0.0 - 0.7 10e9/L    Absolute Basophils 0.1 0.0 - 0.2 10e9/L    Diff Method Automated Method    UA with Microscopic reflex to Culture - [facility]     Status: None    Specimen: Midstream Urine   Result Value Ref Range    Color Urine Yellow     Appearance Urine Clear     Glucose Urine Negative NEG^Negative mg/dL    Bilirubin Urine Negative NEG^Negative    Ketones Urine Negative NEG^Negative mg/dL    Specific Gravity Urine 1.020 1.003 - 1.035    pH Urine 6.5 5.0 - 7.0  pH    Protein Albumin Urine Negative NEG^Negative mg/dL    Urobilinogen Urine 0.2 0.2 - 1.0 EU/dL    Nitrite Urine Negative NEG^Negative    Blood Urine Negative NEG^Negative    Leukocyte Esterase Urine Negative NEG^Negative    Source Midstream Urine     WBC Urine 0 - 5 OTO5^0 - 5 /HPF    RBC Urine O - 2 OTO2^O - 2 /HPF    Squamous Epithelial /LPF Urine Few FEW^Few /LPF   HCG qualitative urine     Status: None   Result Value Ref Range    HCG Qual Urine Negative NEG^Negative   Comprehensive metabolic panel (BMP + Alb, Alk Phos, ALT, AST, Total. Bili, TP)     Status: Abnormal   Result Value Ref Range    Sodium 137 133 - 144 mmol/L    Potassium 3.4 3.4 - 5.3 mmol/L    Chloride 108 94 - 109 mmol/L    Carbon Dioxide 23 20 - 32 mmol/L    Anion Gap 6 3 - 14 mmol/L    Glucose 82 70 - 99 mg/dL    Urea Nitrogen 12 7 - 30 mg/dL    Creatinine 0.51 (L) 0.52 - 1.04 mg/dL    GFR Estimate >90 >60 mL/min/[1.73_m2]    GFR Estimate If Black >90 >60 mL/min/[1.73_m2]    Calcium 8.7 8.5 - 10.1 mg/dL    Bilirubin Total 0.5 0.2 - 1.3 mg/dL    Albumin 4.1 3.4 - 5.0 g/dL    Protein Total 7.6 6.8 - 8.8 g/dL    Alkaline Phosphatase 84 40 - 150 U/L    ALT 26 0 - 50 U/L    AST 11 0 - 45 U/L   Urine Culture Aerobic Bacterial     Status: None (Preliminary result)    Specimen: Midstream Urine   Result Value Ref Range    Specimen Description Midstream Urine     Culture Micro Culture in progress        IMPRESSION:   Reason for surgery/procedure: nephrolithiasis   Diagnosis/reason for consult: Pre-op    The proposed surgical procedure is considered INTERMEDIATE risk.    REVISED CARDIAC RISK INDEX  The patient has the following serious cardiovascular risks for perioperative complications such as (MI, PE, VFib and 3  AV Block):  No serious cardiac risks  INTERPRETATION: 1 risks: Class II (low risk - 0.9% complication rate)    The patient has the following additional risks for perioperative complications:  No identified additional risks      ICD-10-CM     1. Preop general physical exam  Z01.818 CBC with platelets and differential     UA with Microscopic reflex to Culture - [facility]     HCG qualitative urine     EKG 12-lead complete w/read - (Clinic Performed)     Comprehensive metabolic panel (BMP + Alb, Alk Phos, ALT, AST, Total. Bili, TP)   2. Nephrolithiasis  N20.0 Urine Culture Aerobic Bacterial       RECOMMENDATIONS:       --Patient is to take all scheduled medications on the day of surgery EXCEPT for modifications listed below.    APPROVAL GIVEN to proceed with proposed procedure, without further diagnostic evaluation       Signed Electronically by: Jazmyne Villafana NP    Copy of this evaluation report is provided to requesting physician.    [facility] Preop Guidelines    Revised Cardiac Risk Index

## 2020-08-05 ENCOUNTER — OFFICE VISIT (OUTPATIENT)
Dept: FAMILY MEDICINE | Facility: OTHER | Age: 40
End: 2020-08-05
Attending: NURSE PRACTITIONER
Payer: COMMERCIAL

## 2020-08-05 VITALS
TEMPERATURE: 99.2 F | BODY MASS INDEX: 26.82 KG/M2 | SYSTOLIC BLOOD PRESSURE: 110 MMHG | WEIGHT: 161 LBS | HEIGHT: 65 IN | OXYGEN SATURATION: 98 % | HEART RATE: 80 BPM | DIASTOLIC BLOOD PRESSURE: 74 MMHG

## 2020-08-05 DIAGNOSIS — N20.0 NEPHROLITHIASIS: ICD-10-CM

## 2020-08-05 DIAGNOSIS — Z01.818 PREOP GENERAL PHYSICAL EXAM: Primary | ICD-10-CM

## 2020-08-05 LAB
ALBUMIN UR-MCNC: NEGATIVE MG/DL
APPEARANCE UR: CLEAR
BASOPHILS # BLD AUTO: 0.1 10E9/L (ref 0–0.2)
BASOPHILS NFR BLD AUTO: 0.6 %
BILIRUB UR QL STRIP: NEGATIVE
COLOR UR AUTO: YELLOW
DIFFERENTIAL METHOD BLD: NORMAL
EOSINOPHIL # BLD AUTO: 0.3 10E9/L (ref 0–0.7)
EOSINOPHIL NFR BLD AUTO: 3 %
ERYTHROCYTE [DISTWIDTH] IN BLOOD BY AUTOMATED COUNT: 14.4 % (ref 10–15)
GLUCOSE UR STRIP-MCNC: NEGATIVE MG/DL
HCG UR QL: NEGATIVE
HCT VFR BLD AUTO: 39.4 % (ref 35–47)
HGB BLD-MCNC: 13.4 G/DL (ref 11.7–15.7)
HGB UR QL STRIP: NEGATIVE
KETONES UR STRIP-MCNC: NEGATIVE MG/DL
LEUKOCYTE ESTERASE UR QL STRIP: NEGATIVE
LYMPHOCYTES # BLD AUTO: 2.9 10E9/L (ref 0.8–5.3)
LYMPHOCYTES NFR BLD AUTO: 32.2 %
MCH RBC QN AUTO: 30 PG (ref 26.5–33)
MCHC RBC AUTO-ENTMCNC: 34 G/DL (ref 31.5–36.5)
MCV RBC AUTO: 88 FL (ref 78–100)
MONOCYTES # BLD AUTO: 0.7 10E9/L (ref 0–1.3)
MONOCYTES NFR BLD AUTO: 7.7 %
NEUTROPHILS # BLD AUTO: 5.1 10E9/L (ref 1.6–8.3)
NEUTROPHILS NFR BLD AUTO: 56.5 %
NITRATE UR QL: NEGATIVE
NON-SQ EPI CELLS #/AREA URNS LPF: NORMAL /LPF
PH UR STRIP: 6.5 PH (ref 5–7)
PLATELET # BLD AUTO: 419 10E9/L (ref 150–450)
RBC # BLD AUTO: 4.46 10E12/L (ref 3.8–5.2)
RBC #/AREA URNS AUTO: NORMAL /HPF
SOURCE: NORMAL
SP GR UR STRIP: 1.02 (ref 1–1.03)
UROBILINOGEN UR STRIP-ACNC: 0.2 EU/DL (ref 0.2–1)
WBC # BLD AUTO: 9 10E9/L (ref 4–11)
WBC #/AREA URNS AUTO: NORMAL /HPF

## 2020-08-05 PROCEDURE — 81025 URINE PREGNANCY TEST: CPT | Mod: ZL | Performed by: NURSE PRACTITIONER

## 2020-08-05 PROCEDURE — G0463 HOSPITAL OUTPT CLINIC VISIT: HCPCS | Mod: 25

## 2020-08-05 PROCEDURE — 93010 ELECTROCARDIOGRAM REPORT: CPT | Performed by: INTERNAL MEDICINE

## 2020-08-05 PROCEDURE — 87086 URINE CULTURE/COLONY COUNT: CPT | Mod: ZL | Performed by: UROLOGY

## 2020-08-05 PROCEDURE — 80053 COMPREHEN METABOLIC PANEL: CPT | Mod: ZL | Performed by: NURSE PRACTITIONER

## 2020-08-05 PROCEDURE — 81001 URINALYSIS AUTO W/SCOPE: CPT | Mod: ZL | Performed by: NURSE PRACTITIONER

## 2020-08-05 PROCEDURE — 85025 COMPLETE CBC W/AUTO DIFF WBC: CPT | Mod: ZL | Performed by: NURSE PRACTITIONER

## 2020-08-05 PROCEDURE — 36415 COLL VENOUS BLD VENIPUNCTURE: CPT | Mod: ZL | Performed by: NURSE PRACTITIONER

## 2020-08-05 PROCEDURE — G0463 HOSPITAL OUTPT CLINIC VISIT: HCPCS

## 2020-08-05 PROCEDURE — 99214 OFFICE O/P EST MOD 30 MIN: CPT | Performed by: NURSE PRACTITIONER

## 2020-08-05 PROCEDURE — 93005 ELECTROCARDIOGRAM TRACING: CPT

## 2020-08-05 RX ORDER — KETOROLAC TROMETHAMINE 10 MG/1
TABLET, FILM COATED ORAL
COMMUNITY
Start: 2020-06-11 | End: 2020-08-05

## 2020-08-05 RX ORDER — BUDESONIDE 0.5 MG/2ML
INHALANT ORAL
COMMUNITY
Start: 2020-02-07 | End: 2020-08-05

## 2020-08-05 ASSESSMENT — ASTHMA QUESTIONNAIRES
ACT_TOTALSCORE: 23
QUESTION_2 LAST FOUR WEEKS HOW OFTEN HAVE YOU HAD SHORTNESS OF BREATH: ONCE OR TWICE A WEEK
QUESTION_3 LAST FOUR WEEKS HOW OFTEN DID YOUR ASTHMA SYMPTOMS (WHEEZING, COUGHING, SHORTNESS OF BREATH, CHEST TIGHTNESS OR PAIN) WAKE YOU UP AT NIGHT OR EARLIER THAN USUAL IN THE MORNING: NOT AT ALL
QUESTION_4 LAST FOUR WEEKS HOW OFTEN HAVE YOU USED YOUR RESCUE INHALER OR NEBULIZER MEDICATION (SUCH AS ALBUTEROL): NOT AT ALL
QUESTION_5 LAST FOUR WEEKS HOW WOULD YOU RATE YOUR ASTHMA CONTROL: WELL CONTROLLED
QUESTION_1 LAST FOUR WEEKS HOW MUCH OF THE TIME DID YOUR ASTHMA KEEP YOU FROM GETTING AS MUCH DONE AT WORK, SCHOOL OR AT HOME: NONE OF THE TIME

## 2020-08-05 ASSESSMENT — MIFFLIN-ST. JEOR: SCORE: 1401.17

## 2020-08-05 ASSESSMENT — PAIN SCALES - GENERAL: PAINLEVEL: NO PAIN (0)

## 2020-08-05 NOTE — LETTER
My Depression Action Plan  Name: Kimberley Louis   Date of Birth 1980  Date: 8/5/2020    My doctor: Xiomara Caceres   My clinic: [address] [address]  [phone]          GREEN    ZONE   Good Control    What it looks like:     Things are going generally well. You have normal ups and downs. You may even feel depressed from time to time, but bad moods usually last less than a day.   What you need to do:  1. Continue to care for yourself (see self care plan)  2. Check your depression survival kit and update it as needed  3. Follow your physician s recommendations including any medication.  4. Do not stop taking medication unless you consult with your physician first.           YELLOW         ZONE Getting Worse    What it looks like:     Depression is starting to interfere with your life.     It may be hard to get out of bed; you may be starting to isolate yourself from others.    Symptoms of depression are starting to last most all day and this has happened for several days.     You may have suicidal thoughts but they are not constant.   What you need to do:     1. Call your care team. Your response to treatment will improve if you keep your care team informed of your progress. Yellow periods are signs an adjustment may need to be made.     2. Continue your self-care.  Just get dressed and ready for the day.  Don't give yourself time to talk yourself out of it.    3. Talk to someone in your support network.    4. Open up your Depression Self-Care Plan/Wellness Kit.           RED    ZONE Medical Alert - Get Help    What it looks like:     Depression is seriously interfering with your life.     You may experience these or other symptoms: You can t get out of bed most days, can t work or engage in other necessary activities, you have trouble taking care of basic hygiene, or basic responsibilities, thoughts of suicide or death that will not go away,  self-injurious behavior.     What you need to do:  1. Call your care team and request a same-day appointment. If they are not available (weekends or after hours) call your local crisis line, emergency room or 911.            Depression Self-Care Plan / Wellness Kit    Self-Care for Depression  Here s the deal. Your body and mind are really not as separate as most people think.  What you do and think affects how you feel and how you feel influences what you do and think. This means if you do things that people who feel good do, it will help you feel better.  Sometimes this is all it takes.  There is also a place for medication and therapy depending on how severe your depression is, so be sure to consult with your medical provider and/ or Behavioral Health Consultant if your symptoms are worsening or not improving.     In order to better manage my stress, I will:    Exercise  Get some form of exercise, every day. This will help reduce pain and release endorphins, the  feel good  chemicals in your brain. This is almost as good as taking antidepressants!  This is not the same as joining a gym and then never going! (they count on that by the way ) It can be as simple as just going for a walk or doing some gardening, anything that will get you moving.      Hygiene   Maintain good hygiene (get out of bed in the morning, make your bed, brush your teeth, take a shower, and get dressed like you were going to work, even if you are unemployed).  If your clothes don't fit try to get ones that do.    Diet  Strive to eat foods that are good for me, drink plenty of water, and avoid excessive sugar, caffeine, alcohol, and other mood-altering substances.  Some foods that are helpful in depression are: complex carbohydrates, B vitamins, flaxseed, fish or fish oil, fresh fruits and vegetables.    Psychotherapy  Agree to participate in Individual Therapy (if recommended).    Medication  If prescribed medications, I agree to take them.   Missing doses can result in serious side effects.  I understand that drinking alcohol, or other illicit drug use, may cause potential side effects.  I will not stop my medication abruptly without first discussing it with my provider.    Staying Connected With Others  Stay in touch with my friends, family members, and my primary care provider/team.    Use your imagination  Be creative.  We all have a creative side; it doesn t matter if it s oil painting, sand castles, or mud pies! This will also kick up the endorphins.    Witness Beauty  (AKA stop and smell the roses) Take a look outside, even in mid-winter. Notice colors, textures. Watch the squirrels and birds.     Service to others  Be of service to others.  There is always someone else in need.  By helping others we can  get out of ourselves  and remember the really important things.  This also provides opportunities for practicing all the other parts of the program.    Humor  Laugh and be silly!  Adjust your TV habits for less news and crime-drama and more comedy.    Control your stress  Try breathing deep, massage therapy, biofeedback, and meditation. Find time to relax each day.     Crisis Text Line  http://www.crisistextline.org    The Crisis Text Line serves anyone, in any type of crisis, providing access to free, 24/7 support and information via the medium people already use and trust:    Here's how it works:  1.  Text 195-015 from anywhere in the [place], anytime, about any type of crisis.  2.  A live, trained Crisis Counselor receives the text and responds quickly.  3.  The volunteer Crisis Counselor will help you move from a 'hot moment to a cool moment'.    My support system    Clinic Contact:  Phone number:    Contact 1:  Phone number:    Contact 2:  Phone number:    Hindu/:  Phone number:    Therapist:  Phone number:    Local crisis center:    Phone number:    Other community support:  Phone number:

## 2020-08-05 NOTE — NURSING NOTE
"Chief Complaint   Patient presents with     Pre-Op Exam       Initial /74 (BP Location: Right arm, Patient Position: Sitting, Cuff Size: Adult Regular)   Pulse 80   Temp 99.2  F (37.3  C) (Tympanic)   Ht 1.651 m (5' 5\")   Wt 73 kg (161 lb)   LMP 07/15/2020   SpO2 98%   BMI 26.79 kg/m   Estimated body mass index is 26.79 kg/m  as calculated from the following:    Height as of this encounter: 1.651 m (5' 5\").    Weight as of this encounter: 73 kg (161 lb).  Medication Reconciliation: complete  Rosanne Chi LPN  "

## 2020-08-05 NOTE — LETTER
My Asthma Action Plan  Name: Kimberley Louis   YOB: 1980  Date: 8/7/2018   My doctor: ZAHEER Cartagena CNP   My clinic: [facility] [place]        My Control Medicine: Budesonide (Pulmicort Flexhaler) -  180 mcg b  My Rescue Medicine: Albuterol (Proair/Ventolin/Proventil HFA) 2-4 puffs EVERY 4 HOURS as needed. Use a spacer if recommended by your provider.   My Asthma Severity: Mild Persistent  Avoid your asthma triggers: Patient is unaware of triggers               GREEN ZONE   Good Control    I feel good    No cough or wheeze    Can work, sleep and play without asthma symptoms       Take your asthma control medicine every day.     1. If exercise triggers your asthma, take your rescue medication    15 minutes before exercise or sports, and    During exercise if you have asthma symptoms  2. Spacer to use with inhaler: If you have a spacer, make sure to use it with your inhaler             YELLOW ZONE Getting Worse  I have ANY of these:    I do not feel good    Cough or wheeze    Chest feels tight    Wake up at night   1. Keep taking your Green Zone medications  2. Start taking your rescue medicine:    every 20 minutes for up to 1 hour. Then every 4 hours for 24-48 hours.  3. If you stay in the Yellow Zone for more than 12-24 hours, contact your doctor.  4. If you do not return to the Green Zone in 12-24 hours or you get worse, start taking your oral steroid medicine if prescribed by your provider.           RED ZONE Medical Alert - Get Help  I have ANY of these:    I feel awful    Medicine is not helping    Breathing getting harder    Trouble walking or talking    Nose opens wide to breathe       1. Take your rescue medicine NOW  2. If your provider has prescribed an oral steroid medicine, start taking it NOW  3. Call your doctor NOW  4. If you are still in the Red Zone after 20 minutes and you have not reached your doctor:    Take your rescue medicine again and    Call 911 or go to the  emergency room right away    See your regular doctor within 2 weeks of an Emergency Room or Urgent Care visit for follow-up treatment.          Annual Reminders:  Meet with Asthma Educator,  Flu Shot in the Fall, consider Pneumonia Vaccination for patients with asthma (aged 19 and older).    Pharmacy:    [facility] - [place], [place] - [address]   [id number] - [place], [place] - [address] AT [facility] OF  [address]                      Asthma Triggers  How To Control Things That Make Your Asthma Worse    Triggers are things that make your asthma worse.  Look at the list below to help you find your triggers and what you can do about them.  You can help prevent asthma flare-ups by staying away from your triggers.      Trigger                                                          What you can do   Cigarette Smoke  Tobacco smoke can make asthma worse. Do not allow smoking in your home, car or around you.  Be sure no one smokes at a child s day care or school.  If you smoke, ask your health care provider for ways to help you quit.  Ask family members to quit too.  Ask your health care provider for a referral to Quit Plan to help you quit smoking, or call 6-361-356-PLAN.     Colds, Flu, Bronchitis  These are common triggers of asthma. Wash your hands often.  Don t touch your eyes, nose or mouth.  Get a flu shot every year.     Dust Mites  These are tiny bugs that live in cloth or carpet. They are too small to see. Wash sheets and blankets in hot water every week.   Encase pillows and mattress in dust mite proof covers.  Avoid having carpet if you can. If you have carpet, vacuum weekly.   Use a dust mask and HEPA vacuum.   Pollen and Outdoor Mold  Some people are allergic to trees, grass, or weed pollen, or molds. Try to keep your windows closed.  Limit time out doors when pollen count is high.   Ask you health care provider about taking medicine during allergy season.     Animal Dander  Some  people are allergic to skin flakes, urine or saliva from pets with fur or feathers. Keep pets with fur or feathers out of your home.    If you can t keep the pet outdoors, then keep the pet out of your bedroom.  Keep the bedroom door closed.  Keep pets off cloth furniture and away from stuffed toys.     Mice, Rats, and Cockroaches  Some people are allergic to the waste from these pests.   Cover food and garbage.  Clean up spills and food crumbs.  Store grease in the refrigerator.   Keep food out of the bedroom.   Indoor Mold  This can be a trigger if your home has high moisture. Fix leaking faucets, pipes, or other sources of water.   Clean moldy surfaces.  Dehumidify basement if it is damp and smelly.   Smoke, Strong Odors, and Sprays  These can reduce air quality. Stay away from strong odors and sprays, such as perfume, powder, hair spray, paints, smoke incense, paint, cleaning products, candles and new carpet.   Exercise or Sports  Some people with asthma have this trigger. Be active!  Ask your doctor about taking medicine before sports or exercise to prevent symptoms.    Warm up for 5-10 minutes before and after sports or exercise.     Other Triggers of Asthma  Cold air:  Cover your nose and mouth with a scarf.  Sometimes laughing or crying can be a trigger.  Some medicines and food can trigger asthma.

## 2020-08-05 NOTE — LETTER
August 12, 2020      Kimberley Louis  [address]  [place] [address]        Dear ,    We are writing to inform you of your test results.        Resulted Orders   Urine Culture Aerobic Bacterial   Result Value Ref Range    Specimen Description Midstream Urine     Culture Micro No growth        If you have any questions or concerns, please call the clinic at the number listed above.       Sincerely,        Dr. Sherry Mcpherson

## 2020-08-06 LAB
ALBUMIN SERPL-MCNC: 4.1 G/DL (ref 3.4–5)
ALP SERPL-CCNC: 84 U/L (ref 40–150)
ALT SERPL W P-5'-P-CCNC: 26 U/L (ref 0–50)
ANION GAP SERPL CALCULATED.3IONS-SCNC: 6 MMOL/L (ref 3–14)
AST SERPL W P-5'-P-CCNC: 11 U/L (ref 0–45)
BILIRUB SERPL-MCNC: 0.5 MG/DL (ref 0.2–1.3)
BUN SERPL-MCNC: 12 MG/DL (ref 7–30)
CALCIUM SERPL-MCNC: 8.7 MG/DL (ref 8.5–10.1)
CHLORIDE SERPL-SCNC: 108 MMOL/L (ref 94–109)
CO2 SERPL-SCNC: 23 MMOL/L (ref 20–32)
CREAT SERPL-MCNC: 0.51 MG/DL (ref 0.52–1.04)
GFR SERPL CREATININE-BSD FRML MDRD: >90 ML/MIN/{1.73_M2}
GLUCOSE SERPL-MCNC: 82 MG/DL (ref 70–99)
POTASSIUM SERPL-SCNC: 3.4 MMOL/L (ref 3.4–5.3)
PROT SERPL-MCNC: 7.6 G/DL (ref 6.8–8.8)
SODIUM SERPL-SCNC: 137 MMOL/L (ref 133–144)

## 2020-08-06 ASSESSMENT — ASTHMA QUESTIONNAIRES: ACT_TOTALSCORE: 23

## 2020-08-07 LAB
BACTERIA SPEC CULT: NO GROWTH
SPECIMEN SOURCE: NORMAL

## 2020-08-10 ENCOUNTER — ANESTHESIA EVENT (OUTPATIENT)
Dept: SURGERY | Facility: HOSPITAL | Age: 40
End: 2020-08-10
Payer: COMMERCIAL

## 2020-08-10 ASSESSMENT — LIFESTYLE VARIABLES: TOBACCO_USE: 1

## 2020-08-10 NOTE — ANESTHESIA PREPROCEDURE EVALUATION
Anesthesia Pre-Procedure Evaluation    Patient: Kimberley Louis   MRN: [id number] : 1980          Preoperative Diagnosis: Nephrolithiasis [N20.0]    Procedure(s):  CYSTOURETEROSCOPY, WITH RETROGRADE PYELOGRAM, HOLMIUM LASER LITHOTRIPSY OF URETERAL CALCULUS, AND STENT INSERTION    Past Medical History:   Diagnosis Date     Enlarged kidney 2015     Essential hypertension 2015     H/O renal calculi 2015     H/O renal calculi 2015     Past Surgical History:   Procedure Laterality Date     AS REMOVAL OF KIDNEY STONE       AS REMOVAL OF KIDNEY STONE       C REMOVAL OF KIDNEY STONE       TUBAL LIGATION         Anesthesia Evaluation     . Pt has had prior anesthetic.     No history of anesthetic complications          ROS/MED HX    ENT/Pulmonary:     (+)MANUEL risk factors snores loudly, tobacco use, Current use 2 packs/day  Mild Persistent asthma Treatment: Inhaler prn,  , . .   (-) recent URI   Neurologic:     (+)migraines,     Cardiovascular:     (+) hypertension-range: rx metoprolol, took this morning, ---. : . . SUNSHINE, . :. Irregular Heartbeat/Palpitations, . Previous cardiac testing date:results:date: results:ECG reviewed date:2020 results:Appears NSR   Incomplete RBBB unchanged from previous per H&P date: results:          METS/Exercise Tolerance:  >4 METS   Hematologic:     (+) Anemia (hbg 13.4), History of Transfusion (s/p c section) no previous transfusion reaction -      Musculoskeletal:  - neg musculoskeletal ROS       GI/Hepatic:  - neg GI/hepatic ROS       Renal/Genitourinary:     (+) Nephrolithiasis , Other Renal/ Genitourinary, renal calculi, enlarged kidney      Endo:  - neg endo ROS       Psychiatric:  - neg psychiatric ROS       Infectious Disease:  - neg infectious disease ROS      (-) Recent Fever   Malignancy:      - no malignancy   Other:    - neg other ROS                      Physical Exam  Normal systems: cardiovascular, pulmonary and dental    Airway   Mallampati:  "II  TM distance: >3 FB  Neck ROM: full    Dental     Cardiovascular   Rhythm and rate: regular and normal      Pulmonary    breath sounds clear to auscultation            Lab Results   Component Value Date    WBC 9.0 08/05/2020    HGB 13.4 08/05/2020    HCT 39.4 08/05/2020     08/05/2020    CRP 3.7 01/03/2018    SED 10 01/03/2018     08/05/2020    POTASSIUM 3.4 08/05/2020    CHLORIDE 108 08/05/2020    CO2 23 08/05/2020    BUN 12 08/05/2020    CR 0.51 (L) 08/05/2020    GLC 82 08/05/2020    NIYAH 8.7 08/05/2020    ALBUMIN 4.1 08/05/2020    PROTTOTAL 7.6 08/05/2020    ALT 26 08/05/2020    AST 11 08/05/2020    ALKPHOS 84 08/05/2020    BILITOTAL 0.5 08/05/2020    LIPASE 120 12/10/2017    TSH 1.63 01/23/2020    HCG Negative 08/05/2020       Preop Vitals  BP Readings from Last 3 Encounters:   08/05/20 110/74   07/16/20 120/60   06/11/20 106/76    Pulse Readings from Last 3 Encounters:   08/05/20 80   07/16/20 86   06/11/20 77      Resp Readings from Last 3 Encounters:   06/11/20 19   06/09/20 18   02/07/20 16    SpO2 Readings from Last 3 Encounters:   08/05/20 98%   07/16/20 98%   06/11/20 97%      Temp Readings from Last 1 Encounters:   08/05/20 99.2  F (37.3  C) (Tympanic)    Ht Readings from Last 1 Encounters:   08/05/20 1.651 m (5' 5\")      Wt Readings from Last 1 Encounters:   08/05/20 73 kg (161 lb)    Estimated body mass index is 26.79 kg/m  as calculated from the following:    Height as of 8/5/20: 1.651 m (5' 5\").    Weight as of 8/5/20: 73 kg (161 lb).       Anesthesia Plan      History & Physical Review  History and physical reviewed and following examination; no interval change.    ASA Status:  3 .    NPO Status:  > 8 hours    Plan for General with Intravenous and Propofol induction. Maintenance will be Inhalation and Balanced.    PONV prophylaxis:  Ondansetron (or other 5HT-3)  H&P 8/5/20        Postoperative Care  Postoperative pain management:  IV analgesics.      Consents  Anesthetic plan, risks, " benefits and alternatives discussed with:  Patient..                 ZAHEER Flores CRNA

## 2020-08-13 ENCOUNTER — IMMUNIZATION (OUTPATIENT)
Dept: FAMILY MEDICINE | Facility: OTHER | Age: 40
End: 2020-08-13
Attending: UROLOGY
Payer: COMMERCIAL

## 2020-08-13 DIAGNOSIS — Z11.59 ENCOUNTER FOR SCREENING FOR OTHER VIRAL DISEASES: ICD-10-CM

## 2020-08-13 PROCEDURE — U0003 INFECTIOUS AGENT DETECTION BY NUCLEIC ACID (DNA OR RNA); SEVERE ACUTE RESPIRATORY SYNDROME CORONAVIRUS 2 (SARS-COV-2) (CORONAVIRUS DISEASE [COVID-19]), AMPLIFIED PROBE TECHNIQUE, MAKING USE OF HIGH THROUGHPUT TECHNOLOGIES AS DESCRIBED BY CMS-2020-01-R: HCPCS | Mod: ZL | Performed by: UROLOGY

## 2020-08-14 LAB
SARS-COV-2 RNA SPEC QL NAA+PROBE: NOT DETECTED
SPECIMEN SOURCE: NORMAL

## 2020-08-17 ENCOUNTER — APPOINTMENT (OUTPATIENT)
Dept: LAB | Facility: HOSPITAL | Age: 40
End: 2020-08-17
Attending: UROLOGY
Payer: COMMERCIAL

## 2020-08-17 ENCOUNTER — HOSPITAL ENCOUNTER (OUTPATIENT)
Facility: HOSPITAL | Age: 40
Discharge: HOME | End: 2020-08-17
Attending: UROLOGY | Admitting: UROLOGY
Payer: COMMERCIAL

## 2020-08-17 ENCOUNTER — APPOINTMENT (OUTPATIENT)
Dept: GENERAL RADIOLOGY | Facility: HOSPITAL | Age: 40
End: 2020-08-17
Attending: UROLOGY
Payer: COMMERCIAL

## 2020-08-17 ENCOUNTER — ANESTHESIA (OUTPATIENT)
Dept: SURGERY | Facility: HOSPITAL | Age: 40
End: 2020-08-17
Payer: COMMERCIAL

## 2020-08-17 VITALS
OXYGEN SATURATION: 97 % | DIASTOLIC BLOOD PRESSURE: 95 MMHG | BODY MASS INDEX: 26.16 KG/M2 | HEIGHT: 65 IN | RESPIRATION RATE: 18 BRPM | HEART RATE: 73 BPM | TEMPERATURE: 98.1 F | SYSTOLIC BLOOD PRESSURE: 164 MMHG | WEIGHT: 157 LBS

## 2020-08-17 DIAGNOSIS — N20.0 NEPHROLITHIASIS: ICD-10-CM

## 2020-08-17 LAB — HCG UR QL: NEGATIVE

## 2020-08-17 PROCEDURE — 52356 CYSTO/URETERO W/LITHOTRIPSY: CPT | Performed by: NURSE ANESTHETIST, CERTIFIED REGISTERED

## 2020-08-17 PROCEDURE — 27210794 ZZH OR GENERAL SUPPLY STERILE: Performed by: UROLOGY

## 2020-08-17 PROCEDURE — 71000027 ZZH RECOVERY PHASE 2 EACH 15 MINS: Performed by: UROLOGY

## 2020-08-17 PROCEDURE — 37000008 ZZH ANESTHESIA TECHNICAL FEE, 1ST 30 MIN: Performed by: UROLOGY

## 2020-08-17 PROCEDURE — 25000128 H RX IP 250 OP 636: Performed by: UROLOGY

## 2020-08-17 PROCEDURE — 25800030 ZZH RX IP 258 OP 636: Performed by: NURSE ANESTHETIST, CERTIFIED REGISTERED

## 2020-08-17 PROCEDURE — 25000132 ZZH RX MED GY IP 250 OP 250 PS 637: Performed by: UROLOGY

## 2020-08-17 PROCEDURE — 36000063 ZZH SURGERY LEVEL 4 EA 15 ADDTL MIN: Performed by: UROLOGY

## 2020-08-17 PROCEDURE — 27110028 ZZH OR GENERAL SUPPLY NON-STERILE: Performed by: UROLOGY

## 2020-08-17 PROCEDURE — C1758 CATHETER, URETERAL: HCPCS | Performed by: UROLOGY

## 2020-08-17 PROCEDURE — 25500064 ZZH RX 255 OP 636: Performed by: UROLOGY

## 2020-08-17 PROCEDURE — 37000009 ZZH ANESTHESIA TECHNICAL FEE, EACH ADDTL 15 MIN: Performed by: UROLOGY

## 2020-08-17 PROCEDURE — 25000128 H RX IP 250 OP 636: Performed by: NURSE ANESTHETIST, CERTIFIED REGISTERED

## 2020-08-17 PROCEDURE — 52353 CYSTOURETERO W/LITHOTRIPSY: CPT | Performed by: UROLOGY

## 2020-08-17 PROCEDURE — 25000125 ZZHC RX 250: Performed by: NURSE ANESTHETIST, CERTIFIED REGISTERED

## 2020-08-17 PROCEDURE — 40000277 XR SURGERY CARM FLUORO LESS THAN 5 MIN W STILLS: Mod: TC

## 2020-08-17 PROCEDURE — 40000306 ZZH STATISTIC PRE PROC ASSESS II: Performed by: UROLOGY

## 2020-08-17 PROCEDURE — 25000566 ZZH SEVOFLURANE, EA 15 MIN: Performed by: NURSE ANESTHETIST, CERTIFIED REGISTERED

## 2020-08-17 PROCEDURE — 71000014 ZZH RECOVERY PHASE 1 LEVEL 2 FIRST HR: Performed by: UROLOGY

## 2020-08-17 PROCEDURE — 36000065 ZZH SURGERY LEVEL 4 W FLUORO 1ST 30 MIN: Performed by: UROLOGY

## 2020-08-17 PROCEDURE — 81025 URINE PREGNANCY TEST: CPT | Performed by: NURSE ANESTHETIST, CERTIFIED REGISTERED

## 2020-08-17 RX ORDER — FENTANYL CITRATE 50 UG/ML
INJECTION, SOLUTION INTRAMUSCULAR; INTRAVENOUS PRN
Status: DISCONTINUED | OUTPATIENT
Start: 2020-08-17 | End: 2020-08-17

## 2020-08-17 RX ORDER — LIDOCAINE 40 MG/G
CREAM TOPICAL
Status: DISCONTINUED | OUTPATIENT
Start: 2020-08-17 | End: 2020-08-17 | Stop reason: HOSPADM

## 2020-08-17 RX ORDER — LABETALOL 20 MG/4 ML (5 MG/ML) INTRAVENOUS SYRINGE
10
Status: DISCONTINUED | OUTPATIENT
Start: 2020-08-17 | End: 2020-08-17 | Stop reason: HOSPADM

## 2020-08-17 RX ORDER — ONDANSETRON 2 MG/ML
4 INJECTION INTRAMUSCULAR; INTRAVENOUS EVERY 30 MIN PRN
Status: DISCONTINUED | OUTPATIENT
Start: 2020-08-17 | End: 2020-08-17 | Stop reason: HOSPADM

## 2020-08-17 RX ORDER — SODIUM CHLORIDE, SODIUM LACTATE, POTASSIUM CHLORIDE, CALCIUM CHLORIDE 600; 310; 30; 20 MG/100ML; MG/100ML; MG/100ML; MG/100ML
INJECTION, SOLUTION INTRAVENOUS CONTINUOUS
Status: DISCONTINUED | OUTPATIENT
Start: 2020-08-17 | End: 2020-08-17 | Stop reason: HOSPADM

## 2020-08-17 RX ORDER — ALBUTEROL SULFATE 0.83 MG/ML
2.5 SOLUTION RESPIRATORY (INHALATION) EVERY 4 HOURS PRN
Status: DISCONTINUED | OUTPATIENT
Start: 2020-08-17 | End: 2020-08-17 | Stop reason: HOSPADM

## 2020-08-17 RX ORDER — HYDROCODONE BITARTRATE AND ACETAMINOPHEN 5; 325 MG/1; MG/1
1 TABLET ORAL ONCE
Status: COMPLETED | OUTPATIENT
Start: 2020-08-17 | End: 2020-08-17

## 2020-08-17 RX ORDER — PROPOFOL 10 MG/ML
INJECTION, EMULSION INTRAVENOUS PRN
Status: DISCONTINUED | OUTPATIENT
Start: 2020-08-17 | End: 2020-08-17

## 2020-08-17 RX ORDER — FENTANYL CITRATE 50 UG/ML
25-50 INJECTION, SOLUTION INTRAMUSCULAR; INTRAVENOUS
Status: DISCONTINUED | OUTPATIENT
Start: 2020-08-17 | End: 2020-08-17 | Stop reason: HOSPADM

## 2020-08-17 RX ORDER — HYDROMORPHONE HYDROCHLORIDE 1 MG/ML
.3-.5 INJECTION, SOLUTION INTRAMUSCULAR; INTRAVENOUS; SUBCUTANEOUS EVERY 10 MIN PRN
Status: DISCONTINUED | OUTPATIENT
Start: 2020-08-17 | End: 2020-08-17 | Stop reason: HOSPADM

## 2020-08-17 RX ORDER — CIPROFLOXACIN 2 MG/ML
400 INJECTION, SOLUTION INTRAVENOUS EVERY 12 HOURS
Status: DISCONTINUED | OUTPATIENT
Start: 2020-08-17 | End: 2020-08-17 | Stop reason: HOSPADM

## 2020-08-17 RX ORDER — ONDANSETRON 4 MG/1
4 TABLET, ORALLY DISINTEGRATING ORAL EVERY 30 MIN PRN
Status: DISCONTINUED | OUTPATIENT
Start: 2020-08-17 | End: 2020-08-17 | Stop reason: HOSPADM

## 2020-08-17 RX ORDER — LIDOCAINE HYDROCHLORIDE 20 MG/ML
INJECTION, SOLUTION INFILTRATION; PERINEURAL PRN
Status: DISCONTINUED | OUTPATIENT
Start: 2020-08-17 | End: 2020-08-17

## 2020-08-17 RX ORDER — IOPAMIDOL 612 MG/ML
50 INJECTION, SOLUTION INTRAVASCULAR ONCE
Status: COMPLETED | OUTPATIENT
Start: 2020-08-17 | End: 2020-08-17

## 2020-08-17 RX ORDER — KETOROLAC TROMETHAMINE 30 MG/ML
30 INJECTION, SOLUTION INTRAMUSCULAR; INTRAVENOUS EVERY 6 HOURS PRN
Status: DISCONTINUED | OUTPATIENT
Start: 2020-08-17 | End: 2020-08-17 | Stop reason: HOSPADM

## 2020-08-17 RX ORDER — NALOXONE HYDROCHLORIDE 0.4 MG/ML
.1-.4 INJECTION, SOLUTION INTRAMUSCULAR; INTRAVENOUS; SUBCUTANEOUS
Status: DISCONTINUED | OUTPATIENT
Start: 2020-08-17 | End: 2020-08-17 | Stop reason: HOSPADM

## 2020-08-17 RX ORDER — MEPERIDINE HYDROCHLORIDE 25 MG/ML
12.5 INJECTION INTRAMUSCULAR; INTRAVENOUS; SUBCUTANEOUS
Status: DISCONTINUED | OUTPATIENT
Start: 2020-08-17 | End: 2020-08-17 | Stop reason: HOSPADM

## 2020-08-17 RX ORDER — ONDANSETRON 2 MG/ML
INJECTION INTRAMUSCULAR; INTRAVENOUS PRN
Status: DISCONTINUED | OUTPATIENT
Start: 2020-08-17 | End: 2020-08-17

## 2020-08-17 RX ORDER — DEXAMETHASONE SODIUM PHOSPHATE 10 MG/ML
INJECTION, SOLUTION INTRAMUSCULAR; INTRAVENOUS PRN
Status: DISCONTINUED | OUTPATIENT
Start: 2020-08-17 | End: 2020-08-17

## 2020-08-17 RX ORDER — HYDROCODONE BITARTRATE AND ACETAMINOPHEN 5; 325 MG/1; MG/1
1-2 TABLET ORAL EVERY 4 HOURS PRN
Qty: 8 TABLET | Refills: 0 | Status: SHIPPED | OUTPATIENT
Start: 2020-08-17 | End: 2020-09-22

## 2020-08-17 RX ADMIN — ONDANSETRON 4 MG: 2 INJECTION INTRAMUSCULAR; INTRAVENOUS at 12:41

## 2020-08-17 RX ADMIN — IOPAMIDOL 20 ML: 612 INJECTION, SOLUTION INTRAVENOUS at 12:56

## 2020-08-17 RX ADMIN — SODIUM CHLORIDE, POTASSIUM CHLORIDE, SODIUM LACTATE AND CALCIUM CHLORIDE: 600; 310; 30; 20 INJECTION, SOLUTION INTRAVENOUS at 09:49

## 2020-08-17 RX ADMIN — FENTANYL CITRATE 25 MCG: 50 INJECTION, SOLUTION INTRAMUSCULAR; INTRAVENOUS at 12:46

## 2020-08-17 RX ADMIN — FENTANYL CITRATE 100 MCG: 50 INJECTION, SOLUTION INTRAMUSCULAR; INTRAVENOUS at 11:44

## 2020-08-17 RX ADMIN — PROPOFOL 40 MG: 10 INJECTION, EMULSION INTRAVENOUS at 12:38

## 2020-08-17 RX ADMIN — DEXAMETHASONE SODIUM PHOSPHATE 10 MG: 10 INJECTION, SOLUTION INTRAMUSCULAR; INTRAVENOUS at 12:24

## 2020-08-17 RX ADMIN — FENTANYL CITRATE 100 MCG: 50 INJECTION, SOLUTION INTRAMUSCULAR; INTRAVENOUS at 12:10

## 2020-08-17 RX ADMIN — CIPROFLOXACIN 400 MG: 2 INJECTION, SOLUTION INTRAVENOUS at 11:47

## 2020-08-17 RX ADMIN — FENTANYL CITRATE 50 MCG: 50 INJECTION, SOLUTION INTRAMUSCULAR; INTRAVENOUS at 12:37

## 2020-08-17 RX ADMIN — PROPOFOL 20 MG: 10 INJECTION, EMULSION INTRAVENOUS at 12:37

## 2020-08-17 RX ADMIN — ONDANSETRON 4 MG: 2 INJECTION INTRAMUSCULAR; INTRAVENOUS at 13:02

## 2020-08-17 RX ADMIN — Medication 100 MG: at 11:44

## 2020-08-17 RX ADMIN — HYDROCODONE BITARTRATE AND ACETAMINOPHEN 1 TABLET: 5; 325 TABLET ORAL at 14:07

## 2020-08-17 RX ADMIN — KETOROLAC TROMETHAMINE 30 MG: 30 INJECTION, SOLUTION INTRAMUSCULAR at 14:06

## 2020-08-17 RX ADMIN — LIDOCAINE HYDROCHLORIDE 40 MG: 20 INJECTION, SOLUTION INFILTRATION; PERINEURAL at 11:44

## 2020-08-17 RX ADMIN — SODIUM CHLORIDE, POTASSIUM CHLORIDE, SODIUM LACTATE AND CALCIUM CHLORIDE: 600; 310; 30; 20 INJECTION, SOLUTION INTRAVENOUS at 12:45

## 2020-08-17 RX ADMIN — FENTANYL CITRATE 50 MCG: 50 INJECTION, SOLUTION INTRAMUSCULAR; INTRAVENOUS at 12:38

## 2020-08-17 RX ADMIN — FENTANYL CITRATE 50 MCG: 50 INJECTION, SOLUTION INTRAMUSCULAR; INTRAVENOUS at 13:22

## 2020-08-17 RX ADMIN — PROPOFOL 200 MG: 10 INJECTION, EMULSION INTRAVENOUS at 11:44

## 2020-08-17 ASSESSMENT — MIFFLIN-ST. JEOR: SCORE: 1383.03

## 2020-08-17 NOTE — ANESTHESIA POSTPROCEDURE EVALUATION
Patient: Kimberley Loius    Procedure(s):  CYSTOURETEROSCOPY, WITH RETROGRADE PYELOGRAM, HOLMIUM LASER LITHOTRIPSY OF URETERAL CALCULUS, interpretation of fluroscopy    Diagnosis:Nephrolithiasis [N20.0]  Diagnosis Additional Information: No value filed.    Anesthesia Type:  General    Note:  Anesthesia Post Evaluation    Patient location during evaluation: Phase 2  Patient participation: Able to fully participate in evaluation  Level of consciousness: awake and alert  Pain management: adequate  Airway patency: patent  Cardiovascular status: acceptable  Respiratory status: acceptable  Hydration status: stable  PONV: none     Anesthetic complications: None          Last vitals:  Vitals:    08/17/20 1400 08/17/20 1413 08/17/20 1428   BP: 152/96 (!) 158/105 164/95   Pulse: 71 74 73   Resp: 18 18 18   Temp:      SpO2: 99% 98% 98%         Electronically Signed By: ZAHEER Cage CRNA  August 17, 2020  2:56 PM

## 2020-08-17 NOTE — LETTER
Kimberley Louis was seen and treated in our surgery department on 7/21/2020.  She may return to work on Wednesday August 19 th       If you have any questions or concerns, please don't hesitate to call.      Dr. Mcpherson/Mala Vazquez/KHURRAM

## 2020-08-17 NOTE — ANESTHESIA CARE TRANSFER NOTE
Patient: Kimberley Louis    Procedure(s):  CYSTOURETEROSCOPY, WITH RETROGRADE PYELOGRAM, HOLMIUM LASER LITHOTRIPSY OF URETERAL CALCULUS, interpretation of fluroscopy    Diagnosis: Nephrolithiasis [N20.0]  Diagnosis Additional Information: No value filed.    Anesthesia Type:   General     Note:  Airway :Nasal Cannula  Patient transferred to:PACU  Handoff Report: Identifed the Patient, Identified the Reponsible Provider, Reviewed the pertinent medical history, Discussed the surgical course, Reviewed Intra-OP anesthesia mangement and issues during anesthesia, Set expectations for post-procedure period and Allowed opportunity for questions and acknowledgement of understanding      Vitals: (Last set prior to Anesthesia Care Transfer)    CRNA VITALS  8/17/2020 1222 - 8/17/2020 1300      8/17/2020             Resp Rate (observed):  (!) 1    Resp Rate (set):  8                Electronically Signed By: ZAHEER Hobson CRNA  August 17, 2020  1:00 PM

## 2020-08-18 ENCOUNTER — TELEPHONE (OUTPATIENT)
Dept: UROLOGY | Facility: OTHER | Age: 40
End: 2020-08-18

## 2020-08-18 NOTE — TELEPHONE ENCOUNTER
Called pt and she states she is pretty sore and her throat hurts.  She is suppose to return to work tomorrow and wants to know if you will ok a few more days off.  Please advise.  SAYDA HAMMOND LPN

## 2020-08-24 NOTE — OP NOTE
Please obtain auth for DOS 9/21/20     PreOperative diagnosis: Left nephrolithiasis    Postoperative diagnosis: Same    Procedure: Cystoscopy, left retrograde urogram with interpretation of fluoroscopy, left ureteroscopy with intracorporeal lithotripsy of 2 left kidney stones    Surgeon: MABLE OLIVEIRA    Indications for procedure: This is a 40-year-old female who has a history of bilateral nephrolithiasis.  She recently passed a right distal ureteral stone and is known to have 2 larger stones in the left kidney.  She has elected to proceed with intervention at this time to the left kidney stones.  Risks were all discussed with her in my office and are well documented in my office notes    After adequate consent was obtained from the patient she was brought to the operative theater.  She was placed in the dorsolithotomy position genitalia were prepped and draped in the usual sterile fashion.   films were taken and I could see 2 calcifications overlying the left kidney that correlate to the stone seen on the CT scan.  A 22 Italian cystoscopy sheath with a 12 degree lens was inserted into the bladder.  Both ureteral orifices were seen in their normal anatomical location and the rest of the bladder is completely normal.  An open-ended ureteral catheter was placed into the left ureteral orifice and a retrograde urogram was done.  The ureter is completely intact and normal and the collecting system fills out completely.  There is a filling defect which correlates to the larger stone in the posterior mid calyx.  I advanced my open-ended ureteral catheter up to the kidney and I placed an Amplatz Super Stiff guidewire up to the kidney.  I then placed a Glidewire next to the Amplatz Super Stiff wire up to the kidney so I had 2 guidewires.  I used a 12-14 ureteral access sheath.  I used the inner sheath and I was able to dilate the ureter all the way up to the kidney with a fair amount of ease.  Then I put the outer sheath on and I attempted to advance that into the  ureter and it seemed like it was not advancing past that intramural tunnel very easily so I elected not to push that any further.  I then backloaded my Thurston ureteroscope on the Glidewire and advanced it under fluoroscopy and direct visualization all the way up to the kidney.  I then did nephroscopy primarily of the lower pole calyces because this is where the smaller stone is and I identified the stone.  I was able to advance a 200  m holmium laser fiber and I was able to get to the stone and break it up into multiple small pieces.  Pieces were no bigger than the size of the 200  m fiber so she should be able to pass these relatively easily.  Then I went up and found the stone in the middle calyx and I did the same process I was able to break the stone up into multiple small pieces and she should be able to pass all of these pieces.  I then removed the ureteroscope as I was looking at the ureter there was no injury to the ureter when I got down to the distal ureter I repeated my retrograde urogram to confirm that there was no injury or extravasation of contrast with the ureteroscopy and none was noted.  I removed the ureteroscope and I elected to remove both guidewires.  I did not place a stent as the patient had a lot of problems with the stent in the past with terrible pain and the procedure went quite nicely so I elected not to place a stent at this time.  Bladder was drained she was awakened and taken recovery in stable condition there were no complications.

## 2020-09-22 ENCOUNTER — OFFICE VISIT (OUTPATIENT)
Dept: UROLOGY | Facility: OTHER | Age: 40
End: 2020-09-22
Attending: UROLOGY
Payer: COMMERCIAL

## 2020-09-22 ENCOUNTER — ANCILLARY PROCEDURE (OUTPATIENT)
Dept: GENERAL RADIOLOGY | Facility: OTHER | Age: 40
End: 2020-09-22
Attending: UROLOGY
Payer: COMMERCIAL

## 2020-09-22 VITALS
TEMPERATURE: 97.9 F | DIASTOLIC BLOOD PRESSURE: 70 MMHG | SYSTOLIC BLOOD PRESSURE: 110 MMHG | HEART RATE: 77 BPM | OXYGEN SATURATION: 97 %

## 2020-09-22 DIAGNOSIS — N20.0 NEPHROLITHIASIS: ICD-10-CM

## 2020-09-22 DIAGNOSIS — N20.0 NEPHROLITHIASIS: Primary | ICD-10-CM

## 2020-09-22 LAB
PHOSPHATE SERPL-MCNC: 3.5 MG/DL (ref 2.5–4.5)
URATE SERPL-MCNC: 4.1 MG/DL (ref 2.6–6)

## 2020-09-22 PROCEDURE — 74019 RADEX ABDOMEN 2 VIEWS: CPT | Mod: TC

## 2020-09-22 PROCEDURE — 84100 ASSAY OF PHOSPHORUS: CPT | Mod: ZL | Performed by: UROLOGY

## 2020-09-22 PROCEDURE — 99213 OFFICE O/P EST LOW 20 MIN: CPT | Performed by: UROLOGY

## 2020-09-22 PROCEDURE — G0463 HOSPITAL OUTPT CLINIC VISIT: HCPCS

## 2020-09-22 PROCEDURE — 36415 COLL VENOUS BLD VENIPUNCTURE: CPT | Mod: ZL | Performed by: UROLOGY

## 2020-09-22 PROCEDURE — G0463 HOSPITAL OUTPT CLINIC VISIT: HCPCS | Mod: 25

## 2020-09-22 PROCEDURE — 84550 ASSAY OF BLOOD/URIC ACID: CPT | Mod: ZL | Performed by: UROLOGY

## 2020-09-22 ASSESSMENT — PAIN SCALES - GENERAL: PAINLEVEL: NO PAIN (0)

## 2020-09-22 NOTE — LETTER
9/22/2020      RE: Kimberley Louis  [address]  [place] [address]         History     Chief Complaint:    Surgical Followup (Kidney Stones)      HPI   Kimberley Louis is a 40 year old female who presents for follow-up of her left ureteroscopic stone manipulation.  Kimberley did well with the surgery.  We did not put in a stent postoperatively which I think helped with postoperative pain.  She said she passed a little bit of tissue and some small crystals but did not feel that she passed a lot of stone.  Intraoperatively we were not able to see the large fragments of stone once we had the stone broken up.  We will check a KUB on the way out to see if we can see any stone fragments.  I did talk to Kimberley about some postural drainage if there is still some stone fragments present.  We have previously had a stone analysis done on Kimberley and she makes 90% calcium oxalate dihydrate and 10% calcium phosphate.  Her serum calcium is normal at 8.7.  She is here to discuss the metabolic work-up.    Allergies:    No Known Allergies     Medications:      albuterol (PROAIR HFA/PROVENTIL HFA/VENTOLIN HFA) 108 (90 Base) MCG/ACT inhaler  amLODIPine (NORVASC) 10 MG tablet  metoprolol tartrate (LOPRESSOR) 25 MG tablet  triamcinolone (KENALOG) 0.025 % external ointment        Problem List:      Patient Active Problem List    Diagnosis Date Noted     Nephrolithiasis 07/21/2020     Priority: Medium     Added automatically from request for surgery [id number]       Bacterial vaginosis 06/09/2020     Priority: Medium     Left flank tenderness 08/03/2018     Priority: Medium     Personal history of tobacco use, presenting hazards to health 08/03/2018     Priority: Medium     Recurrent UTI 08/03/2018     Priority: Medium     Incomplete right bundle branch block 02/05/2018     Priority: Medium     Pure hypercholesterolemia 01/03/2018     Priority: Medium     Tobacco abuse counseling 01/03/2018     Priority: Medium     Tobacco abuse  01/03/2018     Priority: Medium     Palpitations 01/03/2018     Priority: Medium     Atypical chest pain 01/03/2018     Priority: Medium     Dyspnea on exertion 01/03/2018     Priority: Medium     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  Kimberley CELAYA Missy has no plan or code status on file. Discussed available resources and provided with information. Confirmed code status reflects current choices pending further ACP discussions.  Confirmed/documented legally designated decision makers.  Added by SAYDA CABRERA             ACP (advance care planning) 04/13/2016     Priority: Medium     Advance Care Planning 4/13/2016: ACP Review of Chart / Resources Provided:  Reviewed chart for advance care plan.  Kimberley CELAYA Missy has no plan or code status on file. Discussed available resources and provided with information. Confirmed code status reflects current choices pending further ACP discussions.  Confirmed/documented legally designated decision makers.  Added by Sandra Anne             Kidney stone on left side 12/21/2015     Priority: Medium     Nephrocalcinosis 12/21/2015     Priority: Medium     Essential hypertension 11/19/2015     Priority: Medium     H/O renal calculi 11/19/2015     Priority: Medium     Enlarged kidney 11/19/2015     Priority: Medium        Past Medical History:      Past Medical History:   Diagnosis Date     Enlarged kidney 11/19/2015     Essential hypertension 11/19/2015     H/O renal calculi 11/19/2015     H/O renal calculi 11/19/2015       Past Surgical History:      Past Surgical History:   Procedure Laterality Date     AS REMOVAL OF KIDNEY STONE       AS REMOVAL OF KIDNEY STONE       C REMOVAL OF KIDNEY STONE       COMBINED CYSTOSCOPY, RETROGRADES,  "URETEROSCOPY, LASER HOLMIUM LITHOTRIPSY URETER(S), INSERT STENT Left 8/17/2020    Procedure: CYSTOURETEROSCOPY, WITH RETROGRADE PYELOGRAM, HOLMIUM LASER LITHOTRIPSY OF URETERAL CALCULUS, interpretation of fluroscopy;  Surgeon: Sherry Mcpherson MD;  Location: [facility]     TUBAL LIGATION         Family History:      Family History   Problem Relation Age of Onset     Diabetes Mother      Emphysema Mother      Cervical Cancer Mother      Throat cancer Mother      Diabetes Father      Hypertension Father      Hypertension Maternal Grandmother      Diabetes Maternal Grandmother      Schizophrenia Maternal Grandmother      Unknown/Adopted Maternal Grandfather      Lung Cancer Paternal Grandmother      Diabetes Paternal Grandmother      Unknown/Adopted Paternal Grandfather        Social History:    Marital Status:  Single [1]  Social History     Tobacco Use     Smoking status: Current Every Day Smoker     Packs/day: 1.00     Years: 28.00     Pack years: 28.00     Types: Cigarettes     Start date: 1/1/1989     Smokeless tobacco: Never Used     Tobacco comment: pt denied QP 9/22/20   Substance Use Topics     Alcohol use: Yes     Comment: occasional     Drug use: No        Review of Systems   All other systems reviewed and are negative.        Physical Exam   Vitals:  /70   Pulse 77   Temp 97.9  F (36.6  C) (Tympanic)   SpO2 97%       Physical Exam    Impression: Status post left ureteroscopic stone manipulation for left nephrolithiasis  Plan   Plan: We reviewed all of her intraoperative x-rays and the procedure that I performed.  I also reviewed what labs we have at this time.  We discussed a metabolic work-up which she has never done and we\"re going to proceed with a 24-hour urine, do a follow-up KUB on the way out and get a phosphorus and uric acid.  Once I get all of that information back then we will do a follow-up visit to discuss what further treatment options are needed going forward.  I also instructed her in " postural drainage depending on what the KUB looks like. 20 minutes all spent in discussion.      No follow-ups on file.    Sherry Mcpherson MD  [facility]            Sherry Mcpherson MD

## 2020-09-22 NOTE — NURSING NOTE
"Chief Complaint   Patient presents with     Surgical Followup     Kidney Stones       Initial /70   Pulse 77   Temp 97.9  F (36.6  C) (Tympanic)   SpO2 97%  Estimated body mass index is 26.13 kg/m  as calculated from the following:    Height as of 8/17/20: 1.651 m (5' 5\").    Weight as of 8/17/20: 71.2 kg (157 lb).  Medication Reconciliation: complete  Dorothea Hess LPN  "

## 2020-09-22 NOTE — PROGRESS NOTES
History     Chief Complaint:    Surgical Followup (Kidney Stones)      HPI   Kimberley Louis is a 40 year old female who presents for follow-up of her left ureteroscopic stone manipulation.  Kimberley did well with the surgery.  We did not put in a stent postoperatively which I think helped with postoperative pain.  She said she passed a little bit of tissue and some small crystals but did not feel that she passed a lot of stone.  Intraoperatively we were not able to see the large fragments of stone once we had the stone broken up.  We will check a KUB on the way out to see if we can see any stone fragments.  I did talk to Kimberley about some postural drainage if there is still some stone fragments present.  We have previously had a stone analysis done on Kimberley and she makes 90% calcium oxalate dihydrate and 10% calcium phosphate.  Her serum calcium is normal at 8.7.  She is here to discuss the metabolic work-up.    Allergies:    No Known Allergies     Medications:      albuterol (PROAIR HFA/PROVENTIL HFA/VENTOLIN HFA) 108 (90 Base) MCG/ACT inhaler  amLODIPine (NORVASC) 10 MG tablet  metoprolol tartrate (LOPRESSOR) 25 MG tablet  triamcinolone (KENALOG) 0.025 % external ointment        Problem List:      Patient Active Problem List    Diagnosis Date Noted     Nephrolithiasis 07/21/2020     Priority: Medium     Added automatically from request for surgery [id number]       Bacterial vaginosis 06/09/2020     Priority: Medium     Left flank tenderness 08/03/2018     Priority: Medium     Personal history of tobacco use, presenting hazards to health 08/03/2018     Priority: Medium     Recurrent UTI 08/03/2018     Priority: Medium     Incomplete right bundle branch block 02/05/2018     Priority: Medium     Pure hypercholesterolemia 01/03/2018     Priority: Medium     Tobacco abuse counseling 01/03/2018     Priority: Medium     Tobacco abuse 01/03/2018     Priority: Medium     Palpitations 01/03/2018     Priority: Medium      Atypical chest pain 01/03/2018     Priority: Medium     Dyspnea on exertion 01/03/2018     Priority: Medium     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  Kimberley Louis has no plan or code status on file. Discussed available resources and provided with information. Confirmed code status reflects current choices pending further ACP discussions.  Confirmed/documented legally designated decision makers.  Added by SAYDA CABRERA             ACP (advance care planning) 04/13/2016     Priority: Medium     Advance Care Planning 4/13/2016: ACP Review of Chart / Resources Provided:  Reviewed chart for advance care plan.  Kimberley Louis has no plan or code status on file. Discussed available resources and provided with information. Confirmed code status reflects current choices pending further ACP discussions.  Confirmed/documented legally designated decision makers.  Added by Sandra Anne             Kidney stone on left side 12/21/2015     Priority: Medium     Nephrocalcinosis 12/21/2015     Priority: Medium     Essential hypertension 11/19/2015     Priority: Medium     H/O renal calculi 11/19/2015     Priority: Medium     Enlarged kidney 11/19/2015     Priority: Medium        Past Medical History:      Past Medical History:   Diagnosis Date     Enlarged kidney 11/19/2015     Essential hypertension 11/19/2015     H/O renal calculi 11/19/2015     H/O renal calculi 11/19/2015       Past Surgical History:      Past Surgical History:   Procedure Laterality Date     AS REMOVAL OF KIDNEY STONE       AS REMOVAL OF KIDNEY STONE       C REMOVAL OF KIDNEY STONE       COMBINED CYSTOSCOPY, RETROGRADES, URETEROSCOPY, LASER HOLMIUM LITHOTRIPSY URETER(S), INSERT STENT Left 8/17/2020     "Procedure: CYSTOURETEROSCOPY, WITH RETROGRADE PYELOGRAM, HOLMIUM LASER LITHOTRIPSY OF URETERAL CALCULUS, interpretation of fluroscopy;  Surgeon: Sherry Mcpherson MD;  Location: [facility]     TUBAL LIGATION         Family History:      Family History   Problem Relation Age of Onset     Diabetes Mother      Emphysema Mother      Cervical Cancer Mother      Throat cancer Mother      Diabetes Father      Hypertension Father      Hypertension Maternal Grandmother      Diabetes Maternal Grandmother      Schizophrenia Maternal Grandmother      Unknown/Adopted Maternal Grandfather      Lung Cancer Paternal Grandmother      Diabetes Paternal Grandmother      Unknown/Adopted Paternal Grandfather        Social History:    Marital Status:  Single [1]  Social History     Tobacco Use     Smoking status: Current Every Day Smoker     Packs/day: 1.00     Years: 28.00     Pack years: 28.00     Types: Cigarettes     Start date: 1/1/1989     Smokeless tobacco: Never Used     Tobacco comment: pt denied QP 9/22/20   Substance Use Topics     Alcohol use: Yes     Comment: occasional     Drug use: No        Review of Systems   All other systems reviewed and are negative.        Physical Exam   Vitals:  /70   Pulse 77   Temp 97.9  F (36.6  C) (Tympanic)   SpO2 97%       Physical Exam    Impression: Status post left ureteroscopic stone manipulation for left nephrolithiasis  Plan   Plan: We reviewed all of her intraoperative x-rays and the procedure that I performed.  I also reviewed what labs we have at this time.  We discussed a metabolic work-up which she has never done and we\"re going to proceed with a 24-hour urine, do a follow-up KUB on the way out and get a phosphorus and uric acid.  Once I get all of that information back then we will do a follow-up visit to discuss what further treatment options are needed going forward.  I also instructed her in postural drainage depending on what the KUB looks like. 20 minutes all spent in " discussion.      No follow-ups on file.    Sherry Mcpherson MD  [facility]

## 2020-09-24 ENCOUNTER — OFFICE VISIT (OUTPATIENT)
Dept: OBGYN | Facility: OTHER | Age: 40
End: 2020-09-24
Attending: PHYSICIAN ASSISTANT
Payer: COMMERCIAL

## 2020-09-24 VITALS
HEIGHT: 65 IN | SYSTOLIC BLOOD PRESSURE: 128 MMHG | DIASTOLIC BLOOD PRESSURE: 72 MMHG | HEART RATE: 80 BPM | WEIGHT: 165 LBS | BODY MASS INDEX: 27.49 KG/M2

## 2020-09-24 DIAGNOSIS — N89.8 VAGINAL DISCHARGE: Primary | ICD-10-CM

## 2020-09-24 DIAGNOSIS — N92.0 MENORRHAGIA WITH REGULAR CYCLE: ICD-10-CM

## 2020-09-24 DIAGNOSIS — N94.3 PMS (PREMENSTRUAL SYNDROME): ICD-10-CM

## 2020-09-24 LAB
SPECIMEN SOURCE: NORMAL
WET PREP SPEC: NORMAL

## 2020-09-24 PROCEDURE — 88305 TISSUE EXAM BY PATHOLOGIST: CPT | Mod: TC | Performed by: OBSTETRICS & GYNECOLOGY

## 2020-09-24 PROCEDURE — G0463 HOSPITAL OUTPT CLINIC VISIT: HCPCS | Mod: 25

## 2020-09-24 PROCEDURE — 58100 BIOPSY OF UTERUS LINING: CPT | Performed by: OBSTETRICS & GYNECOLOGY

## 2020-09-24 PROCEDURE — 99204 OFFICE O/P NEW MOD 45 MIN: CPT | Mod: 25 | Performed by: OBSTETRICS & GYNECOLOGY

## 2020-09-24 PROCEDURE — 87210 SMEAR WET MOUNT SALINE/INK: CPT | Mod: ZL | Performed by: OBSTETRICS & GYNECOLOGY

## 2020-09-24 RX ORDER — METRONIDAZOLE 500 MG/1
500 TABLET ORAL 2 TIMES DAILY
Qty: 14 TABLET | Refills: 11 | Status: SHIPPED | OUTPATIENT
Start: 2020-09-24 | End: 2020-10-01

## 2020-09-24 ASSESSMENT — PAIN SCALES - GENERAL: PAINLEVEL: NO PAIN (0)

## 2020-09-24 ASSESSMENT — MIFFLIN-ST. JEOR: SCORE: 1419.32

## 2020-09-24 NOTE — LETTER
September 28, 2020          Kimberley Louis  [address]  [place] [address]        Dear Kimberley,             We have been unable to reach you. Your wet prep came back normal. If you have any questions please call [phone].                       Sincerely,        Bunny Hyatt MD/ Jadyn CLARK

## 2020-09-24 NOTE — PROGRESS NOTES
CC:  Consult from Dr. Martins for mennorhagia  HPI:  Kimberley Louis is a 40 year old female P3 (CS). Patient's last menstrual period was 08/28/2020 (approximate)..  Menses are Regular lasting 7 days with 5 of heavy flow.  Her menstrual flow is limiting her clothing choices and interferes with lifestyle/activites.   She also c/o foul smelling vaginal discharge 2 weeks prior to menses every month.  Has been treated for BV in past.  She says treatment helps but immediately returns.  Associated with coitus. He other complaint is PMS sx with mood/irritibility  sx last two weeks of cycle.     Clots: Yes  Intermenstrual bleeding: No  Post-coital bleeding: No  Previous work-up:No  Contraception: BTO  Abnormal Pap: No  Dysmenorrhea: Has pelvic pain 1-2 days prior to menses, pressure with heavy bleeding and occasional sharp pains when she coughs or sneezes.       Past GYN history:  Unremarkable.  ? Fibroid.       Last PAP smear:  Normal    Patients records are available and reviewed at today's visit.    Past Medical History:   Diagnosis Date     Enlarged kidney 11/19/2015     Essential hypertension 11/19/2015     H/O renal calculi 11/19/2015     H/O renal calculi 11/19/2015       Past Surgical History:   Procedure Laterality Date     AS REMOVAL OF KIDNEY STONE       AS REMOVAL OF KIDNEY STONE       C REMOVAL OF KIDNEY STONE       COMBINED CYSTOSCOPY, RETROGRADES, URETEROSCOPY, LASER HOLMIUM LITHOTRIPSY URETER(S), INSERT STENT Left 8/17/2020    Procedure: CYSTOURETEROSCOPY, WITH RETROGRADE PYELOGRAM, HOLMIUM LASER LITHOTRIPSY OF URETERAL CALCULUS, interpretation of fluroscopy;  Surgeon: Sherry Mcpherson MD;  Location: [facility]     TUBAL LIGATION         Family History   Problem Relation Age of Onset     Diabetes Mother      Emphysema Mother      Cervical Cancer Mother      Throat cancer Mother      Diabetes Father      Hypertension Father      Hypertension Maternal Grandmother      Diabetes Maternal Grandmother      Schizophrenia  "Maternal Grandmother      Unknown/Adopted Maternal Grandfather      Lung Cancer Paternal Grandmother      Diabetes Paternal Grandmother      Unknown/Adopted Paternal Grandfather        Current Outpatient Medications   Medication Sig Dispense Refill     albuterol (PROAIR HFA/PROVENTIL HFA/VENTOLIN HFA) 108 (90 Base) MCG/ACT inhaler INHALE 2 PUFFS INTO THE LUNGS EVERY 6 HOURS 18 g 1     amLODIPine (NORVASC) 10 MG tablet TAKE 1 TABLET(10 MG) BY MOUTH DAILY 30 tablet 0     metoprolol tartrate (LOPRESSOR) 25 MG tablet TAKE 1 TABLET(25 MG) BY MOUTH TWICE DAILY 60 tablet 0     metroNIDAZOLE (FLAGYL) 500 MG tablet Take 1 tablet (500 mg) by mouth 2 times daily for 7 days 14 tablet 11     triamcinolone (KENALOG) 0.025 % external ointment Apply topically 2 times daily as needed for irritation 15 g 0       Allergies: Patient has no known allergies.    ROS:  CONSTITUTIONAL: NEGATIVE for fever, chills, change in weight    GI: NEGATIVE for nausea, abdominal pain, heartburn, or change in bowel habits  : NEGATIVE for frequency, dysuria, hematuria, vaginal discharge  PSYCHIATRIC: NEGATIVE for changes in mood or affect    EXAM:  Blood pressure 128/72, pulse 80, height 1.651 m (5' 5\"), weight 74.8 kg (165 lb), last menstrual period 08/28/2020, not currently breastfeeding.   BMI= Body mass index is 27.46 kg/m .  General - pleasant female in no acute distress.  Abdomen - soft, nontender, nondistended, no hepatosplenomegaly.  Pelvic - EG: normal adult female, BUS: within normal limits, Vagina: well rugated, no discharge, Cervix: no lesions or CMT, Uterus: firm, 8 wk sized and nontender, Adnexae: no masses or tenderness.  Rectovaginal - deferred.  Musculoskeletal - no gross deformities or edema  Neurological - normal mental status.   PROCEDURE:  After informed consent was obtained from the patient, a speculum was placed in the vagina to visualize the cervix.  The cervix was then swabbed with a betadine.  Tenaculum was applied to the " anterior cervical lip. Endometrial biopsy pipelle was passed through the cervical os and tissue obtained.  Uterus sounded to 9 cm.  Tenaculum was removed and sites were hemostatic. There were no complications. The patient tolerated the procedure well with a minimal amount of cramping noted.  Specimen was sent to pathology.    ASSESSMENT/PLAN:  (N89.8) Vaginal discharge  (primary encounter diagnosis)  Comment: Recurrent BV by history  Plan: Wet prep, metroNIDAZOLE (FLAGYL) 500 MG tablet       To take 1 with coitus or prior to menses.    (N92.0) Menorrhagia with regular cycle  Comment: endometrial biopsy done.  ? Enlarged uterus/fibroids on exam  Plan: Surgical pathology exam, US Pelvic Complete         with Transvaginal      Discussed expectant,  medical, IUD and and surgical options(endometrial ablation/hysterectomy). ACOG andouts on each given to pt for review.   Plan f/u appt after testing to discuss results and POC.     PMS/PMDD:  Discussed treatment options with selective serotonin reuptake inhibitor as she is not a candidate of OCP's/hormonal management due to age, smoking, medical history.  Pt declines at present but will think about it.     I spent 45 minutes on this patient's visit (exclusive of separately billed services/procedures) and over half of this time was spent in face-to-face counseling regarding her diagnosis, treatment options with emphasis on  risks and benefits of each, prognosis and importance of compliance.

## 2020-09-24 NOTE — NURSING NOTE
"Chief Complaint   Patient presents with     Consult     Consult fro Dr Banegas for menorrhagia       Initial /72 (BP Location: Left arm, Patient Position: Sitting, Cuff Size: Adult Regular)   Pulse 80   Ht 1.651 m (5' 5\")   Wt 74.8 kg (165 lb)   LMP 08/28/2020 (Approximate)   Breastfeeding No   BMI 27.46 kg/m   Estimated body mass index is 27.46 kg/m  as calculated from the following:    Height as of this encounter: 1.651 m (5' 5\").    Weight as of this encounter: 74.8 kg (165 lb).  Medication Reconciliation: complete  HERMINIO HAIR LPN  "

## 2020-09-28 LAB — COPATH REPORT: NORMAL

## 2020-09-29 ENCOUNTER — TELEPHONE (OUTPATIENT)
Dept: OBGYN | Facility: OTHER | Age: 40
End: 2020-09-29

## 2020-09-29 NOTE — TELEPHONE ENCOUNTER
Pt calls and would like medication for PMS. She uses [facility] in [place] for her pharmacy.

## 2020-10-02 ENCOUNTER — TELEPHONE (OUTPATIENT)
Dept: FAMILY MEDICINE | Facility: OTHER | Age: 40
End: 2020-10-02

## 2020-10-02 NOTE — TELEPHONE ENCOUNTER
Called and left message for patient to call back to schedule mammogram if she is interested, she has been mailed on 2 separate occasions with no response. Rosanne Chi LPN

## 2020-10-13 ENCOUNTER — OFFICE VISIT (OUTPATIENT)
Dept: FAMILY MEDICINE | Facility: OTHER | Age: 40
End: 2020-10-13
Attending: PHYSICIAN ASSISTANT
Payer: COMMERCIAL

## 2020-10-13 ENCOUNTER — NURSE TRIAGE (OUTPATIENT)
Dept: FAMILY MEDICINE | Facility: OTHER | Age: 40
End: 2020-10-13

## 2020-10-13 DIAGNOSIS — Z20.822 COVID-19 RULED OUT: Primary | ICD-10-CM

## 2020-10-13 DIAGNOSIS — Z20.822 COVID-19 RULED OUT: ICD-10-CM

## 2020-10-13 PROCEDURE — U0003 INFECTIOUS AGENT DETECTION BY NUCLEIC ACID (DNA OR RNA); SEVERE ACUTE RESPIRATORY SYNDROME CORONAVIRUS 2 (SARS-COV-2) (CORONAVIRUS DISEASE [COVID-19]), AMPLIFIED PROBE TECHNIQUE, MAKING USE OF HIGH THROUGHPUT TECHNOLOGIES AS DESCRIBED BY CMS-2020-01-R: HCPCS | Mod: ZL | Performed by: PHYSICIAN ASSISTANT

## 2020-10-13 NOTE — TELEPHONE ENCOUNTER
Pt called, requesting covid testing. Reports GI upset, fever that started sunday. Denies SOB. Scheduled for curbside testing. Advised to quarantine per recommendations from [facility]/CDC, symptomatic treatment, call back with change or worsening in symptoms. Pt verbalizes understanding.       Reason for Disposition    [1] COVID-19 infection suspected by caller or triager AND [2] mild symptoms (cough, fever, or others) AND [3] no complications or SOB    Additional Information    Negative: SEVERE difficulty breathing (e.g., struggling for each breath, speaks in single words)    Negative: Difficult to awaken or acting confused (e.g., disoriented, slurred speech)    Negative: Bluish (or gray) lips or face now    Negative: Shock suspected (e.g., cold/pale/clammy skin, too weak to stand, low BP, rapid pulse)    Negative: Sounds like a life-threatening emergency to the triager    Negative: [1] COVID-19 exposure AND [2] no symptoms    Negative: COVID-19 and Breastfeeding, questions about    Negative: [1] Adult with possible COVID-19 symptoms AND [2] triager concerned about severity of symptoms or other causes    Negative: SEVERE or constant chest pain or pressure (Exception: mild central chest pain, present only when coughing)    Negative: MODERATE difficulty breathing (e.g., speaks in phrases, SOB even at rest, pulse 100-120)    Negative: Patient sounds very sick or weak to the triager    Negative: MILD difficulty breathing (e.g., minimal/no SOB at rest, SOB with walking, pulse <100)    Negative: Chest pain or pressure    Negative: Fever > 103 F (39.4 C)    Negative: [1] Fever > 101 F (38.3 C) AND [2] age > 60    Negative: [1] Fever > 100.0 F (37.8 C) AND [2] bedridden (e.g., nursing home patient, CVA, chronic illness, recovering from surgery)    Negative: HIGH RISK patient (e.g., age > 64 years, diabetes, heart or lung disease, weak immune system) (Exception: Has already been evaluated by healthcare provider and has no new or  "worsening symptoms)    Negative: Fever present > 3 days (72 hours)    Negative: [1] Fever returns after gone for over 24 hours AND [2] symptoms worse or not improved    Negative: [1] Continuous (nonstop) coughing interferes with work or school AND [2] no improvement using cough treatment per protocol    Answer Assessment - Initial Assessment Questions  1. COVID-19 DIAGNOSIS: \"Who made your Coronavirus (COVID-19) diagnosis?\" \"Was it confirmed by a positive lab test?\" If not diagnosed by a HCP, ask \"Are there lots of cases (community spread) where you live?\" (See [facility] health department website, if unsure)      Not confirmed  2. ONSET: \"When did the COVID-19 symptoms start?\"       sunday  3. WORST SYMPTOM: \"What is your worst symptom?\" (e.g., cough, fever, shortness of breath, muscle aches)      GI upset, possible fever  4. COUGH: \"Do you have a cough?\" If so, ask: \"How bad is the cough?\"        slight  5. FEVER: \"Do you have a fever?\" If so, ask: \"What is your temperature, how was it measured, and when did it start?\"      hasnt checked but feels feverish  6. RESPIRATORY STATUS: \"Describe your breathing?\" (e.g., shortness of breath, wheezing, unable to speak)       no  7. BETTER-SAME-WORSE: \"Are you getting better, staying the same or getting worse compared to yesterday?\"  If getting worse, ask, \"In what way?\"      worse  8. HIGH RISK DISEASE: \"Do you have any chronic medical problems?\" (e.g., asthma, heart or lung disease, weak immune system, etc.)      HTN  9. PREGNANCY: \"Is there any chance you are pregnant?\" \"When was your last menstrual period?\"      no  10. OTHER SYMPTOMS: \"Do you have any other symptoms?\"  (e.g., chills, fatigue, headache, loss of smell or taste, muscle pain, sore throat)        Diarrhea, sore throat    Protocols used: CORONAVIRUS (COVID-19) DIAGNOSED OR [id number] 8.4.20      "

## 2020-10-14 DIAGNOSIS — R07.89 ATYPICAL CHEST PAIN: ICD-10-CM

## 2020-10-14 DIAGNOSIS — I10 ESSENTIAL HYPERTENSION: ICD-10-CM

## 2020-10-14 RX ORDER — METOPROLOL TARTRATE 25 MG/1
TABLET, FILM COATED ORAL
Qty: 60 TABLET | Refills: 0 | Status: SHIPPED | OUTPATIENT
Start: 2020-10-14 | End: 2020-11-30

## 2020-10-14 RX ORDER — AMLODIPINE BESYLATE 10 MG/1
TABLET ORAL
Qty: 30 TABLET | Refills: 0 | Status: SHIPPED | OUTPATIENT
Start: 2020-10-14 | End: 2020-11-30

## 2020-10-14 NOTE — TELEPHONE ENCOUNTER
norvasc      Last Written Prescription Date:  9/15/2020  Last Fill Quantity: 30,   # refills: 0  Last Office Visit: 8/5/2020  Future Office visit:         metoprolol      Last Written Prescription Date:  9/15/2020  Last Fill Quantity: 60,   # refills: 0  Last Office Visit: 8/5/2020  Future Office visit:

## 2020-10-15 LAB
SARS-COV-2 RNA SPEC QL NAA+PROBE: NOT DETECTED
SPECIMEN SOURCE: NORMAL

## 2020-11-25 NOTE — PROGRESS NOTES
[facility]  Discharge Note  Short Stay    Procedure(s) (LRB):  Right Hip Intra-articular Steroid Injection Under Fluoro (Right)    OUTCOME: Patient tolerated treatment/procedure well without complication and is now ready for discharge.    DISPOSITION: Home or Self Care    FINAL DIAGNOSIS:  Osteoarthritis of right hip    FOLLOWUP: In clinic    DISCHARGE INSTRUCTIONS:    Discharge Procedure Orders   Diet general     Call MD for:  temperature >100.4     Call MD for:  persistent nausea and vomiting     Call MD for:  severe uncontrolled pain     Call MD for:  difficulty breathing, headache or visual disturbances     Call MD for:  redness, tenderness, or signs of infection (pain, swelling, redness, odor or green/yellow discharge around incision site)     Call MD for:  hives     Call MD for:  persistent dizziness or light-headedness     Call MD for:  extreme fatigue          Patient seen by Dr. Cruz 1/3/2018   See note  Mala Polo RN-BSN  `

## 2020-11-30 DIAGNOSIS — I10 ESSENTIAL HYPERTENSION: ICD-10-CM

## 2020-11-30 DIAGNOSIS — R07.89 ATYPICAL CHEST PAIN: ICD-10-CM

## 2020-11-30 RX ORDER — AMLODIPINE BESYLATE 10 MG/1
10 TABLET ORAL DAILY
Qty: 30 TABLET | Refills: 0 | Status: SHIPPED | OUTPATIENT
Start: 2020-11-30 | End: 2020-12-31

## 2020-11-30 RX ORDER — METOPROLOL TARTRATE 25 MG/1
TABLET, FILM COATED ORAL
Qty: 60 TABLET | Refills: 0 | Status: SHIPPED | OUTPATIENT
Start: 2020-11-30 | End: 2020-12-31

## 2020-11-30 NOTE — TELEPHONE ENCOUNTER
Metoprolol Tartrate 25mg  Last Written Prescription Date:  10/14/20  Last Fill Quantity: 60 tablet,   # refills: 0  Last Office Visit: 8/5/20  Future Office visit:

## 2020-11-30 NOTE — TELEPHONE ENCOUNTER
Amlodipine Besylate 10mg    Last Written Prescription Date:  10/14/20  Last Fill Quantity: 30 tablet,   # refills: 0  Last Office Visit: 8/5/20  Future Office visit:

## 2020-12-09 ENCOUNTER — OFFICE VISIT (OUTPATIENT)
Dept: FAMILY MEDICINE | Facility: OTHER | Age: 40
End: 2020-12-09
Attending: PHYSICIAN ASSISTANT
Payer: COMMERCIAL

## 2020-12-09 ENCOUNTER — NURSE TRIAGE (OUTPATIENT)
Dept: FAMILY MEDICINE | Facility: OTHER | Age: 40
End: 2020-12-09

## 2020-12-09 DIAGNOSIS — Z20.822 SUSPECTED 2019 NOVEL CORONAVIRUS INFECTION: Primary | ICD-10-CM

## 2020-12-09 DIAGNOSIS — Z20.822 SUSPECTED 2019 NOVEL CORONAVIRUS INFECTION: ICD-10-CM

## 2020-12-09 PROCEDURE — U0003 INFECTIOUS AGENT DETECTION BY NUCLEIC ACID (DNA OR RNA); SEVERE ACUTE RESPIRATORY SYNDROME CORONAVIRUS 2 (SARS-COV-2) (CORONAVIRUS DISEASE [COVID-19]), AMPLIFIED PROBE TECHNIQUE, MAKING USE OF HIGH THROUGHPUT TECHNOLOGIES AS DESCRIBED BY CMS-2020-01-R: HCPCS | Mod: ZL | Performed by: PHYSICIAN ASSISTANT

## 2020-12-09 NOTE — TELEPHONE ENCOUNTER
Reason for Disposition    [1] COVID-19 infection suspected by caller or triager AND [2] mild symptoms (cough, fever, or others) AND [3] no complications or SOB    Additional Information    Negative: SEVERE difficulty breathing (e.g., struggling for each breath, speaks in single words)    Negative: Difficult to awaken or acting confused (e.g., disoriented, slurred speech)    Negative: Bluish (or gray) lips or face now    Negative: Shock suspected (e.g., cold/pale/clammy skin, too weak to stand, low BP, rapid pulse)    Negative: Sounds like a life-threatening emergency to the triager    Negative: [1] COVID-19 exposure AND [2] no symptoms    Negative: [1] Lives with someone known to have influenza (flu test positive) AND [2] flu-like symptoms (e.g., cough, runny nose, sore throat, SOB; with or without fever)    Negative: [1] Adult with possible COVID-19 symptoms AND [2] triager concerned about severity of symptoms or other causes    Negative: Immunization reaction suspected (e.g., fever, headache, muscle aches occurring during days 1-3 days after immunization)    Negative: COVID-19 and breastfeeding, questions about    Negative: SEVERE or constant chest pain or pressure (Exception: mild central chest pain, present only when coughing)    Negative: MODERATE difficulty breathing (e.g., speaks in phrases, SOB even at rest, pulse 100-120)    Negative: [1] Headache AND [2] stiff neck (can't touch chin to chest)    Negative: MILD difficulty breathing (e.g., minimal/no SOB at rest, SOB with walking, pulse <100)    Negative: Chest pain or pressure    Negative: Patient sounds very sick or weak to the triager    Negative: Fever > 103 F (39.4 C)    Negative: [1] Fever > 101 F (38.3 C) AND [2] age > 60    Negative: [1] Fever > 100.0 F (37.8 C) AND [2] bedridden (e.g., nursing home patient, CVA, chronic illness, recovering from surgery)    Negative: [1] HIGH RISK patient (e.g., age > 64 years, diabetes, heart or lung disease,  "weak immune system) AND [2] new or worsening symptoms    Negative: [1] HIGH RISK patient AND [2] influenza is widespread in the community AND [3] ONE OR MORE respiratory symptoms: cough, sore throat, runny or stuffy nose    Negative: [1] HIGH RISK patient AND [2] influenza exposure within the last 7 days AND [3] ONE OR MORE respiratory symptoms: cough, sore throat, runny or stuffy nose    Negative: Fever present > 3 days (72 hours)    Negative: [1] Fever returns after gone for over 24 hours AND [2] symptoms worse or not improved    Negative: [1] Continuous (nonstop) coughing interferes with work or school AND [2] no improvement using cough treatment per protocol    Answer Assessment - Initial Assessment Questions  1. COVID-19 DIAGNOSIS: \"Who made your Coronavirus (COVID-19) diagnosis?\" \"Was it confirmed by a positive lab test?\" If not diagnosed by a HCP, ask \"Are there lots of cases (community spread) where you live?\" (See public health department website, if unsure)      Not confirmed  2. COVID-19 EXPOSURE: \"Was there any known exposure to COVID before the symptoms began?\" CDC Definition of close contact: within 6 feet (2 meters) for a total of 15 minutes or more over a 24-hour period.       Yes, yesterday  3. ONSET: \"When did the COVID-19 symptoms start?\"       friday  4. WORST SYMPTOM: \"What is your worst symptom?\" (e.g., cough, fever, shortness of breath, muscle aches)      headache  5. COUGH: \"Do you have a cough?\" If so, ask: \"How bad is the cough?\"        no  6. FEVER: \"Do you have a fever?\" If so, ask: \"What is your temperature, how was it measured, and when did it start?\"      no  7. RESPIRATORY STATUS: \"Describe your breathing?\" (e.g., shortness of breath, wheezing, unable to speak)       Mild SOB- not unusual or worse than usual  8. BETTER-SAME-WORSE: \"Are you getting better, staying the same or getting worse compared to yesterday?\"  If getting worse, ask, \"In what way?\"      same  9. HIGH RISK DISEASE: " "\"Do you have any chronic medical problems?\" (e.g., asthma, heart or lung disease, weak immune system, obesity, etc.)      HTN  10. PREGNANCY: \"Is there any chance you are pregnant?\" \"When was your last menstrual period?\"        no  11. OTHER SYMPTOMS: \"Do you have any other symptoms?\"  (e.g., chills, fatigue, headache, loss of smell or taste, muscle pain, sore throat; new loss of smell or taste especially support the diagnosis of COVID-19)        headache    Protocols used: CORONAVIRUS (COVID-19) DIAGNOSED OR [id number] 12.1      "

## 2020-12-11 LAB
SARS-COV-2 RNA SPEC QL NAA+PROBE: NOT DETECTED
SPECIMEN SOURCE: NORMAL

## 2020-12-14 ENCOUNTER — HEALTH MAINTENANCE LETTER (OUTPATIENT)
Age: 40
End: 2020-12-14

## 2020-12-31 DIAGNOSIS — I10 ESSENTIAL HYPERTENSION: ICD-10-CM

## 2020-12-31 DIAGNOSIS — R07.89 ATYPICAL CHEST PAIN: ICD-10-CM

## 2020-12-31 RX ORDER — METOPROLOL TARTRATE 25 MG/1
TABLET, FILM COATED ORAL
Qty: 60 TABLET | Refills: 0 | Status: SHIPPED | OUTPATIENT
Start: 2020-12-31 | End: 2021-01-29

## 2020-12-31 RX ORDER — AMLODIPINE BESYLATE 10 MG/1
10 TABLET ORAL DAILY
Qty: 30 TABLET | Refills: 0 | Status: SHIPPED | OUTPATIENT
Start: 2020-12-31 | End: 2021-01-29

## 2020-12-31 NOTE — TELEPHONE ENCOUNTER
amlopine      Last Written Prescription Date:  11/30/20  Last Fill Quantity: 30,   # refills: 0  Last Office Visit: 8/5/20  Future Office visit:       metoprolol      Last Written Prescription Date:  11/30/20  Last Fill Quantity: 60,   # refills: 0

## 2021-01-29 DIAGNOSIS — R07.89 ATYPICAL CHEST PAIN: ICD-10-CM

## 2021-01-29 DIAGNOSIS — I10 ESSENTIAL HYPERTENSION: ICD-10-CM

## 2021-01-29 RX ORDER — AMLODIPINE BESYLATE 10 MG/1
10 TABLET ORAL DAILY
Qty: 30 TABLET | Refills: 0 | Status: SHIPPED | OUTPATIENT
Start: 2021-01-29 | End: 2021-02-28

## 2021-01-29 RX ORDER — METOPROLOL TARTRATE 25 MG/1
TABLET, FILM COATED ORAL
Qty: 60 TABLET | Refills: 0 | Status: SHIPPED | OUTPATIENT
Start: 2021-01-29 | End: 2021-02-28

## 2021-01-29 NOTE — TELEPHONE ENCOUNTER
Metoprolol 25 mg      Last Written Prescription Date:  12/31/20  Last Fill Quantity: 60,   # refills: 0  Last Office Visit: 8/5/20  Future Office visit:       Routing refill request to provider for review/approval because:

## 2021-01-29 NOTE — TELEPHONE ENCOUNTER
Amlodipine 10 mg      Last Written Prescription Date:  12/31/20  Last Fill Quantity: 30,   # refills: 0  Last Office Visit: 8/5/20  Future Office visit:       Routing refill request to provider for review/approval because:

## 2021-02-26 DIAGNOSIS — I10 ESSENTIAL HYPERTENSION: ICD-10-CM

## 2021-02-26 DIAGNOSIS — R07.89 ATYPICAL CHEST PAIN: ICD-10-CM

## 2021-02-26 NOTE — TELEPHONE ENCOUNTER
Amlodipine 10 mg      Last Written Prescription Date:  1/29/21  Last Fill Quantity: 30,   # refills: 0  Last Office Visit: 8/5/20  Future Office visit:       Routing refill request to provider for review/approval because:      Metoprolol 25 mg      Last Written Prescription Date:  1/29/21  Last Fill Quantity: 60,   # refills: 0  Last Office Visit: 8/5/20  Future Office visit:       Routing refill request to provider for review/approval because:

## 2021-02-27 ENCOUNTER — HEALTH MAINTENANCE LETTER (OUTPATIENT)
Age: 41
End: 2021-02-27

## 2021-02-28 RX ORDER — AMLODIPINE BESYLATE 10 MG/1
10 TABLET ORAL DAILY
Qty: 30 TABLET | Refills: 0 | Status: SHIPPED | OUTPATIENT
Start: 2021-02-28 | End: 2021-03-30

## 2021-02-28 RX ORDER — METOPROLOL TARTRATE 25 MG/1
TABLET, FILM COATED ORAL
Qty: 60 TABLET | Refills: 0 | Status: SHIPPED | OUTPATIENT
Start: 2021-02-28 | End: 2021-03-30

## 2021-03-02 ENCOUNTER — OFFICE VISIT (OUTPATIENT)
Dept: FAMILY MEDICINE | Facility: OTHER | Age: 41
End: 2021-03-02
Attending: NURSE PRACTITIONER
Payer: COMMERCIAL

## 2021-03-02 ENCOUNTER — ANCILLARY PROCEDURE (OUTPATIENT)
Dept: GENERAL RADIOLOGY | Facility: OTHER | Age: 41
End: 2021-03-02
Attending: NURSE PRACTITIONER
Payer: COMMERCIAL

## 2021-03-02 VITALS
HEART RATE: 86 BPM | OXYGEN SATURATION: 99 % | TEMPERATURE: 97.6 F | SYSTOLIC BLOOD PRESSURE: 118 MMHG | DIASTOLIC BLOOD PRESSURE: 84 MMHG | BODY MASS INDEX: 27.62 KG/M2 | WEIGHT: 166 LBS

## 2021-03-02 DIAGNOSIS — M25.561 ACUTE PAIN OF RIGHT KNEE: Primary | ICD-10-CM

## 2021-03-02 DIAGNOSIS — M25.561 ACUTE PAIN OF RIGHT KNEE: ICD-10-CM

## 2021-03-02 DIAGNOSIS — M76.891 TENDONITIS OF KNEE, RIGHT: ICD-10-CM

## 2021-03-02 PROCEDURE — 99213 OFFICE O/P EST LOW 20 MIN: CPT | Performed by: NURSE PRACTITIONER

## 2021-03-02 PROCEDURE — 73562 X-RAY EXAM OF KNEE 3: CPT | Mod: TC,RT

## 2021-03-02 PROCEDURE — G0463 HOSPITAL OUTPT CLINIC VISIT: HCPCS

## 2021-03-02 RX ORDER — PREDNISONE 20 MG/1
TABLET ORAL
Qty: 10 TABLET | Refills: 0 | Status: SHIPPED | OUTPATIENT
Start: 2021-03-02 | End: 2021-07-11

## 2021-03-02 RX ORDER — METRONIDAZOLE 500 MG/1
TABLET ORAL
COMMUNITY
Start: 2021-02-28 | End: 2022-01-12

## 2021-03-02 ASSESSMENT — PAIN SCALES - GENERAL: PAINLEVEL: MODERATE PAIN (5)

## 2021-03-02 NOTE — PATIENT INSTRUCTIONS
Patient Education     Tendonitis  A tendon is the thick fibrous cord that joins muscle to bone and allows joints to move. When a tendon becomes inflamed, it is called tendonitis. This can occur from overuse, injury, or infection. This usually involves the shoulders, forearm, wrist, hands and feet. Symptoms include pain, swelling and tenderness to the touch. Moving the joint increases the pain.  It takes 4 to 6 weeks or more for tendonitis to heal. It is treated by preventing motion of the tendon, occasionally with a splint or brace, and the use of anti-inflammatory medicine.  Home care    Some people find relief with ice packs. These can be crushed or cubed ice in a plastic bag or a bag of frozen vegetables wrapped in a thin towel. Other people get better relief with heat. This can include a hot shower, hot bath, or a moist towel warmed in a microwave. Try each and use the method that feels best, for 15 to 20 minutes several times a day.    Rest the inflamed joint and protect it from movement.    You may use over-the-counter ibuprofen or naproxen to treat pain and inflammation, unless another medicine was prescribed. If you can't take these medicines, acetaminophen may help with the pain, but does not treat inflammation. If you have chronic liver or kidney disease or ever had a stomach ulcer or gastrointestinal bleeding, talk with your doctor before using these medicines.    As your symptoms improve, begin gradual motion at the involved joint.  Follow-up care  Follow up with your healthcare provider if you are not improving after 5 to 7 days of treatment.  When to seek medical advice  Call your healthcare provider right away if any of these occur:    Redness over the painful area    Increasing pain or swelling at the joint    Fever lasting 24 to 48 hours or chills, or as advised by your healthcare provider  Irma last reviewed this educational content on 5/1/2018 2000-2020 [employer] [address]  [facility], [place] [address]. All rights reserved. This information is not intended as a substitute for professional medical care. Always follow your healthcare professional's instructions.    Take Prednisone as ordered. Take int he morning.   Wear ace wrap for comfort.   Elevate right leg.   Ice right knee for 20 minutes 4 times a day. Protect skin.   Follow up if not improving.

## 2021-03-02 NOTE — PROGRESS NOTES
"    Assessment & Plan     (M25.561) Acute pain of right knee  (primary encounter diagnosis)  Comment: check XRAY  Plan: XR Knee Right 3 Views (Clinic Performed)            (E03.568) Tendonitis of knee, right  Comment: DARELL. Prednisone. Supportive care discussed. Likely tendonitis from overuse injury  Plan: predniSONE (DELTASONE) 20 MG tablet               Tobacco Cessation:   reports that she has been smoking cigarettes. She started smoking about 32 years ago. She has a 28.00 pack-year smoking history. She has never used smokeless tobacco.  Tobacco Cessation Action Plan: Information offered: Patient not interested at this time    BMI:   Estimated body mass index is 27.62 kg/m  as calculated from the following:    Height as of 9/24/20: 1.651 m (5' 5\").    Weight as of this encounter: 75.3 kg (166 lb).   Weight management plan: Discussed healthy diet and exercise guidelines    See Patient Instructions    Return if symptoms worsen or fail to improve.    Jazmyne Villafana NP  [facility] - DIXIE Chu is a 41 year old who presents for the following health issues   HPI       Musculoskeletal problem/pain  Onset/Duration: 3 days   Description  Location: knee - Right   Joint Swelling: no  Redness: no  Pain: YES  Warmth: no  Intensity:  moderate, severe  Progression of Symptoms:  worsening  Accompanying signs and symptoms:   Fevers: no  Numbness/tingling/weakness: no  History  Trauma to the area: no  Recent illness:  no  Previous similar problem: no  Previous evaluation:  no  Precipitating or alleviating factors: patient states she did a lot of stairs this weekend and that's the only thing she can think of that could have caused this. She was cleaning in her son's bedroom  Aggravating factors include: bending .  Therapies tried and outcome: ice and Ibuprofen- a little helpful       Review of Systems   Constitutional, HEENT, cardiovascular, pulmonary, gi and gu systems are negative, except as " otherwise noted.      Objective    /84 (BP Location: Left arm, Patient Position: Sitting, Cuff Size: Adult Regular)   Pulse 86   Temp 97.6  F (36.4  C)   Wt 75.3 kg (166 lb)   SpO2 99%   BMI 27.62 kg/m    Body mass index is 27.62 kg/m .  Physical Exam   GENERAL: healthy, alert and no distress  RESP: lungs clear to auscultation - no rales, rhonchi or wheezes  CV: regular rate and rhythm, normal S1 S2, no S3 or S4, no murmur, click or rub, no peripheral edema and peripheral pulses strong  MS: no gross musculoskeletal defects noted, no edema. CMS and ROM intact to right lower extremity. Right dorsalis pedis +2. Generalized tenderness to right knee. Negative Lachman's. +posterior drawer (likely from swelling). -valgus test. +varus test. No erythema, rash, bruising, or warmth to the touch to right knee.   SKIN: no suspicious lesions or rashes  NEURO: Normal strength and tone, mentation intact and speech normal  PSYCH: mentation appears normal, affect normal/bright    Results for orders placed or performed in visit on 03/02/21   XR Knee Right 3 Views (Clinic Performed)     Status: None    Narrative    PROCEDURE:  XR KNEE RT 3 VW    HISTORY: Acute pain of right knee.    COMPARISON:  None.    TECHNIQUE:  3 views.    FINDINGS:  No fracture or dislocation is identified. Mild narrowing of  the medial joint space. No sclerosis or spurring. No fractures.      Impression    IMPRESSION:   1. Mild narrowing of the medial joint space.  2. Otherwise negative.      RO COLES MD            Regular rate and rhythm, Heart sounds S1 S2 present, no murmurs, rubs or gallops

## 2021-03-02 NOTE — LETTER
[facility] - [place]  [address]  [place] [address]  Phone: [phone]    March 2, 2021        Kimberley Louis  [address]  [place] [address]          To whom it may concern:    RE: Kimberley Louis    Patient was seen and treated today at our clinic.    Please excuse from work today for the rest of shift.      Sincerely,        Jazmyne Villafana NP

## 2021-03-03 ASSESSMENT — ASTHMA QUESTIONNAIRES: ACT_TOTALSCORE: 20

## 2021-03-26 DIAGNOSIS — I10 ESSENTIAL HYPERTENSION: ICD-10-CM

## 2021-03-26 DIAGNOSIS — R07.89 ATYPICAL CHEST PAIN: ICD-10-CM

## 2021-03-26 NOTE — TELEPHONE ENCOUNTER
metoprolol      Last Written Prescription Date:  2/28/21  Last Fill Quantity: 60,   # refills: 0  Last Office Visit: 3/2/21  Future Office visit:         norvasc      Last Written Prescription Date:  2/28/21  Last Fill Quantity: 30,   # refills: 0  Last Office Visit: 3/2/21  Future Office visit:

## 2021-03-30 ENCOUNTER — TELEPHONE (OUTPATIENT)
Dept: FAMILY MEDICINE | Facility: OTHER | Age: 41
End: 2021-03-30

## 2021-03-30 RX ORDER — METOPROLOL TARTRATE 25 MG/1
TABLET, FILM COATED ORAL
Qty: 60 TABLET | Refills: 0 | Status: SHIPPED | OUTPATIENT
Start: 2021-03-30 | End: 2021-04-27

## 2021-03-30 RX ORDER — AMLODIPINE BESYLATE 10 MG/1
10 TABLET ORAL DAILY
Qty: 30 TABLET | Refills: 0 | Status: SHIPPED | OUTPATIENT
Start: 2021-03-30 | End: 2021-04-27

## 2021-04-27 DIAGNOSIS — I10 ESSENTIAL HYPERTENSION: ICD-10-CM

## 2021-04-27 DIAGNOSIS — R05.9 COUGH: ICD-10-CM

## 2021-04-27 DIAGNOSIS — R07.89 ATYPICAL CHEST PAIN: ICD-10-CM

## 2021-04-27 RX ORDER — ALBUTEROL SULFATE 90 UG/1
2 AEROSOL, METERED RESPIRATORY (INHALATION) EVERY 6 HOURS
Qty: 8.5 G | Refills: 2 | Status: SHIPPED | OUTPATIENT
Start: 2021-04-27 | End: 2021-12-01

## 2021-04-27 RX ORDER — AMLODIPINE BESYLATE 10 MG/1
TABLET ORAL
Qty: 30 TABLET | Refills: 0 | Status: SHIPPED | OUTPATIENT
Start: 2021-04-27 | End: 2021-06-01

## 2021-04-27 RX ORDER — METOPROLOL TARTRATE 25 MG/1
TABLET, FILM COATED ORAL
Qty: 60 TABLET | Refills: 0 | Status: SHIPPED | OUTPATIENT
Start: 2021-04-27 | End: 2021-06-01

## 2021-04-27 NOTE — TELEPHONE ENCOUNTER
Norvasc 10 mg      Last Written Prescription Date:  3/30/21  Last Fill Quantity: 30,   # refills: 0  Last Office Visit: 3/2/21  Future Office visit:       Routing refill request to provider for review/approval because:        Metoprolol 25 mg      Last Written Prescription Date:  3/30/21  Last Fill Quantity: 60,   # refills: 0  Last Office Visit: 3/2/21  Future Office visit:       Routing refill request to provider for review/approval because:

## 2021-05-06 ENCOUNTER — NURSE TRIAGE (OUTPATIENT)
Dept: NURSING | Facility: CLINIC | Age: 41
End: 2021-05-06

## 2021-05-06 NOTE — TELEPHONE ENCOUNTER
"Triage Call    Pt calling with report of diarrhea, nausea and stomach cramping past 4-5 hrs.  Stool has become watery, and now has some bright red blood in it.  Says toilet water not red.  Says she passed about 1 Tablespoon of blood, and denies continued bleeding.  Denies any dizziness or fever.  Says her son also having diarrhea.    Triaged to disposition of See PCP within 3 days, and care advice given per Rectal Bleeding Guideline.    Belinda Small RN        Additional Information    Negative: Shock suspected (e.g., cold/pale/clammy skin, too weak to stand, low BP, rapid pulse)    Negative: Difficult to awaken or acting confused (e.g., disoriented, slurred speech)    Negative: Passed out (i.e., lost consciousness, collapsed and was not responding)    Negative: [1] Vomiting AND [2] contains red blood or black (\"coffee ground\") material  (Exception: few red streaks in vomit that only happened once)    Negative: Sounds like a life-threatening emergency to the triager    Negative: Diarrhea is main symptom    Negative: Stool color other than brown or tan is main concern  (no bleeding and no melena)    Negative: SEVERE rectal bleeding (large blood clots; on and off, or constant bleeding)    Negative: SEVERE dizziness (e.g., unable to stand, requires support to walk, feels like passing out now)    Negative: [1] MODERATE rectal bleeding (small blood clots, passing blood without stool, or toilet water turns red) AND [2] more than once a day    Negative: Pale skin (pallor) of new onset or worsening    Negative: Tarry or jet black-colored stool (not dark green)    Negative: [1] Constant abdominal pain AND [2] present > 2 hours    Negative: Rectal foreign body (i.e., now or within past week;  inserted or swallowed)    Negative: High-risk adult (e.g., prior surgery on aorta, abdominal aortic aneurysm)    Negative: Taking Coumadin (warfarin) or other strong blood thinner, or known bleeding disorder (e.g., " thrombocytopenia)    Negative: Known cirrhosis of the liver (or history of liver failure or ascites)    Negative: [1] Colonoscopy AND [2] in past 72 hours    Negative: Patient sounds very sick or weak to the triager    Negative: MODERATE rectal bleeding (small blood clots, passing blood without stool, or toilet water turns red)    MILD rectal bleeding (more than just a few drops or streaks)    Protocols used: RECTAL BLEEDING-A-AH

## 2021-06-01 DIAGNOSIS — R07.89 ATYPICAL CHEST PAIN: ICD-10-CM

## 2021-06-01 DIAGNOSIS — I10 ESSENTIAL HYPERTENSION: ICD-10-CM

## 2021-06-01 NOTE — TELEPHONE ENCOUNTER
Metoprolol tartrate 25 mg      Last Written Prescription Date:  4/27/21  Last Fill Quantity: 60,   # refills: 0  Last Office Visit: 3/02/21  Future Office visit:       Routing refill request to provider for review/approval because:  Drug not on the [facility], [facility] or [facility] refill protocol or controlled substance

## 2021-06-01 NOTE — TELEPHONE ENCOUNTER
Amlodipine 10 mg      Last Written Prescription Date:  4/27/21  Last Fill Quantity: 30,   # refills: 0  Last Office Visit: 3/02/21  Future Office visit:       Routing refill request to provider for review/approval because:  Drug not on the [facility], [facility] or [facility] refill protocol or controlled substance

## 2021-06-02 NOTE — TELEPHONE ENCOUNTER
Protocol failed due to   Creatinine   Date Value Ref Range Status   08/05/2020 0.51 (L) 0.52 - 1.04 mg/dL Final     Please advise. Thank you!

## 2021-06-03 RX ORDER — METOPROLOL TARTRATE 25 MG/1
TABLET, FILM COATED ORAL
Qty: 60 TABLET | Refills: 11 | Status: SHIPPED | OUTPATIENT
Start: 2021-06-03 | End: 2021-12-01

## 2021-06-03 RX ORDER — AMLODIPINE BESYLATE 10 MG/1
10 TABLET ORAL DAILY
Qty: 30 TABLET | Refills: 0 | Status: SHIPPED | OUTPATIENT
Start: 2021-06-03 | End: 2021-07-01

## 2021-07-01 DIAGNOSIS — R07.89 ATYPICAL CHEST PAIN: ICD-10-CM

## 2021-07-01 DIAGNOSIS — I10 ESSENTIAL HYPERTENSION: ICD-10-CM

## 2021-07-01 RX ORDER — AMLODIPINE BESYLATE 10 MG/1
10 TABLET ORAL DAILY
Qty: 30 TABLET | Refills: 3 | Status: SHIPPED | OUTPATIENT
Start: 2021-07-01 | End: 2021-11-02

## 2021-07-01 NOTE — TELEPHONE ENCOUNTER
Amlodipine 10 mg      Last Written Prescription Date:  6/03/21  Last Fill Quantity: 30,   # refills: 0  Last Office Visit: 3/02/21  Future Office visit:       Routing refill request to provider for review/approval because:  Drug not on the [facility], [facility] or [facility] refill protocol or controlled substance

## 2021-07-01 NOTE — TELEPHONE ENCOUNTER
Failed protocol due to   Normal serum creatinine on file in past 12 months        Recent Labs   Lab Test 08/05/20  1341   CR 0.51*

## 2021-07-11 ENCOUNTER — HOSPITAL ENCOUNTER (EMERGENCY)
Facility: HOSPITAL | Age: 41
Discharge: HOME | End: 2021-07-11
Attending: NURSE PRACTITIONER | Admitting: NURSE PRACTITIONER
Payer: COMMERCIAL

## 2021-07-11 VITALS
DIASTOLIC BLOOD PRESSURE: 90 MMHG | SYSTOLIC BLOOD PRESSURE: 140 MMHG | OXYGEN SATURATION: 98 % | RESPIRATION RATE: 18 BRPM | HEART RATE: 79 BPM | TEMPERATURE: 97.4 F

## 2021-07-11 DIAGNOSIS — J32.9 SINUSITIS, UNSPECIFIED CHRONICITY, UNSPECIFIED LOCATION: ICD-10-CM

## 2021-07-11 DIAGNOSIS — J32.9 SINUSITIS: ICD-10-CM

## 2021-07-11 PROCEDURE — G0463 HOSPITAL OUTPT CLINIC VISIT: HCPCS

## 2021-07-11 PROCEDURE — 99213 OFFICE O/P EST LOW 20 MIN: CPT | Performed by: NURSE PRACTITIONER

## 2021-07-11 ASSESSMENT — ENCOUNTER SYMPTOMS
CHILLS: 0
SORE THROAT: 0
DIARRHEA: 0
VOMITING: 0
TROUBLE SWALLOWING: 0
MYALGIAS: 0
ACTIVITY CHANGE: 1
HEADACHES: 0
SHORTNESS OF BREATH: 0
SINUS PAIN: 1
FEVER: 1
LIGHT-HEADEDNESS: 0
EYE ITCHING: 1
SINUS PRESSURE: 1
FATIGUE: 0
NAUSEA: 0
CHEST TIGHTNESS: 0
COUGH: 0
APPETITE CHANGE: 0
DIZZINESS: 0
RHINORRHEA: 1

## 2021-07-11 NOTE — ED TRIAGE NOTES
Patient presents with complaints of a sinus infection that started on Wed and is just getting worse.

## 2021-07-11 NOTE — ED PROVIDER NOTES
History     Chief Complaint   Patient presents with     Sinusitis     HPI  Kimberley Louis is a 41 year old female who presents with a 2-day history of low-grade fever of 99.5 at home, nasal congestion, right ear pain, postnasal drip, runny nose at times, right-sided sinus pain and pressure, and itchy eyes.  Has a history of seasonal allergies.  No known sick contacts.  Has not received the Covid vaccination.  History of sinus infections.  Had nasal trauma a few years ago and her sinusitis is exacerbated since that time. took Mucinex and pseudoephedrine at 5 AM this morning. Has tried budesonide nasal flushes; causes burning in her nasal passages. Smoker. Denies chills, nausea, vomiting, diarrhea, and shortness of breath.    Allergies:  No Known Allergies    Problem List:    Patient Active Problem List    Diagnosis Date Noted     Nephrolithiasis 07/21/2020     Priority: Medium     Added automatically from request for surgery [id number]       Bacterial vaginosis 06/09/2020     Priority: Medium     Left flank tenderness 08/03/2018     Priority: Medium     Personal history of tobacco use, presenting hazards to health 08/03/2018     Priority: Medium     Recurrent UTI 08/03/2018     Priority: Medium     Incomplete right bundle branch block 02/05/2018     Priority: Medium     Pure hypercholesterolemia 01/03/2018     Priority: Medium     Tobacco abuse counseling 01/03/2018     Priority: Medium     Tobacco abuse 01/03/2018     Priority: Medium     Palpitations 01/03/2018     Priority: Medium     Atypical chest pain 01/03/2018     Priority: Medium     Dyspnea on exertion 01/03/2018     Priority: Medium     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  Kimberley Louis has no plan or  code status on file. Discussed available resources and provided with information. Confirmed code status reflects current choices pending further ACP discussions.  Confirmed/documented legally designated decision makers.  Added by SAYDA CABRERA             ACP (advance care planning) 04/13/2016     Priority: Medium     Advance Care Planning 4/13/2016: ACP Review of Chart / Resources Provided:  Reviewed chart for advance care plan.  Kimberley Louis has no plan or code status on file. Discussed available resources and provided with information. Confirmed code status reflects current choices pending further ACP discussions.  Confirmed/documented legally designated decision makers.  Added by Sandra Anne             Kidney stone on left side 12/21/2015     Priority: Medium     Nephrocalcinosis 12/21/2015     Priority: Medium     Essential hypertension 11/19/2015     Priority: Medium     H/O renal calculi 11/19/2015     Priority: Medium     Enlarged kidney 11/19/2015     Priority: Medium        Past Medical History:    Past Medical History:   Diagnosis Date     Enlarged kidney 11/19/2015     Essential hypertension 11/19/2015     H/O renal calculi 11/19/2015     H/O renal calculi 11/19/2015       Past Surgical History:    Past Surgical History:   Procedure Laterality Date     AS REMOVAL OF KIDNEY STONE       AS REMOVAL OF KIDNEY STONE       C REMOVAL OF KIDNEY STONE       COMBINED CYSTOSCOPY, RETROGRADES, URETEROSCOPY, LASER HOLMIUM LITHOTRIPSY URETER(S), INSERT STENT Left 8/17/2020    Procedure: CYSTOURETEROSCOPY, WITH RETROGRADE PYELOGRAM, HOLMIUM LASER LITHOTRIPSY OF URETERAL CALCULUS, interpretation of fluroscopy;  Surgeon: Sherry Mcpherson MD;  Location: [facility]     TUBAL LIGATION         Family History:    Family History   Problem Relation Age of Onset     Diabetes Mother      Emphysema Mother      Cervical Cancer Mother      Throat cancer Mother      Diabetes Father      Hypertension Father      Hypertension  Maternal Grandmother      Diabetes Maternal Grandmother      Schizophrenia Maternal Grandmother      Unknown/Adopted Maternal Grandfather      Lung Cancer Paternal Grandmother      Diabetes Paternal Grandmother      Unknown/Adopted Paternal Grandfather        Social History:  Marital Status:  Single [1]  Social History     Tobacco Use     Smoking status: Current Every Day Smoker     Packs/day: 1.00     Years: 28.00     Pack years: 28.00     Types: Cigarettes     Start date: 1/1/1989     Smokeless tobacco: Never Used     Tobacco comment: pt denied QP 9/22/20   Substance Use Topics     Alcohol use: Yes     Comment: occasional     Drug use: No        Medications:    amoxicillin-clavulanate (AUGMENTIN) 875-125 MG tablet  albuterol (PROAIR HFA/PROVENTIL HFA/VENTOLIN HFA) 108 (90 Base) MCG/ACT inhaler  amLODIPine (NORVASC) 10 MG tablet  metoprolol tartrate (LOPRESSOR) 25 MG tablet  metroNIDAZOLE (FLAGYL) 500 MG tablet  triamcinolone (KENALOG) 0.025 % external ointment          Review of Systems   Constitutional: Positive for activity change and fever (low grade at home 99.5). Negative for appetite change, chills and fatigue.   HENT: Positive for congestion (nasal), ear pain (right), postnasal drip, rhinorrhea (at times.), sinus pressure and sinus pain (right side.). Negative for sore throat and trouble swallowing.    Eyes: Positive for itching.   Respiratory: Negative for cough, chest tightness and shortness of breath (no more than normal).    Gastrointestinal: Negative for diarrhea, nausea and vomiting.   Genitourinary: Negative.    Musculoskeletal: Negative for myalgias.   Skin: Negative.    Neurological: Negative for dizziness, light-headedness and headaches.       Physical Exam   BP: (S) (!) 166/104 (states high due to meds taken and quit)  Pulse: 79  Temp: 97.4  F (36.3  C)  Resp: 18  SpO2: 98 %      Physical Exam  Vitals and nursing note reviewed.   Constitutional:       General: She is in acute distress (Moderate).    HENT:      Head: Normocephalic.      Right Ear: Tympanic membrane and ear canal normal.      Left Ear: Tympanic membrane and ear canal normal.      Nose: Septal deviation (Towards right), mucosal edema and rhinorrhea present. Rhinorrhea is clear.      Right Turbinates: Enlarged.      Left Turbinates: Enlarged.      Right Sinus: No maxillary sinus tenderness or frontal sinus tenderness.      Left Sinus: No maxillary sinus tenderness or frontal sinus tenderness.      Mouth/Throat:      Lips: Pink.      Mouth: Mucous membranes are moist.      Pharynx: Uvula midline. No posterior oropharyngeal erythema.      Comments: Moderate amount of translucent post nasal drip noted posterior oropharynx    Eyes:      Conjunctiva/sclera: Conjunctivae normal.   Cardiovascular:      Rate and Rhythm: Normal rate and regular rhythm.      Heart sounds: Normal heart sounds. No murmur heard.     Pulmonary:      Effort: Pulmonary effort is normal. No respiratory distress.      Breath sounds: Normal breath sounds. No wheezing.   Musculoskeletal:      Cervical back: No tenderness.   Lymphadenopathy:      Cervical: No cervical adenopathy.   Skin:     General: Skin is warm and dry.   Neurological:      Mental Status: She is alert and oriented to person, place, and time.   Psychiatric:         Behavior: Behavior normal.         ED Course        Procedures           No results found for this or any previous visit (from the past 24 hour(s)).    Medications - No data to display    Assessments & Plan (with Medical Decision Making)     I have reviewed the nursing notes.    I have reviewed the findings, diagnosis, plan and need for follow up with the patient.  (J32.9) Sinusitis  Comment: 41 year old female who presents with a 2-day history of low-grade fever of 99.5 at home, nasal congestion, right ear pain, postnasal drip, runny nose at times, right-sided sinus pain and pressure, and itchy eyes.  Has a history of seasonal allergies.  No known sick  contacts.  Has not received the Covid vaccination.  History of sinus infections. Had nasal trauma a few years ago and her sinusitis is exacerbated since that time.  Took Mucinex and pseudoephedrine at 5 AM this morning.  Has tried budesonide nasal flushes; causes burning in her nasal passages.  Smoker. Denies chills, nausea, vomiting, diarrhea, and shortness of breath.    MDM:NHT. Lungs CTA    Plan: Amoxicillin twice daily for 7 days.Education provided and/or discussed for this/these medication and for sinusitis.  Treat symptoms conservatively with acetaminophen and  ibuprofen (if applicable) for fevers, body aches, and headaches, guaifenesin and/or honey for cough. May use chest rubs for sore throat and congestion, hot and cold liquids may help decrease sore throat and help you feel better. Increase fluids. You may utilize pseudoephedrine for congestion. Return to be reevaluated by ER/UC or your primary care provider if symptoms worsen, you develop breathing difficulties, or you do not improve in a reasonable time frame. It can take several days for a cough to resolve. It can take ten to fourteen days for upper respiratory symptoms to resolve.   These discharge instructions and medications were reviewed with her and understanding verbalized.    This document was prepared using a combination of typing and voice generated software.  While every attempt was made for accuracy, spelling and grammatical errors may exist.    Discharge Medication List as of 7/11/2021  1:00 PM      START taking these medications    Details   amoxicillin-clavulanate (AUGMENTIN) 875-125 MG tablet Take 1 tablet by mouth 2 times daily for 7 days, Disp-14 tablet, R-0, E-Prescribe             Final diagnoses:   Sinusitis       7/11/2021   [facility]       Mary Zabala, CNP  07/13/21 2011

## 2021-07-11 NOTE — DISCHARGE INSTRUCTIONS
Increase oral intake, cool mist vaporizer as needed, rest, avoid sharing utensils, practice good hand washing techniques, cover mouth when you cough and sneeze. Throw toothbursh away 24 hours after starting antibiotics.  Over the counter medications such as ibuprofen and/or acetaminophen for fever and generalized aches and pains. Ibuprofen 400 to 800 mg (2 - 4 tabs of over the counter med) every six to eight hours as needed;not to exceed maximum amount of 3200 mg in 24 hours.Tylenol 650 to 1000 mg every four to six hours as needed (not to exceed more than 4000 mg in a 24 hour period). May use interchangeably. Robitussin (guaifenesin) for cough. Chest rubs such as Constantin's or Mentholatum may help reduce sore throat symptoms.  Chloraseptic spray for sore throat or menthol lozenges may be helpful for sore throat. Be reevaluated if symptoms persist longer than 10 - 14 days or worsen and if there is no improvement in 72 hours or worsening of symptoms.  Increase fluids. Complete all antibiotics even if feeling better. Taking antibiotics with food may decrease the stomach upset that can occur when taking antibiotics. Antibiotics frequently cause diarrhea. Probiotics or yogurt may help prevent or decrease these symptoms.     Loratadine (Claritin) or cetirizine (Zyrtec) 20 mg  daily for ten to fourteen days to see if symptoms lessen or resolve. If the medication seems to help you may take 10 mg daily on an ongoing basis.  May buy over the counter.  .  Coricidin HBP for cold symptoms.   Avoid pseudoephedrine and otc products with phenylephrine.  Avoid ibuprofen when you have hypertension as it causes constriction of the arteries in the kidneys and can increase high blood pressure        Fluids, herbs, and foods for sore throat relief -- Adjusting the temperature and texture of foods and beverages may provide local relief of sore throat pain. While data showing benefit are quite limited, these approaches are intuitive. We  typically advise these measures since they are likely to be safe with minimal adverse effect, and patients often describe relief of symptoms.  We suggest hydration with frozen (eg, ice or popsicles) or heated liquids (eg, teas, soups), rather than room temperature or refrigerated fluids in patient with significant sore throat pain. Very cold foods can have a numbing-like effect that temporarily reduces or alleviates the pain of swallowing. Ice cubes or frozen popsicles facilitate hydration; ice cream and frozen yogurt provide caloric intake.  Warm fluids and foods, including teas, soups, and soft non-irritating foods, may be better tolerated by patients with throat pain than irritating foods (eg, rough-textured or spicy foods) or fluids at room temperatures. Foods that coat the throat, including honey and hard candies, can facilitate intake of calories while temporarily relieving throat pain.

## 2021-08-30 ENCOUNTER — OFFICE VISIT (OUTPATIENT)
Dept: FAMILY MEDICINE | Facility: OTHER | Age: 41
End: 2021-08-30
Attending: NURSE PRACTITIONER
Payer: COMMERCIAL

## 2021-08-30 ENCOUNTER — NURSE TRIAGE (OUTPATIENT)
Dept: FAMILY MEDICINE | Facility: OTHER | Age: 41
End: 2021-08-30

## 2021-08-30 ENCOUNTER — HOSPITAL ENCOUNTER (OUTPATIENT)
Dept: ULTRASOUND IMAGING | Facility: HOSPITAL | Age: 41
End: 2021-08-30
Attending: NURSE PRACTITIONER
Payer: COMMERCIAL

## 2021-08-30 VITALS
HEART RATE: 78 BPM | DIASTOLIC BLOOD PRESSURE: 83 MMHG | OXYGEN SATURATION: 98 % | WEIGHT: 170 LBS | TEMPERATURE: 98.7 F | SYSTOLIC BLOOD PRESSURE: 128 MMHG | BODY MASS INDEX: 28.29 KG/M2

## 2021-08-30 DIAGNOSIS — M79.662 PAIN OF LEFT LOWER LEG: ICD-10-CM

## 2021-08-30 DIAGNOSIS — I83.899 RUPTURED VARICOSE VEIN: Primary | ICD-10-CM

## 2021-08-30 LAB
APTT PPP: 29 SECONDS (ref 22–38)
INR PPP: 0.98 (ref 0.85–1.15)

## 2021-08-30 PROCEDURE — 99213 OFFICE O/P EST LOW 20 MIN: CPT | Performed by: NURSE PRACTITIONER

## 2021-08-30 PROCEDURE — G0463 HOSPITAL OUTPT CLINIC VISIT: HCPCS

## 2021-08-30 PROCEDURE — 85610 PROTHROMBIN TIME: CPT | Mod: ZL | Performed by: NURSE PRACTITIONER

## 2021-08-30 PROCEDURE — 93971 EXTREMITY STUDY: CPT | Mod: LT

## 2021-08-30 PROCEDURE — 85730 THROMBOPLASTIN TIME PARTIAL: CPT | Mod: ZL | Performed by: NURSE PRACTITIONER

## 2021-08-30 PROCEDURE — 36415 COLL VENOUS BLD VENIPUNCTURE: CPT | Mod: ZL | Performed by: NURSE PRACTITIONER

## 2021-08-30 PROCEDURE — G0463 HOSPITAL OUTPT CLINIC VISIT: HCPCS | Mod: 25

## 2021-08-30 ASSESSMENT — PAIN SCALES - GENERAL: PAINLEVEL: NO PAIN (0)

## 2021-08-30 NOTE — TELEPHONE ENCOUNTER
"Patient calling and states she has a \"popped blood vessel' on her right leg that started on Saturday.  Patient is scheduled today with covering provider.    Reason for Disposition    Localized pain, redness or hard lump along vein    Additional Information    Negative: Looks like a broken bone or dislocated joint (e.g., crooked or deformed)    Negative: Sounds like a life-threatening emergency to the triager    Negative: Followed a leg injury    Negative: Leg swelling is main symptom    Negative: Back pain radiating (shooting) into leg(s)    Negative: Knee pain is main symptom    Negative: Ankle pain is main symptom    Negative: Pregnant    Negative: Postpartum (from 0 to 6 weeks after delivery)    Negative: Chest pain    Negative: Difficulty breathing    Negative: Entire foot is cool or blue in comparison to other side    Negative: Unable to walk    Negative: [1] Red area or streak AND [2] fever    Negative: [1] Swollen joint AND [2] fever    Negative: [1] Cast on leg or ankle AND [2] now increased pain    Negative: Patient sounds very sick or weak to the triager    Negative: [1] SEVERE pain (e.g., excruciating, unable to do any normal activities) AND [2] not improved after 2 hours of pain medicine    Negative: [1] Thigh or calf pain AND [2] only 1 side AND [3] present > 1 hour    Negative: [1] Thigh, calf, or ankle swelling AND [2] only 1 side    Negative: [1] Thigh, calf, or ankle swelling AND [2] bilateral AND [3] 1 side is more swollen    Negative: [1] Red area or streak AND [2] large (> 2 in. or 5 cm)    Negative: History of prior \"blood clot\" in leg or lungs (i.e., deep vein thrombosis, pulmonary embolism)    Negative: History of inherited increased risk of blood clots (e.g., Factor 5 Leiden, Anti-thrombin 3, Protein C or Protein S deficiency, Prothrombin mutation)    Negative: Major surgery in the past month    Negative: Hip or leg fracture (broken bone) in past month (or had cast on leg or ankle in past " "month)    Negative: Illness requiring prolonged bedrest in past month (e.g., immobilization, long hospital stay)    Negative: Long-distance travel in past month (e.g., car, bus, train, plane; with trip lasting 6 or more hours)    Negative: Cancer treatment in the past two months (or has cancer now)    Negative: [1] Painful rash AND [2] multiple small blisters grouped together (i.e., dermatomal distribution or \"band\" or \"stripe\")    Negative: Looks like a boil, infected sore, deep ulcer or other infected rash (spreading redness, pus)    Negative: [1] Localized rash is very painful AND [2] no fever    Negative: Numbness in a leg or foot (i.e., loss of sensation)    Answer Assessment - Initial Assessment Questions  1. ONSET: \"When did the pain start?\"       saturday  2. LOCATION: \"Where is the pain located?\"       Left leg, 2 inches below knee on the inside  3. PAIN: \"How bad is the pain?\"    (Scale 1-10; or mild, moderate, severe)    -  MILD (1-3): doesn't interfere with normal activities     -  MODERATE (4-7): interferes with normal activities (e.g., work or school) or awakens from sleep, limping     -  SEVERE (8-10): excruciating pain, unable to do any normal activities, unable to walk      2/10  4. WORK OR EXERCISE: \"Has there been any recent work or exercise that involved this part of the body?\"       No  5. CAUSE: \"What do you think is causing the leg pain?\"      Popped blood vessel  6. OTHER SYMPTOMS: \"Do you have any other symptoms?\" (e.g., chest pain, back pain, breathing difficulty, swelling, rash, fever, numbness, weakness)      Bruising - size of a soft ball - about 2 inches below knee, inside of left leg  7. PREGNANCY: \"Is there any chance you are pregnant?\" \"When was your last menstrual period?\"      no    Protocols used: LEG PAIN-A-AH      "

## 2021-08-30 NOTE — PROGRESS NOTES
"    Assessment & Plan   1. Ruptured varicose vein  Continue with icing and heat to comfort. Return if any concerns or ongoing bruising continues. We discussed signs and symptoms of DVT, which patient was previously aware of. She will go to the emergency department if pain worsens or any new symptoms present.   - Reflex INR; Future  - Partial thromboplastin time; Future  - US Lower Extremity Venous Duplex Left  - Partial thromboplastin time  - Reflex INR    2. Pain of left lower leg  - Reflex INR; Future  - Partial thromboplastin time; Future  - US Lower Extremity Venous Duplex Left  - Partial thromboplastin time  - Reflex INR      Ordering of each unique test     BMI:   Estimated body mass index is 28.29 kg/m  as calculated from the following:    Height as of 9/24/20: 1.651 m (5' 5\").    Weight as of this encounter: 77.1 kg (170 lb).   Weight management plan: Discussed healthy diet and exercise guidelines      No follow-ups on file.    Antonina Fernandes, [facility] - DIXIE Chu is a 41 year old who presents for the following health issues     HPI   Concern - Blood Vessel Problem  Onset: Saturday night  Description: left calf area- states blood vessel popped, started with knot feeling, then bruised over time. Concerned about possible blood clot further up in her thigh.   Intensity: moderate  Progression of Symptoms:  same  Accompanying Signs & Symptoms: pain/throbbing- inner upper thigh  Previous history of similar problem: none   Precipitating factors:        Worsened by: standing   Alleviating factors:        Improved by: none   Therapies tried and outcome: ice       Review of Systems   Constitutional, HEENT, cardiovascular, pulmonary, gi and gu systems are negative, except as otherwise noted.      Objective    /83 (BP Location: Left arm, Patient Position: Chair, Cuff Size: Adult Regular)   Pulse 78   Temp 98.7  F (37.1  C) (Tympanic)   Wt 77.1 kg (170 lb)   SpO2 98%  "  BMI 28.29 kg/m    Body mass index is 28.29 kg/m .     Physical Exam   GENERAL: healthy, alert and no distress  RESP: lungs clear to auscultation - no rales, rhonchi or wheezes  CV: regular rate and rhythm, normal S1 S2, no S3 or S4, no murmur, click or rub, no peripheral edema.  Pedal pulses present and strong bilaterally.   MS: There are two circular areas of ecchymosis approximately 2 cm in diameter and 6 cm in diameter.  There is no heat erythema or edema.  There is mild tenderness at the larger area.  There of the left lower extremity, there is tenderness near the area of the femoral artery.  There is no edema, ecchymosis, or erythema at this location.     Results for orders placed or performed in visit on 08/30/21   US Lower Extremity Venous Duplex Left     Status: None    Narrative    Exam:US LOWER EXTREMITY VENOUS DUPLEX LEFT    History: Left leg pain and bruising    Comparisons: None    Technique: Venous duplex ultrasonography of the lower extremity was  performed.     Findings: The common femoral vein, superficial femoral vein and  popliteal vein are fully compressible with spontaneous and augmentable  venous flow.           Impression    Impression: No evidence of deep venous thrombosis within the left  lower extremity.    BILL SINGH MD         SYSTEM ID:  [id number]   Partial thromboplastin time     Status: Normal   Result Value Ref Range    aPTT 29 22 - 38 Seconds   Reflex INR     Status: Normal   Result Value Ref Range    INR 0.98 0.85 - 1.15

## 2021-08-30 NOTE — NURSING NOTE
"Chief Complaint   Patient presents with     Musculoskeletal Problem       Initial /83 (BP Location: Left arm, Patient Position: Chair, Cuff Size: Adult Regular)   Pulse 78   Temp 98.7  F (37.1  C) (Tympanic)   Wt 77.1 kg (170 lb)   SpO2 98%   BMI 28.29 kg/m   Estimated body mass index is 28.29 kg/m  as calculated from the following:    Height as of 9/24/20: 1.651 m (5' 5\").    Weight as of this encounter: 77.1 kg (170 lb).  Medication Reconciliation: complete  FREDERICK KOENIG LPN  "

## 2021-10-03 ENCOUNTER — HEALTH MAINTENANCE LETTER (OUTPATIENT)
Age: 41
End: 2021-10-03

## 2021-10-13 ENCOUNTER — ANCILLARY PROCEDURE (OUTPATIENT)
Dept: GENERAL RADIOLOGY | Facility: OTHER | Age: 41
End: 2021-10-13
Attending: UROLOGY
Payer: COMMERCIAL

## 2021-10-13 ENCOUNTER — OFFICE VISIT (OUTPATIENT)
Dept: UROLOGY | Facility: OTHER | Age: 41
End: 2021-10-13
Attending: UROLOGY
Payer: COMMERCIAL

## 2021-10-13 VITALS
OXYGEN SATURATION: 97 % | SYSTOLIC BLOOD PRESSURE: 121 MMHG | HEIGHT: 64 IN | DIASTOLIC BLOOD PRESSURE: 70 MMHG | WEIGHT: 160 LBS | BODY MASS INDEX: 27.31 KG/M2 | HEART RATE: 82 BPM

## 2021-10-13 DIAGNOSIS — N20.0 NEPHROLITHIASIS: ICD-10-CM

## 2021-10-13 DIAGNOSIS — N20.0 NEPHROLITHIASIS: Primary | ICD-10-CM

## 2021-10-13 LAB
ALBUMIN SERPL-MCNC: 4 G/DL (ref 3.4–5)
ALBUMIN UR-MCNC: NEGATIVE MG/DL
ALP SERPL-CCNC: 83 U/L (ref 40–150)
ALT SERPL W P-5'-P-CCNC: 26 U/L (ref 0–50)
ANION GAP SERPL CALCULATED.3IONS-SCNC: 5 MMOL/L (ref 3–14)
APPEARANCE UR: CLEAR
AST SERPL W P-5'-P-CCNC: 12 U/L (ref 0–45)
BILIRUB SERPL-MCNC: 0.5 MG/DL (ref 0.2–1.3)
BILIRUB UR QL STRIP: NEGATIVE
BUN SERPL-MCNC: 11 MG/DL (ref 7–30)
CALCIUM SERPL-MCNC: 9.1 MG/DL (ref 8.5–10.1)
CHLORIDE BLD-SCNC: 105 MMOL/L (ref 94–109)
CO2 SERPL-SCNC: 27 MMOL/L (ref 20–32)
COLOR UR AUTO: NORMAL
CREAT SERPL-MCNC: 0.63 MG/DL (ref 0.52–1.04)
GFR SERPL CREATININE-BSD FRML MDRD: >90 ML/MIN/1.73M2
GLUCOSE BLD-MCNC: 140 MG/DL (ref 70–99)
GLUCOSE UR STRIP-MCNC: NEGATIVE MG/DL
HGB UR QL STRIP: NEGATIVE
KETONES UR STRIP-MCNC: NEGATIVE MG/DL
LEUKOCYTE ESTERASE UR QL STRIP: NEGATIVE
NITRATE UR QL: NEGATIVE
PH UR STRIP: 6 [PH] (ref 4.7–8)
POTASSIUM BLD-SCNC: 3.5 MMOL/L (ref 3.4–5.3)
PROT SERPL-MCNC: 7.7 G/DL (ref 6.8–8.8)
SODIUM SERPL-SCNC: 137 MMOL/L (ref 133–144)
SP GR UR STRIP: 1.02 (ref 1–1.03)
UROBILINOGEN UR STRIP-MCNC: NORMAL MG/DL

## 2021-10-13 PROCEDURE — 74019 RADEX ABDOMEN 2 VIEWS: CPT | Mod: TC

## 2021-10-13 PROCEDURE — 36415 COLL VENOUS BLD VENIPUNCTURE: CPT | Mod: ZL | Performed by: UROLOGY

## 2021-10-13 PROCEDURE — 81003 URINALYSIS AUTO W/O SCOPE: CPT | Mod: ZL | Performed by: UROLOGY

## 2021-10-13 PROCEDURE — 99213 OFFICE O/P EST LOW 20 MIN: CPT | Performed by: UROLOGY

## 2021-10-13 PROCEDURE — G0463 HOSPITAL OUTPT CLINIC VISIT: HCPCS

## 2021-10-13 PROCEDURE — 82040 ASSAY OF SERUM ALBUMIN: CPT | Mod: ZL | Performed by: UROLOGY

## 2021-10-13 ASSESSMENT — ENCOUNTER SYMPTOMS
FLANK PAIN: 1
PALPITATIONS: 1
BACK PAIN: 1

## 2021-10-13 ASSESSMENT — PAIN SCALES - GENERAL: PAINLEVEL: SEVERE PAIN (7)

## 2021-10-13 ASSESSMENT — MIFFLIN-ST. JEOR: SCORE: 1375.76

## 2021-10-13 NOTE — PROGRESS NOTES
History     Chief Complaint:      Follow Up      HPI   Kimberley Louis is a 41 year old female who presents for further evaluation of some left sided back pain.  Kimberley is over a year status post left ureteroscopic stone manipulation for 2 kidney stones.  Follow-up KUB revealed that she had 2 small stones around 2 mm in size left in the kidney after the procedure.  Recently she has been complaining of some left back pain she does not really feel like she is actually passing a stone because it does not feel like renal colic but she is complaining of some fatigue sometimes it just hurts to move.  She says again this is not her typical renal colicky pain but she is concerned about this so she came in today.    Allergies:    No Known Allergies     Medications:      amLODIPine (NORVASC) 10 MG tablet  metoprolol tartrate (LOPRESSOR) 25 MG tablet  triamcinolone (KENALOG) 0.025 % external ointment  albuterol (PROAIR HFA/PROVENTIL HFA/VENTOLIN HFA) 108 (90 Base) MCG/ACT inhaler  metroNIDAZOLE (FLAGYL) 500 MG tablet        Problem List:      Patient Active Problem List    Diagnosis Date Noted     Nephrolithiasis 07/21/2020     Priority: Medium     Added automatically from request for surgery [id number]       Bacterial vaginosis 06/09/2020     Priority: Medium     Left flank tenderness 08/03/2018     Priority: Medium     Personal history of tobacco use, presenting hazards to health 08/03/2018     Priority: Medium     Recurrent UTI 08/03/2018     Priority: Medium     Incomplete right bundle branch block 02/05/2018     Priority: Medium     Pure hypercholesterolemia 01/03/2018     Priority: Medium     Tobacco abuse counseling 01/03/2018     Priority: Medium     Tobacco abuse 01/03/2018     Priority: Medium     Palpitations 01/03/2018     Priority: Medium     Atypical chest pain 01/03/2018     Priority: Medium     Dyspnea on exertion 01/03/2018     Priority: Medium     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  Kimberley Louis has no plan or code status on file. Discussed available resources and provided with information. Confirmed code status reflects current choices pending further ACP discussions.  Confirmed/documented legally designated decision makers.  Added by SAYDA CABRERA             ACP (advance care planning) 04/13/2016     Priority: Medium     Advance Care Planning 4/13/2016: ACP Review of Chart / Resources Provided:  Reviewed chart for advance care plan.  Kimberley Louis has no plan or code status on file. Discussed available resources and provided with information. Confirmed code status reflects current choices pending further ACP discussions.  Confirmed/documented legally designated decision makers.  Added by Sandra Anne             Kidney stone on left side 12/21/2015     Priority: Medium     Nephrocalcinosis 12/21/2015     Priority: Medium     Essential hypertension 11/19/2015     Priority: Medium     H/O renal calculi 11/19/2015     Priority: Medium     Enlarged kidney 11/19/2015     Priority: Medium        Past Medical History:      Past Medical History:   Diagnosis Date     Enlarged kidney 11/19/2015     Essential hypertension 11/19/2015     H/O renal calculi 11/19/2015     H/O renal calculi 11/19/2015       Past Surgical History:      Past Surgical History:   Procedure Laterality Date     AS REMOVAL OF KIDNEY STONE       AS REMOVAL OF KIDNEY STONE       C REMOVAL OF KIDNEY STONE       COMBINED CYSTOSCOPY, RETROGRADES, URETEROSCOPY, LASER HOLMIUM LITHOTRIPSY URETER(S), INSERT STENT Left 8/17/2020    Procedure: CYSTOURETEROSCOPY, WITH RETROGRADE PYELOGRAM, HOLMIUM LASER LITHOTRIPSY OF URETERAL CALCULUS, interpretation of fluroscopy;  Surgeon: Sherry Mcpherson MD;   "Location: [facility]     TUBAL LIGATION         Family History:      Family History   Problem Relation Age of Onset     Diabetes Mother      Emphysema Mother      Cervical Cancer Mother      Throat cancer Mother      Diabetes Father      Hypertension Father      Hypertension Maternal Grandmother      Diabetes Maternal Grandmother      Schizophrenia Maternal Grandmother      Unknown/Adopted Maternal Grandfather      Lung Cancer Paternal Grandmother      Diabetes Paternal Grandmother      Unknown/Adopted Paternal Grandfather        Social History:    Marital Status:  Single [1]  Social History     Tobacco Use     Smoking status: Current Every Day Smoker     Packs/day: 1.00     Years: 28.00     Pack years: 28.00     Types: Cigarettes     Start date: 1/1/1989     Smokeless tobacco: Never Used     Tobacco comment: pt denied QP 9/22/20   Substance Use Topics     Alcohol use: Yes     Comment: occasional     Drug use: No        Review of Systems   Cardiovascular: Positive for palpitations.   Genitourinary: Positive for flank pain.   Musculoskeletal: Positive for back pain.   Skin: Positive for rash.   All other systems reviewed and are negative.        Physical Exam   Vitals:  /70 (BP Location: Left arm, Cuff Size: Adult Regular)   Pulse 82   Ht 1.626 m (5' 4\")   Wt 72.6 kg (160 lb)   SpO2 97%   BMI 27.46 kg/m        Physical Exam  Constitutional:       Appearance: Normal appearance. She is normal weight.   Pulmonary:      Effort: Pulmonary effort is normal.   Abdominal:      General: Abdomen is flat.      Palpations: Abdomen is soft.   Musculoskeletal:        Arms:       Comments: Pain appears to be just lateral to the spinal musculature.  A bit atypical for a renal colicky pain.   Neurological:      Mental Status: She is alert.       Impression: Back pain, history of nephrolithiasis    Plan   Plan: Kimberley has been exposed to a lot of CT scans so I did talk to her about the fact that we worry about ureteral " stricture after stone manipulations.  This is not a common occurrence but we can get a renal ultrasound to rule out obstruction of the kidney.  I will get a KUB to assess her stone burden as its been over a year since her procedure.  We will also check her renal function and a urinalysis.  Overall her symptoms seem more consistent with typical fatigue overall general malaise type symptoms.  They do not seem to be consistent with obstruction from a ureteral stone.  If we do see anything concerning on the KUB, ultrasound or urinalysis and we will need to proceed with a CT.  Due to staff shortage is in ultrasound it may take a while to get the ultrasound done so patient is willing to go to [facility] if we need to do that.      No follow-ups on file.    Sherry Mcpherson MD  [facility]

## 2021-10-13 NOTE — LETTER
10/13/2021      RE: Kimberley Louis  [address]  [facility] [address]           History     Chief Complaint:      Follow Up      HPI   Kimberley Louis is a 41 year old female who presents for further evaluation of some left sided back pain.  Kimberley is over a year status post left ureteroscopic stone manipulation for 2 kidney stones.  Follow-up KUB revealed that she had 2 small stones around 2 mm in size left in the kidney after the procedure.  Recently she has been complaining of some left back pain she does not really feel like she is actually passing a stone because it does not feel like renal colic but she is complaining of some fatigue sometimes it just hurts to move.  She says again this is not her typical renal colicky pain but she is concerned about this so she came in today.    Allergies:    No Known Allergies     Medications:      amLODIPine (NORVASC) 10 MG tablet  metoprolol tartrate (LOPRESSOR) 25 MG tablet  triamcinolone (KENALOG) 0.025 % external ointment  albuterol (PROAIR HFA/PROVENTIL HFA/VENTOLIN HFA) 108 (90 Base) MCG/ACT inhaler  metroNIDAZOLE (FLAGYL) 500 MG tablet        Problem List:      Patient Active Problem List    Diagnosis Date Noted     Nephrolithiasis 07/21/2020     Priority: Medium     Added automatically from request for surgery [id number]       Bacterial vaginosis 06/09/2020     Priority: Medium     Left flank tenderness 08/03/2018     Priority: Medium     Personal history of tobacco use, presenting hazards to health 08/03/2018     Priority: Medium     Recurrent UTI 08/03/2018     Priority: Medium     Incomplete right bundle branch block 02/05/2018     Priority: Medium     Pure hypercholesterolemia 01/03/2018     Priority: Medium     Tobacco abuse counseling 01/03/2018     Priority: Medium     Tobacco abuse 01/03/2018     Priority: Medium     Palpitations 01/03/2018     Priority: Medium     Atypical chest pain 01/03/2018     Priority: Medium     Dyspnea on exertion 01/03/2018      Priority: Medium     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  Kimberley Louis has no plan or code status on file. Discussed available resources and provided with information. Confirmed code status reflects current choices pending further ACP discussions.  Confirmed/documented legally designated decision makers.  Added by SAYDA CABRERA             ACP (advance care planning) 04/13/2016     Priority: Medium     Advance Care Planning 4/13/2016: ACP Review of Chart / Resources Provided:  Reviewed chart for advance care plan.  Kimberley Louis has no plan or code status on file. Discussed available resources and provided with information. Confirmed code status reflects current choices pending further ACP discussions.  Confirmed/documented legally designated decision makers.  Added by Sandra Anne             Kidney stone on left side 12/21/2015     Priority: Medium     Nephrocalcinosis 12/21/2015     Priority: Medium     Essential hypertension 11/19/2015     Priority: Medium     H/O renal calculi 11/19/2015     Priority: Medium     Enlarged kidney 11/19/2015     Priority: Medium        Past Medical History:      Past Medical History:   Diagnosis Date     Enlarged kidney 11/19/2015     Essential hypertension 11/19/2015     H/O renal calculi 11/19/2015     H/O renal calculi 11/19/2015       Past Surgical History:      Past Surgical History:   Procedure Laterality Date     AS REMOVAL OF KIDNEY STONE       AS REMOVAL OF KIDNEY STONE       C REMOVAL OF KIDNEY STONE       COMBINED CYSTOSCOPY, RETROGRADES, URETEROSCOPY, LASER HOLMIUM LITHOTRIPSY URETER(S), INSERT STENT Left 8/17/2020    Procedure: CYSTOURETEROSCOPY, WITH RETROGRADE PYELOGRAM, HOLMIUM LASER LITHOTRIPSY OF URETERAL  "CALCULUS, interpretation of fluroscopy;  Surgeon: Sherry Mcpherson MD;  Location: [facility]     TUBAL LIGATION         Family History:      Family History   Problem Relation Age of Onset     Diabetes Mother      Emphysema Mother      Cervical Cancer Mother      Throat cancer Mother      Diabetes Father      Hypertension Father      Hypertension Maternal Grandmother      Diabetes Maternal Grandmother      Schizophrenia Maternal Grandmother      Unknown/Adopted Maternal Grandfather      Lung Cancer Paternal Grandmother      Diabetes Paternal Grandmother      Unknown/Adopted Paternal Grandfather        Social History:    Marital Status:  Single [1]  Social History     Tobacco Use     Smoking status: Current Every Day Smoker     Packs/day: 1.00     Years: 28.00     Pack years: 28.00     Types: Cigarettes     Start date: 1/1/1989     Smokeless tobacco: Never Used     Tobacco comment: pt denied QP 9/22/20   Substance Use Topics     Alcohol use: Yes     Comment: occasional     Drug use: No        Review of Systems   Cardiovascular: Positive for palpitations.   Genitourinary: Positive for flank pain.   Musculoskeletal: Positive for back pain.   Skin: Positive for rash.   All other systems reviewed and are negative.        Physical Exam   Vitals:  /70 (BP Location: Left arm, Cuff Size: Adult Regular)   Pulse 82   Ht 1.626 m (5' 4\")   Wt 72.6 kg (160 lb)   SpO2 97%   BMI 27.46 kg/m        Physical Exam  Constitutional:       Appearance: Normal appearance. She is normal weight.   Pulmonary:      Effort: Pulmonary effort is normal.   Abdominal:      General: Abdomen is flat.      Palpations: Abdomen is soft.   Musculoskeletal:        Arms:       Comments: Pain appears to be just lateral to the spinal musculature.  A bit atypical for a renal colicky pain.   Neurological:      Mental Status: She is alert.       Impression: Back pain, history of nephrolithiasis    Plan   Plan: Kimberley has been exposed to a lot of CT scans " so I did talk to her about the fact that we worry about ureteral stricture after stone manipulations.  This is not a common occurrence but we can get a renal ultrasound to rule out obstruction of the kidney.  I will get a KUB to assess her stone burden as its been over a year since her procedure.  We will also check her renal function and a urinalysis.  Overall her symptoms seem more consistent with typical fatigue overall general malaise type symptoms.  They do not seem to be consistent with obstruction from a ureteral stone.  If we do see anything concerning on the KUB, ultrasound or urinalysis and we will need to proceed with a CT.  Due to staff shortage is in ultrasound it may take a while to get the ultrasound done so patient is willing to go to [facility] if we need to do that.      No follow-ups on file.    Sherry Mcpherson MD  [facility] [place]              Sherry Mcpherson MD

## 2021-10-13 NOTE — NURSING NOTE
"Chief Complaint   Patient presents with     Follow Up       Initial /70 (BP Location: Left arm, Cuff Size: Adult Regular)   Pulse 82   Ht 1.626 m (5' 4\")   Wt 72.6 kg (160 lb)   SpO2 97%   BMI 27.46 kg/m   Estimated body mass index is 27.46 kg/m  as calculated from the following:    Height as of this encounter: 1.626 m (5' 4\").    Weight as of this encounter: 72.6 kg (160 lb).  Medication Reconciliation: complete     Review of Systems:    Weight loss:    No     Recent fever/chills:  No   Night sweats:   No  Current skin rash:  Yes   Recent hair loss:  No  Heat intolerance:  No   Cold intolerance:  No  Chest pain:   No   Palpitations:   Yes  Shortness of breath:  No   Wheezing:   No  Constipation:    No   Diarrhea:   No   Nausea:   No   Vomiting:   No   Kidney/side pain:  Yes   Back pain:   Yes  Frequent headaches:  Yes   Dizziness:     No  Leg swelling:   No   Calf pain:    No    Parents, brothers or sisters with history of kidney cancer:   No  Parents, brothers or sisters with history of bladder cancer: No    Gina Gonzales LPN  "

## 2021-10-13 NOTE — LETTER
10/13/2021      RE: Kimberley Louis  [address]  [facility] [address]           History     Chief Complaint:      Follow Up      HPI   Kimberley Louis is a 41 year old female who presents for further evaluation of some left sided back pain.  Kimberley is over a year status post left ureteroscopic stone manipulation for 2 kidney stones.  Follow-up KUB revealed that she had 2 small stones around 2 mm in size left in the kidney after the procedure.  Recently she has been complaining of some left back pain she does not really feel like she is actually passing a stone because it does not feel like renal colic but she is complaining of some fatigue sometimes it just hurts to move.  She says again this is not her typical renal colicky pain but she is concerned about this so she came in today.    Allergies:    No Known Allergies     Medications:      amLODIPine (NORVASC) 10 MG tablet  metoprolol tartrate (LOPRESSOR) 25 MG tablet  triamcinolone (KENALOG) 0.025 % external ointment  albuterol (PROAIR HFA/PROVENTIL HFA/VENTOLIN HFA) 108 (90 Base) MCG/ACT inhaler  metroNIDAZOLE (FLAGYL) 500 MG tablet        Problem List:      Patient Active Problem List    Diagnosis Date Noted     Nephrolithiasis 07/21/2020     Priority: Medium     Added automatically from request for surgery [id number]       Bacterial vaginosis 06/09/2020     Priority: Medium     Left flank tenderness 08/03/2018     Priority: Medium     Personal history of tobacco use, presenting hazards to health 08/03/2018     Priority: Medium     Recurrent UTI 08/03/2018     Priority: Medium     Incomplete right bundle branch block 02/05/2018     Priority: Medium     Pure hypercholesterolemia 01/03/2018     Priority: Medium     Tobacco abuse counseling 01/03/2018     Priority: Medium     Tobacco abuse 01/03/2018     Priority: Medium     Palpitations 01/03/2018     Priority: Medium     Atypical chest pain 01/03/2018     Priority: Medium     Dyspnea on exertion 01/03/2018      Priority: Medium     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  Kimberley Louis has no plan or code status on file. Discussed available resources and provided with information. Confirmed code status reflects current choices pending further ACP discussions.  Confirmed/documented legally designated decision makers.  Added by SAYDA CABRERA             ACP (advance care planning) 04/13/2016     Priority: Medium     Advance Care Planning 4/13/2016: ACP Review of Chart / Resources Provided:  Reviewed chart for advance care plan.  Kimberley Louis has no plan or code status on file. Discussed available resources and provided with information. Confirmed code status reflects current choices pending further ACP discussions.  Confirmed/documented legally designated decision makers.  Added by Sandra Anne             Kidney stone on left side 12/21/2015     Priority: Medium     Nephrocalcinosis 12/21/2015     Priority: Medium     Essential hypertension 11/19/2015     Priority: Medium     H/O renal calculi 11/19/2015     Priority: Medium     Enlarged kidney 11/19/2015     Priority: Medium        Past Medical History:      Past Medical History:   Diagnosis Date     Enlarged kidney 11/19/2015     Essential hypertension 11/19/2015     H/O renal calculi 11/19/2015     H/O renal calculi 11/19/2015       Past Surgical History:      Past Surgical History:   Procedure Laterality Date     AS REMOVAL OF KIDNEY STONE       AS REMOVAL OF KIDNEY STONE       C REMOVAL OF KIDNEY STONE       COMBINED CYSTOSCOPY, RETROGRADES, URETEROSCOPY, LASER HOLMIUM LITHOTRIPSY URETER(S), INSERT STENT Left 8/17/2020    Procedure: CYSTOURETEROSCOPY, WITH RETROGRADE PYELOGRAM, HOLMIUM LASER LITHOTRIPSY OF URETERAL  "CALCULUS, interpretation of fluroscopy;  Surgeon: Sherry Mcpherson MD;  Location: [facility]     TUBAL LIGATION         Family History:      Family History   Problem Relation Age of Onset     Diabetes Mother      Emphysema Mother      Cervical Cancer Mother      Throat cancer Mother      Diabetes Father      Hypertension Father      Hypertension Maternal Grandmother      Diabetes Maternal Grandmother      Schizophrenia Maternal Grandmother      Unknown/Adopted Maternal Grandfather      Lung Cancer Paternal Grandmother      Diabetes Paternal Grandmother      Unknown/Adopted Paternal Grandfather        Social History:    Marital Status:  Single [1]  Social History     Tobacco Use     Smoking status: Current Every Day Smoker     Packs/day: 1.00     Years: 28.00     Pack years: 28.00     Types: Cigarettes     Start date: 1/1/1989     Smokeless tobacco: Never Used     Tobacco comment: pt denied QP 9/22/20   Substance Use Topics     Alcohol use: Yes     Comment: occasional     Drug use: No        Review of Systems   Cardiovascular: Positive for palpitations.   Genitourinary: Positive for flank pain.   Musculoskeletal: Positive for back pain.   Skin: Positive for rash.   All other systems reviewed and are negative.        Physical Exam   Vitals:  /70 (BP Location: Left arm, Cuff Size: Adult Regular)   Pulse 82   Ht 1.626 m (5' 4\")   Wt 72.6 kg (160 lb)   SpO2 97%   BMI 27.46 kg/m        Physical Exam  Constitutional:       Appearance: Normal appearance. She is normal weight.   Pulmonary:      Effort: Pulmonary effort is normal.   Abdominal:      General: Abdomen is flat.      Palpations: Abdomen is soft.   Musculoskeletal:        Arms:       Comments: Pain appears to be just lateral to the spinal musculature.  A bit atypical for a renal colicky pain.   Neurological:      Mental Status: She is alert.       Impression: Back pain, history of nephrolithiasis    Plan   Plan: Kimberley has been exposed to a lot of CT scans " so I did talk to her about the fact that we worry about ureteral stricture after stone manipulations.  This is not a common occurrence but we can get a renal ultrasound to rule out obstruction of the kidney.  I will get a KUB to assess her stone burden as its been over a year since her procedure.  We will also check her renal function and a urinalysis.  Overall her symptoms seem more consistent with typical fatigue overall general malaise type symptoms.  They do not seem to be consistent with obstruction from a ureteral stone.  If we do see anything concerning on the KUB, ultrasound or urinalysis and we will need to proceed with a CT.  Due to staff shortage is in ultrasound it may take a while to get the ultrasound done so patient is willing to go to [facility] if we need to do that.      No follow-ups on file.    Sherry Mcpherson MD  [facility] [place]              Sherry Mcpherson MD

## 2021-11-04 ENCOUNTER — NURSE TRIAGE (OUTPATIENT)
Dept: FAMILY MEDICINE | Facility: OTHER | Age: 41
End: 2021-11-04

## 2021-11-09 ENCOUNTER — MYC MEDICAL ADVICE (OUTPATIENT)
Dept: FAMILY MEDICINE | Facility: OTHER | Age: 41
End: 2021-11-09
Payer: COMMERCIAL

## 2021-11-09 DIAGNOSIS — R05.9 COUGH: Primary | ICD-10-CM

## 2021-11-10 RX ORDER — PREDNISONE 20 MG/1
TABLET ORAL
Qty: 13 TABLET | Refills: 1 | Status: SHIPPED | OUTPATIENT
Start: 2021-11-10 | End: 2021-12-01

## 2021-11-26 NOTE — PROGRESS NOTES
Assessment & Plan     Essential hypertension  Today her blood pressure was 122/87. She is taking her medications regularly without adverse effects. She can tell when she missed a dose by her elevated heart rate. Refills ordered   - metoprolol tartrate (LOPRESSOR) 25 MG tablet; TAKE 1 TABLET(25 MG) BY MOUTH TWICE DAILY  - amLODIPine (NORVASC) 10 MG tablet; Take 1 tablet (10 mg) by mouth daily    Dyspnea on exertion  She had COVID diagnosed 11/8/21 and is still having a cough and dyspnea on exertion. She has a history of asthma and an every day smoker. Albuterol inhaler reordered with the addition of AirDuo inhaler. PFT ordered for 6 to 8 weeks post COVID  - albuterol (PROAIR HFA/PROVENTIL HFA/VENTOLIN HFA) 108 (90 Base) MCG/ACT inhaler; Inhale 2 puffs into the lungs every 6 hours  - General PFT Lab (Please always keep checked); Future  - Pulmonary Function Test; Future  - General PFT Lab (Please always keep checked)  - fluticasone-salmeterol (AIRDUO RESPICLICK) 232-14 MCG/ACT inhaler; Inhale 1 puff into the lungs 2 times daily    Cough  She had COVID diagnosed 11/8/21 and is still having a cough. Chest x-ray ordered to see if there is the presence of scar tissue or assess reason for still having a cough  - albuterol (PROAIR HFA/PROVENTIL HFA/VENTOLIN HFA) 108 (90 Base) MCG/ACT inhaler; Inhale 2 puffs into the lungs every 6 hours  - XR CHEST 2 VW (Clinic Performed); Future    Atypical chest pain  She had COVID diagnosed 11/8/21 and symptoms still persist. Chest xray and EKG ordered .  - amLODIPine (NORVASC) 10 MG tablet; Take 1 tablet (10 mg) by mouth daily  - XR CHEST 2 VW (Clinic Performed); Future  - EKG 12-lead complete w/read - (Clinic Performed)    Major depressive disorder, single episode, mild (H)  Discussed with her the stress of work and feeling sick and COVID has had an effect on her mood and increased her depression. We will try 50 mg Zoloft and see her back in 6 weeks to assess her mood  - sertraline  (ZOLOFT) 50 MG tablet; Take 1 tablet (50 mg) by mouth daily    Yeast infection of the vagina  She was on previous antibiotics which caused her to develop a yeast infection. Fluconazole ordered.  - fluconazole (DIFLUCAN) 150 MG tablet; Take 1 tablet (150 mg) by mouth daily for 3 days    Ordering of each unique test  Prescription drug management  5 minutes spent on the date of the encounter doing chart review, review of test results, patient visit and documentation   \     See Patient Instructions    No follow-ups on file.    JAIME BerriosS   I saw and examined this patient.  I have read through the note and made appropriate changes and agree with the  assessment and plan.     TOMER Dean  [facility] - DIXIE Chu is a 41 year old who presents for the following health issues     HPI     Hypertension Follow-up      Do you check your blood pressure regularly outside of the clinic? No     Are you following a low salt diet? No    Are your blood pressures ever more than 140 on the top number (systolic) OR more   than 90 on the bottom number (diastolic), for example 140/90? No    Covid follow      Duration: November 8th tested positive    Description (location/character/radiation): covid +    Intensity:  mild    Accompanying signs and symptoms: coughing, congestion, headache, sore throat, left ear , gland of left side and super tired, confusion    History (similar episodes/previous evaluation): None    Precipitating or alleviating factors: None    Therapies tried and outcome: None              Review of Systems   CONSTITUTIONAL: NEGATIVE for fever, chills, change in weight  ENT/MOUTH: Positive for left ear ache, sore throat  RESP: Little bit of productive cough  CV: NEGATIVE for chest pain, palpitations or peripheral edema  GI: NEGATIVE for nausea, abdominal pain, heartburn, or change in bowel habits  MUSCULOSKELETAL: Positive for joint pain  PSYCHIATRIC: Positive for  "depression and anxiety       Objective    /70 (BP Location: Left arm, Patient Position: Sitting, Cuff Size: Adult Regular)   Pulse 87   Temp 98.3  F (36.8  C) (Tympanic)   Ht 1.626 m (5' 4\")   Wt 75.1 kg (165 lb 9.6 oz)   SpO2 97%   BMI 28.43 kg/m    Body mass index is 28.43 kg/m .  Physical Exam   GENERAL: healthy, alert and no distress  EYES: Eyes grossly normal to inspection, PERRL and conjunctivae and sclerae normal  HENT: throat red and irritated, left TM slightly bulging.  NECK: Tender left cervical lymphadenopathy, and thyroid normal to palpation  RESP: lungs clear to auscultation - no rales, rhonchi or wheezes  CV: regular rate and rhythm, normal S1 S2, no S3 or S4, no murmur, click or rub, no peripheral edema and peripheral pulses strong  MS: no gross musculoskeletal defects noted, no edema  SKIN: no suspicious lesions or rashes  PSYCH: mentation appears normal    Office Visit on 10/13/2021   Component Date Value Ref Range Status     Color Urine 10/13/2021 Light Yellow  Colorless, Straw, Light Yellow, Yellow Final     Appearance Urine 10/13/2021 Clear  Clear Final     Glucose Urine 10/13/2021 Negative  Negative mg/dL Final     Bilirubin Urine 10/13/2021 Negative  Negative Final     Ketones Urine 10/13/2021 Negative  Negative mg/dL Final     Specific Gravity Urine 10/13/2021 1.018  1.003 - 1.035 Final     Blood Urine 10/13/2021 Negative  Negative Final     pH Urine 10/13/2021 6.0  4.7 - 8.0 Final     Protein Albumin Urine 10/13/2021 Negative  Negative mg/dL Final     Urobilinogen Urine 10/13/2021 Normal  Normal, 2.0 mg/dL Final     Nitrite Urine 10/13/2021 Negative  Negative Final     Leukocyte Esterase Urine 10/13/2021 Negative  Negative Final     Sodium 10/13/2021 137  133 - 144 mmol/L Final     Potassium 10/13/2021 3.5  3.4 - 5.3 mmol/L Final     Chloride 10/13/2021 105  94 - 109 mmol/L Final     Carbon Dioxide (CO2) 10/13/2021 27  20 - 32 mmol/L Final     Anion Gap 10/13/2021 5  3 - 14 mmol/L " Final     Urea Nitrogen 10/13/2021 11  7 - 30 mg/dL Final     Creatinine 10/13/2021 0.63  0.52 - 1.04 mg/dL Final     Calcium 10/13/2021 9.1  8.5 - 10.1 mg/dL Final     Glucose 10/13/2021 140* 70 - 99 mg/dL Final     Alkaline Phosphatase 10/13/2021 83  40 - 150 U/L Final     AST 10/13/2021 12  0 - 45 U/L Final     ALT 10/13/2021 26  0 - 50 U/L Final     Protein Total 10/13/2021 7.7  6.8 - 8.8 g/dL Final     Albumin 10/13/2021 4.0  3.4 - 5.0 g/dL Final     Bilirubin Total 10/13/2021 0.5  0.2 - 1.3 mg/dL Final     GFR Estimate 10/13/2021 >90  >60 mL/min/1.73m2 Final    As of July 11, 2021, eGFR is calculated by the CKD-EPI creatinine equation, without race adjustment. eGFR can be influenced by muscle mass, exercise, and diet. The reported eGFR is an estimation only and is only applicable if the renal function is stable.

## 2021-12-01 ENCOUNTER — ANCILLARY PROCEDURE (OUTPATIENT)
Dept: GENERAL RADIOLOGY | Facility: OTHER | Age: 41
End: 2021-12-01
Attending: PHYSICIAN ASSISTANT
Payer: COMMERCIAL

## 2021-12-01 ENCOUNTER — OFFICE VISIT (OUTPATIENT)
Dept: FAMILY MEDICINE | Facility: OTHER | Age: 41
End: 2021-12-01
Attending: PHYSICIAN ASSISTANT
Payer: COMMERCIAL

## 2021-12-01 VITALS
TEMPERATURE: 98.3 F | WEIGHT: 165.6 LBS | OXYGEN SATURATION: 97 % | SYSTOLIC BLOOD PRESSURE: 122 MMHG | HEART RATE: 87 BPM | BODY MASS INDEX: 28.27 KG/M2 | HEIGHT: 64 IN | DIASTOLIC BLOOD PRESSURE: 70 MMHG

## 2021-12-01 DIAGNOSIS — R07.89 ATYPICAL CHEST PAIN: ICD-10-CM

## 2021-12-01 DIAGNOSIS — B37.31 YEAST INFECTION OF THE VAGINA: ICD-10-CM

## 2021-12-01 DIAGNOSIS — R05.9 COUGH: ICD-10-CM

## 2021-12-01 DIAGNOSIS — R06.09 DYSPNEA ON EXERTION: Primary | ICD-10-CM

## 2021-12-01 DIAGNOSIS — I10 ESSENTIAL HYPERTENSION: ICD-10-CM

## 2021-12-01 DIAGNOSIS — F32.0 MAJOR DEPRESSIVE DISORDER, SINGLE EPISODE, MILD (H): ICD-10-CM

## 2021-12-01 PROCEDURE — 99214 OFFICE O/P EST MOD 30 MIN: CPT | Mod: 25 | Performed by: PHYSICIAN ASSISTANT

## 2021-12-01 PROCEDURE — 93010 ELECTROCARDIOGRAM REPORT: CPT | Mod: 77 | Performed by: INTERNAL MEDICINE

## 2021-12-01 PROCEDURE — 93005 ELECTROCARDIOGRAM TRACING: CPT

## 2021-12-01 PROCEDURE — G0463 HOSPITAL OUTPT CLINIC VISIT: HCPCS

## 2021-12-01 PROCEDURE — 71046 X-RAY EXAM CHEST 2 VIEWS: CPT | Mod: TC

## 2021-12-01 RX ORDER — AMLODIPINE BESYLATE 10 MG/1
10 TABLET ORAL DAILY
Qty: 30 TABLET | Refills: 3 | Status: SHIPPED | OUTPATIENT
Start: 2021-12-01 | End: 2022-04-01

## 2021-12-01 RX ORDER — FLUTICASONE PROPIONATE AND SALMETEROL 232; 14 UG/1; UG/1
1 POWDER, METERED RESPIRATORY (INHALATION) 2 TIMES DAILY
Qty: 3 EACH | Refills: 4 | Status: SHIPPED | OUTPATIENT
Start: 2021-12-01 | End: 2022-03-10

## 2021-12-01 RX ORDER — ALBUTEROL SULFATE 90 UG/1
2 AEROSOL, METERED RESPIRATORY (INHALATION) EVERY 6 HOURS
Qty: 8.5 G | Refills: 2 | Status: SHIPPED | OUTPATIENT
Start: 2021-12-01 | End: 2022-02-28

## 2021-12-01 RX ORDER — FLUCONAZOLE 150 MG/1
150 TABLET ORAL DAILY
Qty: 3 TABLET | Refills: 0 | Status: SHIPPED | OUTPATIENT
Start: 2021-12-01 | End: 2021-12-04

## 2021-12-01 RX ORDER — METOPROLOL TARTRATE 25 MG/1
TABLET, FILM COATED ORAL
Qty: 60 TABLET | Refills: 11 | Status: SHIPPED | OUTPATIENT
Start: 2021-12-01 | End: 2022-07-22

## 2021-12-01 ASSESSMENT — MIFFLIN-ST. JEOR: SCORE: 1401.16

## 2021-12-01 ASSESSMENT — ANXIETY QUESTIONNAIRES
5. BEING SO RESTLESS THAT IT IS HARD TO SIT STILL: NEARLY EVERY DAY
2. NOT BEING ABLE TO STOP OR CONTROL WORRYING: NEARLY EVERY DAY
7. FEELING AFRAID AS IF SOMETHING AWFUL MIGHT HAPPEN: NEARLY EVERY DAY
GAD7 TOTAL SCORE: 21
3. WORRYING TOO MUCH ABOUT DIFFERENT THINGS: NEARLY EVERY DAY
4. TROUBLE RELAXING: NEARLY EVERY DAY
1. FEELING NERVOUS, ANXIOUS, OR ON EDGE: NEARLY EVERY DAY
6. BECOMING EASILY ANNOYED OR IRRITABLE: NEARLY EVERY DAY

## 2021-12-01 ASSESSMENT — PAIN SCALES - GENERAL: PAINLEVEL: MODERATE PAIN (5)

## 2021-12-01 NOTE — NURSING NOTE
"Chief Complaint   Patient presents with     Recheck Medication       Initial /70 (BP Location: Left arm, Patient Position: Sitting, Cuff Size: Adult Regular)   Pulse 87   Temp 98.3  F (36.8  C) (Tympanic)   Ht 1.626 m (5' 4\")   Wt 75.1 kg (165 lb 9.6 oz)   SpO2 97%   BMI 28.43 kg/m   Estimated body mass index is 28.43 kg/m  as calculated from the following:    Height as of this encounter: 1.626 m (5' 4\").    Weight as of this encounter: 75.1 kg (165 lb 9.6 oz).  Medication Reconciliation: complete  Rosanne Chi LPN  "

## 2021-12-01 NOTE — PATIENT INSTRUCTIONS
Thank you for choosing [facility].   I have office hours 8:00 am to 4:30 pm on Monday's, Wednesday's, Thursday's and Friday's. My nurse and I are out of the office every Tuesday.    Following your visit, when your labs and diagnostic testing have returned, I will review then and you will be contacted by my nurse.  If you are on My Chart, you can also view results there.    For refills, notify your pharmacy regarding what you need and the pharmacy will generate a refill request. Do not call my nurse as she is unable to process refill request. Please plan ahead and allow 3-5 days for refill requests.    You will generally receive a reminder call the day prior to your appointment.  If you cannot attend your appointment, please cancel your appointment with as much notice as possible.  If there is a pattern of failure to present for your appointments, I cannot provide consistent, meaningful, ongoing care for you. It is very important to me that you come in for your care, so we can best assist you with your health care needs.    IMPORTANT:  Please note that it is my standard of practice to NOT participate in prescribing ongoing requested Narcotic Analgesic therapy, and/or participate in the prescribing of other controlled substances.  My nurse and I am happy to assist you with the process of referral for alternative pain management as needed, and other treatment modalities including but not limited to:  Physical Therapy, Physical Medicine and Rehab, Counseling, Chiropractic Care, Orthopedic Care, and non-narcotic medication management.     In the event that you need to be seen for emergent concerns and I am out of office,  please see one of my colleagues for acute concerns.  You may also present to  or ER.  I appreciate the opportunity to serve you and look forward to supporting your healthcare needs in the future. Please contact me with any questions or concerns that you may  have.    Sincerely,      Xiomara Caceres RN, PA-C

## 2021-12-02 ASSESSMENT — ANXIETY QUESTIONNAIRES: GAD7 TOTAL SCORE: 21

## 2021-12-02 ASSESSMENT — PATIENT HEALTH QUESTIONNAIRE - PHQ9: SUM OF ALL RESPONSES TO PHQ QUESTIONS 1-9: 15

## 2021-12-03 ENCOUNTER — TELEPHONE (OUTPATIENT)
Dept: FAMILY MEDICINE | Facility: OTHER | Age: 41
End: 2021-12-03
Payer: COMMERCIAL

## 2021-12-03 NOTE — TELEPHONE ENCOUNTER
Received a PA from [employer] for Fluticasone-Salmeterol 232-14MCG/ACT aerosol powder. Submitted on CMM. Waiting for a response.

## 2021-12-06 NOTE — TELEPHONE ENCOUNTER
Received DENIAL notification through CMM from [facility] for Fluticasone-Salmeterol 232-14mcg/act aerosol powder. I haven't yet received the official denial letter from [facility] to know the denial reasons. I will update this encounter once I receive that letter. Forms scanned to Epic.

## 2022-01-09 NOTE — PROGRESS NOTES
Assessment & Plan     Moderate episode of recurrent major depressive disorder (H)  Lost a close friend this morning.  She has never dabbled in subtance use.  She states she has hx of ? Bipolar or Schizoaffective but denies hallucination delusions or impulse issues. Not hearing or seeing things, sleeping way way too much , lacks motivation.  She wants to discuss genesite.  I would like to have a good Rene as the last one was done years ago.      - divalproex sodium delayed-release (DEPAKOTE) 250 MG DR tablet; Take 1 tablet (250 mg) by mouth 2 times daily  - [facility] Psychotropic; Future  - Adult Mental Health Referral; Future    2. Essential hypertension  See us back in 3 months good control and tolerates well.     Review of external notes as documented elsewhere in note  Ordering of each unique test  Prescription drug management  5  minutes spent on the date of the encounter doing chart review, patient visit, documentation and discussion with family        See Patient Instructions    No follow-ups on file.    TOMER Dean  [facility] - DIXIE Chu is a 42 year old who presents for the following health issues     Dyspena on exertion  Onset: 11/08/21  Description: shortness of breath  Intensity: mild  Progression of Symptoms:  improving  Accompanying Signs & Symptoms: none  Previous history of similar problem: none  Precipitating factors:        Worsened by: exertion  Alleviating factors:        Improved by: inhalers  Therapies tried and outcome: Inhalers    Had COVID 11/8/21    Still having cough yes    Chest XR on 12/1/21 was clear of infiltrates     History of asthma and an every day smoker    Albuterol and Airduo (fluticasone-salmeterol)     Recommended PFTs    Hypertension Follow-up    Do you check your blood pressure regularly outside of the clinic? No     Are you following a low salt diet? No    Are your blood pressures ever more than 140 on the top number (systolic)  OR more   than 90 on the bottom number (diastolic), for example 140/90? No     Taking metoprolol and amlodipine     Depression Followup    How are you doing with your depression since your last visit? Improved slightly    Are you having other symptoms that might be associated with depression? Yes:  to much sleeping, works and then to home to sleep    Have you had a significant life event?  Grief or Loss Death of close friend this morning.    Are you feeling anxious or having panic attacks?   Yes:  no panic attacks, heart beating fast    Do you have any concerns with your use of alcohol or other drugs? No     Taking Zoloft 50 mg     Social History     Tobacco Use     Smoking status: Current Every Day Smoker     Packs/day: 1.00     Years: 28.00     Pack years: 28.00     Types: Cigarettes     Start date: 1/1/1989     Smokeless tobacco: Never Used     Tobacco comment: pt denied QP 9/22/20   Substance Use Topics     Alcohol use: Yes     Comment: occasional     Drug use: No     PHQ 8/8/2018 8/21/2018 12/1/2021   PHQ-9 Total Score 0 0 15   Q9: Thoughts of better off dead/self-harm past 2 weeks Not at all Not at all Not at all     FAUZIA-7 SCORE 8/8/2018 8/21/2018 12/1/2021   Total Score 0 0 21     Last PHQ-9 12/1/2021   1.  Little interest or pleasure in doing things 3   2.  Feeling down, depressed, or hopeless 3   3.  Trouble falling or staying asleep, or sleeping too much 3   4.  Feeling tired or having little energy 3   5.  Poor appetite or overeating 0   6.  Feeling bad about yourself 0   7.  Trouble concentrating 3   8.  Moving slowly or restless 0   Q9: Thoughts of better off dead/self-harm past 2 weeks 0   PHQ-9 Total Score 15   Difficulty at work, home, or with people -     FAUZIA-7  12/1/2021   1. Feeling nervous, anxious, or on edge 3   2. Not being able to stop or control worrying 3   3. Worrying too much about different things 3   4. Trouble relaxing 3   5. Being so restless that it is hard to sit still 3   6.  "Becoming easily annoyed or irritable 3   7. Feeling afraid, as if something awful might happen 3   FAUZIA-7 Total Score 21   If you checked any problems, how difficult have they made it for you to do your work, take care of things at home, or get along with other people? -       Suicide Assessment Five-step Evaluation and Treatment (SAFE-T)      How many servings of fruits and vegetables do you eat daily?  2-3 vegetables    On average, how many sweetened beverages do you drink each day (Examples: soda, juice, sweet tea, etc.  Do NOT count diet or artificially sweetened beverages)?   1    How many days per week do you exercise enough to make your heart beat faster? 3 or less    How many minutes a day do you exercise enough to make your heart beat faster? 9 or less    How many days per week do you miss taking your medication? 0      Review of Systems   CONSTITUTIONAL: NEGATIVE for fever, chills, change in weight  INTEGUMENTARY/SKIN: NEGATIVE for worrisome rashes, moles or lesions  ENT/MOUTH: NEGATIVE for ear, mouth and throat problems  RESP: NEGATIVE for significant cough or SOB  CV: NEGATIVE for chest pain, palpitations or peripheral edema  PSYCHIATRIC: lost a very close friend,  depressed mood and fatigue      Objective    /72 (BP Location: Left arm, Patient Position: Sitting, Cuff Size: Adult Regular)   Pulse 80   Temp 99.2  F (37.3  C) (Tympanic)   Resp 20   Ht 1.626 m (5' 4\")   Wt 72.6 kg (160 lb)   SpO2 98%   BMI 27.46 kg/m    Body mass index is 27.46 kg/m .  Physical Exam   GENERAL: healthy, alert and no distress  EYES: Eyes grossly normal to inspection, PERRL and conjunctivae and sclerae normal  HENT: ear canals and TM's normal, nose and mouth without ulcers or lesions  NECK: no adenopathy, no asymmetry, masses, or scars and thyroid normal to palpation  RESP: lungs clear to auscultation - no rales, rhonchi or wheezes  CV: regular rate and rhythm, normal S1 S2, no S3 or S4, no murmur, click or rub, no " peripheral edema and peripheral pulses strong  ABDOMEN: soft, nontender, no hepatosplenomegaly, no masses and bowel sounds normal  MS: no gross musculoskeletal defects noted, no edema  SKIN: no suspicious lesions or rashes  NEURO: Normal strength and tone, mentation intact and speech normal  BACK: no CVA tenderness, no paralumbar tenderness    Office Visit on 10/13/2021   Component Date Value Ref Range Status     Color Urine 10/13/2021 Light Yellow  Colorless, Straw, Light Yellow, Yellow Final     Appearance Urine 10/13/2021 Clear  Clear Final     Glucose Urine 10/13/2021 Negative  Negative mg/dL Final     Bilirubin Urine 10/13/2021 Negative  Negative Final     Ketones Urine 10/13/2021 Negative  Negative mg/dL Final     Specific Gravity Urine 10/13/2021 1.018  1.003 - 1.035 Final     Blood Urine 10/13/2021 Negative  Negative Final     pH Urine 10/13/2021 6.0  4.7 - 8.0 Final     Protein Albumin Urine 10/13/2021 Negative  Negative mg/dL Final     Urobilinogen Urine 10/13/2021 Normal  Normal, 2.0 mg/dL Final     Nitrite Urine 10/13/2021 Negative  Negative Final     Leukocyte Esterase Urine 10/13/2021 Negative  Negative Final     Sodium 10/13/2021 137  133 - 144 mmol/L Final     Potassium 10/13/2021 3.5  3.4 - 5.3 mmol/L Final     Chloride 10/13/2021 105  94 - 109 mmol/L Final     Carbon Dioxide (CO2) 10/13/2021 27  20 - 32 mmol/L Final     Anion Gap 10/13/2021 5  3 - 14 mmol/L Final     Urea Nitrogen 10/13/2021 11  7 - 30 mg/dL Final     Creatinine 10/13/2021 0.63  0.52 - 1.04 mg/dL Final     Calcium 10/13/2021 9.1  8.5 - 10.1 mg/dL Final     Glucose 10/13/2021 140* 70 - 99 mg/dL Final     Alkaline Phosphatase 10/13/2021 83  40 - 150 U/L Final     AST 10/13/2021 12  0 - 45 U/L Final     ALT 10/13/2021 26  0 - 50 U/L Final     Protein Total 10/13/2021 7.7  6.8 - 8.8 g/dL Final     Albumin 10/13/2021 4.0  3.4 - 5.0 g/dL Final     Bilirubin Total 10/13/2021 0.5  0.2 - 1.3 mg/dL Final     GFR Estimate 10/13/2021 >90  >60  mL/min/1.73m2 Final    As of July 11, 2021, eGFR is calculated by the CKD-EPI creatinine equation, without race adjustment. eGFR can be influenced by muscle mass, exercise, and diet. The reported eGFR is an estimation only and is only applicable if the renal function is stable.

## 2022-01-12 ENCOUNTER — OFFICE VISIT (OUTPATIENT)
Dept: FAMILY MEDICINE | Facility: OTHER | Age: 42
End: 2022-01-12
Attending: PHYSICIAN ASSISTANT
Payer: COMMERCIAL

## 2022-01-12 VITALS
DIASTOLIC BLOOD PRESSURE: 72 MMHG | WEIGHT: 160 LBS | BODY MASS INDEX: 27.31 KG/M2 | HEART RATE: 80 BPM | RESPIRATION RATE: 20 BRPM | SYSTOLIC BLOOD PRESSURE: 126 MMHG | OXYGEN SATURATION: 98 % | TEMPERATURE: 99.2 F | HEIGHT: 64 IN

## 2022-01-12 DIAGNOSIS — I10 ESSENTIAL HYPERTENSION: ICD-10-CM

## 2022-01-12 DIAGNOSIS — F33.1 MODERATE EPISODE OF RECURRENT MAJOR DEPRESSIVE DISORDER (H): Primary | ICD-10-CM

## 2022-01-12 PROCEDURE — G0463 HOSPITAL OUTPT CLINIC VISIT: HCPCS

## 2022-01-12 PROCEDURE — 99214 OFFICE O/P EST MOD 30 MIN: CPT | Performed by: PHYSICIAN ASSISTANT

## 2022-01-12 RX ORDER — DIVALPROEX SODIUM 250 MG/1
250 TABLET, DELAYED RELEASE ORAL 2 TIMES DAILY
Qty: 60 TABLET | Refills: 1 | Status: SHIPPED | OUTPATIENT
Start: 2022-01-12 | End: 2022-03-10

## 2022-01-12 ASSESSMENT — PAIN SCALES - GENERAL: PAINLEVEL: NO PAIN (0)

## 2022-01-12 ASSESSMENT — MIFFLIN-ST. JEOR: SCORE: 1370.76

## 2022-01-12 NOTE — NURSING NOTE
"No chief complaint on file.      Initial /72 (BP Location: Left arm, Patient Position: Sitting, Cuff Size: Adult Regular)   Pulse 80   Temp 99.2  F (37.3  C) (Tympanic)   Resp 20   Ht 1.626 m (5' 4\")   Wt 72.6 kg (160 lb)   SpO2 98%   BMI 27.46 kg/m   Estimated body mass index is 27.46 kg/m  as calculated from the following:    Height as of this encounter: 1.626 m (5' 4\").    Weight as of this encounter: 72.6 kg (160 lb).  Medication Reconciliation: complete  Sejal Crowder LPN      "

## 2022-01-12 NOTE — PATIENT INSTRUCTIONS
Psychologists/ Counselors      [facility]   [phone]  [facility]   [phone]  [facility] [phone]  [facility] (kids) [phone]  [facility](teens) [phone]  [facility]  [phone]  [facility]  [phone]  [facility]  [phone]  [facility]  [phone]  [facility] counseling [phone]   [facility]   [phone]   [facility]    [phone]   [facility].   [phone]   (Yolanda Caicedo)  [facility]                          [phone]  (MALICK Rocha)                                                      [facility]                      [phone]           [facility]  [phone]  [facility] [phone]  [facility]  [phone]  [facility]  [phone]     [facility] [phone]      [facility] [phone]  [facility] counseling [phone]  [facility]   [phone]  [facility] (MALICK Rocha)                                                   [phone]  [facility]  [phone]  [facility] [phone]  [facility]      [phone]    [employer] [facility]  [phone]  [facility]      [phone]     [place]  Theron Bowmanen   [phone]  [facility]      [phone]  [facility] Dr. ALISE Armstrong [phone]  [facility]      [phone]  [facility]  [phone]    [facility]                      [phone]    *Facilities in bold italics indicate medication management  Services are offered.    Crisis support  Mobile crisis line- [phone]  [facility]: Free online resources including therapy for Mental Health and substance use problems: Http://thriverange.org    Crisis Text Line  Text HOME to 602-110 - The Crisis  Text Line serves anyone, in any type of crisis, providing access to free, 24/7 support and information via the medium people already use and trust  http://www.crisistextline.org   Here's how it works:  Text HOME to 664-569 from anywhere in the [place], anytime, about any type of crisis.  A live, trained Crisis Counselor receives the text and responds quickly.  The volunteer Crisis Counselor will help you move from a 'hot moment to a cool moment'

## 2022-02-26 DIAGNOSIS — R06.09 DYSPNEA ON EXERTION: ICD-10-CM

## 2022-02-26 DIAGNOSIS — R05.9 COUGH: ICD-10-CM

## 2022-02-26 NOTE — TELEPHONE ENCOUNTER
albuterol (PROAIR HFA/PROVENTIL HFA/VENTOLIN HFA) 108 (90 Base) MCG/ACT inhaler      Last Written Prescription Date:  12/1/2021  Last Fill Quantity: 8.5g,   # refills: 2  Last Office Visit: 2/11/2022  Future Office visit:

## 2022-02-28 RX ORDER — ALBUTEROL SULFATE 90 UG/1
AEROSOL, METERED RESPIRATORY (INHALATION)
Qty: 8.5 G | Refills: 2 | Status: SHIPPED | OUTPATIENT
Start: 2022-02-28 | End: 2023-02-14

## 2022-03-09 NOTE — PROGRESS NOTES
"  Assessment & Plan     Kimberley has high blood pressure and is a tobacco user. She is too high risk for hormone medication for heavy menses. Will refer to GYN for further evaluation and treatment.     Update imaging for increase in headache frequency.          (N93.9) Vaginal bleeding  (primary encounter diagnosis)  Comment: check labs   Plan: CBC with platelets and differential,         Comprehensive metabolic panel, TSH with free T4        reflex, Follicle stimulating hormone, DHEA         sulfate, Prolactin, Luteinizing Hormone, Adult,        Estradiol, Iron and iron binding capacity,         Ferritin            (G43.109) Migraine with aura and without status migrainosus, not intractable  Comment: head imaging with change in characteristic of migraines   Plan: CT Head w/o Contrast            (N92.1) Menorrhagia with irregular cycle  Comment: ref to GYN for further evaluation and treatment   Plan: Ob/Gyn Referral             Tobacco Cessation:   reports that she has been smoking cigarettes. She started smoking about 33 years ago. She has a 28.00 pack-year smoking history. She has never used smokeless tobacco.  Tobacco Cessation Action Plan: Information offered: Patient not interested at this time    BMI:   Estimated body mass index is 27.46 kg/m  as calculated from the following:    Height as of 1/12/22: 1.626 m (5' 4\").    Weight as of this encounter: 72.6 kg (160 lb).   Weight management plan: Discussed healthy diet and exercise guidelines    See Patient Instructions    Return if symptoms worsen or fail to improve.    Jazmyne Villafana NP  [facility]      Kimberley is a 42 year old who presents for the following health issues     HPI     Menstrual Concern; Patient reports regular periods have been 8 days in duration (five heavy), Very heavy and presented with blood clots  Onset/Duration: 2/13/2022  Description:   Duration of bleeding episodes: 23 days  Frequency of periods: " "(1st day of one to 1st day of next):  every 28 days, usually   Describe bleeding/flow:   Clots: YES  Number of pads/day: 24 pads/day with initiation of bleeding, tapering off to 3/day         Cramping: severe  Accompanying Signs & Symptoms:  Lightheadedness: YES  Temperature intolerance: YES- Hot flashes   Nosebleeds/Easy bruising: no  Vaginal Discharge: no  Acne: YES- \"period pimple\" - patient states no abnormal acne, however she gets the same pimple monthly   Change in body hair: no  History:  Patient's last menstrual period was 02/13/2022.  Previous normal periods: no - regular, however heavy with clots and severe cramps   Contraceptive use: tubal ligation  Sexually active: YES  Any bleeding after intercourse: no  Abnormal PAP Smears: no  Precipitating or alleviating factors: None  Therapies tried and outcome: None  Chronic vaginal dryness.    Her migraines have been worsening. Her headache characteristic has changed to feeling a sharp pain to right mid scalp. Her vision becomes blurry with headaches and sees white lines. She is light sensitive. Denies recent head injury or trauma. She takes Excedrin migraine which does help. She has been prescribed Imitrex in the past, but was scared to take it after she read the side effects.      Review of Systems   Constitutional, HEENT, cardiovascular, pulmonary, gi and gu systems are negative, except as otherwise noted.      Objective    /70   Pulse 81   Temp 98.6  F (37  C)   Resp 18   Wt 72.6 kg (160 lb)   LMP 02/13/2022   SpO2 97%   BMI 27.46 kg/m    Body mass index is 27.46 kg/m .  Physical Exam   GENERAL: healthy, alert and no distress  EYES: Eyes grossly normal to inspection, PERRL and conjunctivae and sclerae normal  HENT: ear canals and TM's normal, nose and mouth without ulcers or lesions  NECK: no adenopathy, no asymmetry, masses, or scars and thyroid normal to palpation  RESP: lungs clear to auscultation - no rales, rhonchi or wheezes  CV: regular " rate and rhythm, normal S1 S2, no S3 or S4, no murmur, click or rub, no peripheral edema and peripheral pulses strong  MS: no gross musculoskeletal defects noted, no edema  SKIN: no suspicious lesions or rashes  NEURO: Normal strength and tone, mentation intact and speech normal. CN II-XII grossly intact   PSYCH: mentation appears normal, affect normal/bright

## 2022-03-10 ENCOUNTER — OFFICE VISIT (OUTPATIENT)
Dept: FAMILY MEDICINE | Facility: OTHER | Age: 42
End: 2022-03-10
Attending: NURSE PRACTITIONER
Payer: COMMERCIAL

## 2022-03-10 VITALS
BODY MASS INDEX: 27.46 KG/M2 | DIASTOLIC BLOOD PRESSURE: 70 MMHG | RESPIRATION RATE: 18 BRPM | SYSTOLIC BLOOD PRESSURE: 122 MMHG | HEART RATE: 81 BPM | OXYGEN SATURATION: 97 % | WEIGHT: 160 LBS | TEMPERATURE: 98.6 F

## 2022-03-10 DIAGNOSIS — N92.1 MENORRHAGIA WITH IRREGULAR CYCLE: ICD-10-CM

## 2022-03-10 DIAGNOSIS — N93.9 VAGINAL BLEEDING: Primary | ICD-10-CM

## 2022-03-10 DIAGNOSIS — G43.109 MIGRAINE WITH AURA AND WITHOUT STATUS MIGRAINOSUS, NOT INTRACTABLE: ICD-10-CM

## 2022-03-10 LAB
BASOPHILS # BLD AUTO: 0.1 10E3/UL (ref 0–0.2)
BASOPHILS NFR BLD AUTO: 0 %
EOSINOPHIL # BLD AUTO: 0.3 10E3/UL (ref 0–0.7)
EOSINOPHIL NFR BLD AUTO: 3 %
ERYTHROCYTE [DISTWIDTH] IN BLOOD BY AUTOMATED COUNT: 13.9 % (ref 10–15)
HCT VFR BLD AUTO: 37.5 % (ref 35–47)
HGB BLD-MCNC: 12 G/DL (ref 11.7–15.7)
LYMPHOCYTES # BLD AUTO: 2.9 10E3/UL (ref 0.8–5.3)
LYMPHOCYTES NFR BLD AUTO: 23 %
MCH RBC QN AUTO: 28.2 PG (ref 26.5–33)
MCHC RBC AUTO-ENTMCNC: 32 G/DL (ref 31.5–36.5)
MCV RBC AUTO: 88 FL (ref 78–100)
MONOCYTES # BLD AUTO: 0.8 10E3/UL (ref 0–1.3)
MONOCYTES NFR BLD AUTO: 6 %
NEUTROPHILS # BLD AUTO: 8.8 10E3/UL (ref 1.6–8.3)
NEUTROPHILS NFR BLD AUTO: 68 %
PLATELET # BLD AUTO: 489 10E3/UL (ref 150–450)
RBC # BLD AUTO: 4.25 10E6/UL (ref 3.8–5.2)
WBC # BLD AUTO: 12.9 10E3/UL (ref 4–11)

## 2022-03-10 PROCEDURE — 82627 DEHYDROEPIANDROSTERONE: CPT | Mod: ZL | Performed by: NURSE PRACTITIONER

## 2022-03-10 PROCEDURE — 80053 COMPREHEN METABOLIC PANEL: CPT | Mod: ZL | Performed by: NURSE PRACTITIONER

## 2022-03-10 PROCEDURE — G0463 HOSPITAL OUTPT CLINIC VISIT: HCPCS | Performed by: NURSE PRACTITIONER

## 2022-03-10 PROCEDURE — 84443 ASSAY THYROID STIM HORMONE: CPT | Mod: ZL | Performed by: NURSE PRACTITIONER

## 2022-03-10 PROCEDURE — 82040 ASSAY OF SERUM ALBUMIN: CPT | Mod: ZL | Performed by: NURSE PRACTITIONER

## 2022-03-10 PROCEDURE — 99214 OFFICE O/P EST MOD 30 MIN: CPT | Performed by: NURSE PRACTITIONER

## 2022-03-10 PROCEDURE — 83002 ASSAY OF GONADOTROPIN (LH): CPT | Mod: ZL | Performed by: NURSE PRACTITIONER

## 2022-03-10 PROCEDURE — 36415 COLL VENOUS BLD VENIPUNCTURE: CPT | Mod: ZL | Performed by: NURSE PRACTITIONER

## 2022-03-10 PROCEDURE — 82728 ASSAY OF FERRITIN: CPT | Mod: ZL | Performed by: NURSE PRACTITIONER

## 2022-03-10 PROCEDURE — 84146 ASSAY OF PROLACTIN: CPT | Mod: ZL | Performed by: NURSE PRACTITIONER

## 2022-03-10 PROCEDURE — 83001 ASSAY OF GONADOTROPIN (FSH): CPT | Mod: ZL | Performed by: NURSE PRACTITIONER

## 2022-03-10 PROCEDURE — 82670 ASSAY OF TOTAL ESTRADIOL: CPT | Mod: ZL | Performed by: NURSE PRACTITIONER

## 2022-03-10 PROCEDURE — 85004 AUTOMATED DIFF WBC COUNT: CPT | Mod: ZL | Performed by: NURSE PRACTITIONER

## 2022-03-10 PROCEDURE — 83550 IRON BINDING TEST: CPT | Mod: ZL | Performed by: NURSE PRACTITIONER

## 2022-03-10 ASSESSMENT — ANXIETY QUESTIONNAIRES
IF YOU CHECKED OFF ANY PROBLEMS ON THIS QUESTIONNAIRE, HOW DIFFICULT HAVE THESE PROBLEMS MADE IT FOR YOU TO DO YOUR WORK, TAKE CARE OF THINGS AT HOME, OR GET ALONG WITH OTHER PEOPLE: NOT DIFFICULT AT ALL
2. NOT BEING ABLE TO STOP OR CONTROL WORRYING: NOT AT ALL
3. WORRYING TOO MUCH ABOUT DIFFERENT THINGS: NOT AT ALL
7. FEELING AFRAID AS IF SOMETHING AWFUL MIGHT HAPPEN: NOT AT ALL
1. FEELING NERVOUS, ANXIOUS, OR ON EDGE: NOT AT ALL
GAD7 TOTAL SCORE: 1
5. BEING SO RESTLESS THAT IT IS HARD TO SIT STILL: NOT AT ALL
4. TROUBLE RELAXING: NOT AT ALL
6. BECOMING EASILY ANNOYED OR IRRITABLE: SEVERAL DAYS

## 2022-03-10 ASSESSMENT — PAIN SCALES - GENERAL: PAINLEVEL: NO PAIN (0)

## 2022-03-10 ASSESSMENT — PATIENT HEALTH QUESTIONNAIRE - PHQ9: SUM OF ALL RESPONSES TO PHQ QUESTIONS 1-9: 6

## 2022-03-10 NOTE — NURSING NOTE
"Chief Complaint   Patient presents with     Vaginal Bleeding       Initial /70   Pulse 81   Temp 98.6  F (37  C)   Resp 18   Wt 72.6 kg (160 lb)   LMP 02/13/2022   SpO2 97%   BMI 27.46 kg/m   Estimated body mass index is 27.46 kg/m  as calculated from the following:    Height as of 1/12/22: 1.626 m (5' 4\").    Weight as of this encounter: 72.6 kg (160 lb).  Medication Reconciliation: boby Glover  "

## 2022-03-10 NOTE — PATIENT INSTRUCTIONS
Patient Education     Dysfunctional Uterine Bleeding    Dysfunctional uterine bleeding is also called abnormal uterine bleeding. It's a condition in which bleeding is abnormal and occurs at unexpected times of the month. This happens because of changes in the hormones that help control a woman s menstrual cycle each month.   The bleeding may be heavier or lighter than normal. If you have heavy bleeding often, this can lead to a problem called anemia. With anemia, your red blood cell count is too low. Red blood cells help carry oxygen throughout your body. Severe anemia may cause you to look pale and feel very weak or tired. You might also become short of breath easily.   To treat dysfunctional uterine bleeding, you may take medicines. If these don t help, or if you have other symptoms or have reached menopause, you may need more testing and other treatments. Discuss all of your options with your provider.   Home care  Medicines  If you re prescribed medicines, take them as directed. Some of the more common medicines you may be prescribed include:     Hormone therapy. These include most methods of hormonal birth control such as pills, shots, or a hormone-releasing IUD.    Nonsteroidal anti-inflammatory drugs (NSAIDs), such as ibuprofen    Tranexamic acid. This helps your blood clot.    Iron supplements, if you have anemia     General care    Get plenty of rest if you tire easily. Don't do strenuous exercise.    To help ease pain or cramping that may occur with bleeding, try using a heating pad on the lower belly or back. A warm bath may also help.    Follow-up care  Follow up with your healthcare provider, or as directed.  When to seek medical advice  Call your healthcare provider right away if:    Bleeding becomes heavy (soaking 1 pad or tampon every hour for 3 hours)    Increased abdominal pain    Irregular bleeding gets worse or does not get better even with treatment    Fever of 100.4 F (38 C) or higher, or as  "directed by your provider    Signs of anemia, such as pale skin, extreme fatigue or weakness, or shortness of breath    Dizziness or fainting   [employer] last reviewed this educational content on 7/1/2020 2000-2021 [employer]. All rights reserved. This information is not intended as a substitute for professional medical care. Always follow your healthcare professional's instructions.    Patient Education     Migraine Headache: Stages and Treatment    A migraine headache tends to progress in stages. Learning these stages can help you better understand what is happening. Then you can learn ways to reduce pain and relieve other symptoms. Methods for relieving your symptoms include self-care and medicines.   Migraine stages  Migraines tend to progress through 4 stages. Many people don't have all stages, and stages may differ with each headache:     Prodrome. A few hours to a day or so before the headache, you may feel tired, (yawning many times), uneasy, or werner. You may also feel bloated or crave certain foods.    Aura. Up to an hour before the headache starts, some migraine sufferers experience aura--flashing lights, blind spots, other vision problems, confusion, difficulty speaking, or other neurologic symptoms.    Headache. Moderate to severe pain affects one side of the head and then can spread to both sides, often along with nausea. You may be highly sensitive to light, sound, and odors. Vomiting or diarrhea may also happen. This stage lasts 4 to 72 hours.    Postdrome. After your headache ends, you may feel tired, achy, and \"washed out.\" This may last for a day or so.  Self-care during a migraine  Here is what you can do:    Use a cold compress. Wrap a thin cloth around a cold pack, a cold can of soda, or a bag of frozen vegetables. Apply this to your temple or other pain site.    Drink fluids. If nausea makes it hard to drink, try sucking on ice.    Rest. If possible, lie down. Try not to bend " over, as this may increase your pain. Sometimes laying in a dark quiet room can help the migraine from being aggravated.      Try caffeine. Some people find that drinking fluids with caffeine, such as coffee or tea, helps to lessen migraine pain.  Using medicines  Work with your healthcare provider to find the right medicines for you. Medicines for migraine may relieve pain (analgesics), relieve nausea, or attack the migraine's root causes (migraine-specific medicines).   Rebound headache  Taking analgesics each day, or even several times a week, may lead to more frequent and severe headaches. These are called rebound headaches. If you think you're having rebound headaches, tell your healthcare provider. He or she can help you safely decrease your medicine. Rebound caffeine withdrawal headaches can also happen. Certain medicines are addictive and can cause rebound headaches when discontinued abruptly.   [employer] last reviewed this educational content on 5/1/2018 2000-2021 [employer]. All rights reserved. This information is not intended as a substitute for professional medical care. Always follow your healthcare professional's instructions.

## 2022-03-11 ENCOUNTER — MYC MEDICAL ADVICE (OUTPATIENT)
Dept: FAMILY MEDICINE | Facility: OTHER | Age: 42
End: 2022-03-11

## 2022-03-11 ENCOUNTER — HOSPITAL ENCOUNTER (OUTPATIENT)
Dept: CT IMAGING | Facility: HOSPITAL | Age: 42
Discharge: HOME | End: 2022-03-11
Attending: NURSE PRACTITIONER | Admitting: NURSE PRACTITIONER
Payer: COMMERCIAL

## 2022-03-11 DIAGNOSIS — G43.109 MIGRAINE WITH AURA AND WITHOUT STATUS MIGRAINOSUS, NOT INTRACTABLE: ICD-10-CM

## 2022-03-11 LAB
ALBUMIN SERPL-MCNC: 3.9 G/DL (ref 3.4–5)
ALP SERPL-CCNC: 89 U/L (ref 40–150)
ALT SERPL W P-5'-P-CCNC: 29 U/L (ref 0–50)
ANION GAP SERPL CALCULATED.3IONS-SCNC: 8 MMOL/L (ref 3–14)
AST SERPL W P-5'-P-CCNC: 7 U/L (ref 0–45)
BILIRUB SERPL-MCNC: 0.4 MG/DL (ref 0.2–1.3)
BUN SERPL-MCNC: 8 MG/DL (ref 7–30)
CALCIUM SERPL-MCNC: 8.9 MG/DL (ref 8.5–10.1)
CHLORIDE BLD-SCNC: 105 MMOL/L (ref 94–109)
CO2 SERPL-SCNC: 23 MMOL/L (ref 20–32)
CREAT SERPL-MCNC: 0.64 MG/DL (ref 0.52–1.04)
ESTRADIOL SERPL-MCNC: 94 PG/ML
FERRITIN SERPL-MCNC: 4 NG/ML (ref 12–150)
FSH SERPL-ACNC: 4.9 IU/L
GFR SERPL CREATININE-BSD FRML MDRD: >90 ML/MIN/1.73M2
GLUCOSE BLD-MCNC: 104 MG/DL (ref 70–99)
IRON SATN MFR SERPL: 8 % (ref 15–46)
IRON SERPL-MCNC: 35 UG/DL (ref 35–180)
LH SERPL-ACNC: 7.3 IU/L
POTASSIUM BLD-SCNC: 3.5 MMOL/L (ref 3.4–5.3)
PROLACTIN SERPL-MCNC: 7 UG/L (ref 3–27)
PROT SERPL-MCNC: 7.4 G/DL (ref 6.8–8.8)
SODIUM SERPL-SCNC: 136 MMOL/L (ref 133–144)
TIBC SERPL-MCNC: 414 UG/DL (ref 240–430)
TSH SERPL DL<=0.005 MIU/L-ACNC: 0.86 MU/L (ref 0.4–4)

## 2022-03-11 PROCEDURE — 70450 CT HEAD/BRAIN W/O DYE: CPT

## 2022-03-11 ASSESSMENT — ANXIETY QUESTIONNAIRES: GAD7 TOTAL SCORE: 1

## 2022-03-14 LAB — DHEA-S SERPL-MCNC: 240 UG/DL (ref 35–430)

## 2022-03-19 ENCOUNTER — HEALTH MAINTENANCE LETTER (OUTPATIENT)
Age: 42
End: 2022-03-19

## 2022-03-28 ENCOUNTER — OFFICE VISIT (OUTPATIENT)
Dept: FAMILY MEDICINE | Facility: OTHER | Age: 42
End: 2022-03-28
Attending: NURSE PRACTITIONER
Payer: COMMERCIAL

## 2022-03-28 VITALS
TEMPERATURE: 98.6 F | OXYGEN SATURATION: 98 % | DIASTOLIC BLOOD PRESSURE: 74 MMHG | HEART RATE: 76 BPM | RESPIRATION RATE: 18 BRPM | BODY MASS INDEX: 27.81 KG/M2 | WEIGHT: 162 LBS | SYSTOLIC BLOOD PRESSURE: 122 MMHG

## 2022-03-28 DIAGNOSIS — R79.0 LOW FERRITIN LEVEL: Primary | ICD-10-CM

## 2022-03-28 PROCEDURE — G0463 HOSPITAL OUTPT CLINIC VISIT: HCPCS | Performed by: NURSE PRACTITIONER

## 2022-03-28 PROCEDURE — 36415 COLL VENOUS BLD VENIPUNCTURE: CPT | Mod: ZL | Performed by: NURSE PRACTITIONER

## 2022-03-28 PROCEDURE — 83550 IRON BINDING TEST: CPT | Mod: ZL | Performed by: NURSE PRACTITIONER

## 2022-03-28 PROCEDURE — 82728 ASSAY OF FERRITIN: CPT | Mod: ZL | Performed by: NURSE PRACTITIONER

## 2022-03-28 PROCEDURE — 99213 OFFICE O/P EST LOW 20 MIN: CPT | Performed by: NURSE PRACTITIONER

## 2022-03-28 ASSESSMENT — PAIN SCALES - GENERAL: PAINLEVEL: MILD PAIN (2)

## 2022-03-28 NOTE — PATIENT INSTRUCTIONS
Patient Education     Heavy Menstrual Bleeding    Heavy menstrual bleeding means that your periods are heavier or longer than normal. You may soak through a pad or tampon every 1 to 3 hours on the heaviest days of your period. You may also pass large, dark clots. And your periods may last longer than 7 days.   If you have heavy periods often, this can cause a problem called anemia. With anemia, your red blood cell count is too low. Red blood cells are needed as they help carry oxygen all over your body. Severe anemia may make you look pale and feel weak or tired. You might also get short of breath easily.   There are many possible causes of heavy menstrual bleeding. Hormonal imbalance is the most common cause. Having noncancer (benign) growths in your uterus is another cause. These include fibroids or polyps. Taking certain medicines or having certain health problems or bleeding disorders are also causes.   To treat heavy menstrual bleeding, your healthcare provider may prescribe medicines first. If these don t help, you may need more testing and treatments.   Home care  Medicines  If you re prescribed medicines, take them as directed.     To help control heavy bleeding, any of these may be used:  ? Hormone therapy (this includes all types of hormonal birth control such as pills, shots, cream, ring, patch, or hormone-releasing IUD)  ? Nonsteroidal anti-inflammatory drugs (NSAIDs), such as ibuprofen  ? Antifibrinolytic medicines, such as transexamic acid    To help treat anemia, iron supplements may be prescribed.            General care    Get plenty of rest if you tire easily. Avoid heavy exertion.    To help ease pain or cramping, try using a heating pad on the lower belly or back. A warm bath may also help.    Follow-up care  Follow up with your healthcare provider, or as advised.   When to get medical advice  Call your healthcare provider right away if any of these occur:     Heavier bleeding (soaking 1 pad or  tampon every hour for 3 hours)    Heavy bleeding that lasts longer than 1 week    Fever of 100.4 F (38 C) or higher, or as directed by your provider    Pain or cramping that gets worse instead of better    Signs of anemia such as pale skin, extreme tiredness or weakness, or shortness of breath    Dizziness or fainting  Irma last reviewed this educational content on 6/1/2020 2000-2021 [employer]. All rights reserved. This information is not intended as a substitute for professional medical care. Always follow your healthcare professional's instructions.

## 2022-03-28 NOTE — NURSING NOTE
"Chief Complaint   Patient presents with     Results       Initial /74   Pulse 76   Temp 98.6  F (37  C)   Resp 18   Wt 73.5 kg (162 lb)   LMP 03/26/2022 (Exact Date)   SpO2 98%   BMI 27.81 kg/m   Estimated body mass index is 27.81 kg/m  as calculated from the following:    Height as of 1/12/22: 1.626 m (5' 4\").    Weight as of this encounter: 73.5 kg (162 lb).  Medication Reconciliation: boby Glover  "

## 2022-03-28 NOTE — PROGRESS NOTES
Assessment & Plan     (R79.0) Low ferritin level  (primary encounter diagnosis)  Comment: recheck labs today. If ferritin and iron binding continue to be low, I will refer her to hematology. Labs pending   Plan: Ferritin, Iron and iron binding capacity            See Patient Instructions    Return if symptoms worsen or fail to improve.    Jazmyne Villafana NP  [facility]    Chris Chu is a 42 year old who presents for the following health issues     HPI     Concern - Lab follow up//irregular mensuration   Onset: 2/2022  Description: Menstruation 2/11-3/8. No period x16 days. Mensuration began 3/26/2022  Intensity: moderate, severe  Progression of Symptoms:  same  Accompanying Signs & Symptoms: Low iron, low ferritin, fatigue, weakness, cramps    Previous history of similar problem: Hx of heavy mensturation lasting 8-9 days   Precipitating factors:        Worsened by: None  Alleviating factors:        Improved by: None  Therapies tried and outcome: None - referral to GYN, unable to attend r/t family health concerns out of town. She was with her mother in the [facility] as she suffered a brain bleed. She will reschedule to see GYN.     Her mother went through menopause at age 44-45 range.     Follow up on Ferritin and iron binding lab.     Review of Systems   Constitutional, HEENT, cardiovascular, pulmonary, gi and gu systems are negative, except as otherwise noted.      Objective    /74   Pulse 76   Temp 98.6  F (37  C)   Resp 18   Wt 73.5 kg (162 lb)   LMP 03/26/2022 (Exact Date)   SpO2 98%   BMI 27.81 kg/m    Body mass index is 27.81 kg/m .  Physical Exam   GENERAL: healthy, alert and no distress  RESP: lungs clear to auscultation - no rales, rhonchi or wheezes  CV: regular rate and rhythm, normal S1 S2, no S3 or S4, no murmur, click or rub, no peripheral edema and peripheral pulses strong  MS: no gross musculoskeletal defects noted, no edema  SKIN: no suspicious  lesions or rashes  NEURO: Normal strength and tone, mentation intact and speech normal  PSYCH: mentation appears normal, affect normal/bright

## 2022-03-29 LAB
FERRITIN SERPL-MCNC: 6 NG/ML (ref 12–150)
IRON SATN MFR SERPL: 6 % (ref 15–46)
IRON SERPL-MCNC: 28 UG/DL (ref 35–180)
TIBC SERPL-MCNC: 458 UG/DL (ref 240–430)

## 2022-03-31 DIAGNOSIS — R07.89 ATYPICAL CHEST PAIN: ICD-10-CM

## 2022-03-31 DIAGNOSIS — I10 ESSENTIAL HYPERTENSION: ICD-10-CM

## 2022-04-01 RX ORDER — AMLODIPINE BESYLATE 10 MG/1
TABLET ORAL
Qty: 30 TABLET | Refills: 0 | Status: SHIPPED | OUTPATIENT
Start: 2022-04-01 | End: 2022-05-06

## 2022-04-01 NOTE — TELEPHONE ENCOUNTER
Amlodipine      Last Written Prescription Date:  12/1/21  Last Fill Quantity: 30,   # refills: 3  Last Office Visit: 3/10/22  Future Office visit:    Next 5 appointments (look out 90 days)    Apr 12, 2022  8:30 AM  (Arrive by 8:15 AM)  Office Visit with Bunny Hyatt MD  [facility] [place] ([facility] [place] ) [address] [place] [address]  [phone]

## 2022-04-12 ENCOUNTER — OFFICE VISIT (OUTPATIENT)
Dept: OBGYN | Facility: OTHER | Age: 42
End: 2022-04-12
Attending: OBSTETRICS & GYNECOLOGY
Payer: COMMERCIAL

## 2022-04-12 VITALS
DIASTOLIC BLOOD PRESSURE: 74 MMHG | HEIGHT: 64 IN | OXYGEN SATURATION: 98 % | HEART RATE: 77 BPM | WEIGHT: 162 LBS | BODY MASS INDEX: 27.66 KG/M2 | SYSTOLIC BLOOD PRESSURE: 118 MMHG

## 2022-04-12 DIAGNOSIS — N92.1 MENORRHAGIA WITH IRREGULAR CYCLE: Primary | ICD-10-CM

## 2022-04-12 DIAGNOSIS — N94.6 DYSMENORRHEA: ICD-10-CM

## 2022-04-12 DIAGNOSIS — N60.01 BREAST CYST, RIGHT: ICD-10-CM

## 2022-04-12 DIAGNOSIS — Z12.31 ENCOUNTER FOR SCREENING MAMMOGRAM FOR BREAST CANCER: ICD-10-CM

## 2022-04-12 PROCEDURE — G0463 HOSPITAL OUTPT CLINIC VISIT: HCPCS

## 2022-04-12 PROCEDURE — 99213 OFFICE O/P EST LOW 20 MIN: CPT | Mod: 57 | Performed by: OBSTETRICS & GYNECOLOGY

## 2022-04-12 ASSESSMENT — PAIN SCALES - GENERAL: PAINLEVEL: NO PAIN (0)

## 2022-04-12 NOTE — NURSING NOTE
"Chief Complaint   Patient presents with     Consult     Consult/ [place]/ menorrhagia       Initial /74 (BP Location: Left arm, Cuff Size: Adult Regular)   Pulse 77   Ht 1.626 m (5' 4\")   Wt 73.5 kg (162 lb)   LMP 03/26/2022 (Exact Date)   SpO2 98%   BMI 27.81 kg/m   Estimated body mass index is 27.81 kg/m  as calculated from the following:    Height as of this encounter: 1.626 m (5' 4\").    Weight as of this encounter: 73.5 kg (162 lb).  Medication Reconciliation: complete  Gina Gonzales LPN  "

## 2022-04-12 NOTE — PROGRESS NOTES
CC:  Consult from GAGAN Villafana NP for mennorhagia  HPI:  Kimberley Louis is a 42 year old female P3 (CS). Patient's last menstrual period was 03/26/2022 (exact date)..  Menses are Irregular lasting up to 24  days with variable  heavy flow and severe dysmenorrhea. .  Her menstrual flow is limiting her clothing choices and interferes with lifestyle/activites. Occasional hot flashes/PMS.    See my prior eval in 2021.  Endometrial bx and pap nml at that time.  Pelvic US not done.       Clots: Yes  Intermenstrual bleeding: Does not know  Previous work-up:Yes:  Nml tsh, prol, FSH, LH and CBC.  Ferritin low.    Contraception: BTO  Abnormal Pap: No  Dysmenorrhea: Yes  Pelvic pain:No  Dyspareunia: No   Previous treatments:  BC/Depo-provera with daily bleeding.     Past GYN history:        Last PAP smear:  Normal/neg HPV 2 yrs ago    Patients records are available and reviewed at today's visit.    Past Medical History:   Diagnosis Date     Enlarged kidney 11/19/2015     Essential hypertension 11/19/2015     H/O renal calculi 11/19/2015     H/O renal calculi 11/19/2015       Past Surgical History:   Procedure Laterality Date     AS REMOVAL OF KIDNEY STONE       AS REMOVAL OF KIDNEY STONE       COMBINED CYSTOSCOPY, RETROGRADES, URETEROSCOPY, LASER HOLMIUM LITHOTRIPSY URETER(S), INSERT STENT Left 8/17/2020    Procedure: CYSTOURETEROSCOPY, WITH RETROGRADE PYELOGRAM, HOLMIUM LASER LITHOTRIPSY OF URETERAL CALCULUS, interpretation of fluroscopy;  Surgeon: Sherry Mcpherson MD;  Location: [facility]     TUBAL LIGATION       ZZC REMOVAL OF KIDNEY STONE         Family History   Problem Relation Age of Onset     Diabetes Mother      Emphysema Mother      Cervical Cancer Mother      Throat cancer Mother      Diabetes Father      Hypertension Father      Hypertension Maternal Grandmother      Diabetes Maternal Grandmother      Schizophrenia Maternal Grandmother      Unknown/Adopted Maternal Grandfather      Lung Cancer Paternal Grandmother       "Diabetes Paternal Grandmother      Unknown/Adopted Paternal Grandfather        Current Outpatient Medications   Medication Sig Dispense Refill     amLODIPine (NORVASC) 10 MG tablet Take 1 tablet by mouth once daily 30 tablet 0     metoprolol tartrate (LOPRESSOR) 25 MG tablet TAKE 1 TABLET(25 MG) BY MOUTH TWICE DAILY 60 tablet 11     PROAIR  (90 Base) MCG/ACT inhaler INHALE 2 PUFFS INTO THE LUNGS EVERY 6 HOURS 8.5 g 2     sertraline (ZOLOFT) 50 MG tablet Take 1 tablet (50 mg) by mouth daily 30 tablet 4     triamcinolone (KENALOG) 0.025 % external ointment Apply topically 2 times daily as needed for irritation 15 g 0       Allergies: Patient has no known allergies.    ROS:  CONSTITUTIONAL: NEGATIVE for fever, chills, change in weight  GI: NEGATIVE for nausea, abdominal pain, heartburn, or change in bowel habits  : NEGATIVE for frequency, dysuria, hematuria, vaginal discharge      EXAM:  Blood pressure 118/74, pulse 77, height 1.626 m (5' 4\"), weight 73.5 kg (162 lb), last menstrual period 03/26/2022, SpO2 98 %, not currently breastfeeding.   BMI= Body mass index is 27.81 kg/m .  General - pleasant female in no acute distress..  Rectovaginal - deferred.  Musculoskeletal - no gross deformities or edema  Neurological - normal mental status.      ASSESSMENT/PLAN:  Menorrhagia with irregular cycle  (primary encounter diagnosis)  Comment: desires definitive managemtn  Plan: US Pelvic Transabdominal and Transvaginal           (Z12.31) Encounter for screening mammogram for breast cancer.  H/o breast Cyst  Plan: MA Diagnostic Bilateral w/Aroldo           Discussed expectant,  medical, IUD and and surgical options(endometrial ablation/hysterectomy). Patient would like to proceed with minimally invasive  hysterectomy.  Discussed LSH/bilateral salpingectomy.  US pending.  If endometrial thickening consider D and C with frozen section.  Reviewed goals, risks, alternatives for planned procedure.  Including risk of bleeding, " infection, damage to nerves, blood vessels, bowel and bladder. Discussed recovery period and expected discomfort.. All questions were answered.  Preoperative instructions discussed.  Pt desires to proceed.

## 2022-04-18 ENCOUNTER — PREP FOR PROCEDURE (OUTPATIENT)
Dept: OBGYN | Facility: OTHER | Age: 42
End: 2022-04-18
Payer: COMMERCIAL

## 2022-04-18 DIAGNOSIS — Z11.52 ENCOUNTER FOR PREOPERATIVE SCREENING LABORATORY TESTING FOR COVID-19 VIRUS: ICD-10-CM

## 2022-04-18 DIAGNOSIS — N94.6 DYSMENORRHEA: ICD-10-CM

## 2022-04-18 DIAGNOSIS — N92.0 MENORRHAGIA: Primary | ICD-10-CM

## 2022-04-18 DIAGNOSIS — Z01.812 ENCOUNTER FOR PREOPERATIVE SCREENING LABORATORY TESTING FOR COVID-19 VIRUS: ICD-10-CM

## 2022-04-26 ENCOUNTER — OFFICE VISIT (OUTPATIENT)
Dept: FAMILY MEDICINE | Facility: OTHER | Age: 42
End: 2022-04-26
Attending: OBSTETRICS & GYNECOLOGY
Payer: COMMERCIAL

## 2022-04-26 ENCOUNTER — ANESTHESIA EVENT (OUTPATIENT)
Dept: SURGERY | Facility: HOSPITAL | Age: 42
End: 2022-04-26
Payer: COMMERCIAL

## 2022-04-26 VITALS
HEART RATE: 80 BPM | RESPIRATION RATE: 18 BRPM | WEIGHT: 165 LBS | OXYGEN SATURATION: 98 % | SYSTOLIC BLOOD PRESSURE: 120 MMHG | TEMPERATURE: 98.3 F | BODY MASS INDEX: 28.32 KG/M2 | DIASTOLIC BLOOD PRESSURE: 74 MMHG

## 2022-04-26 DIAGNOSIS — E61.1 LOW IRON: ICD-10-CM

## 2022-04-26 DIAGNOSIS — N92.1 MENORRHAGIA WITH IRREGULAR CYCLE: ICD-10-CM

## 2022-04-26 DIAGNOSIS — Z01.818 PREOP GENERAL PHYSICAL EXAM: Primary | ICD-10-CM

## 2022-04-26 LAB
ALBUMIN UR-MCNC: NEGATIVE MG/DL
APPEARANCE UR: CLEAR
BASOPHILS # BLD AUTO: 0 10E3/UL (ref 0–0.2)
BASOPHILS NFR BLD AUTO: 0 %
BILIRUB UR QL STRIP: NEGATIVE
COLOR UR AUTO: YELLOW
EOSINOPHIL # BLD AUTO: 0.3 10E3/UL (ref 0–0.7)
EOSINOPHIL NFR BLD AUTO: 2 %
ERYTHROCYTE [DISTWIDTH] IN BLOOD BY AUTOMATED COUNT: 15.5 % (ref 10–15)
GLUCOSE UR STRIP-MCNC: NEGATIVE MG/DL
HCT VFR BLD AUTO: 38 % (ref 35–47)
HGB BLD-MCNC: 12.2 G/DL (ref 11.7–15.7)
HGB UR QL STRIP: ABNORMAL
KETONES UR STRIP-MCNC: NEGATIVE MG/DL
LEUKOCYTE ESTERASE UR QL STRIP: NEGATIVE
LYMPHOCYTES # BLD AUTO: 3.3 10E3/UL (ref 0.8–5.3)
LYMPHOCYTES NFR BLD AUTO: 25 %
MCH RBC QN AUTO: 28.5 PG (ref 26.5–33)
MCHC RBC AUTO-ENTMCNC: 32.1 G/DL (ref 31.5–36.5)
MCV RBC AUTO: 89 FL (ref 78–100)
MONOCYTES # BLD AUTO: 0.7 10E3/UL (ref 0–1.3)
MONOCYTES NFR BLD AUTO: 5 %
NEUTROPHILS # BLD AUTO: 8.9 10E3/UL (ref 1.6–8.3)
NEUTROPHILS NFR BLD AUTO: 67 %
NITRATE UR QL: NEGATIVE
PH UR STRIP: 6 [PH] (ref 5–7)
PLATELET # BLD AUTO: 499 10E3/UL (ref 150–450)
RBC # BLD AUTO: 4.28 10E6/UL (ref 3.8–5.2)
RBC #/AREA URNS AUTO: ABNORMAL /HPF
SP GR UR STRIP: >=1.03 (ref 1–1.03)
UROBILINOGEN UR STRIP-ACNC: 0.2 E.U./DL
WBC # BLD AUTO: 13.2 10E3/UL (ref 4–11)
WBC #/AREA URNS AUTO: ABNORMAL /HPF

## 2022-04-26 PROCEDURE — 93010 ELECTROCARDIOGRAM REPORT: CPT | Mod: 77 | Performed by: INTERNAL MEDICINE

## 2022-04-26 PROCEDURE — 80048 BASIC METABOLIC PNL TOTAL CA: CPT | Mod: ZL | Performed by: NURSE PRACTITIONER

## 2022-04-26 PROCEDURE — 99214 OFFICE O/P EST MOD 30 MIN: CPT | Performed by: NURSE PRACTITIONER

## 2022-04-26 PROCEDURE — 85025 COMPLETE CBC W/AUTO DIFF WBC: CPT | Mod: ZL | Performed by: NURSE PRACTITIONER

## 2022-04-26 PROCEDURE — 81001 URINALYSIS AUTO W/SCOPE: CPT | Mod: ZL | Performed by: NURSE PRACTITIONER

## 2022-04-26 PROCEDURE — G0463 HOSPITAL OUTPT CLINIC VISIT: HCPCS | Mod: 25

## 2022-04-26 PROCEDURE — 93005 ELECTROCARDIOGRAM TRACING: CPT | Performed by: NURSE PRACTITIONER

## 2022-04-26 PROCEDURE — 36415 COLL VENOUS BLD VENIPUNCTURE: CPT | Mod: ZL | Performed by: NURSE PRACTITIONER

## 2022-04-26 RX ORDER — SWAB
1 SWAB, NON-MEDICATED MISCELLANEOUS DAILY
Qty: 90 TABLET | Refills: 0 | Status: SHIPPED | OUTPATIENT
Start: 2022-04-26 | End: 2022-07-25

## 2022-04-26 ASSESSMENT — PAIN SCALES - GENERAL: PAINLEVEL: NO PAIN (0)

## 2022-04-26 ASSESSMENT — LIFESTYLE VARIABLES: TOBACCO_USE: 1

## 2022-04-26 NOTE — PATIENT INSTRUCTIONS
Preparing for Your Surgery  Getting started  A nurse will call you to review your health history and instructions. They will give you an arrival time based on your scheduled surgery time. Please be ready to share:    Your doctor's clinic name and phone number    Your medical, surgical and anesthesia history    A list of allergies and sensitivities    A list of medicines, including herbal treatments and over-the-counter drugs    Whether the patient has a legal guardian (ask how to send us the papers in advance)  Please tell us if you're pregnant--or if there's any chance you might be pregnant. Some surgeries may injure a fetus (unborn baby), so they require a pregnancy test. Surgeries that are safe for a fetus don't always need a test, and you can choose whether to have one.   If you have a child who's having surgery, please ask for a copy of Preparing for Your Child's Surgery.    Preparing for surgery    Within 30 days of surgery: Have a pre-op exam (sometimes called an H&P, or History and Physical). This can be done at a clinic or pre-operative center.  ? If you're having a , you may not need this exam. Talk to your care team.    At your pre-op exam, talk to your care team about all medicines you take. If you need to stop any medicines before surgery, ask when to start taking them again.  ? We do this for your safety. Many medicines can make you bleed too much during surgery. Some change how well surgery (anesthesia) drugs work.    Call your insurance company to let them know you're having surgery. (If you don't have insurance, call [phone].)    Call your clinic if there's any change in your health. This includes signs of a cold or flu (sore throat, runny nose, cough, rash, fever). It also includes a scrape or scratch near the surgery site.    If you have questions on the day of surgery, call your hospital or surgery center.  COVID testing  You may need to be tested for COVID-19 before having  surgery. If so, your surgical team will give you instructions for scheduling this test, separate from your preoperative history and physical.  Eating and drinking guidelines  For your safety: Unless your surgeon tells you otherwise, follow the guidelines below.    Eat and drink as usual until 8 hours before surgery. After that, no food or milk.    Drink clear liquids until 2 hours before surgery. These are liquids you can see through, like water, Gatorade and Propel Water. You may also have black coffee and tea (no cream or milk).    Nothing by mouth within 2 hours of surgery. This includes gum, candy and breath mints.    If you drink alcohol: Stop drinking it the night before surgery.    If your care team tells you to take medicine on the morning of surgery, it's okay to take it with a sip of water.  Preventing infection    Shower or bathe the night before and morning of your surgery. Follow the instructions your clinic gave you. (If no instructions, use regular soap.)    Don't shave or clip hair near your surgery site. We'll remove the hair if needed.    Don't smoke or vape the morning of surgery. You may chew nicotine gum up to 2 hours before surgery. A nicotine patch is okay.  ? Note: Some surgeries require you to completely quit smoking and nicotine. Check with your surgeon.    Your care team will make every effort to keep you safe from infection. We will:  ? Clean our hands often with soap and water (or an alcohol-based hand rub).  ? Clean the skin at your surgery site with a special soap that kills germs.  ? Give you a special gown to keep you warm. (Cold raises the risk of infection.)  ? Wear special hair covers, masks, gowns and gloves during surgery.  ? Give antibiotic medicine, if prescribed. Not all surgeries need antibiotics.  What to bring on the day of surgery    Photo ID and insurance card    Copy of your health care directive, if you have one    Glasses and hearing aides (bring cases)  ? You can't  wear contacts during surgery    Inhaler and eye drops, if you use them (tell us about these when you arrive)    CPAP machine or breathing device, if you use them    A few personal items, if spending the night    If you have . . .  ? A pacemaker, ICD (cardiac defibrillator) or other implant: Bring the ID card.  ? An implanted stimulator: Bring the remote control.  ? A legal guardian: Bring a copy of the certified (court-stamped) guardianship papers.  Please remove any jewelry, including body piercings. Leave jewelry and other valuables at home.  If you're going home the day of surgery    You must have a responsible adult drive you home. They should stay with you overnight as well.    If you don't have someone to stay with you, and you aren't safe to go home alone, we may keep you overnight. Insurance often won't pay for this.  After surgery  If it's hard to control your pain or you need more pain medicine, please call your surgeon's office.  Questions?   If you have any questions for your care team, list them here: _________________________________________________________________________________________________________________________________________________________________________ ____________________________________ ____________________________________ ____________________________________  For informational purposes only. Not to replace the advice of your health care provider. Copyright   2003, 2019 [facility]. All rights reserved. Clinically reviewed by Cassandra Shipley MD. [employer] [id number] - REV 07/21.

## 2022-04-26 NOTE — ANESTHESIA PREPROCEDURE EVALUATION
Anesthesia Pre-Procedure Evaluation    Patient: Kimberley Louis   MRN: [id number] : 1980        Procedure : Procedure(s):  LAPAROSCOPIC SUPRACERVICAL HYSTERECTOMY  BILATERAL SALPINGECTOMY          Past Medical History:   Diagnosis Date     Enlarged kidney 2015     Essential hypertension 2015     H/O renal calculi 2015     H/O renal calculi 2015      Past Surgical History:   Procedure Laterality Date     AS REMOVAL OF KIDNEY STONE       AS REMOVAL OF KIDNEY STONE       COMBINED CYSTOSCOPY, RETROGRADES, URETEROSCOPY, LASER HOLMIUM LITHOTRIPSY URETER(S), INSERT STENT Left 2020    Procedure: CYSTOURETEROSCOPY, WITH RETROGRADE PYELOGRAM, HOLMIUM LASER LITHOTRIPSY OF URETERAL CALCULUS, interpretation of fluroscopy;  Surgeon: Sherry Mcpherson MD;  Location: [facility]     TUBAL LIGATION       ZZC REMOVAL OF KIDNEY STONE        No Known Allergies   Social History     Tobacco Use     Smoking status: Current Every Day Smoker     Packs/day: 1.00     Years: 28.00     Pack years: 28.00     Types: Cigarettes     Start date: 1989     Smokeless tobacco: Never Used     Tobacco comment: pt denied QP 20   Substance Use Topics     Alcohol use: Yes     Comment: occasional      Wt Readings from Last 1 Encounters:   22 73.5 kg (162 lb)        Anesthesia Evaluation   Pt has had prior anesthetic.     No history of anesthetic complications       ROS/MED HX  ENT/Pulmonary:     (+) tobacco use, Current use, 1 packs/day,     Neurologic:     (+) migraines,     Cardiovascular:     (+) Dyslipidemia hypertension-range: H&P- 120/74/ ----[place]. Irregular Heartbeat/Palpitations, Previous cardiac testing   Echo: Date: Results:    Stress Test: Date: Results:    ECG Reviewed: Date:  Results:  SR  Incomplete RBBB  Cannot rule out anterior infarct  Cath: Date: Results:      METS/Exercise Tolerance: >4 METS    Hematologic: Comments: menorrhagia    (+) anemia, history of blood transfusion, no previous  transfusion reaction, Known PRBC Anitbodies:No     Musculoskeletal:  - neg musculoskeletal ROS     GI/Hepatic:  - neg GI/hepatic ROS     Renal/Genitourinary: Comment: Enlarged kidney   Renal calculi    (+) renal disease, Nephrolithiasis ,     Endo:  - neg endo ROS     Psychiatric/Substance Use:     (+) psychiatric history depression     Infectious Disease:  - neg infectious disease ROS     Malignancy:  - neg malignancy ROS     Other:  - neg other ROS          Physical Exam    Airway        Mallampati: II   TM distance: > 3 FB   Neck ROM: full   Mouth opening: > 3 cm    Respiratory Devices and Support         Dental       (+) chipped and missing      Cardiovascular   cardiovascular exam normal       Rhythm and rate: regular and normal     Pulmonary           (+) decreased breath sounds           OUTSIDE LABS:  CBC:   Lab Results   Component Value Date    WBC 12.9 (H) 03/10/2022    WBC 9.0 08/05/2020    HGB 12.0 03/10/2022    HGB 13.4 08/05/2020    HCT 37.5 03/10/2022    HCT 39.4 08/05/2020     (H) 03/10/2022     08/05/2020     BMP:   Lab Results   Component Value Date     03/10/2022     10/13/2021    POTASSIUM 3.5 03/10/2022    POTASSIUM 3.5 10/13/2021    CHLORIDE 105 03/10/2022    CHLORIDE 105 10/13/2021    CO2 23 03/10/2022    CO2 27 10/13/2021    BUN 8 03/10/2022    BUN 11 10/13/2021    CR 0.64 03/10/2022    CR 0.63 10/13/2021     (H) 03/10/2022     (H) 10/13/2021     COAGS:   Lab Results   Component Value Date    PTT 29 08/30/2021    INR 0.98 08/30/2021     POC:   Lab Results   Component Value Date    HCG Negative 08/17/2020     HEPATIC:   Lab Results   Component Value Date    ALBUMIN 3.9 03/10/2022    PROTTOTAL 7.4 03/10/2022    ALT 29 03/10/2022    AST 7 03/10/2022    ALKPHOS 89 03/10/2022    BILITOTAL 0.4 03/10/2022     OTHER:   Lab Results   Component Value Date    A1C 5.4 01/03/2018    NIYAH 8.9 03/10/2022    PHOS 3.5 09/22/2020    LIPASE 120 12/10/2017    TSH 0.86  03/10/2022    CRP 3.7 01/03/2018    SED 10 01/03/2018       Anesthesia Plan    ASA Status:  3   NPO Status:  NPO Appropriate    Anesthesia Type: General.     - Airway: ETT   Induction: Intravenous, Propofol.   Maintenance: TIVA.        Consents    Anesthesia Plan(s) and associated risks, benefits, and realistic alternatives discussed. Questions answered and patient/representative(s) expressed understanding.     - Discussed: Risks, Benefits and Alternatives for BOTH SEDATION and the PROCEDURE were discussed     - Discussed with:  Patient      - Extended Intubation/Ventilatory Support Discussed: No.      - Patient is DNR/DNI Status: No    Use of blood products discussed: No .     Postoperative Care    Pain management: IV analgesics.   PONV prophylaxis: Ondansetron (or other 5HT-3), Dexamethasone or Solumedrol     Comments:    Other Comments: H&P 4/26 ZAHEER Be CRNA

## 2022-04-26 NOTE — PROGRESS NOTES
[address]  [facility] [place] [address]  Phone: [phone]  Primary Provider: Xiomara Caceres  Pre-op Performing Provider: SALAZAR HOLLIDAY      PREOPERATIVE EVALUATION:  Today's date: 4/26/2022    Kimberley Louis is a 42 year old female who presents for a preoperative evaluation.    Surgical Information:  Surgery/Procedure: LAPAROSCOPIC SUPRACERVICAL HYSTERECTOMY  BILATERAL SALPINGECTOMY  Surgery Location: [facility]   Surgeon: Edamr  Surgery Date: 5/4/22  Time of Surgery: TBD  Where patient plans to recover: At home with family  Fax number for surgical facility: Note does not need to be faxed, will be available electronically in Epic.    Type of Anesthesia Anticipated: to be determined    Assessment & Plan     The proposed surgical procedure is considered INTERMEDIATE risk.    (Z01.818) Preop general physical exam  (primary encounter diagnosis)  Comment: check labs, UA, and EKG   Plan: EKG 12-lead complete w/read - (Clinic         Performed), CBC with platelets and         differential, Basic metabolic panel, *UA reflex        to Microscopic and Culture - [facility],         Urine Microscopic            (E61.1) Low iron  Comment: add Prenatal vitamin. Will await to see hematology. Follow up 3 months with labs. Likely from heavy menses   Plan: Prenatal Vit-Fe Fumarate-FA (PRENATAL         MULTIVITAMIN  WITH IRON) 28-0.8 MG TABS            (N92.1) Menorrhagia with irregular cycle  Comment: check EKG   Plan: EKG 12-lead complete w/read - (Clinic         Performed)              Risks and Recommendations:  The patient has the following additional risks and recommendations for perioperative complications:   - No identified additional risk factors other than previously addressed    Medication Instructions:  Patient is to take all scheduled medications on the day of surgery    RECOMMENDATION:  APPROVAL GIVEN to proceed with proposed procedure pending review of diagnostic  evaluation.    BMP pending and if normal, ok to proceed with scheduled surgery.     Subjective     HPI related to upcoming procedure: Heavy menses. Large amount of blood loss monthly with menses. Pre-operative examination     1. No - Have you ever had a heart attack or stroke?  2. No - Have you ever had surgery on your heart or blood vessels, such as a stent, coronary (heart) bypass, or surgery on an artery in the head, neck, heart, or legs?  3. No - Do you have chest pain when you are physically active?  4. No - Do you have a history of heart failure?  5. No - Do you currently have a cold, bronchitis, or symptoms of other respiratory (head and chest) infections?  6. No - Do you have a cough, shortness of breath, or wheezing?  7. No - Do you or anyone in your family have a history of blood clots?  8. No - Do you or anyone in your family have a serious bleeding problem, such as long-lasting bleeding after surgeries or cuts?  9. Yes - Have you ever had anemia or been told to take iron pills?   10. Yes - Have you had any abnormal blood loss such as black, tarry or bloody stools, or abnormal vaginal bleeding?   11. Yes - Have you ever had a blood transfusion?  12. Yes - Are you willing to have a blood transfusion if it is medically needed before, during, or after your surgery?  13. No - Have you or anyone in your family ever had problems with anesthesia (sedation for surgery)?  14. No - Do you have sleep apnea, excessive snoring, or daytime drowsiness?   15. No - Do you have any artifical heart valves or other implanted medical devices, such as a pacemaker, defibrillator, or continuous glucose monitor?  16. No - Do you have any artifical joints?  17. No - Are you allergic to latex?  18. No - Is there any chance that you may be pregnant?    METS 4+    Health Care Directive:  Patient does not have a Health Care Directive or Living Will: Discussed advance care planning with patient; however, patient declined at this  time.    Preoperative Review of :   reviewed - no record of controlled substances prescribed.      Status of Chronic Conditions:  See problem list for active medical problems.  Problems all longstanding and stable, except as noted/documented.  See ROS for pertinent symptoms related to these conditions.      Review of Systems  CONSTITUTIONAL: NEGATIVE for fever, chills, change in weight  INTEGUMENTARY/SKIN: NEGATIVE for worrisome rashes, moles or lesions  EYES: NEGATIVE for vision changes or irritation  ENT/MOUTH: NEGATIVE for ear, mouth and throat problems  RESP: NEGATIVE for significant cough or SOB  CV: NEGATIVE for chest pain, palpitations or peripheral edema  GI: NEGATIVE for nausea, abdominal pain, heartburn, or change in bowel habits  : NEGATIVE for frequency, dysuria, or hematuria  MUSCULOSKELETAL: NEGATIVE for significant arthralgias or myalgia  NEURO: NEGATIVE for weakness, dizziness or paresthesias  ENDOCRINE: NEGATIVE for temperature intolerance, skin/hair changes  HEME: NEGATIVE for bleeding problems  PSYCHIATRIC: NEGATIVE for changes in mood or affect    Patient Active Problem List    Diagnosis Date Noted     Nephrolithiasis 07/21/2020     Priority: Medium     Added automatically from request for surgery [id number]       Bacterial vaginosis 06/09/2020     Priority: Medium     Left flank tenderness 08/03/2018     Priority: Medium     Personal history of tobacco use, presenting hazards to health 08/03/2018     Priority: Medium     Recurrent UTI 08/03/2018     Priority: Medium     Incomplete right bundle branch block 02/05/2018     Priority: Medium     Pure hypercholesterolemia 01/03/2018     Priority: Medium     Tobacco abuse counseling 01/03/2018     Priority: Medium     Tobacco abuse 01/03/2018     Priority: Medium     Palpitations 01/03/2018     Priority: Medium     Atypical chest pain 01/03/2018     Priority: Medium     Dyspnea on exertion 01/03/2018     Priority: Medium     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  Kimberley Louis has no plan or code status on file. Discussed available resources and provided with information. Confirmed code status reflects current choices pending further ACP discussions.  Confirmed/documented legally designated decision makers.  Added by SAYDA CABRERA             ACP (advance care planning) 04/13/2016     Priority: Medium     Advance Care Planning 4/13/2016: ACP Review of Chart / Resources Provided:  Reviewed chart for advance care plan.  Kimberley Louis has no plan or code status on file. Discussed available resources and provided with information. Confirmed code status reflects current choices pending further ACP discussions.  Confirmed/documented legally designated decision makers.  Added by Sandra Anne             Kidney stone on left side 12/21/2015     Priority: Medium     Nephrocalcinosis 12/21/2015     Priority: Medium     Essential hypertension 11/19/2015     Priority: Medium     H/O renal calculi 11/19/2015     Priority: Medium     Enlarged kidney 11/19/2015     Priority: Medium      Past Medical History:   Diagnosis Date     Enlarged kidney 11/19/2015     Essential hypertension 11/19/2015     H/O renal calculi 11/19/2015     H/O renal calculi 11/19/2015     Past Surgical History:   Procedure Laterality Date     AS REMOVAL OF KIDNEY STONE       AS REMOVAL OF KIDNEY STONE       COMBINED CYSTOSCOPY, RETROGRADES, URETEROSCOPY, LASER HOLMIUM LITHOTRIPSY URETER(S), INSERT STENT Left 8/17/2020    Procedure: CYSTOURETEROSCOPY, WITH RETROGRADE PYELOGRAM, HOLMIUM LASER LITHOTRIPSY OF URETERAL CALCULUS, interpretation of fluroscopy;  Surgeon: Sherry Mcpherson MD;  Location: [facility]     TUBAL LIGATION       ZZC REMOVAL  OF KIDNEY STONE       Current Outpatient Medications   Medication Sig Dispense Refill     amLODIPine (NORVASC) 10 MG tablet Take 1 tablet by mouth once daily 30 tablet 0     metoprolol tartrate (LOPRESSOR) 25 MG tablet TAKE 1 TABLET(25 MG) BY MOUTH TWICE DAILY 60 tablet 11     Prenatal Vit-Fe Fumarate-FA (PRENATAL MULTIVITAMIN  WITH IRON) 28-0.8 MG TABS Take 1 tablet by mouth daily 90 tablet 0     PROAIR  (90 Base) MCG/ACT inhaler INHALE 2 PUFFS INTO THE LUNGS EVERY 6 HOURS 8.5 g 2     sertraline (ZOLOFT) 50 MG tablet Take 1 tablet (50 mg) by mouth daily 30 tablet 4     triamcinolone (KENALOG) 0.025 % external ointment Apply topically 2 times daily as needed for irritation 15 g 0       No Known Allergies     Social History     Tobacco Use     Smoking status: Current Every Day Smoker     Packs/day: 1.00     Years: 28.00     Pack years: 28.00     Types: Cigarettes     Start date: 1/1/1989     Smokeless tobacco: Never Used     Tobacco comment: pt denied QP 9/22/20   Substance Use Topics     Alcohol use: Yes     Comment: occasional     Family History   Problem Relation Age of Onset     Diabetes Mother      Emphysema Mother      Cervical Cancer Mother      Throat cancer Mother      Diabetes Father      Hypertension Father      Hypertension Maternal Grandmother      Diabetes Maternal Grandmother      Schizophrenia Maternal Grandmother      Unknown/Adopted Maternal Grandfather      Lung Cancer Paternal Grandmother      Diabetes Paternal Grandmother      Unknown/Adopted Paternal Grandfather      History   Drug Use No         Objective     /74   Pulse 80   Temp 98.3  F (36.8  C)   Resp 18   Wt 74.8 kg (165 lb)   LMP 04/20/2022 (Exact Date)   SpO2 98%   BMI 28.32 kg/m      Physical Exam    GENERAL APPEARANCE: healthy, alert and no distress     EYES: EOMI, PERRL     HENT: ear canals and TM's normal and nose and mouth without ulcers or lesions     NECK: no adenopathy, no asymmetry, masses, or scars and  thyroid normal to palpation     RESP: lungs clear to auscultation - no rales, rhonchi or wheezes     CV: regular rates and rhythm, normal S1 S2, no S3 or S4 and no murmur, click or rub     ABDOMEN:  soft, nontender, no HSM or masses and bowel sounds normal     MS: extremities normal- no gross deformities noted, no evidence of inflammation in joints, FROM in all extremities.     SKIN: no suspicious lesions or rashes     NEURO: Normal strength and tone, sensory exam grossly normal, mentation intact and speech normal     PSYCH: mentation appears normal. and affect normal/bright     LYMPHATICS: No cervical adenopathy      Diagnostics:  Recent Results (from the past 24 hour(s))   *UA reflex to Microscopic and Culture - [facility] [place]    Collection Time: 04/26/22  3:04 PM    Specimen: Urine, Midstream   Result Value Ref Range    Color Urine Yellow Colorless, Straw, Light Yellow, Yellow    Appearance Urine Clear Clear    Glucose Urine Negative Negative mg/dL    Bilirubin Urine Negative Negative    Ketones Urine Negative Negative mg/dL    Specific Gravity Urine >=1.030 1.003 - 1.035    Blood Urine Small (A) Negative    pH Urine 6.0 5.0 - 7.0    Protein Albumin Urine Negative Negative mg/dL    Urobilinogen Urine 0.2 0.2, 1.0 E.U./dL    Nitrite Urine Negative Negative    Leukocyte Esterase Urine Negative Negative   CBC with platelets and differential    Collection Time: 04/26/22  3:04 PM   Result Value Ref Range    WBC Count 13.2 (H) 4.0 - 11.0 10e3/uL    RBC Count 4.28 3.80 - 5.20 10e6/uL    Hemoglobin 12.2 11.7 - 15.7 g/dL    Hematocrit 38.0 35.0 - 47.0 %    MCV 89 78 - 100 fL    MCH 28.5 26.5 - 33.0 pg    MCHC 32.1 31.5 - 36.5 g/dL    RDW 15.5 (H) 10.0 - 15.0 %    Platelet Count 499 (H) 150 - 450 10e3/uL    % Neutrophils 67 %    % Lymphocytes 25 %    % Monocytes 5 %    % Eosinophils 2 %    % Basophils 0 %    Absolute Neutrophils 8.9 (H) 1.6 - 8.3 10e3/uL    Absolute Lymphocytes 3.3 0.8 - 5.3 10e3/uL    Absolute Monocytes  0.7 0.0 - 1.3 10e3/uL    Absolute Eosinophils 0.3 0.0 - 0.7 10e3/uL    Absolute Basophils 0.0 0.0 - 0.2 10e3/uL   Urine Microscopic    Collection Time: 04/26/22  3:04 PM   Result Value Ref Range    RBC Urine 2-5 (A) 0-2 /HPF /HPF    WBC Urine None Seen 0-5 /HPF /HPF      She is a tobacco user and likely is the increase in WBC.     Small amount of blood in urine. She is finishing her menses.     Urine HCG am of surgery. Already ordered by CRNA.     EKG: appears normal, NSR, normal axis, normal intervals, no acute ST/T changes c/w ischemia, no LVH by voltage criteria    Revised Cardiac Risk Index (RCRI):  The patient has the following serious cardiovascular risks for perioperative complications:   - No serious cardiac risks = 0 points     RCRI Interpretation: 0 points: Class I (very low risk - 0.4% complication rate)        Signed Electronically by: Jazmyne Villafana NP  Copy of this evaluation report is provided to requesting physician.

## 2022-04-26 NOTE — NURSING NOTE
"Chief Complaint   Patient presents with     Pre-Op Exam       Initial /74   Pulse 80   Temp 98.3  F (36.8  C)   Resp 18   Wt 74.8 kg (165 lb)   LMP 04/20/2022 (Exact Date)   SpO2 98%   BMI 28.32 kg/m   Estimated body mass index is 28.32 kg/m  as calculated from the following:    Height as of 4/12/22: 1.626 m (5' 4\").    Weight as of this encounter: 74.8 kg (165 lb).  Medication Reconciliation: boby Glover  "

## 2022-04-27 LAB
ANION GAP SERPL CALCULATED.3IONS-SCNC: 7 MMOL/L (ref 3–14)
BUN SERPL-MCNC: 10 MG/DL (ref 7–30)
CALCIUM SERPL-MCNC: 8.5 MG/DL (ref 8.5–10.1)
CHLORIDE BLD-SCNC: 107 MMOL/L (ref 94–109)
CO2 SERPL-SCNC: 23 MMOL/L (ref 20–32)
CREAT SERPL-MCNC: 0.52 MG/DL (ref 0.52–1.04)
GFR SERPL CREATININE-BSD FRML MDRD: >90 ML/MIN/1.73M2
GLUCOSE BLD-MCNC: 138 MG/DL (ref 70–99)
POTASSIUM BLD-SCNC: 3.5 MMOL/L (ref 3.4–5.3)
SODIUM SERPL-SCNC: 137 MMOL/L (ref 133–144)

## 2022-04-30 ENCOUNTER — OFFICE VISIT (OUTPATIENT)
Dept: FAMILY MEDICINE | Facility: OTHER | Age: 42
End: 2022-04-30
Attending: OBSTETRICS & GYNECOLOGY
Payer: COMMERCIAL

## 2022-04-30 DIAGNOSIS — Z11.52 ENCOUNTER FOR PREOPERATIVE SCREENING LABORATORY TESTING FOR COVID-19 VIRUS: ICD-10-CM

## 2022-04-30 DIAGNOSIS — Z01.812 ENCOUNTER FOR PREOPERATIVE SCREENING LABORATORY TESTING FOR COVID-19 VIRUS: ICD-10-CM

## 2022-04-30 LAB — SARS-COV-2 RNA RESP QL NAA+PROBE: NEGATIVE

## 2022-04-30 PROCEDURE — U0003 INFECTIOUS AGENT DETECTION BY NUCLEIC ACID (DNA OR RNA); SEVERE ACUTE RESPIRATORY SYNDROME CORONAVIRUS 2 (SARS-COV-2) (CORONAVIRUS DISEASE [COVID-19]), AMPLIFIED PROBE TECHNIQUE, MAKING USE OF HIGH THROUGHPUT TECHNOLOGIES AS DESCRIBED BY CMS-2020-01-R: HCPCS | Mod: ZL

## 2022-05-04 ENCOUNTER — ANESTHESIA (OUTPATIENT)
Dept: SURGERY | Facility: HOSPITAL | Age: 42
End: 2022-05-04
Payer: COMMERCIAL

## 2022-05-04 ENCOUNTER — HOSPITAL ENCOUNTER (OUTPATIENT)
Facility: HOSPITAL | Age: 42
Discharge: HOME | End: 2022-05-05
Attending: OBSTETRICS & GYNECOLOGY | Admitting: OBSTETRICS & GYNECOLOGY
Payer: COMMERCIAL

## 2022-05-04 ENCOUNTER — APPOINTMENT (OUTPATIENT)
Dept: LAB | Facility: HOSPITAL | Age: 42
End: 2022-05-04
Attending: OBSTETRICS & GYNECOLOGY
Payer: COMMERCIAL

## 2022-05-04 DIAGNOSIS — Z90.711 S/P LAPAROSCOPIC SUPRACERVICAL HYSTERECTOMY: Primary | ICD-10-CM

## 2022-05-04 DIAGNOSIS — I10 ESSENTIAL HYPERTENSION: ICD-10-CM

## 2022-05-04 DIAGNOSIS — F32.0 MAJOR DEPRESSIVE DISORDER, SINGLE EPISODE, MILD (H): ICD-10-CM

## 2022-05-04 DIAGNOSIS — N92.0 MENORRHAGIA: ICD-10-CM

## 2022-05-04 DIAGNOSIS — R07.89 ATYPICAL CHEST PAIN: ICD-10-CM

## 2022-05-04 DIAGNOSIS — N94.6 DYSMENORRHEA: ICD-10-CM

## 2022-05-04 LAB
ABO/RH(D): NORMAL
ANTIBODY SCREEN: NEGATIVE
HCG UR QL: NEGATIVE
HOLD SPECIMEN: NORMAL
SPECIMEN EXPIRATION DATE: NORMAL

## 2022-05-04 PROCEDURE — 86901 BLOOD TYPING SEROLOGIC RH(D): CPT | Performed by: OBSTETRICS & GYNECOLOGY

## 2022-05-04 PROCEDURE — 710N000010 HC RECOVERY PHASE 1, LEVEL 2, PER MIN: Performed by: OBSTETRICS & GYNECOLOGY

## 2022-05-04 PROCEDURE — 250N000011 HC RX IP 250 OP 636: Performed by: NURSE ANESTHETIST, CERTIFIED REGISTERED

## 2022-05-04 PROCEDURE — 86850 RBC ANTIBODY SCREEN: CPT | Performed by: OBSTETRICS & GYNECOLOGY

## 2022-05-04 PROCEDURE — 370N000017 HC ANESTHESIA TECHNICAL FEE, PER MIN: Performed by: OBSTETRICS & GYNECOLOGY

## 2022-05-04 PROCEDURE — 258N000003 HC RX IP 258 OP 636: Performed by: OBSTETRICS & GYNECOLOGY

## 2022-05-04 PROCEDURE — 88307 TISSUE EXAM BY PATHOLOGIST: CPT | Mod: TC | Performed by: OBSTETRICS & GYNECOLOGY

## 2022-05-04 PROCEDURE — 250N000013 HC RX MED GY IP 250 OP 250 PS 637: Performed by: OBSTETRICS & GYNECOLOGY

## 2022-05-04 PROCEDURE — 250N000009 HC RX 250: Performed by: NURSE ANESTHETIST, CERTIFIED REGISTERED

## 2022-05-04 PROCEDURE — 81025 URINE PREGNANCY TEST: CPT | Performed by: OBSTETRICS & GYNECOLOGY

## 2022-05-04 PROCEDURE — 94640 AIRWAY INHALATION TREATMENT: CPT

## 2022-05-04 PROCEDURE — 999N000157 HC STATISTIC RCP TIME EA 10 MIN

## 2022-05-04 PROCEDURE — 258N000003 HC RX IP 258 OP 636: Performed by: NURSE ANESTHETIST, CERTIFIED REGISTERED

## 2022-05-04 PROCEDURE — 360N000076 HC SURGERY LEVEL 3, PER MIN: Performed by: OBSTETRICS & GYNECOLOGY

## 2022-05-04 PROCEDURE — 58542 LSH W/T/O UT 250 G OR LESS: CPT | Performed by: NURSE ANESTHETIST, CERTIFIED REGISTERED

## 2022-05-04 PROCEDURE — 272N000001 HC OR GENERAL SUPPLY STERILE: Performed by: OBSTETRICS & GYNECOLOGY

## 2022-05-04 PROCEDURE — 36415 COLL VENOUS BLD VENIPUNCTURE: CPT | Performed by: OBSTETRICS & GYNECOLOGY

## 2022-05-04 PROCEDURE — 250N000011 HC RX IP 250 OP 636: Performed by: OBSTETRICS & GYNECOLOGY

## 2022-05-04 PROCEDURE — 999N000141 HC STATISTIC PRE-PROCEDURE NURSING ASSESSMENT: Performed by: OBSTETRICS & GYNECOLOGY

## 2022-05-04 PROCEDURE — 58542 LSH W/T/O UT 250 G OR LESS: CPT | Mod: AS | Performed by: NURSE PRACTITIONER

## 2022-05-04 PROCEDURE — 58542 LSH W/T/O UT 250 G OR LESS: CPT | Performed by: OBSTETRICS & GYNECOLOGY

## 2022-05-04 PROCEDURE — 250N000025 HC SEVOFLURANE, PER MIN: Performed by: OBSTETRICS & GYNECOLOGY

## 2022-05-04 RX ORDER — HYDROMORPHONE HCL IN WATER/PF 6 MG/30 ML
0.4 PATIENT CONTROLLED ANALGESIA SYRINGE INTRAVENOUS
Status: DISCONTINUED | OUTPATIENT
Start: 2022-05-04 | End: 2022-05-05 | Stop reason: HOSPADM

## 2022-05-04 RX ORDER — ALBUTEROL SULFATE 0.83 MG/ML
2.5 SOLUTION RESPIRATORY (INHALATION) ONCE
Status: COMPLETED | OUTPATIENT
Start: 2022-05-04 | End: 2022-05-04

## 2022-05-04 RX ORDER — HYDRALAZINE HYDROCHLORIDE 20 MG/ML
2.5-5 INJECTION INTRAMUSCULAR; INTRAVENOUS EVERY 10 MIN PRN
Status: DISCONTINUED | OUTPATIENT
Start: 2022-05-04 | End: 2022-05-04 | Stop reason: HOSPADM

## 2022-05-04 RX ORDER — NICOTINE 21 MG/24HR
1 PATCH, TRANSDERMAL 24 HOURS TRANSDERMAL DAILY
Status: DISCONTINUED | OUTPATIENT
Start: 2022-05-04 | End: 2022-05-05 | Stop reason: HOSPADM

## 2022-05-04 RX ORDER — FENTANYL CITRATE 50 UG/ML
25 INJECTION, SOLUTION INTRAMUSCULAR; INTRAVENOUS EVERY 5 MIN PRN
Status: DISCONTINUED | OUTPATIENT
Start: 2022-05-04 | End: 2022-05-04 | Stop reason: HOSPADM

## 2022-05-04 RX ORDER — OXYCODONE HYDROCHLORIDE 5 MG/1
10 TABLET ORAL EVERY 4 HOURS PRN
Status: DISCONTINUED | OUTPATIENT
Start: 2022-05-04 | End: 2022-05-05 | Stop reason: HOSPADM

## 2022-05-04 RX ORDER — KETAMINE HYDROCHLORIDE 10 MG/ML
INJECTION INTRAMUSCULAR; INTRAVENOUS PRN
Status: DISCONTINUED | OUTPATIENT
Start: 2022-05-04 | End: 2022-05-04

## 2022-05-04 RX ORDER — FENTANYL CITRATE 50 UG/ML
INJECTION, SOLUTION INTRAMUSCULAR; INTRAVENOUS PRN
Status: DISCONTINUED | OUTPATIENT
Start: 2022-05-04 | End: 2022-05-04

## 2022-05-04 RX ORDER — ALBUTEROL SULFATE 0.83 MG/ML
2.5 SOLUTION RESPIRATORY (INHALATION) EVERY 4 HOURS PRN
Status: DISCONTINUED | OUTPATIENT
Start: 2022-05-04 | End: 2022-05-04 | Stop reason: HOSPADM

## 2022-05-04 RX ORDER — LIDOCAINE 40 MG/G
CREAM TOPICAL
Status: DISCONTINUED | OUTPATIENT
Start: 2022-05-04 | End: 2022-05-04 | Stop reason: HOSPADM

## 2022-05-04 RX ORDER — METOPROLOL TARTRATE 25 MG/1
25 TABLET, FILM COATED ORAL 2 TIMES DAILY
Status: DISCONTINUED | OUTPATIENT
Start: 2022-05-04 | End: 2022-05-05 | Stop reason: HOSPADM

## 2022-05-04 RX ORDER — BISACODYL 10 MG
10 SUPPOSITORY, RECTAL RECTAL DAILY PRN
Status: DISCONTINUED | OUTPATIENT
Start: 2022-05-04 | End: 2022-05-05 | Stop reason: HOSPADM

## 2022-05-04 RX ORDER — ONDANSETRON 2 MG/ML
INJECTION INTRAMUSCULAR; INTRAVENOUS PRN
Status: DISCONTINUED | OUTPATIENT
Start: 2022-05-04 | End: 2022-05-04

## 2022-05-04 RX ORDER — HYDROXYZINE HYDROCHLORIDE 50 MG/ML
100 INJECTION, SOLUTION INTRAMUSCULAR EVERY 6 HOURS PRN
Status: DISCONTINUED | OUTPATIENT
Start: 2022-05-04 | End: 2022-05-05 | Stop reason: HOSPADM

## 2022-05-04 RX ORDER — ACETAMINOPHEN 325 MG/1
650 TABLET ORAL EVERY 6 HOURS
Status: DISCONTINUED | OUTPATIENT
Start: 2022-05-07 | End: 2022-05-05 | Stop reason: HOSPADM

## 2022-05-04 RX ORDER — OXYCODONE HYDROCHLORIDE 5 MG/1
5 TABLET ORAL EVERY 4 HOURS PRN
Status: DISCONTINUED | OUTPATIENT
Start: 2022-05-04 | End: 2022-05-05 | Stop reason: HOSPADM

## 2022-05-04 RX ORDER — LIDOCAINE 40 MG/G
CREAM TOPICAL
Status: DISCONTINUED | OUTPATIENT
Start: 2022-05-04 | End: 2022-05-05 | Stop reason: HOSPADM

## 2022-05-04 RX ORDER — KETOROLAC TROMETHAMINE 15 MG/ML
15 INJECTION, SOLUTION INTRAMUSCULAR; INTRAVENOUS EVERY 6 HOURS
Status: DISCONTINUED | OUTPATIENT
Start: 2022-05-04 | End: 2022-05-05 | Stop reason: HOSPADM

## 2022-05-04 RX ORDER — CEFAZOLIN SODIUM/WATER 2 G/20 ML
2 SYRINGE (ML) INTRAVENOUS SEE ADMIN INSTRUCTIONS
Status: DISCONTINUED | OUTPATIENT
Start: 2022-05-04 | End: 2022-05-04 | Stop reason: HOSPADM

## 2022-05-04 RX ORDER — LABETALOL 20 MG/4 ML (5 MG/ML) INTRAVENOUS SYRINGE
10
Status: DISCONTINUED | OUTPATIENT
Start: 2022-05-04 | End: 2022-05-04 | Stop reason: HOSPADM

## 2022-05-04 RX ORDER — PROPOFOL 10 MG/ML
INJECTION, EMULSION INTRAVENOUS PRN
Status: DISCONTINUED | OUTPATIENT
Start: 2022-05-04 | End: 2022-05-04

## 2022-05-04 RX ORDER — LIDOCAINE HYDROCHLORIDE 20 MG/ML
INJECTION, SOLUTION INFILTRATION; PERINEURAL PRN
Status: DISCONTINUED | OUTPATIENT
Start: 2022-05-04 | End: 2022-05-04

## 2022-05-04 RX ORDER — NALOXONE HYDROCHLORIDE 0.4 MG/ML
0.2 INJECTION, SOLUTION INTRAMUSCULAR; INTRAVENOUS; SUBCUTANEOUS
Status: DISCONTINUED | OUTPATIENT
Start: 2022-05-04 | End: 2022-05-05 | Stop reason: HOSPADM

## 2022-05-04 RX ORDER — TRANEXAMIC ACID 10 MG/ML
1 INJECTION, SOLUTION INTRAVENOUS ONCE
Status: DISCONTINUED | OUTPATIENT
Start: 2022-05-04 | End: 2022-05-04 | Stop reason: HOSPADM

## 2022-05-04 RX ORDER — NALOXONE HYDROCHLORIDE 0.4 MG/ML
0.4 INJECTION, SOLUTION INTRAMUSCULAR; INTRAVENOUS; SUBCUTANEOUS
Status: DISCONTINUED | OUTPATIENT
Start: 2022-05-04 | End: 2022-05-05 | Stop reason: HOSPADM

## 2022-05-04 RX ORDER — DEXAMETHASONE SODIUM PHOSPHATE 10 MG/ML
INJECTION, SOLUTION INTRAMUSCULAR; INTRAVENOUS PRN
Status: DISCONTINUED | OUTPATIENT
Start: 2022-05-04 | End: 2022-05-04

## 2022-05-04 RX ORDER — ACETAMINOPHEN 325 MG/1
975 TABLET ORAL ONCE
Status: COMPLETED | OUTPATIENT
Start: 2022-05-04 | End: 2022-05-04

## 2022-05-04 RX ORDER — AMLODIPINE BESYLATE 5 MG/1
10 TABLET ORAL DAILY
Status: DISCONTINUED | OUTPATIENT
Start: 2022-05-05 | End: 2022-05-05 | Stop reason: HOSPADM

## 2022-05-04 RX ORDER — LIDOCAINE HYDROCHLORIDE 20 MG/ML
JELLY TOPICAL
Status: DISCONTINUED
Start: 2022-05-04 | End: 2022-05-04 | Stop reason: WASHOUT

## 2022-05-04 RX ORDER — SODIUM CHLORIDE, SODIUM LACTATE, POTASSIUM CHLORIDE, CALCIUM CHLORIDE 600; 310; 30; 20 MG/100ML; MG/100ML; MG/100ML; MG/100ML
INJECTION, SOLUTION INTRAVENOUS CONTINUOUS
Status: DISCONTINUED | OUTPATIENT
Start: 2022-05-04 | End: 2022-05-04 | Stop reason: HOSPADM

## 2022-05-04 RX ORDER — ONDANSETRON 4 MG/1
4 TABLET, ORALLY DISINTEGRATING ORAL EVERY 30 MIN PRN
Status: DISCONTINUED | OUTPATIENT
Start: 2022-05-04 | End: 2022-05-04 | Stop reason: HOSPADM

## 2022-05-04 RX ORDER — VITAMIN A ACETATE, .BETA.-CAROTENE, ASCORBIC ACID, CHOLECALCIFEROL, .ALPHA.-TOCOPHEROL ACETATE, DL-, THIAMINE MONONITRATE, RIBOFLAVIN, NIACINAMIDE, PYRIDOXINE HYDROCHLORIDE, FOLIC ACID, CYANOCOBALAMIN, CALCIUM CARBONATE, FERROUS FUMARATE, ZINC OXIDE, AND CUPRIC OXIDE 2000; 2000; 120; 400; 22; 1.84; 3; 20; 10; 1; 12; 200; 27; 25; 2 [IU]/1; [IU]/1; MG/1; [IU]/1; MG/1; MG/1; MG/1; MG/1; MG/1; MG/1; UG/1; MG/1; MG/1; MG/1; MG/1
1 TABLET ORAL DAILY
Status: DISCONTINUED | OUTPATIENT
Start: 2022-05-04 | End: 2022-05-05 | Stop reason: HOSPADM

## 2022-05-04 RX ORDER — CALCIUM CARBONATE 500 MG/1
500 TABLET, CHEWABLE ORAL 4 TIMES DAILY PRN
Status: DISCONTINUED | OUTPATIENT
Start: 2022-05-04 | End: 2022-05-05 | Stop reason: HOSPADM

## 2022-05-04 RX ORDER — MAGNESIUM SULFATE HEPTAHYDRATE 40 MG/ML
4 INJECTION, SOLUTION INTRAVENOUS ONCE
Status: COMPLETED | OUTPATIENT
Start: 2022-05-04 | End: 2022-05-04

## 2022-05-04 RX ORDER — ACETAMINOPHEN 325 MG/1
975 TABLET ORAL EVERY 6 HOURS
Status: DISCONTINUED | OUTPATIENT
Start: 2022-05-04 | End: 2022-05-05 | Stop reason: HOSPADM

## 2022-05-04 RX ORDER — ONDANSETRON 2 MG/ML
4 INJECTION INTRAMUSCULAR; INTRAVENOUS EVERY 6 HOURS PRN
Status: DISCONTINUED | OUTPATIENT
Start: 2022-05-04 | End: 2022-05-05 | Stop reason: HOSPADM

## 2022-05-04 RX ORDER — HYDROMORPHONE HYDROCHLORIDE 1 MG/ML
0.2 INJECTION, SOLUTION INTRAMUSCULAR; INTRAVENOUS; SUBCUTANEOUS EVERY 5 MIN PRN
Status: DISCONTINUED | OUTPATIENT
Start: 2022-05-04 | End: 2022-05-04 | Stop reason: HOSPADM

## 2022-05-04 RX ORDER — CEFAZOLIN SODIUM/WATER 2 G/20 ML
2 SYRINGE (ML) INTRAVENOUS
Status: COMPLETED | OUTPATIENT
Start: 2022-05-04 | End: 2022-05-04

## 2022-05-04 RX ORDER — BUPIVACAINE HYDROCHLORIDE 2.5 MG/ML
INJECTION, SOLUTION EPIDURAL; INFILTRATION; INTRACAUDAL
Status: DISCONTINUED
Start: 2022-05-04 | End: 2022-05-04 | Stop reason: WASHOUT

## 2022-05-04 RX ORDER — PROCHLORPERAZINE MALEATE 10 MG
10 TABLET ORAL EVERY 6 HOURS PRN
Status: DISCONTINUED | OUTPATIENT
Start: 2022-05-04 | End: 2022-05-05 | Stop reason: HOSPADM

## 2022-05-04 RX ORDER — SODIUM CHLORIDE, SODIUM LACTATE, POTASSIUM CHLORIDE, CALCIUM CHLORIDE 600; 310; 30; 20 MG/100ML; MG/100ML; MG/100ML; MG/100ML
INJECTION, SOLUTION INTRAVENOUS CONTINUOUS
Status: DISCONTINUED | OUTPATIENT
Start: 2022-05-04 | End: 2022-05-05 | Stop reason: HOSPADM

## 2022-05-04 RX ORDER — IBUPROFEN 800 MG/1
800 TABLET, FILM COATED ORAL EVERY 6 HOURS
Status: DISCONTINUED | OUTPATIENT
Start: 2022-05-04 | End: 2022-05-05 | Stop reason: HOSPADM

## 2022-05-04 RX ORDER — HYDROMORPHONE HCL IN WATER/PF 6 MG/30 ML
0.2 PATIENT CONTROLLED ANALGESIA SYRINGE INTRAVENOUS
Status: DISCONTINUED | OUTPATIENT
Start: 2022-05-04 | End: 2022-05-05 | Stop reason: HOSPADM

## 2022-05-04 RX ORDER — ONDANSETRON 2 MG/ML
4 INJECTION INTRAMUSCULAR; INTRAVENOUS EVERY 30 MIN PRN
Status: DISCONTINUED | OUTPATIENT
Start: 2022-05-04 | End: 2022-05-04 | Stop reason: HOSPADM

## 2022-05-04 RX ORDER — ONDANSETRON 4 MG/1
4 TABLET, ORALLY DISINTEGRATING ORAL EVERY 6 HOURS PRN
Status: DISCONTINUED | OUTPATIENT
Start: 2022-05-04 | End: 2022-05-05 | Stop reason: HOSPADM

## 2022-05-04 RX ORDER — AMOXICILLIN 250 MG
1 CAPSULE ORAL 2 TIMES DAILY
Status: DISCONTINUED | OUTPATIENT
Start: 2022-05-04 | End: 2022-05-05 | Stop reason: HOSPADM

## 2022-05-04 RX ADMIN — Medication 100 MG: at 10:02

## 2022-05-04 RX ADMIN — ACETAMINOPHEN 975 MG: 325 TABLET ORAL at 14:22

## 2022-05-04 RX ADMIN — KETOROLAC TROMETHAMINE 15 MG: 15 INJECTION, SOLUTION INTRAMUSCULAR; INTRAVENOUS at 14:21

## 2022-05-04 RX ADMIN — TRANEXAMIC ACID 1 G: 1 INJECTION, SOLUTION INTRAVENOUS at 09:56

## 2022-05-04 RX ADMIN — FENTANYL CITRATE 100 MCG: 50 INJECTION, SOLUTION INTRAMUSCULAR; INTRAVENOUS at 09:59

## 2022-05-04 RX ADMIN — MIDAZOLAM 2 MG: 1 INJECTION INTRAMUSCULAR; INTRAVENOUS at 09:53

## 2022-05-04 RX ADMIN — NICOTINE 1 PATCH: 21 PATCH, EXTENDED RELEASE TRANSDERMAL at 19:55

## 2022-05-04 RX ADMIN — FENTANYL CITRATE 50 MCG: 50 INJECTION, SOLUTION INTRAMUSCULAR; INTRAVENOUS at 11:49

## 2022-05-04 RX ADMIN — ROCURONIUM BROMIDE 30 MG: 10 INJECTION INTRAVENOUS at 10:06

## 2022-05-04 RX ADMIN — SODIUM CHLORIDE, POTASSIUM CHLORIDE, SODIUM LACTATE AND CALCIUM CHLORIDE: 600; 310; 30; 20 INJECTION, SOLUTION INTRAVENOUS at 20:45

## 2022-05-04 RX ADMIN — PRENATAL VITAMINS-IRON FUMARATE 27 MG IRON-FOLIC ACID 0.8 MG TABLET 1 TABLET: at 14:22

## 2022-05-04 RX ADMIN — SERTRALINE HYDROCHLORIDE 50 MG: 50 TABLET ORAL at 14:22

## 2022-05-04 RX ADMIN — ROCURONIUM BROMIDE 30 MG: 10 INJECTION INTRAVENOUS at 10:42

## 2022-05-04 RX ADMIN — DEXAMETHASONE SODIUM PHOSPHATE 10 MG: 10 INJECTION, SOLUTION INTRAMUSCULAR; INTRAVENOUS at 10:16

## 2022-05-04 RX ADMIN — SODIUM CHLORIDE, POTASSIUM CHLORIDE, SODIUM LACTATE AND CALCIUM CHLORIDE: 600; 310; 30; 20 INJECTION, SOLUTION INTRAVENOUS at 07:40

## 2022-05-04 RX ADMIN — MAGNESIUM SULFATE IN WATER 4 G: 40 INJECTION, SOLUTION INTRAVENOUS at 08:03

## 2022-05-04 RX ADMIN — HYDROMORPHONE HYDROCHLORIDE 0.5 MG: 1 INJECTION, SOLUTION INTRAMUSCULAR; INTRAVENOUS; SUBCUTANEOUS at 11:22

## 2022-05-04 RX ADMIN — SODIUM CHLORIDE, POTASSIUM CHLORIDE, SODIUM LACTATE AND CALCIUM CHLORIDE: 600; 310; 30; 20 INJECTION, SOLUTION INTRAVENOUS at 11:25

## 2022-05-04 RX ADMIN — KETOROLAC TROMETHAMINE 15 MG: 15 INJECTION, SOLUTION INTRAMUSCULAR; INTRAVENOUS at 20:47

## 2022-05-04 RX ADMIN — ONDANSETRON 4 MG: 2 INJECTION INTRAMUSCULAR; INTRAVENOUS at 11:54

## 2022-05-04 RX ADMIN — FENTANYL CITRATE 50 MCG: 50 INJECTION, SOLUTION INTRAMUSCULAR; INTRAVENOUS at 10:42

## 2022-05-04 RX ADMIN — PROPOFOL 50 MCG/KG/MIN: 10 INJECTION, EMULSION INTRAVENOUS at 10:03

## 2022-05-04 RX ADMIN — PROPOFOL 200 MG: 10 INJECTION, EMULSION INTRAVENOUS at 10:01

## 2022-05-04 RX ADMIN — KETAMINE HYDROCHLORIDE 30 MG: 10 INJECTION, SOLUTION INTRAMUSCULAR; INTRAVENOUS at 10:07

## 2022-05-04 RX ADMIN — ALBUTEROL SULFATE 2.5 MG: 2.5 SOLUTION RESPIRATORY (INHALATION) at 08:56

## 2022-05-04 RX ADMIN — SUGAMMADEX 200 MG: 100 INJECTION, SOLUTION INTRAVENOUS at 11:58

## 2022-05-04 RX ADMIN — METOPROLOL TARTRATE 25 MG: 25 TABLET, FILM COATED ORAL at 20:49

## 2022-05-04 RX ADMIN — PROPOFOL 50 MG: 10 INJECTION, EMULSION INTRAVENOUS at 11:21

## 2022-05-04 RX ADMIN — FENTANYL CITRATE 50 MCG: 50 INJECTION, SOLUTION INTRAMUSCULAR; INTRAVENOUS at 10:59

## 2022-05-04 RX ADMIN — LIDOCAINE HYDROCHLORIDE 40 MG: 20 INJECTION, SOLUTION INFILTRATION; PERINEURAL at 10:01

## 2022-05-04 RX ADMIN — ACETAMINOPHEN 975 MG: 325 TABLET ORAL at 07:48

## 2022-05-04 RX ADMIN — ACETAMINOPHEN 975 MG: 325 TABLET ORAL at 19:55

## 2022-05-04 RX ADMIN — Medication 2 G: at 09:53

## 2022-05-04 RX ADMIN — ROCURONIUM BROMIDE 15 MG: 10 INJECTION INTRAVENOUS at 11:22

## 2022-05-04 RX ADMIN — SENNOSIDES AND DOCUSATE SODIUM 1 TABLET: 50; 8.6 TABLET ORAL at 20:47

## 2022-05-04 NOTE — ANESTHESIA PROCEDURE NOTES
Airway       Patient location during procedure: OR       Procedure Start/Stop Times: 5/4/2022 10:03 AM  Staff -        CRNA: Sandra Valladares APRN CRNA       Performed By: CRNA  Consent for Airway        Urgency: elective  Indications and Patient Condition       Indications for airway management: mahsa-procedural         Mask difficulty assessment: 1 - vent by mask    Final Airway Details       Final airway type: endotracheal airway       Successful airway: ETT - single  Endotracheal Airway Details        ETT size (mm): 7.0       Cuffed: yes       Successful intubation technique: direct laryngoscopy       DL Blade Type: García 2       Grade View of Cords: 2       Measured from: lips       Secured at (cm): 20       Bite block used: None    Post intubation assessment        Placement verified by: capnometry, equal breath sounds and chest rise        Number of attempts at approach: 1       Secured with: commercial tube gloria and plastic tape       Ease of procedure: easy       Dentition: Intact and Unchanged    Medication(s) Administered   Medication Administration Time: 5/4/2022 10:03 AM

## 2022-05-04 NOTE — ANESTHESIA POSTPROCEDURE EVALUATION
Patient: Kimberley Louis    Procedure: Procedure(s):  LAPAROSCOPIC SUPRACERVICAL HYSTERECTOMY  BILATERAL SALPINGECTOMY, Lysis of Adhesions       Anesthesia Type:  General    Note:  Disposition: Admission   Postop Pain Control: Uneventful            Sign Out: Well controlled pain   PONV: No   Neuro/Psych: Uneventful            Sign Out: Acceptable/Baseline neuro status   Airway/Respiratory: Uneventful            Sign Out: Acceptable/Baseline resp. status   CV/Hemodynamics: Uneventful            Sign Out: Acceptable CV status; No obvious hypovolemia; No obvious fluid overload   Other NRE: NONE   DID A NON-ROUTINE EVENT OCCUR? No           Last vitals:  Vitals Value Taken Time   /71 05/04/22 1255   Temp 98.3  F (36.8  C) 05/04/22 1215   Pulse 80 05/04/22 1258   Resp 7 05/04/22 1258   SpO2 95 % 05/04/22 1258   Vitals shown include unvalidated device data.    Electronically Signed By: ZAHEER Cruz CRNA  May 4, 2022  12:59 PM

## 2022-05-04 NOTE — PLAN OF CARE
Goal Outcome Evaluation:    Plan of Care Reviewed With: patient     Overall Patient Progress: improving    Vital signs:  Temp: 98.6  F (37  C) Temp src: Oral BP: 121/57 Pulse: 83   Resp: 14 SpO2: 95 % O2 Device: None (Room air)     Patient doing well with pain reported 3/10 to abdominal incisional areas, constant ache.  Gave toradol and tylenol and reported effective.  Dangled at edge of bed and marched in place at approximately 1430.  Catheter removed per request after patient ambulated in room at 1500.  Void x 2 now with total output adequate as charted.  LR 100ml/hour.  Intake increased to regular diet, ate 100% at 1600.  Patient alert and oriented, use of call light appropriate.  SCDs and pulse ox on.

## 2022-05-04 NOTE — PLAN OF CARE
[facility] Inpatient Admission Note:    Patient admitted to 4202/4202-1 at approximately 1350 via cart accompanied by nurse from surgery . Report received from Shannon PAULSON in SBAR format at 1350 via face to face in room. Patient transferred to bed via slide board.. Patient is alert and oriented X 3, reports pain; rates at 6 on 0-10 scale.  Patient oriented to room, unit, hourly rounding, and plan of care. Explained admission packet and patient handbook with patient bill of rights brochure. Will continue to monitor and document as needed.     Inpatient Nursing criteria listed below was met:    Health care directives status obtained and documented: Yes    Patient identifies a surrogate decision maker: Yes If yes, who: Steffen, significant other, Contact Information: [phone]    Clergy visit ordered if patient requests: N/A    Skin issues/needs documented: Yes    Isolation Patient: no Education given, correct sign in place and documentation row added to PCS:  Yes    Fall Prevention Yes: Care plan updated, education given and documented, sticker and magnet in place: Yes    Care Plan initiated: Yes    Education Documented (including assessment): Yes    Patient has discharge needs : No

## 2022-05-04 NOTE — OP NOTE
[facility] Operative Note:    Pre-operative diagnosis: Menorrhagia [N92.0]  Dysmenorrhea [N94.6]   Post-operative diagnosis Menorrhagia [N92.0]  Dysmenorrhea [N94.6]  Pelvic adhesive disease   Procedure: Laparoscopic Supracervical Hysterectomy with Extracorporeal Morcellation, Bilateral Salpingectomy  Lysis of adhesions   Surgeon:  Assistant: MD Sherice Martinez NP  (assistance required for retraction, exposure, instrument handling, and wound closure)     Anesthesia: General    Estimated blood loss: Less than 100 ml     SPECIMENS: Uterus,and fallopian tubes   COMPLICATIONS: None.   OPERATIVE FINDINGS:  Extensive omental, pelvic and bladder adhesions.  Uterus adhered to anterior abdominal wall.   Otherwise normal pelvic and abdominal anatomy.  OPERATIVE DICTATION:   The patient was brought to the Operating Room and uneventfully placed under general anesthesia. She was prepped and draped in the dorsal lithotomy position and her bladder drained. The cervix was visualized with a speculum and grasped anteriorly with a fine tooth tenaculum and a uterine manipulating device placed. We then changed gloves and proceeded to the abdominal portion of the case. A 5 mm infraumbilical skin incision was performed with a scalpel. A Veress needle was introduced without difficulty and a water drop test performed. The abdomen was insufflated with several liters of carbon dioxide. A 5 mm trocar and a laparoscope were introduced. Visualization was excellent. Findings were as described above. Next,  5 mm  lower quadrant ports were placed under direct visualization. After initial exploration the uterus was then elevated. The LigaSure bipolar cutting cautery device was used to lyse the omental adhesions.  The uterus was then dissected from the anterior abdominal wall with sharp dissection and Ligasure.  >30minutes was spent in extensive adhesionolysis.    After the completion of the adhesionlysis the uterus was mobilized.   The Ligasure was used to  transect down through the mesosalpinx, uteroovarian, and round ligaments adjacent to the uterus. A bladder flap was developed using sharp and blunt dissection and the uterine artery skeletonized within the cardinal ligament. The uterine artery was then transected with the LigaSure just below the level of the internal cervical os. The same procedure was repeated on the patient's right side. The bladder flap was completed in the midline.  The umbilical incision was then extended and an Beni ring retractor  Gel Port was placed. The fallopian tubes were transected and removed through umbilical  port.  The Yoana loop was placed around the uterus and encircled the cervix at the level of the internal os. The uterine manipulating device was removed. Using 150 fraser pure cutting current the uterus was amputated. Vigorous monopolar cautery was performed on the endocervical canal. The Gel Port was removed and the Beni containment bag was placed in the pelvis. The  laparoscope was  reintroduced and the uterus placed into the containment bag. The Gel Port was again removed and the specimen brought to the abdominal wall within the bag.   A  scalpel guard was  placed in umbilical incision. The uterus was grasped with Leahey clamps and using the c-incision technique with a scalpel  the uterus was sequentially morcellated in an extracorporeal fashion until it was completely removed.  The ring retractor and guard were removed and the Gel Port replaced and the abdomen reinsufflated.  The pelvis was irrigated and found to be hemostatic. The remaining trocars were removed and excess carbon dioxide expressed from the abdomen. The umbilical abdominal fascia was closed  with running 0 PDS   The subcutaneous spaces were irrigated and checked for hemostasis. The skin incisions were closed with 3.0 subcuticular Monocryl and surgical glue. There were no complications. The patient was transferred to the Recovery  Room in excellent and stable condition.    SEFERINO CHOW MD

## 2022-05-04 NOTE — INTERVAL H&P NOTE
"I have reviewed the surgical (or preoperative) H&P that is linked to this encounter, and examined the patient. There are no significant changes.    Patient did not obtain pelvic US.  We discussed risks of unforseen ovarian, uterine pathology and potential need for laparotomy, oophorectomy or indicated procedures based on findings at time of surgery, or need for future surgery if abnormal pathology.  Pt agrees with POC and desires to proceed at this time.        Clinical Conditions Present on Arrival:  Clinically Significant Risk Factors Present on Admission                 # Overweight: Estimated body mass index is 27.67 kg/m  as calculated from the following:    Height as of this encounter: 1.626 m (5' 4\").    Weight as of this encounter: 73.1 kg (161 lb 3.2 oz).       "

## 2022-05-04 NOTE — ANESTHESIA CARE TRANSFER NOTE
Patient: Kimberley Louis    Procedure: Procedure(s):  LAPAROSCOPIC SUPRACERVICAL HYSTERECTOMY  BILATERAL SALPINGECTOMY, Lysis of Adhesions       Diagnosis: Menorrhagia [N92.0]  Dysmenorrhea [N94.6]  Diagnosis Additional Information: No value filed.    Anesthesia Type:   General     Note:    Oropharynx: oropharynx clear of all foreign objects  Level of Consciousness: drowsy  Oxygen Supplementation: nasal cannula  Level of Supplemental Oxygen (L/min / FiO2): 2  Independent Airway: airway patency satisfactory and stable  Dentition: dentition unchanged  Vital Signs Stable: post-procedure vital signs reviewed and stable    Patient transferred to: PACU    Handoff Report: Identifed the Patient, Identified the Reponsible Provider, Reviewed the pertinent medical history, Discussed the surgical course, Reviewed Intra-OP anesthesia mangement and issues during anesthesia, Set expectations for post-procedure period and Allowed opportunity for questions and acknowledgement of understanding      Vitals:  Vitals Value Taken Time   /69 05/04/22 1220   Temp     Pulse 93 05/04/22 1221   Resp 14 05/04/22 1221   SpO2 97 % 05/04/22 1221   Vitals shown include unvalidated device data.    Electronically Signed By: ZAHEER Flores CRNA  May 4, 2022  12:23 PM

## 2022-05-05 VITALS
HEART RATE: 70 BPM | BODY MASS INDEX: 27.52 KG/M2 | WEIGHT: 161.2 LBS | DIASTOLIC BLOOD PRESSURE: 63 MMHG | SYSTOLIC BLOOD PRESSURE: 118 MMHG | HEIGHT: 64 IN | RESPIRATION RATE: 16 BRPM | OXYGEN SATURATION: 98 % | TEMPERATURE: 98.5 F

## 2022-05-05 PROBLEM — Z90.711 S/P LAPAROSCOPIC SUPRACERVICAL HYSTERECTOMY: Status: ACTIVE | Noted: 2022-05-05

## 2022-05-05 PROCEDURE — 250N000013 HC RX MED GY IP 250 OP 250 PS 637: Performed by: OBSTETRICS & GYNECOLOGY

## 2022-05-05 PROCEDURE — 250N000011 HC RX IP 250 OP 636: Performed by: OBSTETRICS & GYNECOLOGY

## 2022-05-05 RX ORDER — IBUPROFEN 800 MG/1
800 TABLET, FILM COATED ORAL EVERY 6 HOURS
COMMUNITY
Start: 2022-05-05 | End: 2022-07-22

## 2022-05-05 RX ORDER — ACETAMINOPHEN 325 MG/1
650 TABLET ORAL EVERY 6 HOURS
COMMUNITY
Start: 2022-05-07 | End: 2022-07-22

## 2022-05-05 RX ADMIN — ACETAMINOPHEN 975 MG: 325 TABLET ORAL at 08:33

## 2022-05-05 RX ADMIN — SENNOSIDES AND DOCUSATE SODIUM 1 TABLET: 50; 8.6 TABLET ORAL at 08:34

## 2022-05-05 RX ADMIN — AMLODIPINE BESYLATE 10 MG: 5 TABLET ORAL at 08:33

## 2022-05-05 RX ADMIN — KETOROLAC TROMETHAMINE 15 MG: 15 INJECTION, SOLUTION INTRAMUSCULAR; INTRAVENOUS at 02:23

## 2022-05-05 RX ADMIN — ACETAMINOPHEN 975 MG: 325 TABLET ORAL at 02:23

## 2022-05-05 RX ADMIN — IBUPROFEN 800 MG: 800 TABLET ORAL at 08:34

## 2022-05-05 RX ADMIN — PRENATAL VITAMINS-IRON FUMARATE 27 MG IRON-FOLIC ACID 0.8 MG TABLET 1 TABLET: at 08:34

## 2022-05-05 RX ADMIN — METOPROLOL TARTRATE 25 MG: 25 TABLET, FILM COATED ORAL at 08:32

## 2022-05-05 RX ADMIN — SERTRALINE HYDROCHLORIDE 50 MG: 50 TABLET ORAL at 08:33

## 2022-05-05 NOTE — PLAN OF CARE
Pt verfies understanding and signs discharge paperwork. Partner here, supportive of pt. Pt ambulates off unit independently.

## 2022-05-05 NOTE — PLAN OF CARE
"/63 (Cuff Size: Adult Regular)   Pulse 70   Temp 98.5  F (36.9  C) (Oral)   Resp 16   Ht 1.626 m (5' 4\")   Wt 73.1 kg (161 lb 3.2 oz)   LMP 04/20/2022 (Exact Date)   SpO2 98%   BMI 27.67 kg/m          Pt A&O, makes needs known. Rates abdominal pain 3/10. Surgical incisions without signs of infection, no drainage and open to air. Pt ambulating independently. Pt voiding spontaneously. No complaints at this time, states she is ready to go home.                  "

## 2022-05-05 NOTE — TELEPHONE ENCOUNTER
sertraline (ZOLOFT) 50 MG tablet  Last Written Prescription Date:  12/1/2021  Last Fill Quantity: 30,   # refills: 4  Last Office Visit: 4/26/2022  Future Office visit:    Next 5 appointments (look out 90 days)    Jul 22, 2022  3:15 PM  (Arrive by 3:00 PM)  SHORT with Jazmyne Villafana NP  [facility] ) [address]  [facility] [address]  [phone]         amLODIPine (NORVASC) 10 MG tablet       Last Written Prescription Date: 4/1/2022  Last Fill Quantity: 30,   # refills: 0  Last Office Visit 4/26/2022  Future Office visit:    Next 5 appointments (look out 90 days)    Jul 22, 2022  3:15 PM  (Arrive by 3:00 PM)  SHORT with Jazmyne Villaafna NP  [facility] ([facility] ) [address]  [facility] [address]  [phone]           }

## 2022-05-05 NOTE — DISCHARGE INSTRUCTIONS
No heavy lifting greater than 15lb for 4 weeks  No driving while on pain meds  Pelvic rest for 4 weeks  No vigorous activity, exercise, swim, bath for 4 weeks  Schedule Postop check Dr. Hyatt 4 weeks  Call Dr. Hyatt [phone] as necessary if problems in interim

## 2022-05-05 NOTE — DISCHARGE SUMMARY
"Discharge Summary    Kimberley Louis MRN# [id number]   YOB: 1980 Age: 42 year old     Date of Admission:  5/4/2022  Date of Discharge:  5/5/2022  Admitting Physician:  Bunny Hyatt MD  Discharge Physician:  Sherice Herrmann NP  Discharging Service:  Obstetrics and Gynecology     Primary Provider: Xiomara Caceres          Admission Diagnoses:   Menorrhagia [N92.0]  Dysmenorrhea [N94.6]          Discharge Diagnosis:   Patient Active Problem List   Diagnosis     Essential hypertension     H/O renal calculi     Enlarged kidney     ACP (advance care planning)     Advanced directives, counseling/discussion     Pure hypercholesterolemia     Tobacco abuse counseling     Tobacco abuse     Palpitations     Atypical chest pain     Dyspnea on exertion     Vein disorder     Migraine      Incomplete right bundle branch block     Kidney stone on left side     Left flank tenderness     Nephrocalcinosis     Personal history of tobacco use, presenting hazards to health     Recurrent UTI     Bacterial vaginosis     Nephrolithiasis     Dysmenorrhea     Menorrhagia     S/P laparoscopic supracervical hysterectomy                Discharge Disposition:   Discharged to home           Condition on Discharge:   Discharge condition: Stable   Discharge vitals: Blood pressure 118/63, pulse 70, temperature 98.5  F (36.9  C), temperature source Oral, resp. rate 16, height 1.626 m (5' 4\"), weight 73.1 kg (161 lb 3.2 oz), last menstrual period 04/20/2022, SpO2 98 %, not currently breastfeeding.   Code status on discharge: Full Code           Procedures / Labs / Imaging:   Invasive procedures: preaza, general anesthesia, IV          Medications Prior to Admission:     Medications Prior to Admission   Medication Sig Dispense Refill Last Dose     amLODIPine (NORVASC) 10 MG tablet Take 1 tablet by mouth once daily 30 tablet 0 5/4/2022 at 0500     metoprolol tartrate (LOPRESSOR) 25 MG tablet TAKE 1 TABLET(25 MG) BY MOUTH TWICE DAILY 60 " tablet 11 5/4/2022 at 0500     Prenatal Vit-Fe Fumarate-FA (PRENATAL MULTIVITAMIN  WITH IRON) 28-0.8 MG TABS Take 1 tablet by mouth daily 90 tablet 0 Past Week at Unknown time     PROAIR  (90 Base) MCG/ACT inhaler INHALE 2 PUFFS INTO THE LUNGS EVERY 6 HOURS 8.5 g 2 Past Month at Unknown time     sertraline (ZOLOFT) 50 MG tablet Take 1 tablet (50 mg) by mouth daily 30 tablet 4 5/3/2022 at 0600     triamcinolone (KENALOG) 0.025 % external ointment Apply topically 2 times daily as needed for irritation 15 g 0 More than a month at Unknown time             Discharge Medications:     Current Discharge Medication List      START taking these medications    Details   acetaminophen (TYLENOL) 325 MG tablet Take 2 tablets (650 mg) by mouth every 6 hours    Associated Diagnoses: S/P laparoscopic supracervical hysterectomy      ibuprofen (ADVIL/MOTRIN) 800 MG tablet Take 1 tablet (800 mg) by mouth every 6 hours    Associated Diagnoses: S/P laparoscopic supracervical hysterectomy         CONTINUE these medications which have NOT CHANGED    Details   amLODIPine (NORVASC) 10 MG tablet Take 1 tablet by mouth once daily  Qty: 30 tablet, Refills: 0    Associated Diagnoses: Essential hypertension; Atypical chest pain      metoprolol tartrate (LOPRESSOR) 25 MG tablet TAKE 1 TABLET(25 MG) BY MOUTH TWICE DAILY  Qty: 60 tablet, Refills: 11    Associated Diagnoses: Essential hypertension      Prenatal Vit-Fe Fumarate-FA (PRENATAL MULTIVITAMIN  WITH IRON) 28-0.8 MG TABS Take 1 tablet by mouth daily  Qty: 90 tablet, Refills: 0    Associated Diagnoses: Low iron      PROAIR  (90 Base) MCG/ACT inhaler INHALE 2 PUFFS INTO THE LUNGS EVERY 6 HOURS  Qty: 8.5 g, Refills: 2    Associated Diagnoses: Dyspnea on exertion; Cough      sertraline (ZOLOFT) 50 MG tablet Take 1 tablet (50 mg) by mouth daily  Qty: 30 tablet, Refills: 4    Associated Diagnoses: Major depressive disorder, single episode, mild (H)      triamcinolone (KENALOG) 0.025 %  external ointment Apply topically 2 times daily as needed for irritation  Qty: 15 g, Refills: 0    Associated Diagnoses: Facial dermatitis                   Consultations:   No consultations were requested during this admission             Brief History of Illness:   Kimberley Louis is a 42 year old female who was admitted for elective LSH for ongoing menorrhagia and dysmenorrhea.  Uncomplicated surgical and post op course.  Discharged to home on post op day #1           Hospital Course:     Dysmenorrhea    Menorrhagia    S/P laparoscopic supracervical hysterectomy    * No resolved hospital problems. *                 Significant Results:   None             Pending Results:   None           Discharge Instructions and Follow-Up:   Discharge diet: Regular   Discharge activity: No lifting, driving, or strenuous exercise for 4 week(s)  No driving or operating machinery while on narcotic analgesics  Pelvic rest: abstain from intercourse and do not use tampons for 4 week(s)   Discharge follow-up: Follow up with Dr Hyatt in 4 weeks   Wound care: Keep incisions clean and dry.     Other instructions: None

## 2022-05-05 NOTE — PLAN OF CARE
Goal Outcome Evaluation:                    Pt urinating well, up ad joe, walking to bathroom. Pt reports slight numbness in feet from sitting so long. Encouraged frequent ambulation. Reports gas pain 4/10 in right flank radiating to right shoulder. Declines need for analgesia. Snack given. Ice pack given. Will continue to monitor and provide cares as needed.

## 2022-05-06 RX ORDER — AMLODIPINE BESYLATE 10 MG/1
TABLET ORAL
Qty: 30 TABLET | Refills: 0 | Status: SHIPPED | OUTPATIENT
Start: 2022-05-06 | End: 2022-06-08

## 2022-05-10 LAB
PATH REPORT.COMMENTS IMP SPEC: NORMAL
PATH REPORT.COMMENTS IMP SPEC: NORMAL
PATH REPORT.FINAL DX SPEC: NORMAL
PATH REPORT.GROSS SPEC: NORMAL
PATH REPORT.MICROSCOPIC SPEC OTHER STN: NORMAL
PATH REPORT.RELEVANT HX SPEC: NORMAL
PHOTO IMAGE: NORMAL

## 2022-05-10 PROCEDURE — 88302 TISSUE EXAM BY PATHOLOGIST: CPT | Mod: 26 | Performed by: PATHOLOGY

## 2022-05-10 PROCEDURE — 88307 TISSUE EXAM BY PATHOLOGIST: CPT | Mod: 26 | Performed by: PATHOLOGY

## 2022-06-01 ENCOUNTER — HOSPITAL ENCOUNTER (OUTPATIENT)
Dept: ULTRASOUND IMAGING | Facility: HOSPITAL | Age: 42
Discharge: HOME | End: 2022-06-01
Attending: OBSTETRICS & GYNECOLOGY
Payer: COMMERCIAL

## 2022-06-01 ENCOUNTER — HOSPITAL ENCOUNTER (OUTPATIENT)
Dept: MAMMOGRAPHY | Facility: OTHER | Age: 42
Discharge: HOME | End: 2022-06-01
Attending: OBSTETRICS & GYNECOLOGY
Payer: COMMERCIAL

## 2022-06-01 DIAGNOSIS — N60.01 BREAST CYST, RIGHT: ICD-10-CM

## 2022-06-01 DIAGNOSIS — Z12.31 ENCOUNTER FOR SCREENING MAMMOGRAM FOR BREAST CANCER: ICD-10-CM

## 2022-06-01 PROCEDURE — 76642 ULTRASOUND BREAST LIMITED: CPT | Mod: 50

## 2022-06-01 PROCEDURE — 77066 DX MAMMO INCL CAD BI: CPT | Mod: TC

## 2022-06-02 ENCOUNTER — OFFICE VISIT (OUTPATIENT)
Dept: OBGYN | Facility: OTHER | Age: 42
End: 2022-06-02
Attending: OBSTETRICS & GYNECOLOGY
Payer: COMMERCIAL

## 2022-06-02 VITALS
WEIGHT: 163 LBS | BODY MASS INDEX: 27.83 KG/M2 | SYSTOLIC BLOOD PRESSURE: 130 MMHG | DIASTOLIC BLOOD PRESSURE: 70 MMHG | HEIGHT: 64 IN | TEMPERATURE: 98.4 F | OXYGEN SATURATION: 97 % | HEART RATE: 84 BPM

## 2022-06-02 DIAGNOSIS — B96.89 BV (BACTERIAL VAGINOSIS): Primary | ICD-10-CM

## 2022-06-02 DIAGNOSIS — N60.01 BREAST CYST, RIGHT: ICD-10-CM

## 2022-06-02 DIAGNOSIS — N76.0 BV (BACTERIAL VAGINOSIS): Primary | ICD-10-CM

## 2022-06-02 DIAGNOSIS — N63.20 LEFT BREAST MASS: ICD-10-CM

## 2022-06-02 PROCEDURE — 99024 POSTOP FOLLOW-UP VISIT: CPT | Performed by: OBSTETRICS & GYNECOLOGY

## 2022-06-02 PROCEDURE — G0463 HOSPITAL OUTPT CLINIC VISIT: HCPCS

## 2022-06-02 RX ORDER — METRONIDAZOLE 500 MG/1
500 TABLET ORAL
Qty: 21 TABLET | Refills: 3 | Status: SHIPPED | OUTPATIENT
Start: 2022-06-02 | End: 2023-02-14

## 2022-06-02 ASSESSMENT — PAIN SCALES - GENERAL: PAINLEVEL: NO PAIN (0)

## 2022-06-02 NOTE — PROGRESS NOTES
"GAGAN Louis is a 42 year old female presents for post operative check. She is  4  week(s) status post LSH/BS.  She reports doing well and denies significant pain or bleeding.  Bowel and bladder function is satisfactory. Denies incisional problems. Significant findings: benign    O.  Blood pressure 130/70, pulse 84, temperature 98.4  F (36.9  C), temperature source Tympanic, height 1.626 m (5' 4\"), weight 73.9 kg (163 lb), last menstrual period 04/20/2022, SpO2 97 %, not currently breastfeeding.    Abd: soft, non-tender, non-distended. Incision clear, dry, and intact without evidence of infection.    A. /P. Satisfactory post-op check.Released from restrictions.    H/o recurrent BV with coitus.  Prophylactic flagyl rx.     F/u prn problems or at next annual examination.    Bunny Hyatt MD  "

## 2022-06-02 NOTE — NURSING NOTE
"Chief Complaint   Patient presents with     Surgical Followup     Post op, lap supra hyst, bilateral salpingectomy 5/4/22       Initial /70 (BP Location: Left arm, Patient Position: Chair, Cuff Size: Adult Regular)   Pulse 84   Temp 98.4  F (36.9  C) (Tympanic)   Ht 1.626 m (5' 4\")   Wt 73.9 kg (163 lb)   LMP 04/20/2022 (Exact Date)   SpO2 97%   BMI 27.98 kg/m   Estimated body mass index is 27.98 kg/m  as calculated from the following:    Height as of this encounter: 1.626 m (5' 4\").    Weight as of this encounter: 73.9 kg (163 lb).  Medication Reconciliation: complete  SAYDA HAMMOND LPN    "

## 2022-06-07 DIAGNOSIS — R07.89 ATYPICAL CHEST PAIN: ICD-10-CM

## 2022-06-07 DIAGNOSIS — F32.0 MAJOR DEPRESSIVE DISORDER, SINGLE EPISODE, MILD (H): ICD-10-CM

## 2022-06-07 DIAGNOSIS — I10 ESSENTIAL HYPERTENSION: ICD-10-CM

## 2022-06-08 RX ORDER — AMLODIPINE BESYLATE 10 MG/1
TABLET ORAL
Qty: 30 TABLET | Refills: 0 | Status: SHIPPED | OUTPATIENT
Start: 2022-06-08 | End: 2022-07-14

## 2022-06-08 NOTE — TELEPHONE ENCOUNTER
zoloft      Last Written Prescription Date:  5/6/22  Last Fill Quantity: 30,   # refills: 0  Last Office Visit: 4/26/22  Future Office visit:    Next 5 appointments (look out 90 days)    Jul 22, 2022  3:15 PM  (Arrive by 3:00 PM)  SHORT with Jazmyne Villafana NP  [facility] [address] [address]  [phone]         [facility]      Last Written Prescription Date:  5/6/22  Last Fill Quantity: 30,   # refills: 0  Last Office Visit: 4/26/22  Future Office visit:

## 2022-06-29 ENCOUNTER — HOSPITAL ENCOUNTER (EMERGENCY)
Facility: HOSPITAL | Age: 42
Discharge: HOME | End: 2022-06-29
Attending: NURSE PRACTITIONER | Admitting: NURSE PRACTITIONER
Payer: COMMERCIAL

## 2022-06-29 VITALS
TEMPERATURE: 97.7 F | RESPIRATION RATE: 16 BRPM | OXYGEN SATURATION: 97 % | HEART RATE: 73 BPM | SYSTOLIC BLOOD PRESSURE: 153 MMHG | DIASTOLIC BLOOD PRESSURE: 84 MMHG

## 2022-06-29 DIAGNOSIS — N30.01 ACUTE CYSTITIS WITH HEMATURIA: ICD-10-CM

## 2022-06-29 LAB
ALBUMIN UR-MCNC: 20 MG/DL
APPEARANCE UR: ABNORMAL
BACTERIA #/AREA URNS HPF: ABNORMAL /HPF
BILIRUB UR QL STRIP: NEGATIVE
CLUE CELLS: ABNORMAL
COLOR UR AUTO: YELLOW
GLUCOSE UR STRIP-MCNC: NEGATIVE MG/DL
HGB UR QL STRIP: ABNORMAL
HYALINE CASTS: 6 /LPF
KETONES UR STRIP-MCNC: NEGATIVE MG/DL
LEUKOCYTE ESTERASE UR QL STRIP: ABNORMAL
MUCOUS THREADS #/AREA URNS LPF: PRESENT /LPF
NITRATE UR QL: POSITIVE
PH UR STRIP: 6 [PH] (ref 4.7–8)
RBC URINE: 13 /HPF
SP GR UR STRIP: 1.02 (ref 1–1.03)
SQUAMOUS EPITHELIAL: 15 /HPF
TRICHOMONAS, WET PREP: ABNORMAL
UROBILINOGEN UR STRIP-MCNC: NORMAL MG/DL
WBC CLUMPS #/AREA URNS HPF: PRESENT /HPF
WBC URINE: >182 /HPF
WBC'S/HIGH POWER FIELD, WET PREP: ABNORMAL
YEAST, WET PREP: ABNORMAL

## 2022-06-29 PROCEDURE — 81001 URINALYSIS AUTO W/SCOPE: CPT | Performed by: NURSE PRACTITIONER

## 2022-06-29 PROCEDURE — G0463 HOSPITAL OUTPT CLINIC VISIT: HCPCS

## 2022-06-29 PROCEDURE — 87210 SMEAR WET MOUNT SALINE/INK: CPT | Performed by: NURSE PRACTITIONER

## 2022-06-29 PROCEDURE — 99213 OFFICE O/P EST LOW 20 MIN: CPT | Performed by: NURSE PRACTITIONER

## 2022-06-29 RX ORDER — CEPHALEXIN 500 MG/1
500 CAPSULE ORAL 2 TIMES DAILY
Qty: 14 CAPSULE | Refills: 0 | Status: SHIPPED | OUTPATIENT
Start: 2022-06-29 | End: 2022-07-06

## 2022-06-29 ASSESSMENT — ENCOUNTER SYMPTOMS
FEVER: 0
PSYCHIATRIC NEGATIVE: 1
ENDOCRINE NEGATIVE: 1
HEMATOLOGIC/LYMPHATIC NEGATIVE: 1
CONSTITUTIONAL NEGATIVE: 1
DYSURIA: 1
CARDIOVASCULAR NEGATIVE: 1
MUSCULOSKELETAL NEGATIVE: 1
RESPIRATORY NEGATIVE: 1
EYES NEGATIVE: 1
FREQUENCY: 1
NEUROLOGICAL NEGATIVE: 1
ABDOMINAL PAIN: 1
ALLERGIC/IMMUNOLOGIC NEGATIVE: 1

## 2022-06-29 NOTE — DISCHARGE INSTRUCTIONS
Thank you for choosing [facility] for your healthcare needs today.  For your bladder infection, please take your antibiotics twice a day 12 hours apart for 7 days.  Increase your water intake, eliminate your bladder at least every 2 hours or sooner, and use Tylenol ibuprofen for discomfort.  If your symptoms worsen or you develop any new concerning symptoms please return to urgent care or the emergency room.  Thank you thank you

## 2022-06-29 NOTE — ED TRIAGE NOTES
Symptoms for a week. Had thought it was a yeast infection but it is not getting better and symptoms have changed more towards a suspicion of an UTI

## 2022-06-29 NOTE — ED PROVIDER NOTES
History     Chief Complaint   Patient presents with     UTI     HPI   History of presenting illness given by patient.    Kimberley Louis is a 42 year old female who presents for evaluation of urgency, pain with urination, vaginal discharge more than usual, and lower abdominal pain.  Her symptoms started 1 week ago and have progressively worsened.  She suspects she has a UTI, but does have a history of bacterial vaginosis as well.  She denies fevers, nausea, vomiting, or diarrhea.  She has no concerns for sexually transmitted infection as her and her  have been  for 6 years.    Allergies:  No Known Allergies    Problem List:    Patient Active Problem List    Diagnosis Date Noted     S/P laparoscopic supracervical hysterectomy 05/05/2022     Priority: Medium     Bilateral salp       Dysmenorrhea 05/04/2022     Priority: Medium     Menorrhagia 05/04/2022     Priority: Medium     Nephrolithiasis 07/21/2020     Priority: Medium     Added automatically from request for surgery [id number]       Bacterial vaginosis 06/09/2020     Priority: Medium     Left flank tenderness 08/03/2018     Priority: Medium     Personal history of tobacco use, presenting hazards to health 08/03/2018     Priority: Medium     Recurrent UTI 08/03/2018     Priority: Medium     Incomplete right bundle branch block 02/05/2018     Priority: Medium     Pure hypercholesterolemia 01/03/2018     Priority: Medium     Tobacco abuse counseling 01/03/2018     Priority: Medium     Tobacco abuse 01/03/2018     Priority: Medium     Palpitations 01/03/2018     Priority: Medium     Atypical chest pain 01/03/2018     Priority: Medium     Dyspnea on exertion 01/03/2018     Priority: Medium     Vein disorder 01/03/2018     Priority: Medium     Migraine  01/03/2018     Priority: Medium     Advanced directives, counseling/discussion 05/05/2016     Priority: Medium     Advance Care Planning 5/5/2016: ACP Review of Chart / Resources Provided:  Reviewed  chart for advance care plan.  Kimberley Louis has no plan or code status on file. Discussed available resources and provided with information. Confirmed code status reflects current choices pending further ACP discussions.  Confirmed/documented legally designated decision makers.  Added by SAYDA CABRERA             ACP (advance care planning) 04/13/2016     Priority: Medium     Advance Care Planning 4/13/2016: ACP Review of Chart / Resources Provided:  Reviewed chart for advance care plan.  Kimberley Louis has no plan or code status on file. Discussed available resources and provided with information. Confirmed code status reflects current choices pending further ACP discussions.  Confirmed/documented legally designated decision makers.  Added by Sandra Anne             Kidney stone on left side 12/21/2015     Priority: Medium     Nephrocalcinosis 12/21/2015     Priority: Medium     Essential hypertension 11/19/2015     Priority: Medium     H/O renal calculi 11/19/2015     Priority: Medium     Enlarged kidney 11/19/2015     Priority: Medium        Past Medical History:    Past Medical History:   Diagnosis Date     Cyst of breast      Enlarged kidney 11/19/2015     Essential hypertension 11/19/2015     H/O renal calculi 11/19/2015     H/O renal calculi 11/19/2015       Past Surgical History:    Past Surgical History:   Procedure Laterality Date     AS REMOVAL OF KIDNEY STONE       AS REMOVAL OF KIDNEY STONE       COMBINED CYSTOSCOPY, RETROGRADES, URETEROSCOPY, LASER HOLMIUM LITHOTRIPSY URETER(S), INSERT STENT Left 8/17/2020    Procedure: CYSTOURETEROSCOPY, WITH RETROGRADE PYELOGRAM, HOLMIUM LASER LITHOTRIPSY OF URETERAL CALCULUS, interpretation of fluroscopy;  Surgeon: Sherry Mcpherson MD;  Location: [facility]     LAPAROSCOPIC HYSTERECTOMY SUPRACERVICAL N/A 5/4/2022    Procedure: LAPAROSCOPIC SUPRACERVICAL HYSTERECTOMY;  Surgeon: Bunny Hyatt MD;  Location: [facility] OR     LAPAROSCOPIC SALPINGECTOMY Bilateral 5/4/2022     Procedure: BILATERAL SALPINGECTOMY, Lysis of Adhesions;  Surgeon: Bunny Hyatt MD;  Location: [facility] OR     TUBAL LIGATION       ZZC REMOVAL OF KIDNEY STONE         Family History:    Family History   Problem Relation Age of Onset     Diabetes Mother      Emphysema Mother      Cervical Cancer Mother      Throat cancer Mother      Diabetes Father      Hypertension Father      Hypertension Maternal Grandmother      Diabetes Maternal Grandmother      Schizophrenia Maternal Grandmother      Unknown/Adopted Maternal Grandfather      Lung Cancer Paternal Grandmother      Diabetes Paternal Grandmother      Unknown/Adopted Paternal Grandfather        Social History:  Marital Status:  Single [1]  Social History     Tobacco Use     Smoking status: Current Every Day Smoker     Packs/day: 1.00     Years: 28.00     Pack years: 28.00     Types: Cigarettes     Start date: 1/1/1989     Smokeless tobacco: Never Used     Tobacco comment: pt denied QP 9/22/20   Vaping Use     Vaping Use: Some days   Substance Use Topics     Alcohol use: Yes     Comment: occasional     Drug use: No        Medications:    amLODIPine (NORVASC) 10 MG tablet  cephALEXin (KEFLEX) 500 MG capsule  sertraline (ZOLOFT) 50 MG tablet  acetaminophen (TYLENOL) 325 MG tablet  ibuprofen (ADVIL/MOTRIN) 800 MG tablet  metoprolol tartrate (LOPRESSOR) 25 MG tablet  metroNIDAZOLE (FLAGYL) 500 MG tablet  Prenatal Vit-Fe Fumarate-FA (PRENATAL MULTIVITAMIN  WITH IRON) 28-0.8 MG TABS  PROAIR  (90 Base) MCG/ACT inhaler  triamcinolone (KENALOG) 0.025 % external ointment          Review of Systems   Constitutional: Negative.  Negative for fever.   HENT: Negative.    Eyes: Negative.    Respiratory: Negative.    Cardiovascular: Negative.    Gastrointestinal: Positive for abdominal pain.   Endocrine: Negative.    Genitourinary: Positive for dysuria, frequency, urgency and vaginal discharge.   Musculoskeletal: Negative.    Skin: Negative.    Allergic/Immunologic: Negative.     Neurological: Negative.    Hematological: Negative.    Psychiatric/Behavioral: Negative.        Physical Exam   BP: 153/84  Pulse: 73  Temp: 97.7  F (36.5  C)  Resp: 16  SpO2: 97 %      Physical Exam  Vitals and nursing note reviewed.   Constitutional:       Appearance: Normal appearance. She is normal weight. She is ill-appearing.   HENT:      Head: Normocephalic.      Nose: Nose normal.      Mouth/Throat:      Mouth: Mucous membranes are moist.      Pharynx: Oropharynx is clear.   Eyes:      Conjunctiva/sclera: Conjunctivae normal.      Pupils: Pupils are equal, round, and reactive to light.   Cardiovascular:      Rate and Rhythm: Normal rate and regular rhythm.      Pulses: Normal pulses.      Heart sounds: Normal heart sounds.   Pulmonary:      Effort: Pulmonary effort is normal.      Breath sounds: Normal breath sounds.   Abdominal:      General: Abdomen is flat. Bowel sounds are normal.      Palpations: Abdomen is soft.      Tenderness: There is no right CVA tenderness or left CVA tenderness.   Musculoskeletal:         General: Normal range of motion.      Cervical back: Normal range of motion.   Skin:     General: Skin is warm and dry.   Neurological:      General: No focal deficit present.      Mental Status: She is alert.   Psychiatric:         Mood and Affect: Mood normal.         Behavior: Behavior normal.         Thought Content: Thought content normal.         Judgment: Judgment normal.         ED Course            Results for orders placed or performed during the hospital encounter of 06/29/22 (from the past 24 hour(s))   UA with Microscopic reflex to Culture    Specimen: Urine, Midstream   Result Value Ref Range    Color Urine Yellow Colorless, Straw, Light Yellow, Yellow    Appearance Urine Cloudy (A) Clear    Glucose Urine Negative Negative mg/dL    Bilirubin Urine Negative Negative    Ketones Urine Negative Negative mg/dL    Specific Gravity Urine 1.020 1.003 - 1.035    Blood Urine Small (A)  Negative    pH Urine 6.0 4.7 - 8.0    Protein Albumin Urine 20  (A) Negative mg/dL    Urobilinogen Urine Normal Normal, 2.0 mg/dL    Nitrite Urine Positive (A) Negative    Leukocyte Esterase Urine Large (A) Negative    Bacteria Urine Moderate (A) None Seen /HPF    WBC Clumps Urine Present (A) None Seen /HPF    Mucus Urine Present (A) None Seen /LPF    RBC Urine 13 (H) <=2 /HPF    WBC Urine >182 (H) <=5 /HPF    Squamous Epithelials Urine 15 (H) <=1 /HPF    Hyaline Casts Urine 6 (H) <=2 /LPF   Wet prep    Specimen: Vagina; Swab   Result Value Ref Range    Trichomonas Absent Absent    Yeast Absent Absent    Clue Cells Absent Absent    WBCs/high power field 1+ (A) None       Medications - No data to display    Assessments & Plan (with Medical Decision Making)   42-year-old female presents with urgency, and pain with urination.  Also reports increased vaginal discharge than her normal chronic vaginal discharge.  Does have lower abdominal pain as well.  Laboratory findings are negative with the wet prep.  UA shows positive nitrates, large leukocyte Estrace, moderate bacteria, present white blood cell clumps.  Plan discussed with patient and treatment for acute cystitis with cephalexin 500 mg twice a day for 7 days.  Patient instructed to increase water intake, eliminate at least every 2 hours or sooner, and use over-the-counter Tylenol or ibuprofen as needed for discomfort.  HPI, exam, symptoms, and work-up is not consistent with acute pyelonephritis, STD, renal abscess, urethritis.  Patient voiced understanding of discharge plan and was agreeable.      I have reviewed the nursing notes.    I have reviewed the findings, diagnosis, plan and need for follow up with the patient.      Discharge Medication List as of 6/29/2022  1:38 PM      START taking these medications    Details   cephALEXin (KEFLEX) 500 MG capsule Take 1 capsule (500 mg) by mouth 2 times daily for 7 days, Disp-14 capsule, R-0, E-Prescribe             Final  diagnoses:   Acute cystitis with hematuria       6/29/2022   [facility]     Farida Mendoza APRN CNP  06/29/22 1427

## 2022-07-22 ENCOUNTER — OFFICE VISIT (OUTPATIENT)
Dept: FAMILY MEDICINE | Facility: OTHER | Age: 42
End: 2022-07-22
Attending: NURSE PRACTITIONER
Payer: COMMERCIAL

## 2022-07-22 VITALS
WEIGHT: 157 LBS | HEART RATE: 76 BPM | TEMPERATURE: 98 F | OXYGEN SATURATION: 97 % | HEIGHT: 64 IN | BODY MASS INDEX: 26.8 KG/M2 | SYSTOLIC BLOOD PRESSURE: 118 MMHG | RESPIRATION RATE: 12 BRPM | DIASTOLIC BLOOD PRESSURE: 76 MMHG

## 2022-07-22 DIAGNOSIS — R53.83 FATIGUE, UNSPECIFIED TYPE: Primary | ICD-10-CM

## 2022-07-22 DIAGNOSIS — Z87.440 PERSONAL HISTORY OF URINARY TRACT INFECTION: ICD-10-CM

## 2022-07-22 DIAGNOSIS — I10 ESSENTIAL HYPERTENSION: ICD-10-CM

## 2022-07-22 DIAGNOSIS — F32.0 MAJOR DEPRESSIVE DISORDER, SINGLE EPISODE, MILD (H): ICD-10-CM

## 2022-07-22 LAB
ALBUMIN UR-MCNC: NEGATIVE MG/DL
APPEARANCE UR: CLEAR
BASOPHILS # BLD AUTO: 0.1 10E3/UL (ref 0–0.2)
BASOPHILS NFR BLD AUTO: 1 %
BILIRUB UR QL STRIP: NEGATIVE
COLOR UR AUTO: YELLOW
EOSINOPHIL # BLD AUTO: 0.3 10E3/UL (ref 0–0.7)
EOSINOPHIL NFR BLD AUTO: 2 %
ERYTHROCYTE [DISTWIDTH] IN BLOOD BY AUTOMATED COUNT: 15 % (ref 10–15)
GLUCOSE UR STRIP-MCNC: NEGATIVE MG/DL
HCT VFR BLD AUTO: 45.8 % (ref 35–47)
HGB BLD-MCNC: 15.6 G/DL (ref 11.7–15.7)
HGB UR QL STRIP: ABNORMAL
KETONES UR STRIP-MCNC: NEGATIVE MG/DL
LEUKOCYTE ESTERASE UR QL STRIP: NEGATIVE
LYMPHOCYTES # BLD AUTO: 3.3 10E3/UL (ref 0.8–5.3)
LYMPHOCYTES NFR BLD AUTO: 27 %
MCH RBC QN AUTO: 30.5 PG (ref 26.5–33)
MCHC RBC AUTO-ENTMCNC: 34.1 G/DL (ref 31.5–36.5)
MCV RBC AUTO: 90 FL (ref 78–100)
MONOCYTES # BLD AUTO: 0.8 10E3/UL (ref 0–1.3)
MONOCYTES NFR BLD AUTO: 6 %
NEUTROPHILS # BLD AUTO: 8 10E3/UL (ref 1.6–8.3)
NEUTROPHILS NFR BLD AUTO: 65 %
NITRATE UR QL: NEGATIVE
PH UR STRIP: 6 [PH] (ref 5–7)
PLATELET # BLD AUTO: 418 10E3/UL (ref 150–450)
RBC # BLD AUTO: 5.12 10E6/UL (ref 3.8–5.2)
RBC #/AREA URNS AUTO: NORMAL /HPF
SP GR UR STRIP: 1.02 (ref 1–1.03)
UROBILINOGEN UR STRIP-ACNC: 0.2 E.U./DL
WBC # BLD AUTO: 12.3 10E3/UL (ref 4–11)
WBC #/AREA URNS AUTO: NORMAL /HPF

## 2022-07-22 PROCEDURE — G0463 HOSPITAL OUTPT CLINIC VISIT: HCPCS | Performed by: NURSE PRACTITIONER

## 2022-07-22 PROCEDURE — 85025 COMPLETE CBC W/AUTO DIFF WBC: CPT | Mod: ZL | Performed by: NURSE PRACTITIONER

## 2022-07-22 PROCEDURE — 83550 IRON BINDING TEST: CPT | Mod: ZL | Performed by: NURSE PRACTITIONER

## 2022-07-22 PROCEDURE — 81001 URINALYSIS AUTO W/SCOPE: CPT | Mod: ZL | Performed by: NURSE PRACTITIONER

## 2022-07-22 PROCEDURE — 82728 ASSAY OF FERRITIN: CPT | Mod: ZL | Performed by: NURSE PRACTITIONER

## 2022-07-22 PROCEDURE — 36415 COLL VENOUS BLD VENIPUNCTURE: CPT | Mod: ZL | Performed by: NURSE PRACTITIONER

## 2022-07-22 PROCEDURE — 99213 OFFICE O/P EST LOW 20 MIN: CPT | Performed by: NURSE PRACTITIONER

## 2022-07-22 RX ORDER — METOPROLOL TARTRATE 25 MG/1
TABLET, FILM COATED ORAL
Qty: 180 TABLET | Refills: 3 | Status: SHIPPED | OUTPATIENT
Start: 2022-07-22 | End: 2023-08-22

## 2022-07-22 ASSESSMENT — ANXIETY QUESTIONNAIRES
5. BEING SO RESTLESS THAT IT IS HARD TO SIT STILL: NEARLY EVERY DAY
7. FEELING AFRAID AS IF SOMETHING AWFUL MIGHT HAPPEN: NOT AT ALL
6. BECOMING EASILY ANNOYED OR IRRITABLE: SEVERAL DAYS
GAD7 TOTAL SCORE: 15
IF YOU CHECKED OFF ANY PROBLEMS ON THIS QUESTIONNAIRE, HOW DIFFICULT HAVE THESE PROBLEMS MADE IT FOR YOU TO DO YOUR WORK, TAKE CARE OF THINGS AT HOME, OR GET ALONG WITH OTHER PEOPLE: SOMEWHAT DIFFICULT
2. NOT BEING ABLE TO STOP OR CONTROL WORRYING: NEARLY EVERY DAY
1. FEELING NERVOUS, ANXIOUS, OR ON EDGE: MORE THAN HALF THE DAYS
3. WORRYING TOO MUCH ABOUT DIFFERENT THINGS: NEARLY EVERY DAY
GAD7 TOTAL SCORE: 15

## 2022-07-22 ASSESSMENT — PATIENT HEALTH QUESTIONNAIRE - PHQ9
5. POOR APPETITE OR OVEREATING: NEARLY EVERY DAY
SUM OF ALL RESPONSES TO PHQ QUESTIONS 1-9: 17

## 2022-07-22 ASSESSMENT — PAIN SCALES - GENERAL: PAINLEVEL: NO PAIN (0)

## 2022-07-22 NOTE — PROGRESS NOTES
"  Assessment & Plan     (R53.83) Fatigue, unspecified type  (primary encounter diagnosis)  Comment: check labs. Recheck UA   Plan: CBC with platelets and differential, Ferritin,         Iron and iron binding capacity, *UA reflex to         Microscopic and Culture - [place]            (Z87.440) Personal history of urinary tract infection  Comment: check UA  Plan: *UA reflex to Microscopic and Culture - [place]            (F32.0) Major depressive disorder, single episode, mild (H)  Comment: increase Zoloft to 75 mg daily   Plan: sertraline (ZOLOFT) 50 MG tablet            See Patient Instructions    Return in about 6 weeks (around 9/2/2022) for Med check .    Jazmyne Villafana NP  [facility]    Chris Chu is a 42 year old, presenting for the following health issues:  No chief complaint on file.      HPI     Medication Followup of iron    Taking Medication as prescribed: yes    Side Effects:  None    Medication Helping Symptoms:  No    Tolerating Zoloft well. Thinks she may need an increase in dose.   Denies SI/HI thoughts.         Review of Systems   Constitutional, HEENT, cardiovascular, pulmonary, gi and gu systems are negative, except as otherwise noted.      Objective    /76 (BP Location: Right arm, Patient Position: Sitting, Cuff Size: Adult Regular)   Pulse 76   Temp 98  F (36.7  C) (Tympanic)   Resp 12   Ht 1.626 m (5' 4\")   Wt 71.2 kg (157 lb)   LMP 04/20/2022 (Exact Date)   SpO2 97%   BMI 26.95 kg/m    Body mass index is 26.95 kg/m .  Physical Exam   GENERAL: healthy, alert and no distress  NECK: no adenopathy, no asymmetry, masses, or scars and thyroid normal to palpation  RESP: lungs clear to auscultation - no rales, rhonchi or wheezes  CV: regular rate and rhythm, normal S1 S2, no S3 or S4, no murmur, click or rub, no peripheral edema and peripheral pulses strong  MS: no gross musculoskeletal defects noted, no edema  SKIN: no " suspicious lesions or rashes  NEURO: Normal strength and tone, mentation intact and speech normal  PSYCH: mentation appears normal, affect normal/bright

## 2022-07-22 NOTE — COMMUNITY RESOURCES LIST (ENGLISH)
07/22/2022   [facility] - Outpatient Clinics  N/A  For questions about this resource list or additional care needs, please contact your primary care clinic or care manager.  Phone: [phone]   Email: N/A   Address: [address] [place] [id number]   Hours: N/A        Mental Health       Individual counseling  1  [facility] (Critical access hospital) - [facility] Distance: 0.54 miles      COVID-19 Status: Phone/Virtual   [address] [place] [address]  Language: English  Hours: Mon - Fri 8:00 AM - 5:00 PM  Fees: Insurance, Self Pay   Phone: [phone] Website: [email or web address]     2  [facility] Distance: 0.68 miles      COVID-19 Status: Regular Operations   [address] [place] [address]  Language: English, Hmong, Mandarin, Ukrainian  Hours: Thu 5:30 PM - 7:30 PM  Fees: Free   Phone: [phone] Email: gisella@[email or web address] Website: [email or web address]          Important Numbers & Websites       Emergency Services   911  [facility]   311  Poison Control   ([phone] [phone]  Suicide Prevention Lifeline   (699) 516-3478 (TALK)  Child Abuse Hotline   (933) 636-4324 (4-A-Child)  Sexual Assault Hotline   (342) 545-8741 (HOPE)  National Runaway Safeline   (929) 315-8986 (RUNAWAY)  All-Options Talkline   (664) 197-1659  Substance Abuse Referral   (928) 622-8884 (HELP)

## 2022-07-22 NOTE — NURSING NOTE
"No chief complaint on file.      Initial /76 (BP Location: Right arm, Patient Position: Sitting, Cuff Size: Adult Regular)   Pulse 76   Temp 98  F (36.7  C) (Tympanic)   Resp 12   Ht 1.626 m (5' 4\")   Wt 71.2 kg (157 lb)   LMP 04/20/2022 (Exact Date)   SpO2 97%   BMI 26.95 kg/m   Estimated body mass index is 26.95 kg/m  as calculated from the following:    Height as of this encounter: 1.626 m (5' 4\").    Weight as of this encounter: 71.2 kg (157 lb).  Medication Reconciliation: complete  Sejal Crowder LPN      "

## 2022-07-22 NOTE — LETTER
July 25, 2022      Kimberley Louis  [address]  [place] [address]        Dear ,    We are writing to inform you of your test results.    Your test results fall within the expected range(s) or remain unchanged from previous results.  Please continue with current treatment plan.    Resulted Orders   Ferritin   Result Value Ref Range    Ferritin 15 12 - 150 ng/mL   Iron and iron binding capacity   Result Value Ref Range    Iron 57 35 - 180 ug/dL    Iron Binding Capacity 418 240 - 430 ug/dL    Iron Sat Index 14 (L) 15 - 46 %   *UA reflex to Microscopic and Culture - [facility]   Result Value Ref Range    Color Urine Yellow Colorless, Straw, Light Yellow, Yellow    Appearance Urine Clear Clear    Glucose Urine Negative Negative mg/dL    Bilirubin Urine Negative Negative    Ketones Urine Negative Negative mg/dL    Specific Gravity Urine 1.020 1.003 - 1.035    Blood Urine Trace (A) Negative    pH Urine 6.0 5.0 - 7.0    Protein Albumin Urine Negative Negative mg/dL    Urobilinogen Urine 0.2 0.2, 1.0 E.U./dL    Nitrite Urine Negative Negative    Leukocyte Esterase Urine Negative Negative   CBC with platelets and differential   Result Value Ref Range    WBC Count 12.3 (H) 4.0 - 11.0 10e3/uL    RBC Count 5.12 3.80 - 5.20 10e6/uL    Hemoglobin 15.6 11.7 - 15.7 g/dL    Hematocrit 45.8 35.0 - 47.0 %    MCV 90 78 - 100 fL    MCH 30.5 26.5 - 33.0 pg    MCHC 34.1 31.5 - 36.5 g/dL    RDW 15.0 10.0 - 15.0 %    Platelet Count 418 150 - 450 10e3/uL    % Neutrophils 65 %    % Lymphocytes 27 %    % Monocytes 6 %    % Eosinophils 2 %    % Basophils 1 %    Absolute Neutrophils 8.0 1.6 - 8.3 10e3/uL    Absolute Lymphocytes 3.3 0.8 - 5.3 10e3/uL    Absolute Monocytes 0.8 0.0 - 1.3 10e3/uL    Absolute Eosinophils 0.3 0.0 - 0.7 10e3/uL    Absolute Basophils 0.1 0.0 - 0.2 10e3/uL   Urine Microscopic   Result Value Ref Range    RBC Urine 0-2 0-2 /HPF /HPF    WBC Urine 0-5 0-5 /HPF /HPF    Narrative    Urine Culture not  indicated       If you have any questions or concerns, please call the clinic at the number listed above.       Sincerely,      Jazmyne Villafana NP

## 2022-07-25 ENCOUNTER — TELEPHONE (OUTPATIENT)
Dept: FAMILY MEDICINE | Facility: OTHER | Age: 42
End: 2022-07-25

## 2022-07-25 LAB
FERRITIN SERPL-MCNC: 15 NG/ML (ref 12–150)
IRON SATN MFR SERPL: 14 % (ref 15–46)
IRON SERPL-MCNC: 57 UG/DL (ref 35–180)
TIBC SERPL-MCNC: 418 UG/DL (ref 240–430)

## 2022-07-25 NOTE — TELEPHONE ENCOUNTER
Received a PA from [facility] for Sertraline HCl 50MG tablets. Submitted on CMM. Waiting for a response.

## 2022-07-26 NOTE — TELEPHONE ENCOUNTER
Received an APPROVAL from [facility] for Sertraline HCI 50mg tablets. Effective 04/26/2022 to 07/25/2023. Forms scanned to [facility].

## 2022-08-09 ENCOUNTER — TELEPHONE (OUTPATIENT)
Dept: FAMILY MEDICINE | Facility: OTHER | Age: 42
End: 2022-08-09

## 2022-08-09 ENCOUNTER — OFFICE VISIT (OUTPATIENT)
Dept: FAMILY MEDICINE | Facility: OTHER | Age: 42
End: 2022-08-09
Attending: NURSE PRACTITIONER
Payer: COMMERCIAL

## 2022-08-09 VITALS
TEMPERATURE: 98.2 F | WEIGHT: 162 LBS | SYSTOLIC BLOOD PRESSURE: 112 MMHG | BODY MASS INDEX: 27.81 KG/M2 | OXYGEN SATURATION: 96 % | DIASTOLIC BLOOD PRESSURE: 70 MMHG | HEART RATE: 69 BPM

## 2022-08-09 DIAGNOSIS — R82.90 ABNORMAL URINE FINDINGS: ICD-10-CM

## 2022-08-09 DIAGNOSIS — R30.0 DYSURIA: Primary | ICD-10-CM

## 2022-08-09 DIAGNOSIS — R10.32 LLQ ABDOMINAL PAIN: ICD-10-CM

## 2022-08-09 DIAGNOSIS — R10.9 LEFT FLANK PAIN: ICD-10-CM

## 2022-08-09 LAB
ALBUMIN UR-MCNC: NEGATIVE MG/DL
APPEARANCE UR: CLEAR
BACTERIA #/AREA URNS HPF: ABNORMAL /HPF
BASOPHILS # BLD AUTO: 0.1 10E3/UL (ref 0–0.2)
BASOPHILS NFR BLD AUTO: 1 %
BILIRUB UR QL STRIP: NEGATIVE
CLUE CELLS: NORMAL
COLOR UR AUTO: YELLOW
EOSINOPHIL # BLD AUTO: 0.3 10E3/UL (ref 0–0.7)
EOSINOPHIL NFR BLD AUTO: 2 %
ERYTHROCYTE [DISTWIDTH] IN BLOOD BY AUTOMATED COUNT: 14.8 % (ref 10–15)
GLUCOSE UR STRIP-MCNC: NEGATIVE MG/DL
HCT VFR BLD AUTO: 42.5 % (ref 35–47)
HGB BLD-MCNC: 14.5 G/DL (ref 11.7–15.7)
HGB UR QL STRIP: NEGATIVE
KETONES UR STRIP-MCNC: NEGATIVE MG/DL
LEUKOCYTE ESTERASE UR QL STRIP: ABNORMAL
LYMPHOCYTES # BLD AUTO: 2.8 10E3/UL (ref 0.8–5.3)
LYMPHOCYTES NFR BLD AUTO: 24 %
MCH RBC QN AUTO: 31 PG (ref 26.5–33)
MCHC RBC AUTO-ENTMCNC: 34.1 G/DL (ref 31.5–36.5)
MCV RBC AUTO: 91 FL (ref 78–100)
MONOCYTES # BLD AUTO: 0.5 10E3/UL (ref 0–1.3)
MONOCYTES NFR BLD AUTO: 5 %
NEUTROPHILS # BLD AUTO: 7.8 10E3/UL (ref 1.6–8.3)
NEUTROPHILS NFR BLD AUTO: 68 %
NITRATE UR QL: NEGATIVE
PH UR STRIP: 6 [PH] (ref 5–7)
PLATELET # BLD AUTO: 418 10E3/UL (ref 150–450)
RBC # BLD AUTO: 4.68 10E6/UL (ref 3.8–5.2)
RBC #/AREA URNS AUTO: ABNORMAL /HPF
SP GR UR STRIP: 1.02 (ref 1–1.03)
SQUAMOUS #/AREA URNS AUTO: ABNORMAL /LPF
TRICHOMONAS, WET PREP: NORMAL
UROBILINOGEN UR STRIP-ACNC: 0.2 E.U./DL
WBC # BLD AUTO: 11.4 10E3/UL (ref 4–11)
WBC #/AREA URNS AUTO: ABNORMAL /HPF
WBC'S/HIGH POWER FIELD, WET PREP: NORMAL
YEAST, WET PREP: NORMAL

## 2022-08-09 PROCEDURE — 85025 COMPLETE CBC W/AUTO DIFF WBC: CPT | Mod: ZL | Performed by: NURSE PRACTITIONER

## 2022-08-09 PROCEDURE — 82040 ASSAY OF SERUM ALBUMIN: CPT | Mod: ZL | Performed by: NURSE PRACTITIONER

## 2022-08-09 PROCEDURE — 87210 SMEAR WET MOUNT SALINE/INK: CPT | Mod: ZL | Performed by: NURSE PRACTITIONER

## 2022-08-09 PROCEDURE — 80053 COMPREHEN METABOLIC PANEL: CPT | Mod: ZL | Performed by: NURSE PRACTITIONER

## 2022-08-09 PROCEDURE — 87086 URINE CULTURE/COLONY COUNT: CPT | Performed by: NURSE PRACTITIONER

## 2022-08-09 PROCEDURE — 36415 COLL VENOUS BLD VENIPUNCTURE: CPT | Mod: ZL | Performed by: NURSE PRACTITIONER

## 2022-08-09 PROCEDURE — G0463 HOSPITAL OUTPT CLINIC VISIT: HCPCS | Performed by: NURSE PRACTITIONER

## 2022-08-09 PROCEDURE — 87491 CHLMYD TRACH DNA AMP PROBE: CPT | Performed by: NURSE PRACTITIONER

## 2022-08-09 PROCEDURE — 86140 C-REACTIVE PROTEIN: CPT | Mod: ZL | Performed by: NURSE PRACTITIONER

## 2022-08-09 PROCEDURE — 83690 ASSAY OF LIPASE: CPT | Mod: ZL | Performed by: NURSE PRACTITIONER

## 2022-08-09 PROCEDURE — 99213 OFFICE O/P EST LOW 20 MIN: CPT | Performed by: NURSE PRACTITIONER

## 2022-08-09 PROCEDURE — 81001 URINALYSIS AUTO W/SCOPE: CPT | Mod: ZL | Performed by: NURSE PRACTITIONER

## 2022-08-09 ASSESSMENT — PAIN SCALES - GENERAL: PAINLEVEL: MILD PAIN (3)

## 2022-08-09 NOTE — PROGRESS NOTES
Assessment & Plan     No urine infection. Culture added. Wet prep. Labs pending. Check ABD/Pelvis CT for stone protocol given her  history.     (R30.0) Dysuria  (primary encounter diagnosis)  Comment: check labs and UA. Check wet prep   Plan: *UA reflex to Microscopic and Culture - [place]         [facility], Urine Microscopic, Wet prep,         GC/Chlamydia by PCR - [facility], CBC with platelets        and differential, Comprehensive metabolic         panel, CRP, inflammation            (R82.90) Abnormal urine findings  Comment: add culture   Plan: Urine Culture Aerobic Bacterial            (R10.9) Left flank pain  Comment: add CT with  history   Plan: CT Abdomen Pelvis w/o Contrast, Lipase            (R10.32) LLQ abdominal pain  Comment: check labs and UA   Plan: *UA reflex to Microscopic and Culture - [facility]/[place], Urine Microscopic, CBC with         platelets and differential, Comprehensive         metabolic panel, CRP, inflammation, CT Abdomen         Pelvis w/o Contrast, Lipase            See Patient Instructions    Return if symptoms worsen or fail to improve.    Jazmyne Villafana NP  [facility]    Chris Chu is a 42 year old, presenting for the following health issues:    UTI      HPI     URINARY TRACT SYMPTOMS      Duration: 4 days     Description  dysuria, frequency, back pain and cloudy    Intensity:  moderate    Accompanying signs and symptoms:  Fever/chills: no   Flank pain YES  Nausea and vomiting: no   Vaginal symptoms: discharge  Abdominal/Pelvic Pain: YES    History  History of frequent UTI's: YES  History of kidney stones: YES  Sexually Active: YES  Possibility of pregnancy: No    Precipitating or alleviating factors: None    Therapies tried and outcome: AZO   Outcome: not effective    LLQ pain and radiate into pelvis    HX of kidney stones     Urinating well     HX of left enlarged kidney (? Hydronephrosis)    Review of Systems   Constitutional,  HEENT, cardiovascular, pulmonary, gi and gu systems are negative, except as otherwise noted.      Objective    /70   Pulse 69   Temp 98.2  F (36.8  C)   Wt 73.5 kg (162 lb)   LMP 04/20/2022 (Exact Date)   SpO2 96%   BMI 27.81 kg/m    Body mass index is 27.81 kg/m .  Physical Exam   GENERAL: healthy, alert and no distress  NECK: no adenopathy, no asymmetry, masses, or scars and thyroid normal to palpation  RESP: lungs clear to auscultation - no rales, rhonchi or wheezes  CV: regular rate and rhythm, normal S1 S2, no S3 or S4, no murmur, click or rub, no peripheral edema and peripheral pulses strong  ABDOMEN: soft, nontender, no hepatosplenomegaly, no masses and bowel sounds normal  MS: no gross musculoskeletal defects noted, no edema  NEURO: Normal strength and tone, mentation intact and speech normal  BACK: Left CVA tenderness. Negative right CVA tenderness. No paralumbar tenderness  PSYCH: mentation appears normal, affect normal/bright

## 2022-08-09 NOTE — NURSING NOTE
"Chief Complaint   Patient presents with     UTI       Initial /70   Pulse 69   Temp 98.2  F (36.8  C)   Wt 73.5 kg (162 lb)   LMP 04/20/2022 (Exact Date)   SpO2 96%   BMI 27.81 kg/m   Estimated body mass index is 27.81 kg/m  as calculated from the following:    Height as of 7/22/22: 1.626 m (5' 4\").    Weight as of this encounter: 73.5 kg (162 lb).  Medication Reconciliation: complete  Jocelyne Martinez, LPN  "

## 2022-08-09 NOTE — TELEPHONE ENCOUNTER
11:08 AM    Reason for Call: OVERBOOK    Patient is having the following symptoms: Possible UTI  for  4 days    The patient is requesting an appointment for Today with Jazmyne Villafana    Was an appointment offered for this call? No  If yes : Appointment type              Date    Preferred method for responding to this message: Telephone Call  What is your phone number ?  [phone]    If we cannot reach you directly, may we leave a detailed response at the number you provided? Yes    Can this message wait until your PCP/provider returns, if unavailable today? Not applicable,     Sofie Brown

## 2022-08-10 LAB
ALBUMIN SERPL-MCNC: 3.8 G/DL (ref 3.4–5)
ALP SERPL-CCNC: 93 U/L (ref 40–150)
ALT SERPL W P-5'-P-CCNC: 23 U/L (ref 0–50)
ANION GAP SERPL CALCULATED.3IONS-SCNC: 9 MMOL/L (ref 3–14)
AST SERPL W P-5'-P-CCNC: 12 U/L (ref 0–45)
BILIRUB SERPL-MCNC: 0.7 MG/DL (ref 0.2–1.3)
BUN SERPL-MCNC: 10 MG/DL (ref 7–30)
C TRACH DNA SPEC QL PROBE+SIG AMP: NEGATIVE
CALCIUM SERPL-MCNC: 8.9 MG/DL (ref 8.5–10.1)
CHLORIDE BLD-SCNC: 106 MMOL/L (ref 94–109)
CO2 SERPL-SCNC: 21 MMOL/L (ref 20–32)
CREAT SERPL-MCNC: 0.54 MG/DL (ref 0.52–1.04)
CRP SERPL-MCNC: 5 MG/L (ref 0–8)
GFR SERPL CREATININE-BSD FRML MDRD: >90 ML/MIN/1.73M2
GLUCOSE BLD-MCNC: 98 MG/DL (ref 70–99)
LIPASE SERPL-CCNC: 83 U/L (ref 73–393)
N GONORRHOEA DNA SPEC QL NAA+PROBE: NEGATIVE
POTASSIUM BLD-SCNC: 3.4 MMOL/L (ref 3.4–5.3)
PROT SERPL-MCNC: 7.4 G/DL (ref 6.8–8.8)
SODIUM SERPL-SCNC: 136 MMOL/L (ref 133–144)

## 2022-08-11 DIAGNOSIS — N39.0 URINARY TRACT INFECTION WITHOUT HEMATURIA, SITE UNSPECIFIED: Primary | ICD-10-CM

## 2022-08-11 LAB — BACTERIA UR CULT: ABNORMAL

## 2022-08-11 RX ORDER — NITROFURANTOIN 25; 75 MG/1; MG/1
100 CAPSULE ORAL 2 TIMES DAILY
Qty: 14 CAPSULE | Refills: 0 | Status: SHIPPED | OUTPATIENT
Start: 2022-08-11 | End: 2022-08-18

## 2022-08-15 ENCOUNTER — HOSPITAL ENCOUNTER (OUTPATIENT)
Dept: CT IMAGING | Facility: HOSPITAL | Age: 42
Discharge: HOME | End: 2022-08-15
Attending: NURSE PRACTITIONER | Admitting: NURSE PRACTITIONER
Payer: COMMERCIAL

## 2022-08-15 DIAGNOSIS — R10.9 LEFT FLANK PAIN: ICD-10-CM

## 2022-08-15 DIAGNOSIS — R10.32 LLQ ABDOMINAL PAIN: ICD-10-CM

## 2022-08-15 PROCEDURE — 74176 CT ABD & PELVIS W/O CONTRAST: CPT

## 2022-09-04 ENCOUNTER — HEALTH MAINTENANCE LETTER (OUTPATIENT)
Age: 42
End: 2022-09-04

## 2022-11-14 ENCOUNTER — TELEPHONE (OUTPATIENT)
Dept: FAMILY MEDICINE | Facility: OTHER | Age: 42
End: 2022-11-14

## 2022-12-28 DIAGNOSIS — F32.0 MAJOR DEPRESSIVE DISORDER, SINGLE EPISODE, MILD (H): ICD-10-CM

## 2022-12-28 RX ORDER — SERTRALINE HYDROCHLORIDE 25 MG/1
TABLET, FILM COATED ORAL
Qty: 90 TABLET | Refills: 0 | Status: SHIPPED | OUTPATIENT
Start: 2022-12-28 | End: 2023-02-14

## 2022-12-28 NOTE — TELEPHONE ENCOUNTER
Sertraline 25 mg      Last Written Prescription Date:  10/20/22  Last Fill Quantity: 135,   # refills: 0  Last Office Visit: 8/09/22  Future Office visit:       Routing refill request to provider for review/approval because:  Drug not on the [facility], [facility] or [facility] refill protocol or controlled substance

## 2022-12-29 NOTE — TELEPHONE ENCOUNTER
zoloft 50mg      Last Written Prescription Date:  7/22/2022  Last Fill Quantity: NA,   # refills: NA  Last Office Visit: 8/9/2022  Future Office visit:       Routing refill request to provider for review/approval because:  Drug not active on patient's medication list

## 2023-01-09 DIAGNOSIS — I10 ESSENTIAL HYPERTENSION: ICD-10-CM

## 2023-01-09 DIAGNOSIS — R07.89 ATYPICAL CHEST PAIN: ICD-10-CM

## 2023-01-09 RX ORDER — AMLODIPINE BESYLATE 10 MG/1
TABLET ORAL
Qty: 90 TABLET | Refills: 0 | Status: SHIPPED | OUTPATIENT
Start: 2023-01-09 | End: 2023-01-16

## 2023-01-16 DIAGNOSIS — I10 ESSENTIAL HYPERTENSION: ICD-10-CM

## 2023-01-16 DIAGNOSIS — R07.89 ATYPICAL CHEST PAIN: ICD-10-CM

## 2023-01-16 RX ORDER — AMLODIPINE BESYLATE 10 MG/1
TABLET ORAL
Qty: 90 TABLET | Refills: 0 | Status: SHIPPED | OUTPATIENT
Start: 2023-01-16 | End: 2023-02-14

## 2023-01-19 ENCOUNTER — PATIENT OUTREACH (OUTPATIENT)
Dept: OTHER | Facility: HOSPITAL | Age: 43
End: 2023-01-19

## 2023-01-19 NOTE — TELEPHONE ENCOUNTER
Patient Quality Outreach    Patient is due for the following:   Depression  -  PHQ-9 needed    Next Steps:   Patient was assigned appropriate questionnaire to complete    Type of outreach:    Sent [employer] message.      Questions for provider review:    None     Radha Woodard RN

## 2023-01-27 ENCOUNTER — MYC MEDICAL ADVICE (OUTPATIENT)
Dept: FAMILY MEDICINE | Facility: OTHER | Age: 43
End: 2023-01-27

## 2023-01-27 ASSESSMENT — ANXIETY QUESTIONNAIRES
7. FEELING AFRAID AS IF SOMETHING AWFUL MIGHT HAPPEN: NOT AT ALL
1. FEELING NERVOUS, ANXIOUS, OR ON EDGE: SEVERAL DAYS
GAD7 TOTAL SCORE: 3
GAD7 TOTAL SCORE: 3
4. TROUBLE RELAXING: NOT AT ALL
5. BEING SO RESTLESS THAT IT IS HARD TO SIT STILL: NOT AT ALL
3. WORRYING TOO MUCH ABOUT DIFFERENT THINGS: SEVERAL DAYS
6. BECOMING EASILY ANNOYED OR IRRITABLE: NOT AT ALL
7. FEELING AFRAID AS IF SOMETHING AWFUL MIGHT HAPPEN: NOT AT ALL
GAD7 TOTAL SCORE: 3
2. NOT BEING ABLE TO STOP OR CONTROL WORRYING: SEVERAL DAYS

## 2023-01-27 ASSESSMENT — PATIENT HEALTH QUESTIONNAIRE - PHQ9
SUM OF ALL RESPONSES TO PHQ QUESTIONS 1-9: 8
SUM OF ALL RESPONSES TO PHQ QUESTIONS 1-9: 8
10. IF YOU CHECKED OFF ANY PROBLEMS, HOW DIFFICULT HAVE THESE PROBLEMS MADE IT FOR YOU TO DO YOUR WORK, TAKE CARE OF THINGS AT HOME, OR GET ALONG WITH OTHER PEOPLE: SOMEWHAT DIFFICULT

## 2023-01-30 NOTE — TELEPHONE ENCOUNTER
Patient completed MyChart PHQ-9/FAUZIA-7 assessments for Depression Remission quality patient outreach at 12 months per quality measure guidelines. This writer notes patient has had a significant decline in severity of symptoms since July 2022's completed mental health questionnaires. Will continue to monitor via MyChart outreach.     PHQ 3/10/2022 7/22/2022 1/27/2023   PHQ-9 Total Score 6 17 8   Q9: Thoughts of better off dead/self-harm past 2 weeks Not at all Not at all Not at all     FAUZIA-7 SCORE 3/10/2022 7/22/2022 1/27/2023   Total Score - - 3 (minimal anxiety)   Total Score 1 15 3     Radha oWodard RN

## 2023-02-13 NOTE — PROGRESS NOTES
Assessment & Plan      Kimberley has been tired. She works and goes home and goes to sleep. She feels fatigued. She denies SI/HI thoughts. She feels she bend wrong and her lower back has been hurting. She is due for 6 month breast imaging as recommended by radiology.     (R92.8) Abnormal mammogram  (primary encounter diagnosis)  Comment: recommendation is 6 month follow up from last imaging  Plan: [facility] Diagnostic Bilateral w/[place], US Breast         Bilateral Limited 1-3 Quadrants            (F34.1) Dysthymic disorder  Comment: [place] today. Ref to [facility] for counseling. Start Effexor. Wean off Zoloft    Plan: [facility], [facility] Referral, venlafaxine (EFFEXOR XR)         37.5 MG 24 hr capsule            (F41.1) FAUZIA (generalized anxiety disorder)  Comment: [place] today. Ref to [facility] for counseling. Start Effexor. Wean off Zoloft    Plan: [facility], [facility] Referral, venlafaxine (EFFEXOR XR)         37.5 MG 24 hr capsule, hydrOXYzine (ATARAX) 25         MG tablet            (M54.42) Acute bilateral low back pain with left-sided sciatica  Comment: check XRAY. Prednisone and Flexeril. Supportive care discussed   Plan: XR LUMBAR SPINE G/E 4 VW (Clinic Performed),         predniSONE (DELTASONE) 20 MG tablet,         cyclobenzaprine (FLEXERIL) 10 MG tablet            (I10) Essential hypertension  Comment: RF  Plan: amLODIPine (NORVASC) 10 MG tablet            (R07.89) Atypical chest pain  Comment: RF  Plan: amLODIPine (NORVASC) 10 MG tablet            (R06.09) Dyspnea on exertion  Comment: RF  Plan: albuterol (PROAIR HFA) 108 (90 Base) MCG/ACT         inhaler            (L71.9) Rosacea  Comment: Elocon cream has been working well for facial rash. RF   Plan: mometasone (ELOCON) 0.1 % external cream            (R53.83) Fatigue, unspecified type  Comment: check labs and EKG   Plan: EKG 12-lead complete w/read - (Clinic          "Performed), Iron and iron binding capacity,         Ferritin, TSH with free T4 reflex, Vitamin D         Deficiency            (R07.9) Chest pain, unspecified type  Comment: check EKG and stress test   Plan: EKG 12-lead complete w/read - (Clinic         Performed), NM Lexiscan stress test            (E55.9) Vitamin D deficiency  Comment: treat with high dose Vit D for 6 weeks, then 2000 international unit(s) daily   Plan: vitamin D2 (ERGOCALCIFEROL) 79709 units (1250         mcg) capsule            Nicotine/Tobacco Cessation:  She reports that she has been smoking cigarettes. She started smoking about 34 years ago. She has a 28.00 pack-year smoking history. She has never used smokeless tobacco.  Nicotine/Tobacco Cessation Plan:   Information offered: Patient not interested at this time      BMI:   Estimated body mass index is 28.56 kg/m  as calculated from the following:    Height as of 7/22/22: 1.626 m (5' 4\").    Weight as of this encounter: 75.5 kg (166 lb 6.4 oz).   Weight management plan: Discussed healthy diet and exercise guidelines    See Patient Instructions    Return in about 6 weeks (around 3/28/2023) for Med check follow up. .    ZAHEER Umanzor [facility]    Chris Chu is a 43 year old, presenting for the following health issues:    Depression, Recheck Medication, and Insomnia      HPI     Joint Pain    Onset: 2 weeks     Description:   Location: Left hip, left low back   Character: Sharp, Dull ache and Gnawing    Intensity: moderate, severe    Progression of Symptoms: same    Accompanying Signs & Symptoms:  Other symptoms: radiation of pain to Left buttock and across back     History:   Previous similar pain: Hx pinched nerve in neck, Family Hx DDD        Precipitating factors:   Trauma or overuse: no     Alleviating factors:  Improved by: Sleep    Therapies Tried and outcome: rest/inactivity, heat, ice, massage, acetaminophen and Ibuprofen    Denies " loss of bowel or bladder control.   Denies saddle paresthesia.   No red flags.        Depression and Anxiety Follow-Up    How are you doing with your depression since your last visit? Unknown - patient states that she is numb    How are you doing with your anxiety since your last visit?  Improved Slightly. Situational. States that things she should be anxious about she just isn't.      Are you having other symptoms that might be associated with depression or anxiety? 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 Job Concerns     Do you have any concerns with your use of alcohol or other drugs? No    Social History     Tobacco Use     Smoking status: Every Day     Packs/day: 1.00     Years: 28.00     Pack years: 28.00     Types: Cigarettes     Start date: 1/1/1989     Smokeless tobacco: Never     Tobacco comments:     pt denied QP 9/22/20   Vaping Use     Vaping Use: Some days   Substance Use Topics     Alcohol use: Yes     Comment: occasional     Drug use: No     PHQ 7/22/2022 1/27/2023 2/14/2023   PHQ-9 Total Score 17 8 13   Q9: Thoughts of better off dead/self-harm past 2 weeks Not at all Not at all Not at all     FAUZIA-7 SCORE 7/22/2022 1/27/2023 2/14/2023   Total Score - 3 (minimal anxiety) -   Total Score 15 3 3     Last PHQ-9 2/14/2023   1.  Little interest or pleasure in doing things 2   2.  Feeling down, depressed, or hopeless 2   3.  Trouble falling or staying asleep, or sleeping too much 3   4.  Feeling tired or having little energy 3   5.  Poor appetite or overeating 1   6.  Feeling bad about yourself 1   7.  Trouble concentrating 1   8.  Moving slowly or restless 0   Q9: Thoughts of better off dead/self-harm past 2 weeks 0   PHQ-9 Total Score 13   Difficulty at work, home, or with people Very difficult     FAUZIA-7  2/14/2023   1. Feeling nervous, anxious, or on edge 1   2. Not being able to stop or control  worrying 0   3. Worrying too much about different things 0   4. Trouble relaxing 0   5. Being so restless that it is hard to sit still 0   6. Becoming easily annoyed or irritable 2   7. Feeling afraid, as if something awful might happen 0   FAUZIA-7 Total Score 3   If you checked any problems, how difficult have they made it for you to do your work, take care of things at home, or get along with other people? Very difficult     Denies SI/HI thoughts.     Review of Systems   Constitutional, HEENT, cardiovascular, pulmonary, GI, , musculoskeletal, neuro, skin, endocrine and psych systems are negative, except as otherwise noted.      Objective    /70   Pulse 64   Temp 98.7  F (37.1  C)   Resp 18   Wt 75.5 kg (166 lb 6.4 oz)   LMP 04/20/2022 (Exact Date)   SpO2 99%   BMI 28.56 kg/m    Body mass index is 28.56 kg/m .  Physical Exam   GENERAL: healthy, alert and no distress  NECK: no adenopathy, no asymmetry, masses, or scars and thyroid normal to palpation  RESP: lungs clear to auscultation - no rales, rhonchi or wheezes  CV: regular rate and rhythm, normal S1 S2, no S3 or S4, no murmur, click or rub, no peripheral edema and peripheral pulses strong  MS: no gross musculoskeletal defects noted, no edema. No step offs or TTP to spinal column. TTP to paraspinal muscles bilaterally at L4-S1 region. Distal pulses intact. Straight leg raises intact. No rash, erythema, bruising, or warmth to the touch to posterior back   SKIN: no suspicious lesions or rashes  PSYCH: mentation appears normal, affect normal/bright           EKG Interpretation:      Interpreted by ZAHEER Umanzor CNP  Time reviewed:1530   Symptoms at time of EKG: Fatigue. Intermittent chest pain    Rhythm: Normal sinus  and Sinus bradycardia  Rate: 50-60  Axis: Normal  Ectopy: None  Conduction: Normal  ST Segments/ T Waves: No ST-T wave changes and No acute ischemic changes  Q Waves: None  Comparison to prior: Unchanged    Clinical Impression:  normal EKG

## 2023-02-14 ENCOUNTER — ANCILLARY PROCEDURE (OUTPATIENT)
Dept: GENERAL RADIOLOGY | Facility: OTHER | Age: 43
End: 2023-02-14
Attending: NURSE PRACTITIONER
Payer: COMMERCIAL

## 2023-02-14 ENCOUNTER — OFFICE VISIT (OUTPATIENT)
Dept: FAMILY MEDICINE | Facility: OTHER | Age: 43
End: 2023-02-14
Attending: NURSE PRACTITIONER
Payer: COMMERCIAL

## 2023-02-14 VITALS
OXYGEN SATURATION: 99 % | TEMPERATURE: 98.7 F | HEART RATE: 64 BPM | WEIGHT: 166.4 LBS | SYSTOLIC BLOOD PRESSURE: 120 MMHG | RESPIRATION RATE: 18 BRPM | BODY MASS INDEX: 28.56 KG/M2 | DIASTOLIC BLOOD PRESSURE: 70 MMHG

## 2023-02-14 DIAGNOSIS — E55.9 VITAMIN D DEFICIENCY: ICD-10-CM

## 2023-02-14 DIAGNOSIS — L71.9 ROSACEA: ICD-10-CM

## 2023-02-14 DIAGNOSIS — F41.1 GAD (GENERALIZED ANXIETY DISORDER): ICD-10-CM

## 2023-02-14 DIAGNOSIS — R92.8 ABNORMAL MAMMOGRAM: Primary | ICD-10-CM

## 2023-02-14 DIAGNOSIS — R07.9 CHEST PAIN, UNSPECIFIED TYPE: ICD-10-CM

## 2023-02-14 DIAGNOSIS — F34.1 DYSTHYMIC DISORDER: ICD-10-CM

## 2023-02-14 DIAGNOSIS — I10 ESSENTIAL HYPERTENSION: ICD-10-CM

## 2023-02-14 DIAGNOSIS — M54.42 ACUTE BILATERAL LOW BACK PAIN WITH LEFT-SIDED SCIATICA: ICD-10-CM

## 2023-02-14 DIAGNOSIS — R07.89 ATYPICAL CHEST PAIN: ICD-10-CM

## 2023-02-14 DIAGNOSIS — R53.83 FATIGUE, UNSPECIFIED TYPE: ICD-10-CM

## 2023-02-14 DIAGNOSIS — R06.09 DYSPNEA ON EXERTION: ICD-10-CM

## 2023-02-14 PROCEDURE — 93010 ELECTROCARDIOGRAM REPORT: CPT | Mod: 77 | Performed by: INTERNAL MEDICINE

## 2023-02-14 PROCEDURE — 83550 IRON BINDING TEST: CPT | Mod: ZL | Performed by: NURSE PRACTITIONER

## 2023-02-14 PROCEDURE — 82306 VITAMIN D 25 HYDROXY: CPT | Mod: ZL | Performed by: NURSE PRACTITIONER

## 2023-02-14 PROCEDURE — 36415 COLL VENOUS BLD VENIPUNCTURE: CPT | Mod: ZL | Performed by: NURSE PRACTITIONER

## 2023-02-14 PROCEDURE — 72110 X-RAY EXAM L-2 SPINE 4/>VWS: CPT | Mod: TC

## 2023-02-14 PROCEDURE — 93005 ELECTROCARDIOGRAM TRACING: CPT | Performed by: NURSE PRACTITIONER

## 2023-02-14 PROCEDURE — 99215 OFFICE O/P EST HI 40 MIN: CPT | Mod: 25 | Performed by: NURSE PRACTITIONER

## 2023-02-14 PROCEDURE — 84443 ASSAY THYROID STIM HORMONE: CPT | Mod: ZL | Performed by: NURSE PRACTITIONER

## 2023-02-14 PROCEDURE — 82728 ASSAY OF FERRITIN: CPT | Mod: ZL | Performed by: NURSE PRACTITIONER

## 2023-02-14 PROCEDURE — G0463 HOSPITAL OUTPT CLINIC VISIT: HCPCS | Performed by: NURSE PRACTITIONER

## 2023-02-14 RX ORDER — PREDNISONE 20 MG/1
TABLET ORAL
Qty: 10 TABLET | Refills: 0 | Status: ON HOLD | OUTPATIENT
Start: 2023-02-14 | End: 2023-03-02

## 2023-02-14 RX ORDER — MOMETASONE FUROATE 1 MG/G
CREAM TOPICAL DAILY
Qty: 50 G | Refills: 0 | Status: SHIPPED | OUTPATIENT
Start: 2023-02-14

## 2023-02-14 RX ORDER — CYCLOBENZAPRINE HCL 10 MG
10 TABLET ORAL 3 TIMES DAILY PRN
Qty: 20 TABLET | Refills: 0 | Status: SHIPPED | OUTPATIENT
Start: 2023-02-14 | End: 2023-11-27

## 2023-02-14 RX ORDER — VENLAFAXINE HYDROCHLORIDE 37.5 MG/1
75 CAPSULE, EXTENDED RELEASE ORAL DAILY
Qty: 180 CAPSULE | Refills: 0 | Status: SHIPPED | OUTPATIENT
Start: 2023-02-14 | End: 2023-03-21

## 2023-02-14 RX ORDER — HYDROXYZINE HYDROCHLORIDE 25 MG/1
25 TABLET, FILM COATED ORAL 3 TIMES DAILY PRN
Qty: 60 TABLET | Refills: 1 | Status: SHIPPED | OUTPATIENT
Start: 2023-02-14 | End: 2023-11-27

## 2023-02-14 RX ORDER — ALBUTEROL SULFATE 90 UG/1
2 AEROSOL, METERED RESPIRATORY (INHALATION) EVERY 6 HOURS
Qty: 8.5 G | Refills: 2 | Status: SHIPPED | OUTPATIENT
Start: 2023-02-14 | End: 2024-04-23

## 2023-02-14 RX ORDER — AMLODIPINE BESYLATE 10 MG/1
10 TABLET ORAL DAILY
Qty: 90 TABLET | Refills: 0 | Status: SHIPPED | OUTPATIENT
Start: 2023-02-14 | End: 2023-07-06

## 2023-02-14 ASSESSMENT — ANXIETY QUESTIONNAIRES
1. FEELING NERVOUS, ANXIOUS, OR ON EDGE: SEVERAL DAYS
GAD7 TOTAL SCORE: 3
3. WORRYING TOO MUCH ABOUT DIFFERENT THINGS: NOT AT ALL
2. NOT BEING ABLE TO STOP OR CONTROL WORRYING: NOT AT ALL
7. FEELING AFRAID AS IF SOMETHING AWFUL MIGHT HAPPEN: NOT AT ALL
6. BECOMING EASILY ANNOYED OR IRRITABLE: MORE THAN HALF THE DAYS
5. BEING SO RESTLESS THAT IT IS HARD TO SIT STILL: NOT AT ALL
GAD7 TOTAL SCORE: 3
4. TROUBLE RELAXING: NOT AT ALL
IF YOU CHECKED OFF ANY PROBLEMS ON THIS QUESTIONNAIRE, HOW DIFFICULT HAVE THESE PROBLEMS MADE IT FOR YOU TO DO YOUR WORK, TAKE CARE OF THINGS AT HOME, OR GET ALONG WITH OTHER PEOPLE: VERY DIFFICULT

## 2023-02-14 ASSESSMENT — PAIN SCALES - GENERAL: PAINLEVEL: MODERATE PAIN (4)

## 2023-02-14 ASSESSMENT — PATIENT HEALTH QUESTIONNAIRE - PHQ9: SUM OF ALL RESPONSES TO PHQ QUESTIONS 1-9: 13

## 2023-02-14 NOTE — PATIENT INSTRUCTIONS
Decrease Zoloft to 50 mg daily for 1 week and then decrease to 25 mg for 1 week then 25 mg every other day for 1 week and then stop.   Start Effexor 37.5 mg daily for 1 week and then increase to 75 mg.   OK to take Hydroxyzine as needed as directed.

## 2023-02-15 LAB
FERRITIN SERPL-MCNC: 46 NG/ML (ref 6–175)
IRON BINDING CAPACITY (ROCHE): 342 UG/DL (ref 240–430)
IRON SATN MFR SERPL: 23 % (ref 15–46)
IRON SERPL-MCNC: 78 UG/DL (ref 37–145)
TSH SERPL DL<=0.005 MIU/L-ACNC: 2.56 UIU/ML (ref 0.3–4.2)

## 2023-02-16 LAB — DEPRECATED CALCIDIOL+CALCIFEROL SERPL-MC: 15 UG/L (ref 20–75)

## 2023-02-17 RX ORDER — ERGOCALCIFEROL 1.25 MG/1
50000 CAPSULE, LIQUID FILLED ORAL WEEKLY
Qty: 6 CAPSULE | Refills: 0 | Status: SHIPPED | OUTPATIENT
Start: 2023-02-17 | End: 2023-03-31

## 2023-02-22 ENCOUNTER — HOSPITAL ENCOUNTER (OUTPATIENT)
Dept: ULTRASOUND IMAGING | Facility: HOSPITAL | Age: 43
Discharge: HOME | End: 2023-02-22
Attending: NURSE PRACTITIONER
Payer: COMMERCIAL

## 2023-02-22 ENCOUNTER — HOSPITAL ENCOUNTER (OUTPATIENT)
Dept: MAMMOGRAPHY | Facility: OTHER | Age: 43
Discharge: HOME | End: 2023-02-22
Attending: NURSE PRACTITIONER
Payer: COMMERCIAL

## 2023-02-22 DIAGNOSIS — R92.8 ABNORMAL MAMMOGRAM: ICD-10-CM

## 2023-02-22 PROCEDURE — 76642 ULTRASOUND BREAST LIMITED: CPT | Mod: 50

## 2023-02-22 PROCEDURE — 77062 BREAST TOMOSYNTHESIS BI: CPT | Mod: TC

## 2023-03-01 RX ORDER — NICOTINE POLACRILEX 4 MG
15-30 LOZENGE BUCCAL
Status: CANCELLED | OUTPATIENT
Start: 2023-03-01

## 2023-03-01 RX ORDER — LIDOCAINE HYDROCHLORIDE 10 MG/ML
5-10 INJECTION, SOLUTION EPIDURAL; INFILTRATION; INTRACAUDAL; PERINEURAL
Status: CANCELLED | OUTPATIENT
Start: 2023-03-01

## 2023-03-01 RX ORDER — DEXTROSE MONOHYDRATE 25 G/50ML
25-50 INJECTION, SOLUTION INTRAVENOUS
Status: CANCELLED | OUTPATIENT
Start: 2023-03-01

## 2023-03-01 RX ORDER — LIDOCAINE 40 MG/G
CREAM TOPICAL
Status: CANCELLED | OUTPATIENT
Start: 2023-03-01

## 2023-03-01 RX ORDER — SODIUM CHLORIDE 9 MG/ML
INJECTION, SOLUTION INTRAVENOUS CONTINUOUS PRN
Status: CANCELLED | OUTPATIENT
Start: 2023-03-01

## 2023-03-02 ENCOUNTER — HOSPITAL ENCOUNTER (OUTPATIENT)
Facility: HOSPITAL | Age: 43
Discharge: HOME | End: 2023-03-02
Attending: RADIOLOGY | Admitting: RADIOLOGY
Payer: COMMERCIAL

## 2023-03-02 ENCOUNTER — HOSPITAL ENCOUNTER (OUTPATIENT)
Dept: ULTRASOUND IMAGING | Facility: HOSPITAL | Age: 43
Discharge: HOME | End: 2023-03-02
Attending: SURGERY | Admitting: RADIOLOGY
Payer: COMMERCIAL

## 2023-03-02 VITALS
DIASTOLIC BLOOD PRESSURE: 95 MMHG | SYSTOLIC BLOOD PRESSURE: 122 MMHG | HEART RATE: 75 BPM | RESPIRATION RATE: 16 BRPM | OXYGEN SATURATION: 98 %

## 2023-03-02 DIAGNOSIS — N63.22 MASS OF UPPER INNER QUADRANT OF LEFT BREAST: ICD-10-CM

## 2023-03-02 PROCEDURE — 76642 ULTRASOUND BREAST LIMITED: CPT | Mod: LT

## 2023-03-02 RX ORDER — LIDOCAINE 40 MG/G
CREAM TOPICAL
Status: DISCONTINUED | OUTPATIENT
Start: 2023-03-02 | End: 2023-03-03 | Stop reason: HOSPADM

## 2023-03-02 RX ORDER — DEXTROSE MONOHYDRATE 25 G/50ML
25-50 INJECTION, SOLUTION INTRAVENOUS
Status: DISCONTINUED | OUTPATIENT
Start: 2023-03-02 | End: 2023-03-03 | Stop reason: HOSPADM

## 2023-03-02 RX ORDER — NICOTINE POLACRILEX 4 MG
15-30 LOZENGE BUCCAL
Status: DISCONTINUED | OUTPATIENT
Start: 2023-03-02 | End: 2023-03-03 | Stop reason: HOSPADM

## 2023-03-02 RX ORDER — SODIUM CHLORIDE 9 MG/ML
INJECTION, SOLUTION INTRAVENOUS CONTINUOUS PRN
Status: DISCONTINUED | OUTPATIENT
Start: 2023-03-02 | End: 2023-03-03 | Stop reason: HOSPADM

## 2023-03-02 RX ORDER — LIDOCAINE HYDROCHLORIDE 10 MG/ML
5-10 INJECTION, SOLUTION EPIDURAL; INFILTRATION; INTRACAUDAL; PERINEURAL
Status: COMPLETED | OUTPATIENT
Start: 2023-03-02 | End: 2023-03-02

## 2023-03-02 ASSESSMENT — ACTIVITIES OF DAILY LIVING (ADL): ADLS_ACUITY_SCORE: 35

## 2023-03-02 NOTE — DISCHARGE INSTRUCTIONS
DISCHARGE INSTRUCTIONS FOR  BREAST BIOPSY    BREAST BIOPSY  A needle was used to locate the breast tissue. Tissue was removed to check the cells and be examined by a Pathologist. This will help your doctor plan any further treatment or follow-up. Your doctor will tell you the results of the tests in 3 to 5 days.    Follow these instructions:  Climb stairs slowly and use the hand rail. Avoid extra trips. No running or jumping.  No pushing, pulling or straining (vacuuming, shoveling, sweeping etc.)  You may shower tomorrow, pat the are dry around the tegaderm dressing.  Wear a bra at all times until your breast is fully healed.  Apply ice to the breast during the first few hours following the procedure to relieve swelling and bruising.  Steri strips stay in place for 7-10 days or until they fall off. Do not pull them off.  May remove pressure dressing after 24 hours.    Call your doctor if you have:  increased bleeding or drainage from your incision.  swelling, redness or opening of your incision.  foul smell from your incision or dressing.  red streaks from your incision.  chills or fever over 101 degrees (by mouth).  pain not helped by your pain medication.  trouble or pain with breathing.  any new problems or concerns.

## 2023-03-02 NOTE — IP AVS SNAPSHOT
[facility]  [address] [place] [id number]  Phone: [phone]                                    After Visit Summary   3/2/2023    Kimberley Louis   MRN: [id number]           After Visit Summary Signature Page    I have received my discharge instructions, and my questions have been answered. I have discussed any challenges I see with this plan with the nurse or doctor.    ..........................................................................................................................................  Patient/Patient Representative Signature      ..........................................................................................................................................  Patient Representative Print Name and Relationship to Patient    ..................................................               ................................................  Date                                   Time    ..........................................................................................................................................  Reviewed by Signature/Title    ...................................................              ..............................................  Date                                               Time          22EPIC Rev 08/18

## 2023-03-02 NOTE — IP AVS SNAPSHOT
After Visit Summary Template Not Found    This Print Group is only intended to be used in the After Visit Summary and can only be used in a report that uses a released After Visit Summary Template.                       MRN:[id number]                      After Visit Summary   3/2/2023    Kimberley Louis   MRN: [id number]           Visit Information        Provider Department      3/2/2023 10:30 AM HIXRRN; HIIRRAD; HIUS1 HI ULTRASOUND           Review of your medicines       Notice    This visit is during an admission. Changes to the med list made in this visit will be reflected in the After Visit Summary of the admission.           Protect others around you: Learn how to safely use, store and throw away your medicines at www.disposemymeds.org.       Follow-ups after your visit       Your next 10 appointments already scheduled    Mar 02, 2023 10:30 AM  (Arrive by 10:00 AM)  US BREAST BIOPSY VACUUM LEFT with HIUS1, HIIRRAD, HIXRRN  HI ULTRASOUND ([facility] ) [address] [place] [address]  [phone]   How do I prepare for my exam? (Food and drink instructions)  No Food and Drink Restrictions.    How do I prepare for my exam? (Other instructions)  Do not use any powder, lotion or deodorant under your arms or on your breast. If you do, we will ask you to remove it before your exam. Do not wear any scented perfume or cologne to your appointment.    What should I wear: Wear comfortable clothes.    How long does the exam take:  The whole appointment takes about one hour.    What should I bring: If your healthcare records are not in our computer system, please bring a list of your medicines, including vitamins, minerals and over-the-counter drugs. We may ask you to bring previous breast imaging from other facilities or have them mailed to medical records. It is best to leave valuables at home.  For the safety of your family, please do not bring any children with you unless they are  accompanied by another adult who can watch them during your imaging exam.    Do I need a :  No  is needed.    What do I need to tell my doctor:  Tell your doctor in advance if:  * You have any allergies.  *You are taking any drug that could increase bleeding. Contact the imaging department if you take any prescription blood thinners, such as Coumadin, Warfarin or Plavix. Ask if you should stop your medicine or if you need anylab work before your biopsy.    What should I do after the exam:  To reduce the risk of bleeding, take it easy for the rest of the day. Don't do anything strenuous for 24 hours.  Your breast may feel tender, and you may get a bruise. You may take Tylenol (acetaminophen) if needed. Don't take aspirin, Aleve or Advil (ibuprofen) on the biopsy day.  Wear a well-fitted bra for 24 hours.  We will give you a discharge instruction sheet after the biopsy.    What is this test: We use sound waves to take pictures of your breast. You will lie on your back on an exam table and we will take tissue/fluid samples.    Who should I call with questions: If you have any questions, please call the Imaging Department where you will have your exam. Directions, parking instructions, and other information is available on our website, [email or web address].     Mar 02, 2023 11:00 AM  (Arrive by 10:00 AM)  MA POST PROCEDURE LEFT with [address] ([facility] ) [address] [facility]  [facility] [address]  [phone]   How do I prepare for my exam? (Food and drink instructions)  No Food and Drink Restrictions.    How do I prepare for my exam? (Other instructions)  Do not use any powder, lotion or deodorant under your arms or on your breast. If you do, we will ask you to remove it before your exam.    What should I wear: Wear comfortable, two-piece clothing.    How long does the exam take: Most scans will take 15 minutes.    What should I bring: Bring any previous mammograms from  "other facilities or have them mailed to the breast center.    Do I need a :  No  is needed.    What do I need to tell my doctor: If you have any allergies, tell your care team.    What should I do after the exam: No restrictions, you may resume normal activities.    What is this test: This test is an x-ray of the breast to look for breast disease. The breast is pressed between two plates to flatten and spread the tissue. An X-ray is taken of the breast from different angles.    Who should I call with questions: If you have any questions, please call the Imaging Department where you will have your exam. Directions, parking instructions, and other information are available on our website, [email or web address].    Other information about my exam  Three-dimensional (3D) mammograms are available at [facility] locations in [facility], [facility], [place], [facility] and [place]. [facility] locations include [place] and the [facility] in [place].    Benefits of 3D mammograms include:  * Improved rate of cancer detection  * Decreases your chance of having to go back for more tests, which means fewer:  * \"False-positive\" results (This means that there is an abnormal area but it isn't cancer.)  * Invasive testing procedures, such as a biopsy or surgery  * Can provide clearer images of the breast if you have dense breast tissue.    *3D mammography is an optional exam that anyone can have with a 2D mammogram. It doesn't replace or take the place of a 2D mammogram. 2D mammograms remain an effective screening test for all women.  Not all insurance companies cover the cost of a 3D mammogram. Check with your insurance.     Mar 09, 2023 11:00 AM  (Arrive by 10:45 AM)  New Visit with Kb Houston MD  [facility] ([facility] ) [address]  [place] [address]  [phone]      Mar 31, 2023  6:30 AM  (Arrive by 6:15 " AM)  NM INJECTION with [id number]  HI NUCLEAR MEDICINE ([facility] ) [address] [place] [address] [phone]   How do I prepare for my exam?: (Food and drink instructions)  ***12 hours before your test stop all caffeine. This includes coffee, decaf coffee (has small amounts of caffeine), tea, soda, chocolate and some medications (such as Anacin, Excedrin, NoDoz).  Stop drinking alcohol, smoking and using tobacco.  ****The morning of the test stop eating 3 hours before the test.     How do I prepare for my exam? (Medication instructions)  You may need to stop some medicines before the test. Follow your doctor s orders.  *2 days before:  Stop taking dipyridamole (Aggrenox, Persantine, Permole)     ***12 hours before:  Stop taking Theophylline (Elixophyllin, Mario-24, Theolair) & Aminophylline     ****Morning of the test:  Do not take Nitrates [eg. isosorbide dinitrate (Isordil), isosorbide mononitrate (Imdur, BiDil), nitroglycerin (Nitrostat, Nitromist, Nitro-Bid)] or wear a Nitro Patch (Transderm-Nitro).  If you take insulin or a beta blocker, follow the instructions from your health care provider.     What should I wear?:   Please wear a loose two-piece outfit. If you will have an exercise test, bring rubber-soled walking shoes.     How long does the exam take?:  *This test can take 1-2 days.*  ONE day exam: Allow 3-4 hours for test.  IF TWO day exam: Allow 1 1/2 to 2 hours for test.     What should I bring?:  It is safest to leave personal items at home. Please bring a list of your medicines.     Do I need a ?:    No  is needed.     What do I need to tell my doctor?:  Tell your doctor if you are breastfeeding or if there is any chance you may be pregnant.   If you think you may need sedation (medicine to help you relax).     What should I do after the exam?:   No restrictions, you may resume normal activities. The radioactive fluid will leave your body when you  urinate (use the toilet). If you drink extra fluids, it will leave your body faster.     What is this test?:   Your doctor has ordered a nuclear stress test to check how well blood is flowing through your heart. You will either exercise or take a medicine that mimics exercise; we will watch your heart.     Who should I call with questions?:   If you have any questions, please call the Imaging Department where you will have your exam. Directions, parking instructions, and other information are available on our website, [email or web address].        Care Instructions       Further instructions from your care team         DISCHARGE INSTRUCTIONS FOR  BREAST BIOPSY    BREAST BIOPSY  A needle was used to locate the breast tissue. Tissue was removed to check the cells and be examined by a Pathologist. This will help your doctor plan any further treatment or follow-up. Your doctor will tell you the results of the tests in 3 to 5 days.    Follow these instructions:  Climb stairs slowly and use the hand rail. Avoid extra trips. No running or jumping.  No pushing, pulling or straining (vacuuming, shoveling, sweeping etc.)  You may shower tomorrow, pat the are dry around the tegaderm dressing.  Wear a bra at all times until your breast is fully healed.  Apply ice to the breast during the first few hours following the procedure to relieve swelling and bruising.  Steri strips stay in place for 7-10 days or until they fall off. Do not pull them off.  May remove pressure dressing after 24 hours.    Call your doctor if you have:  increased bleeding or drainage from your incision.  swelling, redness or opening of your incision.  foul smell from your incision or dressing.  red streaks from your incision.  chills or fever over 101 degrees (by mouth).  pain not helped by your pain medication.  trouble or pain with breathing.  any new problems or concerns.    Additional Information About Your Visit       MyChart Information    [employer]  gives you secure access to your electronic health record. If you see a primary care provider, you can also send messages to your care team and make appointments. If you have questions, please call your primary care clinic.  If you do not have a primary care provider, please call [phone] and they will assist you.       Care EveryWhere ID    This is your Care EveryWhere ID. This could be used by other organizations to access your [facility] medical records  [id number]       Your Vitals Were     Last Period   04/20/2022 (Exact Date)              Primary Care Provider  ZAHEER Umanzor CNP Office Phone #  [phone] Fax #  [phone]      Equal Access to Services    DEE CAMPA : Hadii caitlin Skinner, walul hutchins, qafredta kaalmada lucille, nikki manning . So [facility] [phone].    ATENCIÓN: Si habla español, tiene a alex disposición servicios gratuitos de asistencia lingüística. Llame al [phone].    We comply with applicable federal and state civil rights laws, including the [place] Human Rights Act. We do not discriminate on the basis of race, color, creed, Church, national origin, marital status, age, disability, sex, sexual orientation, or gender identity.    If you would like an itemization of your charges they will now be available in [employer] 30 days after discharge. To access the itemized statements in [employer] go to billing/billing summary. From there select view account. There will be multiple tabs showing an overview of your account, detail, payments, and communications. From the communications tab you can see your monthly statements, your itemized statements, and any billing letters generated for your account. If you do not have a [employer] account and need help getting access please contact [employer] support at [phone].  If you would prefer to have your itemized statements mailed please contact our automated itemized bill request line at  [phone] option  2.       Thank you!    Thank you for choosing [facility] for your care. Our goal is always to provide you with excellent care. Hearing back from our patients is one way we can continue to improve our services. Please take a few minutes to complete the written survey that you may receive in the mail after you visit with us. Thank you!         Medication List      Notice    This visit is during an admission. Changes to the med list made in this visit will be reflected in the After Visit Summary of the admission.

## 2023-03-21 ENCOUNTER — VIRTUAL VISIT (OUTPATIENT)
Dept: FAMILY MEDICINE | Facility: OTHER | Age: 43
End: 2023-03-21
Attending: NURSE PRACTITIONER
Payer: COMMERCIAL

## 2023-03-21 DIAGNOSIS — F34.1 DYSTHYMIC DISORDER: ICD-10-CM

## 2023-03-21 DIAGNOSIS — J30.2 SEASONAL ALLERGIC RHINITIS, UNSPECIFIED TRIGGER: Primary | ICD-10-CM

## 2023-03-21 PROCEDURE — 99213 OFFICE O/P EST LOW 20 MIN: CPT | Mod: 93 | Performed by: NURSE PRACTITIONER

## 2023-03-21 RX ORDER — VENLAFAXINE HYDROCHLORIDE 75 MG/1
70 CAPSULE, EXTENDED RELEASE ORAL DAILY
COMMUNITY
Start: 2023-02-14 | End: 2023-05-23

## 2023-03-21 NOTE — PROGRESS NOTES
Kimberley is a 43 year old who is being evaluated via a billable telephone visit.      What phone number would you like to be contacted at? [phone]  How would you like to obtain your AVS? Maritzaharblanca  Distant Location (provider location):  On-site    Assessment & Plan     (J30.2) Seasonal allergic rhinitis, unspecified trigger  (primary encounter diagnosis)  Comment: Discussed Zyrtec at night. Ref to ENT for allergy testing   Plan: Adult ENT  Referral            (F34.1) Dysthymic disorder  Comment: much happier with Effexor. Less numb feeling being off Zoloft. She has started therapy. Not sleeping the best. Will send a copy of [facility] results   Plan: Follow up 1 month for Effexor follow up. Continue with therapy at [facility].        See Patient Instructions    Return in about 1 month (around 4/21/2023) for Effexor med check .    ZAHEER Umanzor [facility]      Kimberley is a 43 year old, presenting for the following health issues:    Results and Allergies      HPI     Concern - Follow up   Onset: Ongoing   Description: Labs - Gene sight - collected 2/14/2023    Requesting allergy testing. 3 dogs in the house. She has been more congested and sneezing a lot.     Review of Systems   Constitutional, HEENT, cardiovascular, pulmonary, gi and gu systems are negative, except as otherwise noted.      Objective         Vitals:  No vitals were obtained today due to virtual visit.    Physical Exam   healthy, alert and no distress  PSYCH: Alert and oriented times 3; coherent speech, normal   rate and volume, able to articulate logical thoughts, able   to abstract reason, no tangential thoughts, no hallucinations   or delusions  Her affect is normal  RESP: No cough, no audible wheezing, able to talk in full sentences  Remainder of exam unable to be completed due to telephone visits      Phone call duration: 8 minutes  Start: 1539  End: 1547

## 2023-03-31 ENCOUNTER — HOSPITAL ENCOUNTER (OUTPATIENT)
Dept: NUCLEAR MEDICINE | Facility: HOSPITAL | Age: 43
Setting detail: NUCLEAR MEDICINE
Discharge: HOME | End: 2023-03-31
Attending: NURSE PRACTITIONER
Payer: COMMERCIAL

## 2023-03-31 ENCOUNTER — HOSPITAL ENCOUNTER (OUTPATIENT)
Dept: CARDIOLOGY | Facility: HOSPITAL | Age: 43
Setting detail: NUCLEAR MEDICINE
Discharge: HOME | End: 2023-03-31
Attending: NURSE PRACTITIONER
Payer: COMMERCIAL

## 2023-03-31 DIAGNOSIS — R07.9 CHEST PAIN, UNSPECIFIED TYPE: ICD-10-CM

## 2023-03-31 LAB
CV BLOOD PRESSURE: 73 MMHG
CV STRESS MAX HR HE: 99
NUC STRESS EJECTION FRACTION: 77 %
RATE PRESSURE PRODUCT: NORMAL
STRESS ECHO BASELINE DIASTOLIC HE: 74
STRESS ECHO BASELINE HR: 69 BPM
STRESS ECHO BASELINE SYSTOLIC BP: 120
STRESS ECHO CALCULATED PERCENT HR: 56 %
STRESS ECHO LAST STRESS DIASTOLIC BP: 72
STRESS ECHO LAST STRESS SYSTOLIC BP: 122
STRESS ECHO TARGET HR: 177

## 2023-03-31 PROCEDURE — 78452 HT MUSCLE IMAGE SPECT MULT: CPT

## 2023-03-31 PROCEDURE — 343N000001 HC RX 343: Performed by: RADIOLOGY

## 2023-03-31 PROCEDURE — 93018 CV STRESS TEST I&R ONLY: CPT | Performed by: INTERNAL MEDICINE

## 2023-03-31 PROCEDURE — A9500 TC99M SESTAMIBI: HCPCS | Performed by: RADIOLOGY

## 2023-03-31 PROCEDURE — 250N000011 HC RX IP 250 OP 636: Performed by: INTERNAL MEDICINE

## 2023-03-31 PROCEDURE — 93017 CV STRESS TEST TRACING ONLY: CPT

## 2023-03-31 PROCEDURE — 93016 CV STRESS TEST SUPVJ ONLY: CPT | Performed by: INTERNAL MEDICINE

## 2023-03-31 RX ORDER — REGADENOSON 0.08 MG/ML
0.4 INJECTION, SOLUTION INTRAVENOUS ONCE
Status: COMPLETED | OUTPATIENT
Start: 2023-03-31 | End: 2023-03-31

## 2023-03-31 RX ORDER — AMINOPHYLLINE 25 MG/ML
INJECTION, SOLUTION INTRAVENOUS
Status: DISCONTINUED
Start: 2023-03-31 | End: 2023-03-31 | Stop reason: WASHOUT

## 2023-03-31 RX ADMIN — Medication 10.1 MILLICURIE: at 06:43

## 2023-03-31 RX ADMIN — Medication 31.4 MILLICURIE: at 08:27

## 2023-03-31 RX ADMIN — REGADENOSON 0.4 MG: 0.08 INJECTION, SOLUTION INTRAVENOUS at 08:27

## 2023-04-19 ENCOUNTER — OFFICE VISIT (OUTPATIENT)
Dept: OTOLARYNGOLOGY | Facility: OTHER | Age: 43
End: 2023-04-19
Attending: NURSE PRACTITIONER
Payer: COMMERCIAL

## 2023-04-19 VITALS
WEIGHT: 171 LBS | HEIGHT: 64 IN | HEART RATE: 80 BPM | SYSTOLIC BLOOD PRESSURE: 130 MMHG | TEMPERATURE: 97.6 F | BODY MASS INDEX: 29.19 KG/M2 | OXYGEN SATURATION: 97 % | DIASTOLIC BLOOD PRESSURE: 80 MMHG

## 2023-04-19 DIAGNOSIS — R09.81 NASAL CONGESTION: ICD-10-CM

## 2023-04-19 DIAGNOSIS — J30.2 SEASONAL ALLERGIC RHINITIS, UNSPECIFIED TRIGGER: ICD-10-CM

## 2023-04-19 DIAGNOSIS — R09.82 POST-NASAL DRAINAGE: ICD-10-CM

## 2023-04-19 DIAGNOSIS — J30.89 PERENNIAL ALLERGIC RHINITIS: Primary | ICD-10-CM

## 2023-04-19 DIAGNOSIS — J34.2 DNS (DEVIATED NASAL SEPTUM): ICD-10-CM

## 2023-04-19 DIAGNOSIS — Z72.0 TOBACCO ABUSE: ICD-10-CM

## 2023-04-19 PROCEDURE — G0463 HOSPITAL OUTPT CLINIC VISIT: HCPCS

## 2023-04-19 PROCEDURE — 36415 COLL VENOUS BLD VENIPUNCTURE: CPT | Mod: ZL | Performed by: PHYSICIAN ASSISTANT

## 2023-04-19 PROCEDURE — 86003 ALLG SPEC IGE CRUDE XTRC EA: CPT | Mod: ZL | Performed by: PHYSICIAN ASSISTANT

## 2023-04-19 PROCEDURE — 82785 ASSAY OF IGE: CPT | Mod: ZL | Performed by: PHYSICIAN ASSISTANT

## 2023-04-19 PROCEDURE — 99214 OFFICE O/P EST MOD 30 MIN: CPT | Performed by: PHYSICIAN ASSISTANT

## 2023-04-19 RX ORDER — MONTELUKAST SODIUM 10 MG/1
10 TABLET ORAL AT BEDTIME
Qty: 30 TABLET | Refills: 11 | Status: SHIPPED | OUTPATIENT
Start: 2023-04-19 | End: 2023-11-01

## 2023-04-19 RX ORDER — AZELASTINE 1 MG/ML
2 SPRAY, METERED NASAL 2 TIMES DAILY
Qty: 30 ML | Refills: 11 | Status: SHIPPED | OUTPATIENT
Start: 2023-04-19 | End: 2023-11-07

## 2023-04-19 RX ORDER — BUDESONIDE 0.5 MG/2ML
INHALANT ORAL
Qty: 120 ML | Refills: 1 | Status: SHIPPED | OUTPATIENT
Start: 2023-04-19 | End: 2023-11-07

## 2023-04-19 ASSESSMENT — PAIN SCALES - GENERAL: PAINLEVEL: NO PAIN (0)

## 2023-04-19 NOTE — LETTER
2023         RE: Kimberley Louis  [address]  [place] [address]        Dear Colleague,    Thank you for referring your patient, Kimberley Louis, to the [facility] [place]. Please see a copy of my visit note below.    Otolaryngology Consultation    Patient: Kimberley Louis  : 1980    Patient presents with:  Consult: Referred by Jazmyne Villafana for seasonal AR      HPI:  Kimberley Louis is a 43 year old female seen today for allergy evaluation.  Symptoms have been present for a number of years, and are worsening despite use of antihistamines and decongestants.    She has been worsening allergy concerns over the last few years. She has felt increase in symptoms over the last few months related to animal exposures.   She had worsening symptoms in [place] last week.   Increase in post nasal drainage.   She has been using Zyrtec with some relief.     She does not tolerate nasal sprays.   She has felt the rinses are more tolerable compared to NS.     Resides in a house with a basement  There is no water or mold  Carpet in bedroom - Rugs.   Heat in home- steam heat  Animals- Dogs, cats.   Family hx of allergies Yes.   Past trials of medications- Zyrtec    She is a current 1 ppd smoker, she is not interested in quitting at this time  Increase in use of Inhaler over the last year.     Is able to breath thru nares, right feels more plugged than left  No history of sinus injury/trauma/surgery  No dysphonia, dysphagia.   Occasional concerns with reflux, 1-2 times per month  No history of strep/ tonsillitis.   She has had bronchitis and pneumonia in thpast  History of COM, ear surgeries  + family history of allergies/atopy  No prior allergy testing.      Current Outpatient Rx   Medication Sig Dispense Refill     albuterol (PROAIR HFA) 108 (90 Base) MCG/ACT inhaler Inhale 2 puffs into the lungs every 6 hours 8.5 g 2     amLODIPine (NORVASC) 10 MG tablet Take 1 tablet (10 mg) by mouth daily 90  tablet 0     cyclobenzaprine (FLEXERIL) 10 MG tablet Take 1 tablet (10 mg) by mouth 3 times daily as needed for muscle spasms 20 tablet 0     hydrOXYzine (ATARAX) 25 MG tablet Take 1 tablet (25 mg) by mouth 3 times daily as needed for anxiety 60 tablet 1     metoprolol tartrate (LOPRESSOR) 25 MG tablet TAKE 1 TABLET(25 MG) BY MOUTH TWICE DAILY 180 tablet 3     mometasone (ELOCON) 0.1 % external cream Apply topically daily 50 g 0     venlafaxine (EFFEXOR XR) 75 MG 24 hr capsule Take 70 mg by mouth daily         Allergies: Patient has no known allergies.     Past Medical History:   Diagnosis Date     Cyst of breast      Enlarged kidney 11/19/2015     Essential hypertension 11/19/2015     H/O renal calculi 11/19/2015     H/O renal calculi 11/19/2015       Past Surgical History:   Procedure Laterality Date     AS REMOVAL OF KIDNEY STONE       AS REMOVAL OF KIDNEY STONE       COMBINED CYSTOSCOPY, RETROGRADES, URETEROSCOPY, LASER HOLMIUM LITHOTRIPSY URETER(S), INSERT STENT Left 8/17/2020    Procedure: CYSTOURETEROSCOPY, WITH RETROGRADE PYELOGRAM, HOLMIUM LASER LITHOTRIPSY OF URETERAL CALCULUS, interpretation of fluroscopy;  Surgeon: Sherry Mcpherson MD;  Location: [facility]     LAPAROSCOPIC HYSTERECTOMY SUPRACERVICAL N/A 5/4/2022    Procedure: LAPAROSCOPIC SUPRACERVICAL HYSTERECTOMY;  Surgeon: Bunny Hyatt MD;  Location: [facility]     LAPAROSCOPIC SALPINGECTOMY Bilateral 5/4/2022    Procedure: BILATERAL SALPINGECTOMY, Lysis of Adhesions;  Surgeon: Bunny Hyatt MD;  Location: [facility]     TUBAL LIGATION       ZZC REMOVAL OF KIDNEY STONE         ENT family history reviewed    Social History     Tobacco Use     Smoking status: Every Day     Packs/day: 1.00     Years: 28.00     Pack years: 28.00     Types: Cigarettes     Start date: 1/1/1989     Smokeless tobacco: Never     Tobacco comments:     pt denied QP 9/22/20   Vaping Use     Vaping status: Some Days   Substance Use Topics     Alcohol use: Yes     Comment: occasional     Drug  "use: No       Review of Systems  ROS: 10 point ROS neg other than the symptoms noted above in the HPI     Physical Exam  /80 (BP Location: Right arm, Cuff Size: Adult Regular)   Pulse 80   Temp 97.6  F (36.4  C) (Tympanic)   Ht 1.626 m (5' 4\")   Wt 77.6 kg (171 lb)   LMP 04/20/2022 (Exact Date)   SpO2 97%   BMI 29.35 kg/m    General - The patient is well nourished and well developed, and appears to have good nutritional status.  Alert and oriented to person and place, answers questions and cooperates with examination appropriately.   Head and Face - Normocephalic and atraumatic, with no gross asymmetry noted.  The facial nerve is intact, with strong symmetric movements.  Voice and Breathing - The patient was breathing comfortably without the use of accessory muscles. There was no wheezing, stridor, or stertor.  The patients voice was clear and strong, and had appropriate pitch and quality.  Ears -The external auditory canals are patent, the tympanic membranes are intact without effusion, retraction or mass.  Bony landmarks are intact.  Eyes - Extraocular movements intact, and the pupils were reactive to light.  Sclera were not icteric or injected, conjunctiva were pink and moist.  Mouth - Examination of the oral cavity showed pink, healthy oral mucosa. No lesions or ulcerations noted.  The tongue was mobile and midline, and the dentition were in good condition.    Throat - The walls of the oropharynx were smooth, pink, moist, symmetric, and had no lesions or ulcerations.  The tonsillar pillars and soft palate were symmetric.  The uvula was midline on elevation.    Neck - Normal midline excursion of the laryngotracheal complex during swallowing.  Full range of motion on passive movement.  Palpation of the occipital, submental, submandibular, internal jugular chain, and supraclavicular nodes did not demonstrate any abnormal lymph nodes or masses.  Palpation of the thyroid was soft and smooth, with no " nodules or goiter appreciated.  The trachea was mobile and midline.  Nose - External contour is symmetric, no gross deflection or scars.  Nasal mucosa is pink and moist with no abnormal mucus.  The septum was intact and the turbinates are enlarged.  No polyps, masses, or purulence noted on examination.    Reviewed prior sinus CT which was negative for chronic sinusitis but did have a deviated nasal septum with nasal septal spur.  CT was from 2020      Impression and Plan- Kimberley Louis is a 43 year old female with:    ICD-10-CM    1. Perennial allergic rhinitis  J30.89 budesonide (PULMICORT) 0.5 MG/2ML neb solution     montelukast (SINGULAIR) 10 MG tablet     azelastine (ASTELIN) 0.1 % nasal spray     Inhalent Panel [place] Region (Serolab)     Total IgE (Serolab)      2. Seasonal allergic rhinitis, unspecified trigger  J30.2 Adult ENT  Referral     budesonide (PULMICORT) 0.5 MG/2ML neb solution     montelukast (SINGULAIR) 10 MG tablet     azelastine (ASTELIN) 0.1 % nasal spray     [place]halent Panel [place] (Serolab)     Total IgE (Serolab)      3. Nasal congestion  R09.81 budesonide (PULMICORT) 0.5 MG/2ML neb solution     montelukast (SINGULAIR) 10 MG tablet     azelastine (ASTELIN) 0.1 % nasal spray     [place]halent Panel [place] (Serolab)     Total IgE (Serolab)      4. Post-nasal drainage  R09.82 budesonide (PULMICORT) 0.5 MG/2ML neb solution     montelukast (SINGULAIR) 10 MG tablet     azelastine (ASTELIN) 0.1 % nasal spray     [place]halent Panel [place] (Serolab)     Total IgE (Serolab)      5. DNS (deviated nasal septum)  J34.2 budesonide (PULMICORT) 0.5 MG/2ML neb solution     montelukast (SINGULAIR) 10 MG tablet     azelastine (ASTELIN) 0.1 % nasal spray     [place]halent Panel [place] (Serolab)     Total IgE (Serolab)      6. Tobacco abuse  Z72.0           Start Budesonide rinses.   Rinse 1-2 times daily.   Continue with Zyrtec.   Start Singulair 10 mg at night.   Complete allergy panel. Results take about  10-14 days to return.   Decrease tobacco use.   May consider surgical options including septoplasty and turbinate reduction.    Follow up in 4-6 weeks.    Allergen avoidance measures were discussed and are important in preventing symptoms from occurring or worsening.    Indications for allergy testing include:   1) Confirm suspicion of allergic rhinitis due to inhalant allergies  2) Identify the offending allergen to determine specific mode of treatment  3) In the case of chronic rhinosinusitis: when symptoms are not controlled by avoidance and pharmacotherapy  4) In the Asthma patient when exacerbations may be due to perennial allergen exposure  5) Suspect food allergy  6) Otitis Media, chronic rhinitis, atopic dermatitis, Meniere disease, headache, pharyngitis or eye symptoms    Due to the findings above,  modified quantitative testing (MQT) will be performed.  Signed consent was obtained, and the risks of immunotherapy were discussed, including the potential for anaphylaxis.    If immunotherapy (IT) is recommended, there is continued risk of anaphylaxis.   Anaphylaxis can cause death. The patient will need to be monitored for 30 minutes post injection.  They must present their epinephrine pen prior to injection.  Subcutaneous as well as sublingual immunotherapy (SLIT) were discussed as potential treatment options.  The patient was told SLIT is not approved by the FDA and is cash pay.  The general time frame of immunotherapy was discussed (generally 3-5 years, sometimes longer), and the basic immunology behind IT was discussed.    Education was provided on on Singulair and the black box warning regarding its association with depression.  Nightmares may occur or worsen in children.           Ashley Cosme PA-C  ENT  [facility], [place]          Again, thank you for allowing me to participate in the care of your patient.        Sincerely,        Ashley Cosme PA-C

## 2023-04-19 NOTE — PROGRESS NOTES
Otolaryngology Consultation    Patient: Kimberley Louis  : 1980    Patient presents with:  Consult: Referred by Jazmyne Villafana for seasonal AR      HPI:  Kimberley Louis is a 43 year old female seen today for allergy evaluation.  Symptoms have been present for a number of years, and are worsening despite use of antihistamines and decongestants.    She has been worsening allergy concerns over the last few years. She has felt increase in symptoms over the last few months related to animal exposures.   She had worsening symptoms in [place] last week.   Increase in post nasal drainage.   She has been using Zyrtec with some relief.     She does not tolerate nasal sprays.   She has felt the rinses are more tolerable compared to NS.     Resides in a house with a basement  There is no water or mold  Carpet in bedroom - Rugs.   Heat in home- steam heat  Animals- Dogs, cats.   Family hx of allergies Yes.   Past trials of medications- Zyrtec    She is a current 1 ppd smoker, she is not interested in quitting at this time  Increase in use of Inhaler over the last year.     Is able to breath thru nares, right feels more plugged than left  No history of sinus injury/trauma/surgery  No dysphonia, dysphagia.   Occasional concerns with reflux, 1-2 times per month  No history of strep/ tonsillitis.   She has had bronchitis and pneumonia in thpast  History of COM, ear surgeries  + family history of allergies/atopy  No prior allergy testing.      Current Outpatient Rx   Medication Sig Dispense Refill     albuterol (PROAIR HFA) 108 (90 Base) MCG/ACT inhaler Inhale 2 puffs into the lungs every 6 hours 8.5 g 2     amLODIPine (NORVASC) 10 MG tablet Take 1 tablet (10 mg) by mouth daily 90 tablet 0     cyclobenzaprine (FLEXERIL) 10 MG tablet Take 1 tablet (10 mg) by mouth 3 times daily as needed for muscle spasms 20 tablet 0     hydrOXYzine (ATARAX) 25 MG tablet Take 1 tablet (25 mg) by mouth 3 times daily as needed for anxiety 60  tablet 1     metoprolol tartrate (LOPRESSOR) 25 MG tablet TAKE 1 TABLET(25 MG) BY MOUTH TWICE DAILY 180 tablet 3     mometasone (ELOCON) 0.1 % external cream Apply topically daily 50 g 0     venlafaxine (EFFEXOR XR) 75 MG 24 hr capsule Take 70 mg by mouth daily         Allergies: Patient has no known allergies.     Past Medical History:   Diagnosis Date     Cyst of breast      Enlarged kidney 11/19/2015     Essential hypertension 11/19/2015     H/O renal calculi 11/19/2015     H/O renal calculi 11/19/2015       Past Surgical History:   Procedure Laterality Date     AS REMOVAL OF KIDNEY STONE       AS REMOVAL OF KIDNEY STONE       COMBINED CYSTOSCOPY, RETROGRADES, URETEROSCOPY, LASER HOLMIUM LITHOTRIPSY URETER(S), INSERT STENT Left 8/17/2020    Procedure: CYSTOURETEROSCOPY, WITH RETROGRADE PYELOGRAM, HOLMIUM LASER LITHOTRIPSY OF URETERAL CALCULUS, interpretation of fluroscopy;  Surgeon: Sherry Mcpherson MD;  Location: [facility]     LAPAROSCOPIC HYSTERECTOMY SUPRACERVICAL N/A 5/4/2022    Procedure: LAPAROSCOPIC SUPRACERVICAL HYSTERECTOMY;  Surgeon: Bunny Hyatt MD;  Location: [facility]     LAPAROSCOPIC SALPINGECTOMY Bilateral 5/4/2022    Procedure: BILATERAL SALPINGECTOMY, Lysis of Adhesions;  Surgeon: Bunny Hyatt MD;  Location: [facility]     TUBAL LIGATION       ZZC REMOVAL OF KIDNEY STONE         ENT family history reviewed    Social History     Tobacco Use     Smoking status: Every Day     Packs/day: 1.00     Years: 28.00     Pack years: 28.00     Types: Cigarettes     Start date: 1/1/1989     Smokeless tobacco: Never     Tobacco comments:     pt denied QP 9/22/20   Vaping Use     Vaping status: Some Days   Substance Use Topics     Alcohol use: Yes     Comment: occasional     Drug use: No       Review of Systems  ROS: 10 point ROS neg other than the symptoms noted above in the HPI     Physical Exam  /80 (BP Location: Right arm, Cuff Size: Adult Regular)   Pulse 80   Temp 97.6  F (36.4  C) (Tympanic)   Ht 1.626 m  "(5' 4\")   Wt 77.6 kg (171 lb)   LMP 04/20/2022 (Exact Date)   SpO2 97%   BMI 29.35 kg/m    General - The patient is well nourished and well developed, and appears to have good nutritional status.  Alert and oriented to person and place, answers questions and cooperates with examination appropriately.   Head and Face - Normocephalic and atraumatic, with no gross asymmetry noted.  The facial nerve is intact, with strong symmetric movements.  Voice and Breathing - The patient was breathing comfortably without the use of accessory muscles. There was no wheezing, stridor, or stertor.  The patients voice was clear and strong, and had appropriate pitch and quality.  Ears -The external auditory canals are patent, the tympanic membranes are intact without effusion, retraction or mass.  Bony landmarks are intact.  Eyes - Extraocular movements intact, and the pupils were reactive to light.  Sclera were not icteric or injected, conjunctiva were pink and moist.  Mouth - Examination of the oral cavity showed pink, healthy oral mucosa. No lesions or ulcerations noted.  The tongue was mobile and midline, and the dentition were in good condition.    Throat - The walls of the oropharynx were smooth, pink, moist, symmetric, and had no lesions or ulcerations.  The tonsillar pillars and soft palate were symmetric.  The uvula was midline on elevation.    Neck - Normal midline excursion of the laryngotracheal complex during swallowing.  Full range of motion on passive movement.  Palpation of the occipital, submental, submandibular, internal jugular chain, and supraclavicular nodes did not demonstrate any abnormal lymph nodes or masses.  Palpation of the thyroid was soft and smooth, with no nodules or goiter appreciated.  The trachea was mobile and midline.  Nose - External contour is symmetric, no gross deflection or scars.  Nasal mucosa is pink and moist with no abnormal mucus.  The septum was intact and the turbinates are enlarged.  " No polyps, masses, or purulence noted on examination.    Reviewed prior sinus CT which was negative for chronic sinusitis but did have a deviated nasal septum with nasal septal spur.  CT was from 2020      Impression and Plan- Kimberley Louis is a 43 year old female with:    ICD-10-CM    1. Perennial allergic rhinitis  J30.89 budesonide (PULMICORT) 0.5 MG/2ML neb solution     montelukast (SINGULAIR) 10 MG tablet     azelastine (ASTELIN) 0.1 % nasal spray     [place]halent Panel [place] (Serolab)     Total IgE (Serolab)      2. Seasonal allergic rhinitis, unspecified trigger  J30.2 Adult ENT  Referral     budesonide (PULMICORT) 0.5 MG/2ML neb solution     montelukast (SINGULAIR) 10 MG tablet     azelastine (ASTELIN) 0.1 % nasal spray     [place]ent Panel [place] (Serolab)     Total IgE (Serolab)      3. Nasal congestion  R09.81 budesonide (PULMICORT) 0.5 MG/2ML neb solution     montelukast (SINGULAIR) 10 MG tablet     azelastine (ASTELIN) 0.1 % nasal spray     [place]hal[place] Panel [place] (Serolab)     Total IgE (Serolab)      4. Post-nasal drainage  R09.82 budesonide (PULMICORT) 0.5 MG/2ML neb solution     montelukast (SINGULAIR) 10 MG tablet     azelastine (ASTELIN) 0.1 % nasal spray     [place]halent Panel [place] (Serolab)     Total IgE (Serolab)      5. DNS (deviated nasal septum)  J34.2 budesonide (PULMICORT) 0.5 MG/2ML neb solution     montelukast (SINGULAIR) 10 MG tablet     azelastine (ASTELIN) 0.1 % nasal spray     [place]halent Panel [place] (Serolab)     Total IgE (Serolab)      6. Tobacco abuse  Z72.0           Start Budesonide rinses.   Rinse 1-2 times daily.   Continue with Zyrtec.   Start Singulair 10 mg at night.   Complete allergy panel. Results take about 10-14 days to return.   Decrease tobacco use.   May consider surgical options including septoplasty and turbinate reduction.    Follow up in 4-6 weeks.    Allergen avoidance measures were discussed and are important in preventing symptoms from  occurring or worsening.    Indications for allergy testing include:   1) Confirm suspicion of allergic rhinitis due to inhalant allergies  2) Identify the offending allergen to determine specific mode of treatment  3) In the case of chronic rhinosinusitis: when symptoms are not controlled by avoidance and pharmacotherapy  4) In the Asthma patient when exacerbations may be due to perennial allergen exposure  5) Suspect food allergy  6) Otitis Media, chronic rhinitis, atopic dermatitis, Meniere disease, headache, pharyngitis or eye symptoms    Due to the findings above,  modified quantitative testing (MQT) will be performed.  Signed consent was obtained, and the risks of immunotherapy were discussed, including the potential for anaphylaxis.    If immunotherapy (IT) is recommended, there is continued risk of anaphylaxis.   Anaphylaxis can cause death. The patient will need to be monitored for 30 minutes post injection.  They must present their epinephrine pen prior to injection.  Subcutaneous as well as sublingual immunotherapy (SLIT) were discussed as potential treatment options.  The patient was told SLIT is not approved by the FDA and is cash pay.  The general time frame of immunotherapy was discussed (generally 3-5 years, sometimes longer), and the basic immunology behind IT was discussed.    Education was provided on on Singulair and the black box warning regarding its association with depression.  Nightmares may occur or worsen in children.           Ashley Cosme PA-C  ENT  [facility], [place]

## 2023-04-19 NOTE — PATIENT INSTRUCTIONS
Start Budesonide rinses.   Rinse 1-2 times daily.   Continue with Zyrtec.   Start Singulair 10 mg at night.   Complete allergy panel. Results take about 10-14 days to return.   Decrease tobacco use.       Follow up in 4-6 weeks.       Thank you for allowing Ashley Cosme PA-C and our ENT team to participate in your care.  If your medications are too expensive, please give the nurse a call.  We can possibly change this medication.  If you have a scheduling or an appointment question please contact our Health Unit Coordinator at [phone], Ext. [phone].    ALL nursing questions or concerns can be directed to your ENT nurse at: [phone] [facility]      Budesonide nasal saline irrigation per instructions:  -Obtain [facility] Sinus rinse over the counter.    -Use warm distilled water and 2 packets of the salt solution that comes with the bottle, dissolve in bottle up to the 240 mL marshall.  -Add 1 vial of budesonide.  -Irrigate each side of your nose leaning over the sink, using 1/3 to 1/2 the volume of the bottle in each nostril every irrigation.  Irrigate 2 times daily.  -If additional rinses are needed/recommended, you may use the plan [facility] Sinus irrigation without the use of added budesonide

## 2023-04-29 ENCOUNTER — HEALTH MAINTENANCE LETTER (OUTPATIENT)
Age: 43
End: 2023-04-29

## 2023-05-08 LAB
SCANNED LAB RESULT: NORMAL
SCANNED LAB RESULT: NORMAL

## 2023-05-22 DIAGNOSIS — F34.1 DYSTHYMIC DISORDER: Primary | ICD-10-CM

## 2023-05-24 RX ORDER — VENLAFAXINE HYDROCHLORIDE 75 MG/1
CAPSULE, EXTENDED RELEASE ORAL
Qty: 90 CAPSULE | Refills: 0 | Status: SHIPPED | OUTPATIENT
Start: 2023-05-24 | End: 2023-07-14

## 2023-07-05 DIAGNOSIS — R07.89 ATYPICAL CHEST PAIN: ICD-10-CM

## 2023-07-05 DIAGNOSIS — I10 ESSENTIAL HYPERTENSION: ICD-10-CM

## 2023-07-06 RX ORDER — AMLODIPINE BESYLATE 10 MG/1
TABLET ORAL
Qty: 90 TABLET | Refills: 0 | Status: SHIPPED | OUTPATIENT
Start: 2023-07-06 | End: 2023-10-18

## 2023-07-14 ENCOUNTER — OFFICE VISIT (OUTPATIENT)
Dept: FAMILY MEDICINE | Facility: OTHER | Age: 43
End: 2023-07-14
Attending: NURSE PRACTITIONER
Payer: COMMERCIAL

## 2023-07-14 VITALS
SYSTOLIC BLOOD PRESSURE: 128 MMHG | TEMPERATURE: 98.1 F | RESPIRATION RATE: 18 BRPM | BODY MASS INDEX: 29.37 KG/M2 | WEIGHT: 171.1 LBS | OXYGEN SATURATION: 98 % | DIASTOLIC BLOOD PRESSURE: 80 MMHG | HEART RATE: 72 BPM

## 2023-07-14 DIAGNOSIS — E55.9 VITAMIN D DEFICIENCY: ICD-10-CM

## 2023-07-14 DIAGNOSIS — R35.0 URINE FREQUENCY: ICD-10-CM

## 2023-07-14 DIAGNOSIS — M54.16 LUMBAR RADICULOPATHY: ICD-10-CM

## 2023-07-14 DIAGNOSIS — F34.1 DYSTHYMIC DISORDER: ICD-10-CM

## 2023-07-14 DIAGNOSIS — R41.840 ATTENTION DEFICIT: Primary | ICD-10-CM

## 2023-07-14 DIAGNOSIS — R53.83 FATIGUE, UNSPECIFIED TYPE: ICD-10-CM

## 2023-07-14 LAB
ALBUMIN UR-MCNC: NEGATIVE MG/DL
APPEARANCE UR: CLEAR
BILIRUB UR QL STRIP: NEGATIVE
COLOR UR AUTO: YELLOW
GLUCOSE UR STRIP-MCNC: NEGATIVE MG/DL
HGB UR QL STRIP: NEGATIVE
KETONES UR STRIP-MCNC: NEGATIVE MG/DL
LEUKOCYTE ESTERASE UR QL STRIP: NEGATIVE
NITRATE UR QL: NEGATIVE
PH UR STRIP: 7 [PH] (ref 5–7)
SP GR UR STRIP: 1.01 (ref 1–1.03)
UROBILINOGEN UR STRIP-ACNC: 0.2 E.U./DL

## 2023-07-14 PROCEDURE — 36415 COLL VENOUS BLD VENIPUNCTURE: CPT | Mod: ZL | Performed by: NURSE PRACTITIONER

## 2023-07-14 PROCEDURE — 99214 OFFICE O/P EST MOD 30 MIN: CPT | Performed by: NURSE PRACTITIONER

## 2023-07-14 PROCEDURE — 81003 URINALYSIS AUTO W/O SCOPE: CPT | Mod: ZL | Performed by: NURSE PRACTITIONER

## 2023-07-14 PROCEDURE — 82306 VITAMIN D 25 HYDROXY: CPT | Mod: ZL | Performed by: NURSE PRACTITIONER

## 2023-07-14 PROCEDURE — G0463 HOSPITAL OUTPT CLINIC VISIT: HCPCS | Performed by: NURSE PRACTITIONER

## 2023-07-14 RX ORDER — VENLAFAXINE HYDROCHLORIDE 150 MG/1
150 CAPSULE, EXTENDED RELEASE ORAL DAILY
Qty: 90 CAPSULE | Refills: 0 | Status: SHIPPED | OUTPATIENT
Start: 2023-07-14 | End: 2023-10-18

## 2023-07-14 ASSESSMENT — ANXIETY QUESTIONNAIRES
4. TROUBLE RELAXING: MORE THAN HALF THE DAYS
IF YOU CHECKED OFF ANY PROBLEMS ON THIS QUESTIONNAIRE, HOW DIFFICULT HAVE THESE PROBLEMS MADE IT FOR YOU TO DO YOUR WORK, TAKE CARE OF THINGS AT HOME, OR GET ALONG WITH OTHER PEOPLE: EXTREMELY DIFFICULT
1. FEELING NERVOUS, ANXIOUS, OR ON EDGE: MORE THAN HALF THE DAYS
2. NOT BEING ABLE TO STOP OR CONTROL WORRYING: NEARLY EVERY DAY
GAD7 TOTAL SCORE: 15
3. WORRYING TOO MUCH ABOUT DIFFERENT THINGS: NEARLY EVERY DAY
5. BEING SO RESTLESS THAT IT IS HARD TO SIT STILL: SEVERAL DAYS
7. FEELING AFRAID AS IF SOMETHING AWFUL MIGHT HAPPEN: SEVERAL DAYS
GAD7 TOTAL SCORE: 15
6. BECOMING EASILY ANNOYED OR IRRITABLE: NEARLY EVERY DAY

## 2023-07-14 ASSESSMENT — PATIENT HEALTH QUESTIONNAIRE - PHQ9
10. IF YOU CHECKED OFF ANY PROBLEMS, HOW DIFFICULT HAVE THESE PROBLEMS MADE IT FOR YOU TO DO YOUR WORK, TAKE CARE OF THINGS AT HOME, OR GET ALONG WITH OTHER PEOPLE: EXTREMELY DIFFICULT
SUM OF ALL RESPONSES TO PHQ QUESTIONS 1-9: 24
SUM OF ALL RESPONSES TO PHQ QUESTIONS 1-9: 24

## 2023-07-14 ASSESSMENT — PAIN SCALES - GENERAL: PAINLEVEL: SEVERE PAIN (7)

## 2023-07-14 ASSESSMENT — ENCOUNTER SYMPTOMS: NERVOUS/ANXIOUS: 1

## 2023-07-14 NOTE — PROGRESS NOTES
Assessment & Plan     (R41.840) Attention deficit  (primary encounter diagnosis)  Comment: Ref for DA at [facility]   Plan: Adult Mental Health  Referral            (R35.0) Urine frequency  Comment: check UA   Plan: UA Macroscopic with reflex to Microscopic and         Culture            (M54.16) Lumbar radiculopathy  Comment: check MRI. Consider IR consult if warranted   Plan: MR Lumbar Spine w/o Contrast            (E55.9) Vitamin D deficiency  Comment: check vit D level   Plan: Vitamin D Deficiency            (F34.1) Dysthymic disorder  Comment: Increase Effexor to 150 mg daily   Plan: venlafaxine (EFFEXOR XR) 150 MG 24 hr capsule            (R53.83) Fatigue, unspecified type  Comment: Ref for sleep study  Plan: Adult Sleep Eval & Management          Referral            Depression Screening Follow Up      Follow Up      Follow Up Actions Taken  Crisis resource information provided in the After Visit Summary  Patient to follow up with PCP.  Clinic staff to schedule appointment if able.  Mental Health Referral placed    Discussed the following ways the patient can remain in a safe environment:  remove things I could use to hurt myself: yes and be around others  See Patient Instructions    Return in about 6 weeks (around 8/25/2023) for Med check .    ZAHEER Umanzor [facility]    Chris Chu is a 43 year old, presenting for the following health issues:  Musculoskeletal Problem, Chronic Disease Management, Recheck Medication, Depression, and Anxiety        7/14/2023     9:42 AM   Additional Questions   Roomed by AMBER Bobby   Accompanied by Self     Musculoskeletal Problem    Anxiety         Depression and Anxiety Follow-Up    How are you doing with your depression since your last visit? Improved     How are you doing with your anxiety since your last visit?  Worsened     Are you having other symptoms that might be associated with depression or  anxiety? Yes:  Extreame fatigue     Have you had a significant life event? No     Do you have any concerns with your use of alcohol or other drugs? No     Answers for HPI/ROS submitted by the patient on 7/14/2023  If you checked off any problems, how difficult have these problems made it for you to do your work, take care of things at home, or get along with other people?: Extremely difficult  PHQ9 TOTAL SCORE: 24  FAUZIA 7 TOTAL SCORE: 15      Social History     Tobacco Use     Smoking status: Every Day     Packs/day: 1.00     Years: 28.00     Pack years: 28.00     Types: Cigarettes     Start date: 1/1/1989     Smokeless tobacco: Never     Tobacco comments:     pt denied QP 9/22/20   Vaping Use     Vaping Use: Some days   Substance Use Topics     Alcohol use: Yes     Comment: occasional     Drug use: No         1/27/2023     5:39 PM 2/14/2023     3:00 PM 7/14/2023     9:26 AM   PHQ   PHQ-9 Total Score 8 13 24   Q9: Thoughts of better off dead/self-harm past 2 weeks Not at all Not at all Several days   F/U: Thoughts of suicide or self-harm   Yes   F/U: Self harm-plan   No   F/U: Self-harm action   No   F/U: Safety concerns   No         1/27/2023     5:40 PM 2/14/2023     3:00 PM 7/14/2023     9:26 AM   FAUZIA-7 SCORE   Total Score 3 (minimal anxiety)  15 (severe anxiety)   Total Score 3 3 15         7/14/2023     9:26 AM   Last PHQ-9   1.  Little interest or pleasure in doing things 3   2.  Feeling down, depressed, or hopeless 3   3.  Trouble falling or staying asleep, or sleeping too much 3   4.  Feeling tired or having little energy 3   5.  Poor appetite or overeating 2   6.  Feeling bad about yourself 3   7.  Trouble concentrating 3   8.  Moving slowly or restless 3   Q9: Thoughts of better off dead/self-harm past 2 weeks 1   PHQ-9 Total Score 24   In the past two weeks have you had thoughts of suicide or self harm? Yes   Do you have concerns about your personal safety or the safety of others? No   In the past 2 weeks  have you thought about a plan or had intention to harm yourself? No   In the past 2 weeks have you acted on these thoughts in any way? No         7/14/2023     9:26 AM   FAUZIA-7    1. Feeling nervous, anxious, or on edge 2   2. Not being able to stop or control worrying 3   3. Worrying too much about different things 3   4. Trouble relaxing 2   5. Being so restless that it is hard to sit still 1   6. Becoming easily annoyed or irritable 3   7. Feeling afraid, as if something awful might happen 1   FAUZIA-7 Total Score 15   If you checked any problems, how difficult have they made it for you to do your work, take care of things at home, or get along with other people? Extremely difficult     Denies active SI/HI thoughts. Contracts for safety.      Chronic/Recurring Back Pain Follow Up      Where is your back pain located? (Select all that apply) low back bilateral    How would you describe your back pain?  cramping, dull ache, gnawing, sharp and shooting    Where does your back pain spread? nowhere    Since your last clinic visit for back pain, how has your pain changed? always present, but gets better and worse    Does your back pain interfere with your job? YES    Since your last visit, have you tried any new treatment? No     Denies loss of bowel or bladder control    Denies saddle paresthesia       Review of Systems   Psychiatric/Behavioral: The patient is nervous/anxious.       Constitutional, HEENT, cardiovascular, pulmonary, GI, , musculoskeletal, neuro, skin, endocrine and psych systems are negative, except as otherwise noted.      Objective    LMP 04/20/2022 (Exact Date)   There is no height or weight on file to calculate BMI.  Physical Exam   GENERAL: healthy, alert and no distress  NECK: no adenopathy, no asymmetry, masses, or scars and thyroid normal to palpation  RESP: lungs clear to auscultation - no rales, rhonchi or wheezes  CV: regular rate and rhythm, normal S1 S2, no S3 or S4, no murmur, click or rub,  no peripheral edema and peripheral pulses strong  MS: no gross musculoskeletal defects noted, no edema. No step offs or TTP to spinal column. TTP to paraspinal muscles at L4-S1 bilaterally. Straight leg raises intact. Distal pulses intact. No rash, erythema, bruising, or warmth to the touch to skin.   SKIN: no suspicious lesions or rashes  NEURO: Normal strength and tone, mentation intact and speech normal  PSYCH: mentation appears normal, affect normal/bright

## 2023-07-16 LAB — DEPRECATED CALCIDIOL+CALCIFEROL SERPL-MC: 39 UG/L (ref 20–75)

## 2023-07-28 ENCOUNTER — HOSPITAL ENCOUNTER (OUTPATIENT)
Dept: MRI IMAGING | Facility: HOSPITAL | Age: 43
Discharge: HOME | End: 2023-07-28
Attending: NURSE PRACTITIONER | Admitting: NURSE PRACTITIONER
Payer: COMMERCIAL

## 2023-07-28 DIAGNOSIS — M54.16 LUMBAR RADICULOPATHY: ICD-10-CM

## 2023-07-28 PROCEDURE — 72148 MRI LUMBAR SPINE W/O DYE: CPT

## 2023-08-10 ENCOUNTER — DOCUMENTATION ONLY (OUTPATIENT)
Dept: PULMONOLOGY | Facility: OTHER | Age: 43
End: 2023-08-10

## 2023-08-10 NOTE — PROGRESS NOTES
STOP BANG       Name: Kimberley Louis MRN# [id number]   Age: 43 year old YOB: 1980     Stop Bang questionnaire completed with a score of >3 to allow for HST     Have you been told you snore loudly (louder than talking or loud enough to be heard through doors)? YES    Do you often feel tired, fatigued, or sleepy during the daytime? YES    Has anyone observed you stop breathing during your sleep? YES    Do you have or are you being treated for high blood pressure? YES    Is your BMI greater than 35? NO    Is your neck size circumference 16 inches or greater? unanswered    Are you over 50 years old? NO    Stop Bang Score (# of yes): 4-5

## 2023-08-10 NOTE — PROGRESS NOTES
SLEEP HISTORY QUESTIONNAIRE    Please describe the main reason for your sleep appointment? Frequent awakenings, snoring, daytime sleepiness    How long has this been a problem? 2-3 years    Have you been diagnosed with a sleep problem in the past? NO    If so, what?     What treatment was recommended?     Have you had a sleep study in the past? NO    If yes, where and when?     Sleep Habits:   Do you read in bed? No  Do you eat in bed? No  Do you watch TV in bed? Yes  Do you work in bed? No  Do you use a phone or computer in bed? No    Is you sleep disturbed by:   Bed partner: No  Children: No  Noise: No   Pets: No  Other:       On two or more nights per week, do you drink alcohol to help you fall asleep?NO    On two or more nights per week, do you take melatonin to help you fall asleep? NO    On two or more nights per week, do you take over the counter medicine to fall asleep?  NO    Do you take drinks with caffeine (coffee, tea, soda, energy drinks)? NO    Do you have 3 or more caffeine drinks in a day? NO    Do you have caffeine drinks within 6 hours of bedtime? NO    Do you smoke or use tobacco? YES    Do you exercise? NO    Sleep Routine:   Using a 24 Hour Clock    What time do you usually get into bed on workdays? 9-10pm    Weekend/non work days? 10-11pm    What time do you get out of bed on workdays? 5:45am      Weekend/non work days?6-8am    Do you work the evening or night shift or do your shifts rotate? NO    How long does it usually take to fall to sleep? 30-60mins    How many times do you wake during the night? 5-10    How much time do you feel that you are awake during the entire night? 1-2hrs    How long does it take for you to fall back to sleep after you wake up? 10-20 mins    Why do you think you wake up? Snoring, bathroom    What do you do when you wake up? Roll around or use the bathroom    How much sleep do you think you get on work nights? 6hrs    How much sleep do you think you get on  weekends/non work days? 6-7hrs    How much sleep do you think you need to feel your best? 8hrs    How many days during a week do you take a nap on average? 0    What is the average length of your naps? 0    Do you feel better after taking a nap? DOES NOT APPLY    If you could chose the best sleep schedule for you, what time would you go to bed? 10pm  What time would you get up? 6am    Do you read in bed? NO    Do you eat in bed? NO    Do you watch TV in bed? YES    Do you do work in bed? NO    Do you use a computer or phone in bed? NO    Sleep Disruptions?   Leg movements:  Do you ever have restless, crawling, aching or other unusual feelings in your legs? YES    Do you ever wake yourself by kicking your legs during the night? NO    Are the sheets and blankets messed up or tossed about when you get up? YES    Night-time behaviors:   Do you have nightmares or night terrors? YES   How often? 1-2 times a week    Have you had times when you were sleep walking? NO    Have you been seen doing anything unusual while you sleep at nights? YES  What? Talking, yelling, thrashing  How often? Randomly/not often    Have you ever hurt yourself or someone else while you were sleeping? NO  Please describe:     Do you clench or grind your teeth during the night? YES    Sleep Apnea (pauses in breathing during sleep):  Do you wake with a headache in the morning? YES  How often? 3-4 days a week    Does your bed partner, family or friends ever say that you snore? YES  How many nights per week do you snore? 7  Can snoring be heard outside the bedroom? YES    Do you ever wake yourself up from snoring, gasping or choking? YES    Have you ever been told that you stop breathing or have pauses in your breathing? YES    Do you wake in the morning with a dry throat or mouth? YES    Do you have trouble breathing through your nose? YES    Do you have problems with heartburn, reflux or a hiatal hernia? YES    Which positions do you usually sleep  in? (stomach, back, sides, all) back and sides    Do you use oxygen or any other medical equipment when you sleep? NO    Do members of your family (related by blood) snore? YES    Have any members of your family been diagnosed with with sleep apnea? YES    Do other members of your family have restless leg? NO    Do other members of your family have sleep walking? NO    Have you ever had an accident, or near accident due to sleepiness while driving? NO    Does your sleepiness affect your work on the job or at school? YES    Do you ever fall asleep by accident while doing a task? YES    Have you had sudden muscle weakness when laughing, angry or surprised? NO    Have you ever been unable to move your body when falling asleep or waking up? YES    Do you ever have trouble  your dreams from real life events? NO  Please describe:     Physical Health: (including illness and injury): During the past 30 days, on how many days was your physical health not good? 10/30 days     Mental Health: (including stress, depression, and problems with emotions): During the last 30 days, how may days was your mental health not good? 30 days.     During the past 30 days, on how many days did poor physical or mental health keep you from doing your usual activities? This might be self-care, work, or play? 30 days.     Social History:   Marital status:     Who lives in your home with you? unanswered    Mother (alive or dead)? alive If has , from what?   Father (alive or dead)? alive If has , from what?     Siblings: YES  Have any ? NO  If so, from what?     Currently working? YES  If yes, work: unanswered  Former jobs: unanswered     Sleepiness Scale:   Sitting and reading 3   Watching TV 3   Sitting in a public place 3   Riding in a car 2   Lying down to rest in the afternoon 3   Sitting and talking to someone 1   Sitting quietly after a lunch without alcohol 2   In a car, stopping for a few minutes in  traffic 0       Surgical History:   Past Surgical History:   Procedure Laterality Date    AS REMOVAL OF KIDNEY STONE      AS REMOVAL OF KIDNEY STONE      COMBINED CYSTOSCOPY, RETROGRADES, URETEROSCOPY, LASER HOLMIUM LITHOTRIPSY URETER(S), INSERT STENT Left 8/17/2020    Procedure: CYSTOURETEROSCOPY, WITH RETROGRADE PYELOGRAM, HOLMIUM LASER LITHOTRIPSY OF URETERAL CALCULUS, interpretation of fluroscopy;  Surgeon: Sherry Mcpherson MD;  Location: [facility]    LAPAROSCOPIC HYSTERECTOMY SUPRACERVICAL N/A 5/4/2022    Procedure: LAPAROSCOPIC SUPRACERVICAL HYSTERECTOMY;  Surgeon: Bunny Hyatt MD;  Location: [facility] OR    LAPAROSCOPIC SALPINGECTOMY Bilateral 5/4/2022    Procedure: BILATERAL SALPINGECTOMY, Lysis of Adhesions;  Surgeon: Bunny Hyatt MD;  Location: [facility] OR    TUBAL LIGATION      ZZC REMOVAL OF KIDNEY STONE         Medical Conditions:   Past Medical History:   Diagnosis Date    Cyst of breast     Enlarged kidney 11/19/2015    Essential hypertension 11/19/2015    H/O renal calculi 11/19/2015    H/O renal calculi 11/19/2015       Medications:   Current Outpatient Medications   Medication Sig    albuterol (PROAIR HFA) 108 (90 Base) MCG/ACT inhaler Inhale 2 puffs into the lungs every 6 hours    amLODIPine (NORVASC) 10 MG tablet Take 1 tablet by mouth once daily    azelastine (ASTELIN) 0.1 % nasal spray Spray 2 sprays into both nostrils 2 times daily (Patient not taking: Reported on 7/14/2023)    budesonide (PULMICORT) 0.5 MG/2ML neb solution Make 240 cc Severiano med sinus irrigation Mix 2 ml vial of budesonide 0.5 mg Rinse 1-2 times daily (Patient not taking: Reported on 7/14/2023)    cyclobenzaprine (FLEXERIL) 10 MG tablet Take 1 tablet (10 mg) by mouth 3 times daily as needed for muscle spasms    hydrOXYzine (ATARAX) 25 MG tablet Take 1 tablet (25 mg) by mouth 3 times daily as needed for anxiety    metoprolol tartrate (LOPRESSOR) 25 MG tablet TAKE 1 TABLET(25 MG) BY MOUTH TWICE DAILY    mometasone (ELOCON) 0.1 % external  cream Apply topically daily    montelukast (SINGULAIR) 10 MG tablet Take 1 tablet (10 mg) by mouth At Bedtime (Patient not taking: Reported on 7/14/2023)    venlafaxine (EFFEXOR XR) 150 MG 24 hr capsule Take 1 capsule (150 mg) by mouth daily for 90 days     No current facility-administered medications for this visit.       Are you currently having any of the following symptoms?   General:   Obvious weight gain or loss NO  Fever, chills or sweats NO  Drug allergies:     Eyes:   Changes in vision NO  Blind spots NO  Double vision NO  Other     Ear, Nose and Throat:   Ear pain NO  Sore throat NO  Sinus pain YES  Post-nasal drip YES  Runny nose YES  Bloody nose NO    Heart:   Rapid or irregular heart beat NO  Chest pain or pressure NO  Out of breath when lying down NO  Swelling in feet or legs NO  High blood pressure YES  Heart disease NO    Nervous system   Headaches YES  Weakness in arms or legs YES  Numbness in arms of legs YES  Other:     Skin  Rashes NO  New moles or skin changes NO  Other     Lungs  Shortness of breath at rest NO  Shortness of breath with activity YES  Dry cough YES  Coughing up mucous or phlegm NO  Coughing up blood NO  Wheezing when breathing YES    Lymph System  Swollen lymph nodes NO  New lumps or bumps NO  Changes in breasts or discharge NO    Digestive System   Nausea or vomiting NO  Loose or watery stools YES  Hard, dry stools (constipation) NO  Fat or grease in stools NO  Blood in stools NO  Stools are black or bloody NO  Abdominal (belly) pain NO    Urinary Tract   Pain when you urinate (pee) NO  Blood in your urine NO  Urinate (pee) more than normal YES  Irregular periods NO    Muscles and bones   Muscle pain YES  Joint or bone pain YES  Swollen joints NO  Other     Glands  Increased thirst or urination YES  Diabetes NO  Morning glucose:   Afternoon glucose:     Mental Health  Depression YES  Anxiety YES  Other mental health issues:

## 2023-08-18 NOTE — PROGRESS NOTES
Chart review prior to sleep testing.    Patient Summary:  43 year old yo female who is referred for sleep-disordered breathing.    Patient Active Problem List    Diagnosis Date Noted    S/P laparoscopic supracervical hysterectomy 05/05/2022     Priority: Medium     Bilateral salp      Dysmenorrhea 05/04/2022     Priority: Medium    Menorrhagia 05/04/2022     Priority: Medium    Nephrolithiasis 07/21/2020     Priority: Medium     Added automatically from request for surgery [id number]      Bacterial vaginosis 06/09/2020     Priority: Medium    Left flank tenderness 08/03/2018     Priority: Medium    Personal history of tobacco use, presenting hazards to health 08/03/2018     Priority: Medium    Recurrent UTI 08/03/2018     Priority: Medium    Incomplete right bundle branch block 02/05/2018     Priority: Medium    Pure hypercholesterolemia 01/03/2018     Priority: Medium    Tobacco abuse counseling 01/03/2018     Priority: Medium    Tobacco abuse 01/03/2018     Priority: Medium    Palpitations 01/03/2018     Priority: Medium    Atypical chest pain 01/03/2018     Priority: Medium    Dyspnea on exertion 01/03/2018     Priority: Medium    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  Kimberley Louis has no plan or code status on file. Discussed available resources and provided with information. Confirmed code status reflects current choices pending further ACP discussions.  Confirmed/documented legally designated decision makers.  Added by SAYDA CABRERA            ACP (advance care planning) 04/13/2016     Priority: Medium     Advance Care Planning 4/13/2016: ACP Review of Chart / Resources Provided:  Reviewed chart for advance care plan.  Kimberley Louis has no plan or code status on  "file. Discussed available resources and provided with information. Confirmed code status reflects current choices pending further ACP discussions.  Confirmed/documented legally designated decision makers.  Added by Sandra Anne            Kidney stone on left side 12/21/2015     Priority: Medium    Nephrocalcinosis 12/21/2015     Priority: Medium    Essential hypertension 11/19/2015     Priority: Medium    H/O renal calculi 11/19/2015     Priority: Medium    Enlarged kidney 11/19/2015     Priority: Medium       Current Outpatient Medications   Medication    albuterol (PROAIR HFA) 108 (90 Base) MCG/ACT inhaler    amLODIPine (NORVASC) 10 MG tablet    azelastine (ASTELIN) 0.1 % nasal spray    budesonide (PULMICORT) 0.5 MG/2ML neb solution    cyclobenzaprine (FLEXERIL) 10 MG tablet    hydrOXYzine (ATARAX) 25 MG tablet    metoprolol tartrate (LOPRESSOR) 25 MG tablet    mometasone (ELOCON) 0.1 % external cream    montelukast (SINGULAIR) 10 MG tablet    venlafaxine (EFFEXOR XR) 150 MG 24 hr capsule     No current facility-administered medications for this visit.     Pertinent PMHx of HTN, dyspnea on exertion, tobacco use.    STOP-BANG score of 4-5, with unknown neck circumference.  [place] score of 17.  BMI of Estimated body mass index is 29.37 kg/m  as calculated from the following:    Height as of 4/19/23: 1.626 m (5' 4\").    Weight as of 7/14/23: 77.6 kg (171 lb 1.6 oz).     Per questionnaire: \"Frequent awakenings, snoring, daytime sleepiness \"    Caffeine use:  No for 3+ per day.  No for within 6 hours of bed.    Tobacco use: Yes    Sleep pattern:  Workdays.  9-10pm to 5:45am, total sleep time 6 hours.  Weekends.  10-11pm to 6-8am, total sleep time 6-7 hours.  Time to fall asleep: ~30-60 minutes.  Awakenings: 5-10 times per night, 10-20 minutes to return to sleep, awake for total of 1-2 hours per night.  Napping.  0 days per week, - hours per nap.    Yes for RLS screen.  No for sleep walking.  Yes for dream " enactment behavior - Talking, yelling, thrashing though rare.  Yes for bruxism.    Yes for morning headaches.  Yes for snoring.  Yes for observed apnea.  Yes for FHx of MANUEL.    SHx:  , working.    A/P:    1.)  High likelihood of MANUEL with STOP-BANG score of 4-5.   - Would appear to be candidate for either home sleep testing or in-lab PSG.    ---  This note was written with the assistance of the Dragon voice-dictation technology software. The final document, although reviewed, may contain errors. For corrections, please contact the office.    Fidel Cisneros MD    Sleep Medicine  [facility] [place], [place]  Main Office: [phone]  [facility] [address], [address]  Schedule visits: [phone]  Main Office: [phone]  Fax: [phone]

## 2023-08-20 DIAGNOSIS — I10 ESSENTIAL HYPERTENSION: ICD-10-CM

## 2023-08-22 RX ORDER — METOPROLOL TARTRATE 25 MG/1
TABLET, FILM COATED ORAL
Qty: 180 TABLET | Refills: 3 | Status: SHIPPED | OUTPATIENT
Start: 2023-08-22 | End: 2024-04-23

## 2023-08-24 ENCOUNTER — TELEPHONE (OUTPATIENT)
Dept: PULMONOLOGY | Facility: OTHER | Age: 43
End: 2023-08-24

## 2023-09-05 ENCOUNTER — HOSPITAL ENCOUNTER (OUTPATIENT)
Dept: ULTRASOUND IMAGING | Facility: HOSPITAL | Age: 43
Discharge: HOME | End: 2023-09-05
Attending: PHYSICIAN ASSISTANT | Admitting: PHYSICIAN ASSISTANT
Payer: COMMERCIAL

## 2023-09-05 DIAGNOSIS — N63.22 MASS OF UPPER INNER QUADRANT OF LEFT BREAST: ICD-10-CM

## 2023-09-05 PROCEDURE — 76642 ULTRASOUND BREAST LIMITED: CPT | Mod: LT

## 2023-09-06 DIAGNOSIS — R06.81 APNEA: Primary | ICD-10-CM

## 2023-09-29 ENCOUNTER — THERAPY VISIT (OUTPATIENT)
Dept: SLEEP MEDICINE | Facility: HOSPITAL | Age: 43
End: 2023-09-29
Attending: FAMILY MEDICINE
Payer: COMMERCIAL

## 2023-09-29 DIAGNOSIS — R06.81 APNEA: ICD-10-CM

## 2023-09-29 PROCEDURE — 95810 POLYSOM 6/> YRS 4/> PARAM: CPT

## 2023-09-29 PROCEDURE — 95810 POLYSOM 6/> YRS 4/> PARAM: CPT | Mod: 26 | Performed by: FAMILY MEDICINE

## 2023-09-30 NOTE — PROGRESS NOTES
Diagnostic testing showed all stages of sleep at an 88% efficiency. PLMs were occasionally noted. Sleep disordered breathing consisted mostly OSH and frequent RERAs with a moderate snort. AHI was just under 6. RERA disturbance index was just under 20.

## 2023-10-01 NOTE — PROCEDURES
[facility]   SLEEP STUDY INTERPRETATION  DIAGNOSTIC POLYSOMNOGRAPHY REPORT      Patient: LILLIE VELEZ  YOB: 1980  Study Date: 9/29/2023  MRN: [id number]  Referring Provider: LUCY SCHWAB [facility]  Ordering Provider: HASMUKH Cisneros MD    Indications for Polysomnography: The patient is a 43 year old Female who is 5'4  and weighs 171 lbs. Her BMI is 29.4, [place] sleepiness scale 17 and neck circumference is - cm. Relevant medical history includes HTN, dyspnea on exertion, tobacco use. A diagnostic polysomnogram was performed to evaluate for sleep apnea.    Polysomnogram Data: A full night polysomnogram recorded the standard physiologic parameters including EEG, EOG, EMG, ECG, nasal and oral airflow. Respiratory parameters of chest and abdominal movements were recorded with respiratory inductance plethysmography. Oxygen saturation was recorded by pulse oximetry. Hypopnea scoring rule used: 1B 4%.    Sleep Architecture: Decreased sleep onset latency and delayed REM latency, supine REM was observed.  Increased arousal index.  The total recording time of the polysomnogram was 471.5 minutes. The total sleep time was 415.5 minutes. Sleep latency was mildly decreased at 9.8 minutes without the use of a sleep aid. REM latency was 176.5 minutes. Arousal index was increased at 29.2 arousals per hour. Sleep efficiency was normal at 88.1%. Wake after sleep onset was 46.5 minutes. The patient spent 14.4% of total sleep time in Stage N1, 45.6% in Stage N2, 19.4% in Stage N3, and 20.6% in REM. Time in REM supine was 64.5 minutes.    Respiration: Mild to moderate MANUEL (AHI 5.9, RDI 25.1) without sleep-associated hypoxemia.  Events ? The polysomnogram revealed a presence of 17 obstructive, 4 central, and - mixed apneas resulting in an apnea index of 3.0 events per hour. There were 20 obstructive hypopneas and - central hypopneas resulting in an obstructive hypopnea index of 2.9 and central hypopnea index of - events per hour.  The combined apnea/hypopnea index was 5.9 events per hour (central apnea/hypopnea index was 0.6 events per hour). The REM AHI was 21.8 events per hour. The supine AHI was 6.3 events per hour. The RERA index was 19.2 events per hour.  The RDI was 25.1 events per hour.  Snoring - was reported as moderate.  Respiratory rate and pattern - was notable for normal respiratory rate and pattern.  Sustained Sleep Associated Hypoventilation - Transcutaneous carbon dioxide monitoring was not used, however significant hypoventilation was not suggested by oximetry.  Sleep Associated Hypoxemia - (Greater than 5 minutes O2 sat at or below 88%) was not present. Baseline oxygen saturation was 93.3%. Lowest oxygen saturation was 87.0%. Time spent less than or equal to 88% was 0.5 minutes. Time spent less than or equal to 89% was 1.8 minutes.    Movement Activity: Rare PLM's  Periodic Limb Activity - There were 12 PLMs during the entire study. The PLM index was 1.7 movements per hour. The PLM Arousal Index was 0.3 per hour.  REM EMG Activity - Excessive transient/sustained muscle activity was not present.  Nocturnal Behavior - Abnormal sleep related behaviors were not noted during/arising out of NREM / REM sleep  Bruxism - None apparent.    Cardiac Summary: Appears NSR  The average pulse rate was 67.4 bpm. The minimum pulse rate was 56.0 bpm while the maximum pulse rate was 88.0 bpm.      Assessment:   Decreased sleep onset latency and delayed REM latency, supine REM was observed.  Increased arousal index.  Mild to moderate MANUEL (AHI 5.9, RDI 25.1) without sleep-associated hypoxemia.  Rare PLM's.    Recommendations:  Consider repeat polysomnography with full night titration of positive airway pressure therapy for the control of sleep disordered breathing.  Based on the presence of mild/moderate obstructive sleep apnea and excessive daytime sleepiness, treatment could be empirically initiated with Auto?titrating PAP therapy with a range  of 5 to 15 cmH2O. Recommend clinical follow up with sleep management team.  Patient may be a candidate for dental appliance through referral to Sleep Dentistry for the treatment of obstructive sleep apnea and/or socially disruptive snoring.  If devices are not acceptable or effective, patient may benefit from evaluation of possible surgical options. If she is interested, would recommend referral to specialized ENT-Sleep provider.  Advice regarding the risks of drowsy driving.  Suggest optimizing sleep schedule and avoiding sleep deprivation.  Pharmacologic therapy should be used for management of restless legs syndrome only if present and clinically indicated and not based on the presence of periodic limb movements alone.    Diagnostic Codes:   Obstructive Sleep Apnea G47.33     _____________________________________   Electronically Signed By: Fidel Cisneros MD (10/1/2023)

## 2023-10-04 NOTE — PROGRESS NOTES
"Kimberley Louis is a 43 year old female who is being evaluated via a billable video visit.       The patient has been notified of following:      \"This video visit will be conducted via a call between you and your physician/provider. We have found that certain health care needs can be provided without the need for an in-person physical exam.  This service lets us provide the care you need with a video conversation.  If a prescription is necessary we can send it directly to your pharmacy.  If lab work is needed we can place an order for that and you can then stop by our lab to have the test done at a later time.     Video visits are billed at different rates depending on your insurance coverage.  Please reach out to your insurance provider with any questions.     If during the course of the call the physician/provider feels a video visit is not appropriate, you will not be charged for this service.\"     Patient has given verbal consent for Video visit? Yes  How would you like to obtain your AVS? Mail a copy  If you are dropped from the video visit, the video invite should be resent to: Text to cell phone: -  Will anyone else be joining your video visit? No  If patient encounters technical issues they should call [phone]      Video-Visit Details     Type of service:  Video Visit     Start Time:  2:30pm  End Time:  2:50pm    Originating Location (pt. Location): Home     Distant Location (provider location):  Off-site, [facility]       Platform used for Video Visit: [email or web address]    Virtual visit for review of sleep study results.     A/P:     1.)  Mild to moderate MANUEL (AHI 5.9, RDI 25.1) without sleep-associated hypoxemia     We discussed that this level of obstructive sleep apnea is not felt to have a significant increase in long-term cardiovascular risk factors, though treatment would presumably have benefit for reducing daytime sleepiness and in regards to mood disorders.    We discussed " "treatment option including CPAP, oral appliances (she did not tolerate a bite guard in the past), weight management.    We agreed to proceed with a combination of starting CPAP auto titrate 5-15 cm H2O and weight management.    Plan for follow-up 4 to 6 weeks after starting CPAP.    SUBJECTIVE:  Kimberley Louis is a 43 year old female.    43 year old yo female who is referred for sleep-disordered breathing.     Pertinent PMHx of HTN, dyspnea on exertion, tobacco use.     STOP-BANG score of 4-5, with unknown neck circumference.  [place] score of 17.  BMI of Estimated body mass index is 29.37 kg/m  as calculated from the following:    Height as of 4/19/23: 1.626 m (5' 4\").    Weight as of 7/14/23: 77.6 kg (171 lb 1.6 oz).      Today -we reviewed her sleep test results in detail.    Her primary concerns were daytime fatigue that have been worsening over the last 1-1.5 years with also some excessive daytime sleepiness.  This was initially attributed to worsening mood, but she feels her mood now has improved and is stable and her fatigue has continued.    She also notes a 2-3-year history of very loud and worsening snoring.    Family history of paternal grandmother and father with obstructive sleep apnea.  She has little knowledge of her mother's family history.    Per questionnaire: \"Frequent awakenings, snoring, daytime sleepiness \"     Caffeine use:  No for 3+ per day.  No for within 6 hours of bed.     Tobacco use: Yes     Sleep pattern:  Workdays.  9-10pm to 5:45am, total sleep time 6 hours.  Weekends.  10-11pm to 6-8am, total sleep time 6-7 hours.  Time to fall asleep: ~30-60 minutes.  Awakenings: 5-10 times per night, 10-20 minutes to return to sleep, awake for total of 1-2 hours per night.  Napping.  0 days per week, - hours per nap.     Yes for RLS screen.  No for sleep walking.  Yes for dream enactment behavior - Talking, yelling, thrashing though rare.  Yes for bruxism.     Yes for morning headaches.  Yes for " snoring.  Yes for observed apnea.  Yes for FHx of MANUEL.     SHx:  , working.    SLEEP STUDY INTERPRETATION  DIAGNOSTIC POLYSOMNOGRAPHY REPORT        Patient: LILLIE VELEZ  YOB: 1980  Study Date: 9/29/2023  MRN: [id number]  Referring Provider: LUCY SCHWAB [employer]  Ordering Provider: HASMUKH Cisneros MD     Indications for Polysomnography: The patient is a 43 year old Female who is 5'4  and weighs 171 lbs. Her BMI is 29.4, [place] sleepiness scale 17 and neck circumference is - cm. Relevant medical history includes HTN, dyspnea on exertion, tobacco use. A diagnostic polysomnogram was performed to evaluate for sleep apnea.     Polysomnogram Data: A full night polysomnogram recorded the standard physiologic parameters including EEG, EOG, EMG, ECG, nasal and oral airflow. Respiratory parameters of chest and abdominal movements were recorded with respiratory inductance plethysmography. Oxygen saturation was recorded by pulse oximetry. Hypopnea scoring rule used: 1B 4%.     Sleep Architecture: Decreased sleep onset latency and delayed REM latency, supine REM was observed.  Increased arousal index.  The total recording time of the polysomnogram was 471.5 minutes. The total sleep time was 415.5 minutes. Sleep latency was mildly decreased at 9.8 minutes without the use of a sleep aid. REM latency was 176.5 minutes. Arousal index was increased at 29.2 arousals per hour. Sleep efficiency was normal at 88.1%. Wake after sleep onset was 46.5 minutes. The patient spent 14.4% of total sleep time in Stage N1, 45.6% in Stage N2, 19.4% in Stage N3, and 20.6% in REM. Time in REM supine was 64.5 minutes.     Respiration: Mild to moderate MANUEL (AHI 5.9, RDI 25.1) without sleep-associated hypoxemia.  Events ? The polysomnogram revealed a presence of 17 obstructive, 4 central, and - mixed apneas resulting in an apnea index of 3.0 events per hour. There were 20 obstructive hypopneas and - central hypopneas resulting in an  obstructive hypopnea index of 2.9 and central hypopnea index of - events per hour. The combined apnea/hypopnea index was 5.9 events per hour (central apnea/hypopnea index was 0.6 events per hour). The REM AHI was 21.8 events per hour. The supine AHI was 6.3 events per hour. The RERA index was 19.2 events per hour.  The RDI was 25.1 events per hour.  Snoring - was reported as moderate.  Respiratory rate and pattern - was notable for normal respiratory rate and pattern.  Sustained Sleep Associated Hypoventilation - Transcutaneous carbon dioxide monitoring was not used, however significant hypoventilation was not suggested by oximetry.  Sleep Associated Hypoxemia - (Greater than 5 minutes O2 sat at or below 88%) was not present. Baseline oxygen saturation was 93.3%. Lowest oxygen saturation was 87.0%. Time spent less than or equal to 88% was 0.5 minutes. Time spent less than or equal to 89% was 1.8 minutes.     Movement Activity: Rare PLM's  Periodic Limb Activity - There were 12 PLMs during the entire study. The PLM index was 1.7 movements per hour. The PLM Arousal Index was 0.3 per hour.  REM EMG Activity - Excessive transient/sustained muscle activity was not present.  Nocturnal Behavior - Abnormal sleep related behaviors were not noted during/arising out of NREM / REM sleep  Bruxism - None apparent.     Cardiac Summary: Appears NSR  The average pulse rate was 67.4 bpm. The minimum pulse rate was 56.0 bpm while the maximum pulse rate was 88.0 bpm.       Assessment:   Decreased sleep onset latency and delayed REM latency, supine REM was observed.  Increased arousal index.  Mild to moderate MANUEL (AHI 5.9, RDI 25.1) without sleep-associated hypoxemia.  Rare PLM's.     Recommendations:  Consider repeat polysomnography with full night titration of positive airway pressure therapy for the control of sleep disordered breathing.  Based on the presence of mild/moderate obstructive sleep apnea and excessive daytime sleepiness,  treatment could be empirically initiated with Auto?titrating PAP therapy with a range of 5 to 15 cmH2O. Recommend clinical follow up with sleep management team.  Patient may be a candidate for dental appliance through referral to Sleep Dentistry for the treatment of obstructive sleep apnea and/or socially disruptive snoring.  If devices are not acceptable or effective, patient may benefit from evaluation of possible surgical options. If she is interested, would recommend referral to specialized ENT-Sleep provider.  Advice regarding the risks of drowsy driving.  Suggest optimizing sleep schedule and avoiding sleep deprivation.  Pharmacologic therapy should be used for management of restless legs syndrome only if present and clinically indicated and not based on the presence of periodic limb movements alone.     Diagnostic Codes:   Obstructive Sleep Apnea G47.33     _____________________________________   Electronically Signed By: Fidel Cisneros MD (10/1/2023)       Past medical history:    Patient Active Problem List    Diagnosis Date Noted    S/P laparoscopic supracervical hysterectomy 05/05/2022     Priority: Medium     Bilateral salp      Dysmenorrhea 05/04/2022     Priority: Medium    Menorrhagia 05/04/2022     Priority: Medium    Nephrolithiasis 07/21/2020     Priority: Medium     Added automatically from request for surgery [id number]      Bacterial vaginosis 06/09/2020     Priority: Medium    Left flank tenderness 08/03/2018     Priority: Medium    Personal history of tobacco use, presenting hazards to health 08/03/2018     Priority: Medium    Recurrent UTI 08/03/2018     Priority: Medium    Incomplete right bundle branch block 02/05/2018     Priority: Medium    Pure hypercholesterolemia 01/03/2018     Priority: Medium    Tobacco abuse counseling 01/03/2018     Priority: Medium    Tobacco abuse 01/03/2018     Priority: Medium    Palpitations 01/03/2018     Priority: Medium    Atypical chest pain 01/03/2018      Priority: Medium    Dyspnea on exertion 01/03/2018     Priority: Medium    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  Kimberley Louis has no plan or code status on file. Discussed available resources and provided with information. Confirmed code status reflects current choices pending further ACP discussions.  Confirmed/documented legally designated decision makers.  Added by SAYDA CABRERA            ACP (advance care planning) 04/13/2016     Priority: Medium     Advance Care Planning 4/13/2016: ACP Review of Chart / Resources Provided:  Reviewed chart for advance care plan.  Kimberley Louis has no plan or code status on file. Discussed available resources and provided with information. Confirmed code status reflects current choices pending further ACP discussions.  Confirmed/documented legally designated decision makers.  Added by Sandra Anne            Kidney stone on left side 12/21/2015     Priority: Medium    Nephrocalcinosis 12/21/2015     Priority: Medium    Essential hypertension 11/19/2015     Priority: Medium    H/O renal calculi 11/19/2015     Priority: Medium    Enlarged kidney 11/19/2015     Priority: Medium       10 point ROS of systems including Constitutional, Eyes, Respiratory, Cardiovascular, Gastroenterology, Genitourinary, Integumentary, Muscularskeletal, Psychiatric were all negative except for pertinent positives noted in my HPI.    Current Outpatient Medications   Medication Sig Dispense Refill    albuterol (PROAIR HFA) 108 (90 Base) MCG/ACT inhaler Inhale 2 puffs into the lungs every 6 hours 8.5 g 2    amLODIPine (NORVASC) 10 MG tablet Take 1 tablet by mouth once daily 90 tablet 0    azelastine (ASTELIN) 0.1 % nasal spray Spray 2 sprays into both  nostrils 2 times daily (Patient not taking: Reported on 7/14/2023) 30 mL 11    budesonide (PULMICORT) 0.5 MG/2ML neb solution Make 240 cc Severiano med sinus irrigation Mix 2 ml vial of budesonide 0.5 mg Rinse 1-2 times daily (Patient not taking: Reported on 7/14/2023) 120 mL 1    cyclobenzaprine (FLEXERIL) 10 MG tablet Take 1 tablet (10 mg) by mouth 3 times daily as needed for muscle spasms 20 tablet 0    hydrOXYzine (ATARAX) 25 MG tablet Take 1 tablet (25 mg) by mouth 3 times daily as needed for anxiety 60 tablet 1    metoprolol tartrate (LOPRESSOR) 25 MG tablet Take 1 tablet by mouth twice daily 180 tablet 3    mometasone (ELOCON) 0.1 % external cream Apply topically daily 50 g 0    montelukast (SINGULAIR) 10 MG tablet Take 1 tablet (10 mg) by mouth At Bedtime (Patient not taking: Reported on 7/14/2023) 30 tablet 11    venlafaxine (EFFEXOR XR) 150 MG 24 hr capsule Take 1 capsule (150 mg) by mouth daily for 90 days 90 capsule 0       OBJECTIVE:  LMP 04/20/2022 (Exact Date)     Physical Exam     ---  This note was written with the assistance of the Dragon voice-dictation technology software. The final document, although reviewed, may contain errors. For corrections, please contact the office.    Total time spent preparing to see the patient, review of chart, obtaining history and physical examination, review of sleep testing, review of treatment options, education, discussion with patient and documenting in Epic / EMR was 25 minutes.  All time involved was spent on the day of service for the patient (the same day as the patient's appointment).    Fidel Cisneros MD    Sleep Medicine  [place], [place]  Main Office: [phone]  [facility] [place], [place]  [address] [facility], [address]  Schedule visits: [phone]  Main Office: [phone]  Fax: [phone]

## 2023-10-05 ENCOUNTER — VIRTUAL VISIT (OUTPATIENT)
Dept: PULMONOLOGY | Facility: OTHER | Age: 43
End: 2023-10-05
Attending: FAMILY MEDICINE
Payer: COMMERCIAL

## 2023-10-05 DIAGNOSIS — F39 MOOD DISORDER (H): ICD-10-CM

## 2023-10-05 DIAGNOSIS — G47.10 HYPERSOMNIA: ICD-10-CM

## 2023-10-05 DIAGNOSIS — G47.33 OSA (OBSTRUCTIVE SLEEP APNEA): Primary | ICD-10-CM

## 2023-10-05 PROCEDURE — 99213 OFFICE O/P EST LOW 20 MIN: CPT | Mod: 95 | Performed by: FAMILY MEDICINE

## 2023-10-05 NOTE — NURSING NOTE
Is the patient currently in the state of [place]? YES    Visit mode:VIDEO    If the visit is dropped, the patient can be reconnected by: VIDEO VISIT: Text to cell phone:   Telephone Information:   Mobile [phone]       Will anyone else be joining the visit? NO  (If patient encounters technical issues they should call [phone])    How would you like to obtain your AVS? MyChart    Are changes needed to the allergy or medication list? No    Reason for visit: RECHECK    Debra KINGSTON    Has patient had flu shot for current/most recent flu season? If so, when? No

## 2023-10-05 NOTE — PROGRESS NOTES
"Virtual Visit Details    Type of service:  Video Visit   Video Start Time: {video visit start/end time for provider to select:138146}  Video End Time:{video visit start/end time for provider to select:746036}    Originating Location (pt. Location): {video visit patient location:489508::\"Home\"}  {PROVIDER LOCATION On-site should be selected for visits conducted from your clinic location or adjoining [facility] hospital, academic office, or other nearby [facility] building. Off-site should be selected for all other provider locations, including home:727819}  Distant Location (provider location):  {virtual location provider:396494}  Platform used for Video Visit: {Virtual Visit Platforms:394492::\"ClassLink\"}    "

## 2023-10-17 ENCOUNTER — MYC REFILL (OUTPATIENT)
Dept: FAMILY MEDICINE | Facility: OTHER | Age: 43
End: 2023-10-17

## 2023-10-17 DIAGNOSIS — I10 ESSENTIAL HYPERTENSION: ICD-10-CM

## 2023-10-17 DIAGNOSIS — R07.89 ATYPICAL CHEST PAIN: ICD-10-CM

## 2023-10-17 DIAGNOSIS — F34.1 DYSTHYMIC DISORDER: ICD-10-CM

## 2023-10-19 RX ORDER — AMLODIPINE BESYLATE 10 MG/1
10 TABLET ORAL DAILY
Qty: 90 TABLET | Refills: 0 | OUTPATIENT
Start: 2023-10-19

## 2023-10-19 RX ORDER — VENLAFAXINE HYDROCHLORIDE 150 MG/1
150 CAPSULE, EXTENDED RELEASE ORAL DAILY
Qty: 90 CAPSULE | Refills: 0 | OUTPATIENT
Start: 2023-10-19

## 2023-10-19 NOTE — TELEPHONE ENCOUNTER
Effexor       Last Written Prescription Date:  10/18/2023  Last Fill Quantity: 90,   # refills: 0  Last Office Visit: 07/05/2023    Norvasc       Last Written Prescription Date:  10/18/2023  Last Fill Quantity: 90,   # refills: 0  Last Office Visit: 07/05/2023  Future Office visit:    Next 5 appointments (look out 90 days)      Nov 01, 2023  2:15 PM  (Arrive by 2:00 PM)  SHORT with ZAHEER Umanzor CNP  [facility] ([facility] ) [address]  [facility] [place] [place] [address]  [phone]

## 2023-10-31 ENCOUNTER — DOCUMENTATION ONLY (OUTPATIENT)
Dept: HOME HEALTH SERVICES | Facility: CLINIC | Age: 43
End: 2023-10-31

## 2023-10-31 NOTE — PROGRESS NOTES
Patient was offered choice of vendor and chose [facility].  Patient Kimberley Louis was set up at [facility] [place] on October 31, 2023. Patient received a Resmed Airsense 10 Pressures were set at  5-15 cm H2O.   Patient s ramp is Off and FLEX/EPR is EPR, 2.  Patient received a Resmed Mask name: Airfit N20 for Her Nasal mask size Small, heated tubing and heated humidifier.  Patient has the following compliance requirements: usage only  Patient has a follow up on 12/04/2023 with Dr. Cisneros.    Ash Gastelum

## 2023-11-01 ENCOUNTER — OFFICE VISIT (OUTPATIENT)
Dept: FAMILY MEDICINE | Facility: OTHER | Age: 43
End: 2023-11-01
Attending: NURSE PRACTITIONER
Payer: COMMERCIAL

## 2023-11-01 VITALS
TEMPERATURE: 98.5 F | OXYGEN SATURATION: 95 % | BODY MASS INDEX: 30.61 KG/M2 | WEIGHT: 178.3 LBS | DIASTOLIC BLOOD PRESSURE: 89 MMHG | HEART RATE: 72 BPM | SYSTOLIC BLOOD PRESSURE: 135 MMHG

## 2023-11-01 DIAGNOSIS — F34.1 DYSTHYMIC DISORDER: ICD-10-CM

## 2023-11-01 DIAGNOSIS — I10 ESSENTIAL HYPERTENSION: ICD-10-CM

## 2023-11-01 DIAGNOSIS — M25.50 MULTIPLE JOINT PAIN: ICD-10-CM

## 2023-11-01 DIAGNOSIS — R73.09 ELEVATED GLUCOSE: ICD-10-CM

## 2023-11-01 DIAGNOSIS — F41.1 GAD (GENERALIZED ANXIETY DISORDER): Primary | ICD-10-CM

## 2023-11-01 DIAGNOSIS — E55.9 VITAMIN D DEFICIENCY: ICD-10-CM

## 2023-11-01 LAB
ERYTHROCYTE [DISTWIDTH] IN BLOOD BY AUTOMATED COUNT: 12.8 % (ref 10–15)
HCT VFR BLD AUTO: 43.7 % (ref 35–47)
HGB BLD-MCNC: 14.6 G/DL (ref 11.7–15.7)
MCH RBC QN AUTO: 30.8 PG (ref 26.5–33)
MCHC RBC AUTO-ENTMCNC: 33.4 G/DL (ref 31.5–36.5)
MCV RBC AUTO: 92 FL (ref 78–100)
PLATELET # BLD AUTO: 465 10E3/UL (ref 150–450)
RBC # BLD AUTO: 4.74 10E6/UL (ref 3.8–5.2)
WBC # BLD AUTO: 11.8 10E3/UL (ref 4–11)

## 2023-11-01 PROCEDURE — 36415 COLL VENOUS BLD VENIPUNCTURE: CPT | Mod: ZL | Performed by: NURSE PRACTITIONER

## 2023-11-01 PROCEDURE — 84550 ASSAY OF BLOOD/URIC ACID: CPT | Mod: ZL | Performed by: NURSE PRACTITIONER

## 2023-11-01 PROCEDURE — 87207 SMEAR SPECIAL STAIN: CPT | Mod: ZL | Performed by: NURSE PRACTITIONER

## 2023-11-01 PROCEDURE — 82306 VITAMIN D 25 HYDROXY: CPT | Mod: ZL | Performed by: NURSE PRACTITIONER

## 2023-11-01 PROCEDURE — 86140 C-REACTIVE PROTEIN: CPT | Mod: ZL | Performed by: NURSE PRACTITIONER

## 2023-11-01 PROCEDURE — 86038 ANTINUCLEAR ANTIBODIES: CPT | Mod: ZL | Performed by: NURSE PRACTITIONER

## 2023-11-01 PROCEDURE — 87015 SPECIMEN INFECT AGNT CONCNTJ: CPT | Mod: ZL | Performed by: NURSE PRACTITIONER

## 2023-11-01 PROCEDURE — 99214 OFFICE O/P EST MOD 30 MIN: CPT | Performed by: NURSE PRACTITIONER

## 2023-11-01 PROCEDURE — 84132 ASSAY OF SERUM POTASSIUM: CPT | Mod: ZL | Performed by: NURSE PRACTITIONER

## 2023-11-01 PROCEDURE — 86431 RHEUMATOID FACTOR QUANT: CPT | Mod: ZL | Performed by: NURSE PRACTITIONER

## 2023-11-01 PROCEDURE — 86618 LYME DISEASE ANTIBODY: CPT | Mod: ZL | Performed by: NURSE PRACTITIONER

## 2023-11-01 PROCEDURE — 85027 COMPLETE CBC AUTOMATED: CPT | Mod: ZL | Performed by: NURSE PRACTITIONER

## 2023-11-01 PROCEDURE — 83036 HEMOGLOBIN GLYCOSYLATED A1C: CPT | Mod: ZL | Performed by: NURSE PRACTITIONER

## 2023-11-01 PROCEDURE — G0463 HOSPITAL OUTPT CLINIC VISIT: HCPCS

## 2023-11-01 RX ORDER — VENLAFAXINE HYDROCHLORIDE 37.5 MG/1
37.5 CAPSULE, EXTENDED RELEASE ORAL DAILY
Qty: 90 CAPSULE | Refills: 0 | Status: SHIPPED | OUTPATIENT
Start: 2023-11-01 | End: 2024-01-29

## 2023-11-01 RX ORDER — VENLAFAXINE HYDROCHLORIDE 150 MG/1
CAPSULE, EXTENDED RELEASE ORAL
Qty: 90 CAPSULE | Refills: 0 | Status: SHIPPED | OUTPATIENT
Start: 2023-11-01 | End: 2024-04-23

## 2023-11-01 RX ORDER — BUSPIRONE HYDROCHLORIDE 5 MG/1
5 TABLET ORAL 3 TIMES DAILY PRN
Qty: 30 TABLET | Refills: 1 | Status: SHIPPED | OUTPATIENT
Start: 2023-11-01

## 2023-11-01 RX ORDER — AMLODIPINE BESYLATE 10 MG/1
10 TABLET ORAL DAILY
Qty: 90 TABLET | Refills: 0 | Status: SHIPPED | OUTPATIENT
Start: 2023-11-01 | End: 2024-04-15

## 2023-11-01 ASSESSMENT — ANXIETY QUESTIONNAIRES
GAD7 TOTAL SCORE: 6
5. BEING SO RESTLESS THAT IT IS HARD TO SIT STILL: NOT AT ALL
IF YOU CHECKED OFF ANY PROBLEMS ON THIS QUESTIONNAIRE, HOW DIFFICULT HAVE THESE PROBLEMS MADE IT FOR YOU TO DO YOUR WORK, TAKE CARE OF THINGS AT HOME, OR GET ALONG WITH OTHER PEOPLE: SOMEWHAT DIFFICULT
4. TROUBLE RELAXING: MORE THAN HALF THE DAYS
6. BECOMING EASILY ANNOYED OR IRRITABLE: SEVERAL DAYS
3. WORRYING TOO MUCH ABOUT DIFFERENT THINGS: SEVERAL DAYS
1. FEELING NERVOUS, ANXIOUS, OR ON EDGE: SEVERAL DAYS
GAD7 TOTAL SCORE: 6
7. FEELING AFRAID AS IF SOMETHING AWFUL MIGHT HAPPEN: NOT AT ALL
2. NOT BEING ABLE TO STOP OR CONTROL WORRYING: SEVERAL DAYS

## 2023-11-01 ASSESSMENT — PATIENT HEALTH QUESTIONNAIRE - PHQ9
SUM OF ALL RESPONSES TO PHQ QUESTIONS 1-9: 10
10. IF YOU CHECKED OFF ANY PROBLEMS, HOW DIFFICULT HAVE THESE PROBLEMS MADE IT FOR YOU TO DO YOUR WORK, TAKE CARE OF THINGS AT HOME, OR GET ALONG WITH OTHER PEOPLE: SOMEWHAT DIFFICULT
SUM OF ALL RESPONSES TO PHQ QUESTIONS 1-9: 10

## 2023-11-01 ASSESSMENT — PAIN SCALES - GENERAL: PAINLEVEL: NO PAIN (0)

## 2023-11-01 NOTE — PROGRESS NOTES
"  Assessment & Plan     Kimberley is having a tough day today. Her relationship with her significant other of the past 8 years has been rough. They have been  and stilling hanging out at times. Last night he wasn't responding to her which is unusual. This am she tried to contact him and he wasn't returning her call so she went to his house. By the looks of his house he had a party the night before and had shared his bed with a women. He denied sleeping with another women or having sexual relations. She is emotional today and hurt. \"It's a rough day today.\" She knows that she just needs to be done with the relationship and move on. Effexor adjustment today and Buspar added. Follow up in 6 weeks or sooner. She does have support of friends that she can relay on to help her get through. She does go to therapy at [facility]. She will reach out for another scheduled appointment with [facility].     (F41.1) FAUZIA (generalized anxiety disorder)  (primary encounter diagnosis)  Comment: Add Buspar for increased anxiety that is situational   Plan: busPIRone (BUSPAR) 5 MG tablet            (F34.1) Dysthymic disorder  Comment: Worsening of symptoms and is situational. Increase Effexor to 187.5 mg daily   Plan: venlafaxine (EFFEXOR XR) 150 MG 24 hr capsule,         venlafaxine (EFFEXOR XR) 37.5 MG 24 hr capsule,        busPIRone (BUSPAR) 5 MG tablet            (I10) Essential hypertension  Comment: Stable. RF   Plan: amLODIPine (NORVASC) 10 MG tablet            (M25.50) Multiple joint pain  Comment: check labs   Plan: Rheumatoid factor, Anti Nuclear Heather IgG by IFA         with Reflex, Uric acid, Parasite stain, Lyme         Disease Total Abs Bld with Reflex to Confirm         CLIA, Comprehensive metabolic panel, CBC with         platelets, CRP, inflammation            (E55.9) Vitamin D deficiency  Comment: check lab   Plan: Vitamin D Deficiency             BMI:   Estimated body mass index is 30.61 kg/m  as " "calculated from the following:    Height as of 4/19/23: 1.626 m (5' 4\").    Weight as of this encounter: 80.9 kg (178 lb 4.8 oz).   Weight management plan: Discussed healthy diet and exercise guidelines    Depression Screening Follow Up        11/1/2023     1:54 PM   PHQ   PHQ-9 Total Score 10   Q9: Thoughts of better off dead/self-harm past 2 weeks Not at all         11/1/2023     1:54 PM   Last PHQ-9   1.  Little interest or pleasure in doing things 1   2.  Feeling down, depressed, or hopeless 1   3.  Trouble falling or staying asleep, or sleeping too much 2   4.  Feeling tired or having little energy 3   5.  Poor appetite or overeating 1   6.  Feeling bad about yourself 1   7.  Trouble concentrating 1   8.  Moving slowly or restless 0   Q9: Thoughts of better off dead/self-harm past 2 weeks 0   PHQ-9 Total Score 10     Denies SI/HI thoughts.     Follow Up Actions Taken       See Patient Instructions    Return in about 6 weeks (around 12/13/2023) for Medication follow up .    ZAHEER Umanzor [facility]    Chris Chu is a 43 year old, presenting for the following health issues:    Recheck Medication        11/1/2023     2:07 PM   Additional Questions   Roomed by Reji Cao   Accompanied by None         11/1/2023     2:07 PM   Patient Reported Additional Medications   Patient reports taking the following new medications None       HPI     Medication Followup of Effexor   Taking Medication as prescribed: yes  Side Effects:  Fatigue   Medication Helping Symptoms:  Patient states that she does not feel any different than before she was on the medication    Multiple joint pain in her hands, hips, knees, and ankles. Denies tick bites. Symptoms started to worsen in the past couple months.     Review of Systems   Constitutional, HEENT, cardiovascular, pulmonary, GI, , musculoskeletal, neuro, skin, endocrine and psych systems are negative, except as otherwise noted.   "    Objective    /89 (BP Location: Right arm, Patient Position: Sitting, Cuff Size: Adult Large)   Pulse 72   Temp 98.5  F (36.9  C) (Tympanic)   Wt 80.9 kg (178 lb 4.8 oz)   LMP 04/20/2022 (Exact Date)   SpO2 95%   BMI 30.61 kg/m    Body mass index is 30.61 kg/m .  Physical Exam   GENERAL: healthy, alert and no distress  NECK: no adenopathy, no asymmetry, masses, or scars and thyroid normal to palpation  RESP: lungs clear to auscultation - no rales, rhonchi or wheezes  CV: regular rate and rhythm, normal S1 S2, no S3 or S4, no murmur, click or rub, no peripheral edema and peripheral pulses strong  MS: no gross musculoskeletal defects noted, no edema  SKIN: no suspicious lesions or rashes  PSYCH: mentation appears normal, tearful and emotional         Answers submitted by the patient for this visit:  Patient Health Questionnaire (Submitted on 11/1/2023)  If you checked off any problems, how difficult have these problems made it for you to do your work, take care of things at home, or get along with other people?: Somewhat difficult  PHQ9 TOTAL SCORE: 10  FAUZIA-7 (Submitted on 11/1/2023)  FAUZIA 7 TOTAL SCORE: 6

## 2023-11-02 LAB
ALBUMIN SERPL BCG-MCNC: 4.7 G/DL (ref 3.5–5.2)
ALP SERPL-CCNC: 85 U/L (ref 35–104)
ALT SERPL W P-5'-P-CCNC: 16 U/L (ref 0–50)
ANION GAP SERPL CALCULATED.3IONS-SCNC: 14 MMOL/L (ref 7–15)
AST SERPL W P-5'-P-CCNC: 15 U/L (ref 0–45)
BILIRUB SERPL-MCNC: 0.3 MG/DL
BUN SERPL-MCNC: 10.1 MG/DL (ref 6–20)
CALCIUM SERPL-MCNC: 9.9 MG/DL (ref 8.6–10)
CHLORIDE SERPL-SCNC: 101 MMOL/L (ref 98–107)
CREAT SERPL-MCNC: 0.51 MG/DL (ref 0.51–0.95)
CRP SERPL-MCNC: 3.9 MG/L
DEPRECATED HCO3 PLAS-SCNC: 23 MMOL/L (ref 22–29)
EGFRCR SERPLBLD CKD-EPI 2021: >90 ML/MIN/1.73M2
GLUCOSE SERPL-MCNC: 146 MG/DL (ref 70–99)
POTASSIUM SERPL-SCNC: 3.6 MMOL/L (ref 3.4–5.3)
PROT SERPL-MCNC: 7.6 G/DL (ref 6.4–8.3)
SODIUM SERPL-SCNC: 138 MMOL/L (ref 135–145)
URATE SERPL-MCNC: 4 MG/DL (ref 2.4–5.7)
VIT D+METAB SERPL-MCNC: 33 NG/ML (ref 20–50)

## 2023-11-03 LAB
ANA SER QL IF: NEGATIVE
ANAPLASMA BLD MOD GIEMSA: NEGATIVE
B BURGDOR IGG+IGM SER QL: 0.1
B MICROTI BLD SMEAR: NEGATIVE
EHRLICHIA SPEC QL MICRO: NEGATIVE
EST. AVERAGE GLUCOSE BLD GHB EST-MCNC: 114 MG/DL
HBA1C MFR BLD: 5.6 %
RHEUMATOID FACT SER NEPH-ACNC: <6 IU/ML

## 2023-11-27 ENCOUNTER — HOSPITAL ENCOUNTER (EMERGENCY)
Facility: HOSPITAL | Age: 43
Discharge: HOME | End: 2023-11-27
Attending: NURSE PRACTITIONER | Admitting: NURSE PRACTITIONER
Payer: COMMERCIAL

## 2023-11-27 VITALS
WEIGHT: 170 LBS | SYSTOLIC BLOOD PRESSURE: 127 MMHG | RESPIRATION RATE: 18 BRPM | TEMPERATURE: 98.5 F | OXYGEN SATURATION: 96 % | HEART RATE: 76 BPM | BODY MASS INDEX: 29.18 KG/M2 | DIASTOLIC BLOOD PRESSURE: 82 MMHG

## 2023-11-27 DIAGNOSIS — J45.901 ASTHMA EXACERBATION: ICD-10-CM

## 2023-11-27 DIAGNOSIS — J06.9 VIRAL URI WITH COUGH: Primary | ICD-10-CM

## 2023-11-27 DIAGNOSIS — J45.901 EXACERBATION OF ASTHMA, UNSPECIFIED ASTHMA SEVERITY, UNSPECIFIED WHETHER PERSISTENT: ICD-10-CM

## 2023-11-27 LAB
FLUAV RNA SPEC QL NAA+PROBE: NEGATIVE
FLUBV RNA RESP QL NAA+PROBE: NEGATIVE
GROUP A STREP BY PCR: NOT DETECTED
RSV RNA SPEC NAA+PROBE: NEGATIVE
SARS-COV-2 RNA RESP QL NAA+PROBE: NEGATIVE

## 2023-11-27 PROCEDURE — 87637 SARSCOV2&INF A&B&RSV AMP PRB: CPT | Performed by: NURSE PRACTITIONER

## 2023-11-27 PROCEDURE — 87651 STREP A DNA AMP PROBE: CPT | Performed by: NURSE PRACTITIONER

## 2023-11-27 PROCEDURE — 99213 OFFICE O/P EST LOW 20 MIN: CPT | Performed by: NURSE PRACTITIONER

## 2023-11-27 PROCEDURE — G0463 HOSPITAL OUTPT CLINIC VISIT: HCPCS

## 2023-11-27 PROCEDURE — C9803 HOPD COVID-19 SPEC COLLECT: HCPCS

## 2023-11-27 RX ORDER — PREDNISONE 20 MG/1
TABLET ORAL
Qty: 10 TABLET | Refills: 0 | Status: SHIPPED | OUTPATIENT
Start: 2023-11-27 | End: 2023-12-20

## 2023-11-27 ASSESSMENT — ENCOUNTER SYMPTOMS
RHINORRHEA: 1
VOICE CHANGE: 0
SHORTNESS OF BREATH: 1
CHEST TIGHTNESS: 1
SORE THROAT: 1
COUGH: 1
FEVER: 1
TROUBLE SWALLOWING: 0

## 2023-11-27 NOTE — ED TRIAGE NOTES
Pt presents today with c/o sinus congestion, cough, fever, and sore throat.     Started Friday.

## 2023-11-27 NOTE — DISCHARGE INSTRUCTIONS
Take prednisone as prescribed.     Continue using your inhaler, taking mucinex and tylenol/ibuprofen.  Drink plenty of fluids.     We will notify you of your results when available.     Follow up with your doctor if no improvement in symptoms.    Return to emergency department for worsening or concerning symptoms.

## 2023-11-27 NOTE — ED PROVIDER NOTES
History     Chief Complaint   Patient presents with    Fever    Cough    Pharyngitis     HPI  Kimberley Louis is a 43 year old female who presents to urgent care for evaluation of URI symptoms that started 3 days ago.  Patient reports cough, chest tightness, shortness of breath, low-grade fever, nasal congestion and sore throat.  History of asthma and notes has had increased use of her albuterol inhaler.  Also notes symptoms are similar to when she has an asthma flareup which she notes sometimes ends up turning into bronchitis.  Current tobacco use.  She has also tried Mucinex, Tylenol/ibuprofen.  No known recent ill contacts.     Allergies:  No Known Allergies    Problem List:    Patient Active Problem List    Diagnosis Date Noted    MANUEL (obstructive sleep apnea) 10/05/2023     Priority: Medium    S/P laparoscopic supracervical hysterectomy 05/05/2022     Priority: Medium     Bilateral salp      Dysmenorrhea 05/04/2022     Priority: Medium    Menorrhagia 05/04/2022     Priority: Medium    Nephrolithiasis 07/21/2020     Priority: Medium     Added automatically from request for surgery [id number]      Bacterial vaginosis 06/09/2020     Priority: Medium    Left flank tenderness 08/03/2018     Priority: Medium    Personal history of tobacco use, presenting hazards to health 08/03/2018     Priority: Medium    Recurrent UTI 08/03/2018     Priority: Medium    Incomplete right bundle branch block 02/05/2018     Priority: Medium    Pure hypercholesterolemia 01/03/2018     Priority: Medium    Tobacco abuse counseling 01/03/2018     Priority: Medium    Tobacco abuse 01/03/2018     Priority: Medium    Palpitations 01/03/2018     Priority: Medium    Atypical chest pain 01/03/2018     Priority: Medium    Dyspnea on exertion 01/03/2018     Priority: Medium    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  Kimberley Louis has no plan or code status on file. Discussed available resources and provided with information. Confirmed code status reflects current choices pending further ACP discussions.  Confirmed/documented legally designated decision makers.  Added by SAYDA CABRERA            ACP (advance care planning) 04/13/2016     Priority: Medium     Advance Care Planning 4/13/2016: ACP Review of Chart / Resources Provided:  Reviewed chart for advance care plan.  Kimberley Louis has no plan or code status on file. Discussed available resources and provided with information. Confirmed code status reflects current choices pending further ACP discussions.  Confirmed/documented legally designated decision makers.  Added by Sandra Anne            Kidney stone on left side 12/21/2015     Priority: Medium    Nephrocalcinosis 12/21/2015     Priority: Medium    Essential hypertension 11/19/2015     Priority: Medium    H/O renal calculi 11/19/2015     Priority: Medium    Enlarged kidney 11/19/2015     Priority: Medium        Past Medical History:    Past Medical History:   Diagnosis Date    Cyst of breast     Depressive disorder     Enlarged kidney 11/19/2015    Essential hypertension 11/19/2015    H/O renal calculi 11/19/2015    H/O renal calculi 11/19/2015    History of blood transfusion     Uncomplicated asthma        Past Surgical History:    Past Surgical History:   Procedure Laterality Date    ABDOMEN SURGERY      AS REMOVAL OF KIDNEY STONE      AS REMOVAL OF KIDNEY STONE      COMBINED CYSTOSCOPY, RETROGRADES, URETEROSCOPY, LASER HOLMIUM LITHOTRIPSY URETER(S), INSERT STENT Left 08/17/2020    Procedure: CYSTOURETEROSCOPY, WITH RETROGRADE PYELOGRAM, HOLMIUM LASER LITHOTRIPSY OF URETERAL CALCULUS, interpretation of fluroscopy;  Surgeon: Sherry Mcpherson MD;  Location: [facility]    LAPAROSCOPIC HYSTERECTOMY  SUPRACERVICAL N/A 05/04/2022    Procedure: LAPAROSCOPIC SUPRACERVICAL HYSTERECTOMY;  Surgeon: Bunny Hyatt MD;  Location: [facility]    LAPAROSCOPIC SALPINGECTOMY Bilateral 05/04/2022    Procedure: BILATERAL SALPINGECTOMY, Lysis of Adhesions;  Surgeon: Bunny Hyatt MD;  Location: [facility] OR    TUBAL LIGATION      ZZC REMOVAL OF KIDNEY STONE         Family History:    Family History   Problem Relation Age of Onset    Diabetes Mother     Emphysema Mother     Cervical Cancer Mother     Throat cancer Mother     Depression Mother     Diabetes Father     Hypertension Father     Hypertension Maternal Grandmother     Diabetes Maternal Grandmother     Schizophrenia Maternal Grandmother     Depression Maternal Grandmother     Mental Illness Maternal Grandmother     Unknown/Adopted Maternal Grandfather     Lung Cancer Paternal Grandmother     Diabetes Paternal Grandmother     Unknown/Adopted Paternal Grandfather        Social History:  Marital Status:  Single [1]  Social History     Tobacco Use    Smoking status: Every Day     Packs/day: 1.00     Years: 28.00     Additional pack years: 0.00     Total pack years: 28.00     Types: Cigarettes     Start date: 1/1/1989    Smokeless tobacco: Never    Tobacco comments:     pt denied QP 9/22/20   Vaping Use    Vaping Use: Never used   Substance Use Topics    Alcohol use: Not Currently     Comment: occasional    Drug use: No        Medications:    albuterol (PROAIR HFA) 108 (90 Base) MCG/ACT inhaler  amLODIPine (NORVASC) 10 MG tablet  busPIRone (BUSPAR) 5 MG tablet  metoprolol tartrate (LOPRESSOR) 25 MG tablet  predniSONE (DELTASONE) 20 MG tablet  venlafaxine (EFFEXOR XR) 150 MG 24 hr capsule  venlafaxine (EFFEXOR XR) 37.5 MG 24 hr capsule  mometasone (ELOCON) 0.1 % external cream          Review of Systems   Constitutional:  Positive for fever (Low-grade).   HENT:  Positive for congestion, rhinorrhea and sore throat. Negative for trouble swallowing and voice change.    Respiratory:   Positive for cough, chest tightness and shortness of breath.    All other systems reviewed and are negative.      Physical Exam   BP: 127/82  Pulse: 76  Temp: 98.5  F (36.9  C)  Resp: 18  Weight: 77.1 kg (170 lb)  SpO2: 96 %      Physical Exam  Vitals and nursing note reviewed.   Constitutional:       General: She is not in acute distress.     Appearance: Normal appearance. She is well-developed. She is not ill-appearing or toxic-appearing.   HENT:      Head: Normocephalic and atraumatic.      Right Ear: Tympanic membrane and ear canal normal.      Left Ear: Tympanic membrane and ear canal normal.      Nose: Congestion and rhinorrhea present.      Mouth/Throat:      Mouth: Mucous membranes are moist.      Pharynx: No oropharyngeal exudate or posterior oropharyngeal erythema.      Tonsils: Tonsillar abscess present. No tonsillar exudate. 0 on the right. 0 on the left.   Eyes:      General: No scleral icterus.     Conjunctiva/sclera: Conjunctivae normal.   Cardiovascular:      Rate and Rhythm: Normal rate and regular rhythm.      Heart sounds: Normal heart sounds.   Pulmonary:      Effort: Pulmonary effort is normal. No respiratory distress.      Breath sounds: Normal breath sounds. No stridor. No wheezing, rhonchi or rales.   Abdominal:      General: Abdomen is flat.   Musculoskeletal:      Cervical back: Normal range of motion and neck supple.   Lymphadenopathy:      Cervical: No cervical adenopathy.   Skin:     General: Skin is warm and dry.      Capillary Refill: Capillary refill takes less than 2 seconds.      Coloration: Skin is not pale.   Neurological:      Mental Status: She is alert and oriented to person, place, and time.         ED Course                 Procedures         Results for orders placed or performed during the hospital encounter of 11/27/23 (from the past 24 hour(s))   Group A Streptococcus PCR Throat Swab    Specimen: Throat; Swab   Result Value Ref Range    Group A strep by PCR Not Detected  Not Detected    Narrative    The Xpert Xpress Strep A test, performed on the [employer], is a rapid, qualitative in vitro diagnostic test for the detection of Streptococcus pyogenes (Group A ß-hemolytic Streptococcus, Strep A) in throat swab specimens from patients with signs and symptoms of pharyngitis. The Xpert Xpress Strep A test can be used as an aid in the diagnosis of Group A Streptococcal pharyngitis. The assay is not intended to monitor treatment for Group A Streptococcus infections. The Xpert Xpress Strep A test utilizes an automated real-time polymerase chain reaction (PCR) to detect Streptococcus pyogenes DNA.       Medications - No data to display    Assessments & Plan (with Medical Decision Making)   43-year-old female that presented for evaluation of URI symptoms that started 3 days ago.  Heart rate and rhythm are regular.  Bilateral lung fields are clear to auscultation.  Patient talking in full sentences.  Respirations are nonlabored.  Oxygen saturation is 96% on room air.  No obvious tonsillar abscess.  No trismus.  She tested negative for strep throat.  COVID-19, influenza and RSV results were still pending at discharge.    Symptoms and findings are consistent with a viral upper respiratory infection.  This could be causing a mild asthma exacerbation.  Will treat patient with prednisone which she notes she has had in the past for similar symptoms.  Advised her to continue with albuterol inhaler, Mucinex, Tylenol and ibuprofen.  Push fluids.  Follow-up with primary doctor if no improvement in symptoms.  Return to urgent care or emergency room for any worsening or concerning symptoms.  I have reviewed the nursing notes.    I have reviewed the findings, diagnosis, plan and need for follow up with the patient.  This document was prepared using a combination of typing and voice generated software.  While every attempt was made for accuracy, spelling and grammatical errors may exist.          New Prescriptions    PREDNISONE (DELTASONE) 20 MG TABLET    Take two tablets (= 40mg) each day for 5 (five) days       Final diagnoses:   Viral URI with cough   Asthma exacerbation       11/27/2023   [facility]       Mpofu, Prudence, CNP  11/27/23 1029

## 2023-12-02 NOTE — PROGRESS NOTES
"Kimberley Louis is a 43 year old female who is being evaluated via a billable video visit.       The patient has been notified of following:      \"This video visit will be conducted via a call between you and your physician/provider. We have found that certain health care needs can be provided without the need for an in-person physical exam.  This service lets us provide the care you need with a video conversation.  If a prescription is necessary we can send it directly to your pharmacy.  If lab work is needed we can place an order for that and you can then stop by our lab to have the test done at a later time.     Video visits are billed at different rates depending on your insurance coverage.  Please reach out to your insurance provider with any questions.     If during the course of the call the physician/provider feels a video visit is not appropriate, you will not be charged for this service.\"     Patient has given verbal consent for Video visit? Yes  How would you like to obtain your AVS? Mail a copy  If you are dropped from the video visit, the video invite should be resent to: Text to cell phone: -  Will anyone else be joining your video visit? No  If patient encounters technical issues they should call [phone]      Video-Visit Details     Type of service:  Video Visit     Start Time:  1100  End Time:  1115    Originating Location (pt. Location): Home     Distant Location (provider location):  Off-site, [facility]       Platform used for Video Visit: [employer]    Virtual visit for CPAP compliance follow-up.     A/P:     1.)  Mild to moderate MANUEL (AHI 5.9, RDI 25.1) without sleep-associated hypoxemia      We discussed that this level of obstructive sleep apnea is not felt to have a significant increase in long-term cardiovascular risk factors, though treatment would presumably have benefit for reducing daytime sleepiness and in regards to mood disorders.     We discussed " "treatment option including CPAP, oral appliances (she did not tolerate a bite guard in the past), weight management.     We agreed to proceed with a combination of starting CPAP auto titrate 5-15 cm H2O and weight management.     Appears well controlled with improving though overall inadequate compliance on CPAP auto-titrate 6-15 cm H2O.    She notes definite daytime improvement in reduction of fatigue and improve sleep quality with use of CPAP.  The reason she cannot use her CPAP for period time was due to her dog chewing the tubing.  Overall she is happy that would like to continue.    Plan continue CPAP auto titrate 6-15 cm H2O and follow-up in 1 year, or sooner if questions or concerns.    SUBJECTIVE:  Kimberley Louis is a 43 year old female.    43 year old yo female who is referred for sleep-disordered breathing.      Pertinent PMHx of HTN, dyspnea on exertion, tobacco use.    10/5/2023 -we reviewed her sleep test results in detail.     Her primary concerns were daytime fatigue that have been worsening over the last 1-1.5 years with also some excessive daytime sleepiness.  This was initially attributed to worsening mood, but she feels her mood now has improved and is stable and her fatigue has continued.     She also notes a 2-3-year history of very loud and worsening snoring.     Family history of paternal grandmother and father with obstructive sleep apnea.  She has little knowledge of her mother's family history.     Per questionnaire: \"Frequent awakenings, snoring, daytime sleepiness \"    A/P to start CPAP auto 5-15, weight management.    Today -overall, she feels that the CPAP is working well and would like to continue usage.  She notes definite daytime improvement in reduction of fatigue and improve sleep quality with use of CPAP.  The reason she cannot use her CPAP for period time was due to her dog chewing the tubing.  Overall she is happy that would like to continue.    CPAP download reviewed over " past 30 days on auto-titrate 6-15 cm H2O.  Average daily usage 223 minutes.  AHI 1.6.  Average usage is decreased given a 7 day period of no usage, otherwise appears to be > 4 hours on most nights of usage.    Past medical history:    Patient Active Problem List    Diagnosis Date Noted    MANUEL (obstructive sleep apnea) 10/05/2023     Priority: Medium    S/P laparoscopic supracervical hysterectomy 05/05/2022     Priority: Medium     Bilateral salp      Dysmenorrhea 05/04/2022     Priority: Medium    Menorrhagia 05/04/2022     Priority: Medium    Nephrolithiasis 07/21/2020     Priority: Medium     Added automatically from request for surgery [id number]      Bacterial vaginosis 06/09/2020     Priority: Medium    Left flank tenderness 08/03/2018     Priority: Medium    Personal history of tobacco use, presenting hazards to health 08/03/2018     Priority: Medium    Recurrent UTI 08/03/2018     Priority: Medium    Incomplete right bundle branch block 02/05/2018     Priority: Medium    Pure hypercholesterolemia 01/03/2018     Priority: Medium    Tobacco abuse counseling 01/03/2018     Priority: Medium    Tobacco abuse 01/03/2018     Priority: Medium    Palpitations 01/03/2018     Priority: Medium    Atypical chest pain 01/03/2018     Priority: Medium    Dyspnea on exertion 01/03/2018     Priority: Medium    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  Kimberley CELAYA Missy has no plan or code status on file. Discussed available resources and provided with information. Confirmed code status reflects current choices pending further ACP discussions.  Confirmed/documented legally designated decision makers.  Added by SAYDA CABRERA            ACP (advance care planning) 04/13/2016     Priority:  Medium     Advance Care Planning 4/13/2016: ACP Review of Chart / Resources Provided:  Reviewed chart for advance care plan.  Kimberley Louis has no plan or code status on file. Discussed available resources and provided with information. Confirmed code status reflects current choices pending further ACP discussions.  Confirmed/documented legally designated decision makers.  Added by Sandra Anne            Kidney stone on left side 12/21/2015     Priority: Medium    Nephrocalcinosis 12/21/2015     Priority: Medium    Essential hypertension 11/19/2015     Priority: Medium    H/O renal calculi 11/19/2015     Priority: Medium    Enlarged kidney 11/19/2015     Priority: Medium       10 point ROS of systems including Constitutional, Eyes, Respiratory, Cardiovascular, Gastroenterology, Genitourinary, Integumentary, Muscularskeletal, Psychiatric were all negative except for pertinent positives noted in my HPI.    Current Outpatient Medications   Medication Sig Dispense Refill    albuterol (PROAIR HFA) 108 (90 Base) MCG/ACT inhaler Inhale 2 puffs into the lungs every 6 hours 8.5 g 2    amLODIPine (NORVASC) 10 MG tablet Take 1 tablet (10 mg) by mouth daily 90 tablet 0    busPIRone (BUSPAR) 5 MG tablet Take 1 tablet (5 mg) by mouth 3 times daily as needed (Anxiety) 30 tablet 1    metoprolol tartrate (LOPRESSOR) 25 MG tablet Take 1 tablet by mouth twice daily 180 tablet 3    mometasone (ELOCON) 0.1 % external cream Apply topically daily 50 g 0    predniSONE (DELTASONE) 20 MG tablet Take two tablets (= 40mg) each day for 5 (five) days 10 tablet 0    venlafaxine (EFFEXOR XR) 150 MG 24 hr capsule Take 1 capsule by mouth once daily for 90 days 90 capsule 0    venlafaxine (EFFEXOR XR) 37.5 MG 24 hr capsule Take 1 capsule (37.5 mg) by mouth daily for 90 days 90 capsule 0       OBJECTIVE:  LMP 04/20/2022 (Exact Date)     Physical Exam     ---  This note was written with the assistance of the Dragon voice-dictation technology  software. The final document, although reviewed, may contain errors. For corrections, please contact the office.    Total time spent preparing to see the patient, review of chart, obtaining history and physical examination, review of sleep testing, review of treatment options, education, discussion with patient and documenting in Epic / EMR was 15 minutes.  All time involved was spent on the day of service for the patient (the same day as the patient's appointment).    Fidel Cisneros MD    Sleep Medicine  [place], [place]  Main Office: [phone]  [facility] [address], [address]  Schedule visits: [phone]  Main Office: [phone]  Fax: [phone]

## 2023-12-04 ENCOUNTER — VIRTUAL VISIT (OUTPATIENT)
Dept: PULMONOLOGY | Facility: OTHER | Age: 43
End: 2023-12-04
Attending: FAMILY MEDICINE
Payer: COMMERCIAL

## 2023-12-04 VITALS — HEIGHT: 64 IN | BODY MASS INDEX: 29.88 KG/M2 | WEIGHT: 175 LBS

## 2023-12-04 DIAGNOSIS — F39 MOOD DISORDER (H): ICD-10-CM

## 2023-12-04 DIAGNOSIS — G47.33 OSA (OBSTRUCTIVE SLEEP APNEA): Primary | ICD-10-CM

## 2023-12-04 DIAGNOSIS — G47.10 HYPERSOMNIA: ICD-10-CM

## 2023-12-04 PROCEDURE — 99213 OFFICE O/P EST LOW 20 MIN: CPT | Mod: 95 | Performed by: FAMILY MEDICINE

## 2023-12-04 ASSESSMENT — PAIN SCALES - GENERAL: PAINLEVEL: NO PAIN (0)

## 2023-12-04 NOTE — PROGRESS NOTES
"Virtual Visit Details    Type of service:  Video Visit   Video Start Time: {video visit start/end time for provider to select:561119}  Video End Time:{video visit start/end time for provider to select:218452}    Originating Location (pt. Location): {video visit patient location:332268::\"Home\"}  {PROVIDER LOCATION On-site should be selected for visits conducted from your clinic location or adjoining [facility] hospital, academic office, or other nearby [facility] building. Off-site should be selected for all other provider locations, including home:536534}  Distant Location (provider location):  {virtual location provider:682793}  Platform used for Video Visit: {Virtual Visit Platforms:419360::\"[employer]\"}    CPAP Compliance Visit:       Name: Kimberley Louis MRN# [id number]   Age: 43 year old YOB: 1980     Date of Consultation: December 4, 2023  Primary care provider: Jazmyne Villafana    Compliance:   47 % of days with >4 hours of use.   7days/30 with no use   Average use: 4hours 40minutes per day   95%ile leak 7.7 L/min   CPAP 95% pressure 8.7 cm   AHI 1.4 events/hour   ERAN 0.3  PB 0%     Impression:   Patient is {CPAP Compliance :837536}    "

## 2023-12-04 NOTE — NURSING NOTE
Patient is also taking Vitamin D.      Is the patient currently in the state of [place]? YES    Visit mode:VIDEO    If the visit is dropped, the patient can be reconnected by: VIDEO VISIT: Text to cell phone:   Telephone Information:   Mobile [phone]       Will anyone else be joining the visit? NO  (If patient encounters technical issues they should call [phone])    How would you like to obtain your AVS? MyChart    Are changes needed to the allergy or medication list? Yes ADD MEDICATION- Vitamin D.    Reason for visit: RECHECK    Kimberley KINGSTON

## 2023-12-18 NOTE — PROGRESS NOTES
Assessment & Plan     (R53.83) Other fatigue  (primary encounter diagnosis)  Comment:Allyn feels she can sleep anytime of the day. She wears a CPAP at night and never feels rested in the am. She will sleep most of her weekend away. Her CPAP has been checked by sleep medicine. I will reach out to Dr. Cisneros as he is also part of her care team. She is sleeping 8-12 hours each night. ?narcolepsy   Plan: TSH with free T4 reflex            (F34.1) Dysthymic disorder  Comment: Improved. Feels depression is better controlled   Plan: Continue medication regimen     (F41.1) FAUZIA (generalized anxiety disorder)  Comment: Improved. Feels anxiety is better controlled   Plan: Continue medication regimen    See Patient Instructions    Return if symptoms worsen or fail to improve.    ZAHEER Umanzor [facility]   Kimberley is a 43 year old, presenting for the following health issues:  Recheck Medication, Anxiety, and Depression        12/20/2023    11:12 AM   Additional Questions   Roomed by Alyssa BLAKE       [facility]     Medication Followup of Effexor and Buspar  Taking Medication as prescribed: yes  Side Effects:  None  Medication Helping Symptoms:  yes    Depression and Anxiety Follow-Up  How are you doing with your depression since your last visit? Improved   How are you doing with your anxiety since your last visit?  Improved   Are you having other symptoms that might be associated with depression or anxiety? Yes:  sleeping all the time  Have you had a significant life event? No   Do you have any concerns with your use of alcohol or other drugs? No    Social History     Tobacco Use    Smoking status: Every Day     Packs/day: 1.00     Years: 28.00     Additional pack years: 0.00     Total pack years: 28.00     Types: Cigarettes     Start date: 1/1/1989    Smokeless tobacco: Never    Tobacco comments:     pt denied QP 9/22/20   Vaping Use    Vaping Use: Never used   Substance Use  "Topics    Alcohol use: Not Currently     Comment: occasional    Drug use: No         7/14/2023     9:26 AM 11/1/2023     1:54 PM 12/20/2023    10:52 AM   PHQ   PHQ-9 Total Score 24 10 8   Q9: Thoughts of better off dead/self-harm past 2 weeks Several days Not at all Not at all   F/U: Thoughts of suicide or self-harm Yes     F/U: Self harm-plan No     F/U: Self-harm action No     F/U: Safety concerns No           7/14/2023     9:26 AM 11/1/2023     1:55 PM 12/20/2023    10:53 AM   FAUZIA-7 SCORE   Total Score 15 (severe anxiety) 6 (mild anxiety) 0 (minimal anxiety)   Total Score 15 6 0     Denies SI/HI thoughts.     Have you ever done Advance Care Planning? (For example, a Health Directive, POLST, or a discussion with a medical provider or your loved ones about your wishes): No, advance care planning information given to patient to review.  Patient declined advance care planning discussion at this time.     Review of Systems   Constitutional, HEENT, cardiovascular, pulmonary, gi and gu systems are negative, except as otherwise noted.      Objective    /86 (BP Location: Right arm, Patient Position: Sitting, Cuff Size: Adult Regular)   Pulse 78   Temp 98.1  F (36.7  C) (Tympanic)   Resp 18   Ht 1.626 m (5' 4\")   Wt 79.5 kg (175 lb 3.2 oz)   LMP 04/20/2022 (Exact Date)   SpO2 95%   BMI 30.07 kg/m    Body mass index is 30.07 kg/m .  Physical Exam   GENERAL: healthy, alert and no distress  NECK: no adenopathy, no asymmetry, masses, or scars and thyroid normal to palpation  RESP: lungs clear to auscultation - no rales, rhonchi or wheezes  CV: regular rate and rhythm, normal S1 S2, no S3 or S4, no murmur, click or rub, no peripheral edema and peripheral pulses strong  MS: no gross musculoskeletal defects noted, no edema  SKIN: no suspicious lesions or rashes  PSYCH: mentation appears normal, affect normal/bright            "

## 2023-12-20 ENCOUNTER — OFFICE VISIT (OUTPATIENT)
Dept: FAMILY MEDICINE | Facility: OTHER | Age: 43
End: 2023-12-20
Attending: NURSE PRACTITIONER
Payer: COMMERCIAL

## 2023-12-20 VITALS
HEIGHT: 64 IN | HEART RATE: 78 BPM | BODY MASS INDEX: 29.91 KG/M2 | SYSTOLIC BLOOD PRESSURE: 124 MMHG | WEIGHT: 175.2 LBS | DIASTOLIC BLOOD PRESSURE: 86 MMHG | RESPIRATION RATE: 18 BRPM | TEMPERATURE: 98.1 F | OXYGEN SATURATION: 95 %

## 2023-12-20 DIAGNOSIS — R53.83 OTHER FATIGUE: Primary | ICD-10-CM

## 2023-12-20 DIAGNOSIS — F41.1 GAD (GENERALIZED ANXIETY DISORDER): ICD-10-CM

## 2023-12-20 DIAGNOSIS — F34.1 DYSTHYMIC DISORDER: ICD-10-CM

## 2023-12-20 LAB — TSH SERPL DL<=0.005 MIU/L-ACNC: 1.42 UIU/ML (ref 0.3–4.2)

## 2023-12-20 PROCEDURE — G0463 HOSPITAL OUTPT CLINIC VISIT: HCPCS

## 2023-12-20 PROCEDURE — 84443 ASSAY THYROID STIM HORMONE: CPT | Mod: ZL | Performed by: NURSE PRACTITIONER

## 2023-12-20 PROCEDURE — 99214 OFFICE O/P EST MOD 30 MIN: CPT | Performed by: NURSE PRACTITIONER

## 2023-12-20 PROCEDURE — 36415 COLL VENOUS BLD VENIPUNCTURE: CPT | Mod: ZL | Performed by: NURSE PRACTITIONER

## 2023-12-20 ASSESSMENT — PATIENT HEALTH QUESTIONNAIRE - PHQ9
SUM OF ALL RESPONSES TO PHQ QUESTIONS 1-9: 8
SUM OF ALL RESPONSES TO PHQ QUESTIONS 1-9: 8
10. IF YOU CHECKED OFF ANY PROBLEMS, HOW DIFFICULT HAVE THESE PROBLEMS MADE IT FOR YOU TO DO YOUR WORK, TAKE CARE OF THINGS AT HOME, OR GET ALONG WITH OTHER PEOPLE: VERY DIFFICULT

## 2023-12-20 ASSESSMENT — ASTHMA QUESTIONNAIRES: ACT_TOTALSCORE: 15

## 2023-12-20 ASSESSMENT — ANXIETY QUESTIONNAIRES
5. BEING SO RESTLESS THAT IT IS HARD TO SIT STILL: NOT AT ALL
3. WORRYING TOO MUCH ABOUT DIFFERENT THINGS: NOT AT ALL
1. FEELING NERVOUS, ANXIOUS, OR ON EDGE: NOT AT ALL
4. TROUBLE RELAXING: NOT AT ALL
6. BECOMING EASILY ANNOYED OR IRRITABLE: NOT AT ALL
GAD7 TOTAL SCORE: 0
GAD7 TOTAL SCORE: 0
2. NOT BEING ABLE TO STOP OR CONTROL WORRYING: NOT AT ALL
7. FEELING AFRAID AS IF SOMETHING AWFUL MIGHT HAPPEN: NOT AT ALL

## 2023-12-20 ASSESSMENT — PAIN SCALES - GENERAL: PAINLEVEL: NO PAIN (0)

## 2024-01-28 DIAGNOSIS — F34.1 DYSTHYMIC DISORDER: ICD-10-CM

## 2024-01-29 RX ORDER — VENLAFAXINE HYDROCHLORIDE 37.5 MG/1
CAPSULE, EXTENDED RELEASE ORAL
Qty: 90 CAPSULE | Refills: 0 | Status: SHIPPED | OUTPATIENT
Start: 2024-01-29 | End: 2024-04-23

## 2024-01-29 NOTE — TELEPHONE ENCOUNTER
venlafaxine (EFFEXOR XR) 150 MG 24 hr capsule 90 capsule 0 11/1/2023     Last Office Visit: 12/20/2023  Future Office visit:       Routing refill request to provider for review/approval because:

## 2024-02-09 ENCOUNTER — MYC MEDICAL ADVICE (OUTPATIENT)
Dept: FAMILY MEDICINE | Facility: OTHER | Age: 44
End: 2024-02-09

## 2024-02-09 DIAGNOSIS — R53.83 OTHER FATIGUE: Primary | ICD-10-CM

## 2024-03-08 ENCOUNTER — OFFICE VISIT (OUTPATIENT)
Dept: FAMILY MEDICINE | Facility: OTHER | Age: 44
End: 2024-03-08
Attending: NURSE PRACTITIONER

## 2024-03-08 ENCOUNTER — TELEPHONE (OUTPATIENT)
Dept: FAMILY MEDICINE | Facility: OTHER | Age: 44
End: 2024-03-08

## 2024-03-08 VITALS
SYSTOLIC BLOOD PRESSURE: 134 MMHG | HEART RATE: 89 BPM | HEIGHT: 63 IN | TEMPERATURE: 98.2 F | BODY MASS INDEX: 32.43 KG/M2 | OXYGEN SATURATION: 96 % | WEIGHT: 183 LBS | DIASTOLIC BLOOD PRESSURE: 76 MMHG

## 2024-03-08 DIAGNOSIS — H65.91 OME (OTITIS MEDIA WITH EFFUSION), RIGHT: Primary | ICD-10-CM

## 2024-03-08 PROCEDURE — 99213 OFFICE O/P EST LOW 20 MIN: CPT | Performed by: NURSE PRACTITIONER

## 2024-03-08 RX ORDER — FLUTICASONE PROPIONATE 50 MCG
2 SPRAY, SUSPENSION (ML) NASAL DAILY
Qty: 16 G | Refills: 0 | Status: SHIPPED | OUTPATIENT
Start: 2024-03-08 | End: 2024-03-18

## 2024-03-08 ASSESSMENT — PAIN SCALES - GENERAL: PAINLEVEL: SEVERE PAIN (7)

## 2024-03-08 NOTE — PROGRESS NOTES
Assessment & Plan     (H65.91) OME (otitis media with effusion), right  (primary encounter diagnosis)  Comment: Right ear with yellow fluid with erythema to middle ear. With plugging nose and increasing pressure to ears, fluid is free flowing in ear. Possible small TM rupture. Treat with ABX. Continue Flonase and Zytrec. If symptoms don't improve she will need to see ENT. She just got back from [place] and had a lot of ear pressure with flying and pain after getting to [place]. Flight home she was miserable with ear pain and pressure   Plan: amoxicillin-clavulanate (AUGMENTIN) 875-125 MG         tablet, fluticasone (FLONASE) 50 MCG/ACT nasal         spray            Nicotine/Tobacco Cessation  She reports that she has been smoking cigarettes. She started smoking about 35 years ago. She has a 21 pack-year smoking history. She has never used smokeless tobacco.  Nicotine/Tobacco Cessation Plan  Information offered: Patient not interested at this time        See Patient Instructions    Return if symptoms worsen or fail to improve.    Chris Chu is a 44 year old, presenting for the following health issues:  Ear Problem        3/8/2024     3:19 PM   Additional Questions   Roomed by Alyssa BLAKE     [facility]     Ear Problem  Onset/Duration: 3/1/24  Description:   Location: Bilateral  Pain: YES  Redness: No  Feels plugged: YES  Ringing in ears (Tinnitus): No  Hearing Loss: YES  Accompanying Signs & Symptoms:  Discharge: YES  Swelling: No  Fever: No  Unsteady/off balance: YES  Nasal Congestion: No  Progression of Symptoms: worsening  History:  Trauma: YES- flying on multiple flight to get to [place]  Previous similar symptoms: No  Precipitating or alleviating factors: None  Therapies tried and outcome: Ear wax remover oil- worked for about 15 minutes, also tried swimmers ear drops- did nothing      Review of Systems  Constitutional, HEENT, cardiovascular, pulmonary, GI, , musculoskeletal, neuro, skin, endocrine and psych  "systems are negative, except as otherwise noted.      Objective    /76 (BP Location: Right arm, Patient Position: Sitting, Cuff Size: Adult Regular)   Pulse 89   Temp 98.2  F (36.8  C) (Tympanic)   Ht 1.6 m (5' 3\")   Wt 83 kg (183 lb)   LMP 04/20/2022 (Exact Date)   SpO2 96%   BMI 32.42 kg/m    Body mass index is 32.42 kg/m .  Physical Exam   GENERAL: alert and no distress  HENT: Left ear canal and TM normal, nose and mouth without ulcers or lesions. Right ear with yellow fluid with erythema to middle ear. With plugging nose and increasing pressure to ears, fluid is free flowing in ear. Possible small TM rupture. No TTP or fullness to sinuses   NECK: no adenopathy, no asymmetry, masses, or scars  RESP: lungs clear to auscultation - no rales, rhonchi or wheezes  CV: regular rate and rhythm, normal S1 S2, no S3 or S4, no murmur, click or rub, no peripheral edema  MS: no gross musculoskeletal defects noted, no edema  PSYCH: mentation appears normal, affect normal/bright      Signed Electronically by: ZAHEER Umanzor CNP    "

## 2024-03-08 NOTE — TELEPHONE ENCOUNTER
9:34 AM    Reason for Call: OVERBOOK    Patient is having the following symptoms: Ear infection  for 6 days .    The patient is requesting an appointment for Today with Jazmyne Villafana.    Was an appointment offered for this call? No  If yes : Appointment type              Date    Preferred method for responding to this message: Telephone Call  What is your phone number ?  [phone]     If we cannot reach you directly, may we leave a detailed response at the number you provided? Yes    Can this message wait until your PCP/provider returns, if unavailable today? Not applicable    Sofie Brown

## 2024-04-15 DIAGNOSIS — I10 ESSENTIAL HYPERTENSION: ICD-10-CM

## 2024-04-15 RX ORDER — AMLODIPINE BESYLATE 10 MG/1
10 TABLET ORAL DAILY
Qty: 90 TABLET | Refills: 3 | Status: SHIPPED | OUTPATIENT
Start: 2024-04-15 | End: 2024-04-23

## 2024-04-15 NOTE — TELEPHONE ENCOUNTER
Norvasc  Last Written Prescription Date: 11/1/23  Last Fill Quantity: 90 # of Refills: 0  Last Office Visit: 3/8/24

## 2024-07-06 ENCOUNTER — HEALTH MAINTENANCE LETTER (OUTPATIENT)
Age: 44
End: 2024-07-06

## 2024-07-22 DIAGNOSIS — F34.1 DYSTHYMIC DISORDER: ICD-10-CM

## 2024-07-22 RX ORDER — VENLAFAXINE HYDROCHLORIDE 150 MG/1
CAPSULE, EXTENDED RELEASE ORAL
Qty: 90 CAPSULE | Refills: 0 | Status: SHIPPED | OUTPATIENT
Start: 2024-07-22 | End: 2024-07-31

## 2024-07-22 RX ORDER — VENLAFAXINE HYDROCHLORIDE 37.5 MG/1
37.5 CAPSULE, EXTENDED RELEASE ORAL DAILY
Qty: 90 CAPSULE | Refills: 0 | Status: SHIPPED | OUTPATIENT
Start: 2024-07-22 | End: 2024-07-31

## 2024-07-22 NOTE — TELEPHONE ENCOUNTER
Effexor, Effexor      Last Written Prescription Date:  4.24.24  Last Fill Quantity: #90, #90,   # refills: 0  Last Office Visit: 3.8.24  Future Office visit:       Routing refill request to provider for review/approval because:

## 2024-07-24 DIAGNOSIS — F34.1 DYSTHYMIC DISORDER: ICD-10-CM

## 2024-07-24 RX ORDER — VENLAFAXINE HYDROCHLORIDE 150 MG/1
CAPSULE, EXTENDED RELEASE ORAL
Qty: 90 CAPSULE | Refills: 0 | OUTPATIENT
Start: 2024-07-24

## 2024-07-24 RX ORDER — VENLAFAXINE HYDROCHLORIDE 37.5 MG/1
37.5 CAPSULE, EXTENDED RELEASE ORAL DAILY
Qty: 90 CAPSULE | Refills: 0 | OUTPATIENT
Start: 2024-07-24

## 2024-07-24 NOTE — TELEPHONE ENCOUNTER
Effexor 150mg      Last Written Prescription Date:  7/22/24  Last Fill Quantity: 90,   # refills: 0  Last Office Visit: 3/8/24  Future Office visit:       Routing refill request to provider for review/approval because:      Effexor 37.5mg      Last Written Prescription Date:  7/22/24  Last Fill Quantity: 90,   # refills: 0  Last Office Visit: 3/8/24  Future Office visit:       Routing refill request to provider for review/approval because:

## 2024-07-26 ENCOUNTER — MYC REFILL (OUTPATIENT)
Dept: FAMILY MEDICINE | Facility: OTHER | Age: 44
End: 2024-07-26

## 2024-07-26 DIAGNOSIS — F34.1 DYSTHYMIC DISORDER: ICD-10-CM

## 2024-07-26 RX ORDER — VENLAFAXINE HYDROCHLORIDE 37.5 MG/1
37.5 CAPSULE, EXTENDED RELEASE ORAL DAILY
Qty: 90 CAPSULE | Refills: 0 | OUTPATIENT
Start: 2024-07-26

## 2024-07-26 RX ORDER — VENLAFAXINE HYDROCHLORIDE 150 MG/1
CAPSULE, EXTENDED RELEASE ORAL
Qty: 90 CAPSULE | Refills: 0 | OUTPATIENT
Start: 2024-07-26

## 2024-07-26 NOTE — TELEPHONE ENCOUNTER
Effexor 37.5      Last Written Prescription Date:  7/22/2024  Last Fill Quantity: 90,   # refills: 0  Last Office Visit: 3/08/2024    Effexor       Last Written Prescription Date:  7/22/2024  Last Fill Quantity: 90,   # refills: 0  Last Office Visit: 3/08/2024  Future Office visit:

## 2024-07-31 ENCOUNTER — MYC MEDICAL ADVICE (OUTPATIENT)
Dept: FAMILY MEDICINE | Facility: OTHER | Age: 44
End: 2024-07-31

## 2024-07-31 DIAGNOSIS — I10 ESSENTIAL HYPERTENSION: ICD-10-CM

## 2024-07-31 DIAGNOSIS — L71.9 ROSACEA: ICD-10-CM

## 2024-07-31 DIAGNOSIS — F34.1 DYSTHYMIC DISORDER: ICD-10-CM

## 2024-07-31 RX ORDER — VENLAFAXINE HYDROCHLORIDE 37.5 MG/1
37.5 CAPSULE, EXTENDED RELEASE ORAL DAILY
Qty: 90 CAPSULE | Refills: 1 | Status: SHIPPED | OUTPATIENT
Start: 2024-07-31

## 2024-07-31 RX ORDER — VENLAFAXINE HYDROCHLORIDE 150 MG/1
CAPSULE, EXTENDED RELEASE ORAL
Qty: 90 CAPSULE | Refills: 1 | Status: SHIPPED | OUTPATIENT
Start: 2024-07-31

## 2024-07-31 RX ORDER — AMLODIPINE BESYLATE 10 MG/1
10 TABLET ORAL DAILY
Qty: 90 TABLET | Refills: 1 | Status: SHIPPED | OUTPATIENT
Start: 2024-07-31

## 2024-07-31 RX ORDER — METOPROLOL TARTRATE 25 MG/1
TABLET, FILM COATED ORAL
Qty: 180 TABLET | Refills: 1 | Status: SHIPPED | OUTPATIENT
Start: 2024-07-31

## 2024-07-31 NOTE — TELEPHONE ENCOUNTER
Allyn needs her medications. I will not wean her off. Please cristobal up what she needs and I will sign. Thanks! Please let her know about [facility] as well for prescriptions.

## 2024-10-30 DIAGNOSIS — L71.9 ROSACEA: ICD-10-CM

## 2024-10-30 RX ORDER — MOMETASONE FUROATE 1 MG/G
CREAM TOPICAL DAILY
Qty: 50 G | Refills: 1 | Status: SHIPPED | OUTPATIENT
Start: 2024-10-30

## 2024-10-30 NOTE — TELEPHONE ENCOUNTER
Elocon      Last Written Prescription Date:  02/14/23  Last Fill Quantity: 50g,   # refills: 0  Last Office Visit: 03/08/24  Future Office visit:

## 2025-02-19 ENCOUNTER — MYC MEDICAL ADVICE (OUTPATIENT)
Dept: FAMILY MEDICINE | Facility: OTHER | Age: 45
End: 2025-02-19

## 2025-02-19 ASSESSMENT — ASTHMA QUESTIONNAIRES
QUESTION_2 LAST FOUR WEEKS HOW OFTEN HAVE YOU HAD SHORTNESS OF BREATH: ONCE OR TWICE A WEEK
QUESTION_5 LAST FOUR WEEKS HOW WOULD YOU RATE YOUR ASTHMA CONTROL: COMPLETELY CONTROLLED
QUESTION_3 LAST FOUR WEEKS HOW OFTEN DID YOUR ASTHMA SYMPTOMS (WHEEZING, COUGHING, SHORTNESS OF BREATH, CHEST TIGHTNESS OR PAIN) WAKE YOU UP AT NIGHT OR EARLIER THAN USUAL IN THE MORNING: NOT AT ALL
QUESTION_1 LAST FOUR WEEKS HOW MUCH OF THE TIME DID YOUR ASTHMA KEEP YOU FROM GETTING AS MUCH DONE AT WORK, SCHOOL OR AT HOME: NONE OF THE TIME
ACT_TOTALSCORE: 22
ACT_TOTALSCORE: 22
QUESTION_4 LAST FOUR WEEKS HOW OFTEN HAVE YOU USED YOUR RESCUE INHALER OR NEBULIZER MEDICATION (SUCH AS ALBUTEROL): TWO OR THREE TIMES PER WEEK

## 2025-02-19 NOTE — TELEPHONE ENCOUNTER
Patient completed [facility] ACT questionnaire on 2/19/2025 for [facility] quality patient outreach. Currently meeting goal parameters with an ACT total score >= 20. This writer does not note any major respiratory or safety issues at present. Continue with current plan of care.         3/2/2021    11:00 AM 12/20/2023    10:54 AM 2/19/2025    10:12 AM   ACT Total Scores   ACT TOTAL SCORE (Goal Greater than or Equal to 20) 20 15 22    In the past 12 months, how many times did you visit the emergency room for your asthma without being admitted to the hospital? 0 1 0   In the past 12 months, how many times were you hospitalized overnight because of your asthma? 0 0 0       Patient-reported      Appointments in Next Year      Feb 25, 2025 2:15 PM  (Arrive by 2:00 PM)  Adult Preventative Visit with ZAHEER Umanzor [facility] ([facility] ) [phone]          Radha Woodard RN

## 2025-02-20 ENCOUNTER — APPOINTMENT (OUTPATIENT)
Dept: ULTRASOUND IMAGING | Facility: HOSPITAL | Age: 45
End: 2025-02-20
Attending: NURSE PRACTITIONER
Payer: COMMERCIAL

## 2025-02-20 ENCOUNTER — APPOINTMENT (OUTPATIENT)
Dept: CT IMAGING | Facility: HOSPITAL | Age: 45
End: 2025-02-20
Attending: NURSE PRACTITIONER
Payer: COMMERCIAL

## 2025-02-20 ENCOUNTER — HOSPITAL ENCOUNTER (EMERGENCY)
Facility: HOSPITAL | Age: 45
Discharge: HOME | End: 2025-02-20
Attending: NURSE PRACTITIONER
Payer: COMMERCIAL

## 2025-02-20 VITALS
RESPIRATION RATE: 16 BRPM | DIASTOLIC BLOOD PRESSURE: 71 MMHG | BODY MASS INDEX: 28.87 KG/M2 | TEMPERATURE: 98.9 F | SYSTOLIC BLOOD PRESSURE: 138 MMHG | OXYGEN SATURATION: 97 % | HEART RATE: 56 BPM | WEIGHT: 163 LBS

## 2025-02-20 DIAGNOSIS — N20.1 LEFT URETERAL STONE: ICD-10-CM

## 2025-02-20 LAB
ALBUMIN SERPL BCG-MCNC: 4.4 G/DL (ref 3.5–5.2)
ALBUMIN UR-MCNC: NEGATIVE MG/DL
ALP SERPL-CCNC: 65 U/L (ref 40–150)
ALT SERPL W P-5'-P-CCNC: 13 U/L (ref 0–50)
ANION GAP SERPL CALCULATED.3IONS-SCNC: 11 MMOL/L (ref 7–15)
APPEARANCE UR: CLEAR
AST SERPL W P-5'-P-CCNC: 14 U/L (ref 0–45)
BACTERIA #/AREA URNS HPF: ABNORMAL /HPF
BILIRUB SERPL-MCNC: 0.4 MG/DL
BILIRUB UR QL STRIP: NEGATIVE
BUN SERPL-MCNC: 9.1 MG/DL (ref 6–20)
CALCIUM SERPL-MCNC: 9.4 MG/DL (ref 8.8–10.4)
CHLORIDE SERPL-SCNC: 105 MMOL/L (ref 98–107)
COLOR UR AUTO: ABNORMAL
CREAT SERPL-MCNC: 0.74 MG/DL (ref 0.51–0.95)
EGFRCR SERPLBLD CKD-EPI 2021: >90 ML/MIN/1.73M2
ERYTHROCYTE [DISTWIDTH] IN BLOOD BY AUTOMATED COUNT: 12.6 % (ref 10–15)
GLUCOSE SERPL-MCNC: 135 MG/DL (ref 70–99)
GLUCOSE UR STRIP-MCNC: NEGATIVE MG/DL
HCO3 SERPL-SCNC: 25 MMOL/L (ref 22–29)
HCT VFR BLD AUTO: 40.4 % (ref 35–47)
HGB BLD-MCNC: 13.7 G/DL (ref 11.7–15.7)
HGB UR QL STRIP: ABNORMAL
HOLD SPECIMEN: NORMAL
KETONES UR STRIP-MCNC: NEGATIVE MG/DL
LEUKOCYTE ESTERASE UR QL STRIP: NEGATIVE
LIPASE SERPL-CCNC: 19 U/L (ref 13–60)
MCH RBC QN AUTO: 30.2 PG (ref 26.5–33)
MCHC RBC AUTO-ENTMCNC: 33.9 G/DL (ref 31.5–36.5)
MCV RBC AUTO: 89 FL (ref 78–100)
MUCOUS THREADS #/AREA URNS LPF: PRESENT /LPF
NITRATE UR QL: NEGATIVE
PH UR STRIP: 6 [PH] (ref 4.7–8)
PLATELET # BLD AUTO: 374 10E3/UL (ref 150–450)
POTASSIUM SERPL-SCNC: 3.8 MMOL/L (ref 3.4–5.3)
PROT SERPL-MCNC: 6.9 G/DL (ref 6.4–8.3)
RBC # BLD AUTO: 4.53 10E6/UL (ref 3.8–5.2)
RBC URINE: 46 /HPF
SODIUM SERPL-SCNC: 141 MMOL/L (ref 135–145)
SP GR UR STRIP: 1.01 (ref 1–1.03)
SQUAMOUS EPITHELIAL: 3 /HPF
UROBILINOGEN UR STRIP-MCNC: NORMAL MG/DL
WBC # BLD AUTO: 10.3 10E3/UL (ref 4–11)
WBC URINE: 1 /HPF

## 2025-02-20 PROCEDURE — 36415 COLL VENOUS BLD VENIPUNCTURE: CPT | Performed by: NURSE PRACTITIONER

## 2025-02-20 PROCEDURE — 85014 HEMATOCRIT: CPT | Performed by: NURSE PRACTITIONER

## 2025-02-20 PROCEDURE — 81001 URINALYSIS AUTO W/SCOPE: CPT | Performed by: EMERGENCY MEDICINE

## 2025-02-20 PROCEDURE — 96374 THER/PROPH/DIAG INJ IV PUSH: CPT

## 2025-02-20 PROCEDURE — 81001 URINALYSIS AUTO W/SCOPE: CPT | Performed by: NURSE PRACTITIONER

## 2025-02-20 PROCEDURE — 99285 EMERGENCY DEPT VISIT HI MDM: CPT | Mod: 25

## 2025-02-20 PROCEDURE — 74176 CT ABD & PELVIS W/O CONTRAST: CPT

## 2025-02-20 PROCEDURE — 85048 AUTOMATED LEUKOCYTE COUNT: CPT | Performed by: NURSE PRACTITIONER

## 2025-02-20 PROCEDURE — 80053 COMPREHEN METABOLIC PANEL: CPT | Performed by: NURSE PRACTITIONER

## 2025-02-20 PROCEDURE — 83690 ASSAY OF LIPASE: CPT | Performed by: NURSE PRACTITIONER

## 2025-02-20 PROCEDURE — 99284 EMERGENCY DEPT VISIT MOD MDM: CPT | Performed by: NURSE PRACTITIONER

## 2025-02-20 PROCEDURE — 250N000011 HC RX IP 250 OP 636: Performed by: NURSE PRACTITIONER

## 2025-02-20 RX ORDER — KETOROLAC TROMETHAMINE 30 MG/ML
30 INJECTION, SOLUTION INTRAMUSCULAR; INTRAVENOUS ONCE
Status: COMPLETED | OUTPATIENT
Start: 2025-02-20 | End: 2025-02-20

## 2025-02-20 RX ORDER — KETOROLAC TROMETHAMINE 10 MG/1
10 TABLET, FILM COATED ORAL EVERY 6 HOURS PRN
Qty: 20 TABLET | Refills: 0 | Status: SHIPPED | OUTPATIENT
Start: 2025-02-20

## 2025-02-20 RX ORDER — OXYCODONE HYDROCHLORIDE 5 MG/1
5 TABLET ORAL EVERY 6 HOURS PRN
Qty: 6 TABLET | Refills: 0 | Status: SHIPPED | OUTPATIENT
Start: 2025-02-20

## 2025-02-20 RX ADMIN — KETOROLAC TROMETHAMINE 30 MG: 30 INJECTION, SOLUTION INTRAMUSCULAR at 13:50

## 2025-02-20 ASSESSMENT — ENCOUNTER SYMPTOMS
DYSURIA: 0
NEUROLOGICAL NEGATIVE: 1
GASTROINTESTINAL NEGATIVE: 1
EYES NEGATIVE: 1
PSYCHIATRIC NEGATIVE: 1
ENDOCRINE NEGATIVE: 1
ALLERGIC/IMMUNOLOGIC NEGATIVE: 1
CARDIOVASCULAR NEGATIVE: 1
HEMATURIA: 0
FLANK PAIN: 1
CONSTITUTIONAL NEGATIVE: 1
HEMATOLOGIC/LYMPHATIC NEGATIVE: 1
RESPIRATORY NEGATIVE: 1

## 2025-02-20 ASSESSMENT — COLUMBIA-SUICIDE SEVERITY RATING SCALE - C-SSRS
2. HAVE YOU ACTUALLY HAD ANY THOUGHTS OF KILLING YOURSELF IN THE PAST MONTH?: NO
6. HAVE YOU EVER DONE ANYTHING, STARTED TO DO ANYTHING, OR PREPARED TO DO ANYTHING TO END YOUR LIFE?: NO
1. IN THE PAST MONTH, HAVE YOU WISHED YOU WERE DEAD OR WISHED YOU COULD GO TO SLEEP AND NOT WAKE UP?: NO

## 2025-02-20 ASSESSMENT — ACTIVITIES OF DAILY LIVING (ADL)
ADLS_ACUITY_SCORE: 41

## 2025-02-20 NOTE — ED NOTES
Discharge instruction reviewed with patient. Encouraged to return with new or worsening symptoms.  Declined discharge vitals.  Rx has been e-scribed to pharmacy of choice

## 2025-02-20 NOTE — DISCHARGE INSTRUCTIONS
Ketorolac (Toradol):   While taking the ketorolac (Toradol), do not use aspirin, ibuprofen, Aleve, or naproxen products for pain control.  However, if able, you can use acetaminophen for further pain control in addition to the ketorolac.  Once done with the ketorolac, you may take aspirin, ibuprofen, Aleve, or naproxen products for pain control again.  Remember to keep well hydrated and take this medication with food.        Narcotic pain medications (Oxycodone):   The medications prescribed can be quite effective for control of your pain but ARE NOT a safe long-term choice for your symptom control.  This medication is highly addictive and can lead to medication abuse.  It is recommended to try the ibuprofen/acetaminophen regimen (if able) described below before using the narcotic.   Do not drive, operate machinery, or do work that could harm people when under the influence of narcotic pain medications.  It can impair perception, reaction time, motor skills, and attention in ways that make it dangerous to drive, operate machinery, or engage in any activity at home or at work that could harm others or cause professional malpractice.  Just how long such impairments will last for a particular individual taking a particular type and dose is unknown, but it is at least several hours.  Drinking alcohol while taking narcotic pain medications makes impairment much worse, so abstain for alcohol use.    I recommend taking a stool softener such as Colace or Senokot-S which can be purchased over the counter while you are taking narcotics since these medications can be constipating.  You may also try Miralax OTC if needed for constipation.     Pain control:   If your past medical conditions, allergies, current medications, or current status does not prevent you from using acetaminophen and/or ibuprofen, use the following: Take acetaminophen 650-1000 mg every 6 hours as needed for pain in addition to ibuprofen 400-600 mg every 6  hours as needed for pain.  Take these two medications together.          Follow-up with your primary care provider for reevaluation.  Contact your primary care provider if you have any questions or concerns.  Do not hesitate to return to the ER if any new or worsening symptoms.     Please read the attached instructions (if any).  They highlight more specific treatments and interventions for you at home.              Thank you for letting me participate in your care and wish you a fast and uneventful recovery,    Kirit SCHWAB, CNP    Do not hesitate to contact me with questions or concerns.  [email or web address]

## 2025-02-20 NOTE — ED PROVIDER NOTES
History     Chief Complaint   Patient presents with    Flank Pain     HPI  Kimberley Louis is a 45 year old individual with history of hypertension, hysterectomy, recurrent kidney stones, comes in for flank pain.  Patient states that she had some right flank pain few days ago but subsided and couple days ago developed left flank pain and today it was severe so comes in think she has a kidney stone.  No fever or chills.  No nausea or vomiting.  No hematuria or dysuria reported.    Allergies:  No Known Allergies    Problem List:    Patient Active Problem List    Diagnosis Date Noted    MANUEL (obstructive sleep apnea) 10/05/2023     Priority: Medium    S/P laparoscopic supracervical hysterectomy 05/05/2022     Priority: Medium     Bilateral salp      Dysmenorrhea 05/04/2022     Priority: Medium    Menorrhagia 05/04/2022     Priority: Medium    Nephrolithiasis 07/21/2020     Priority: Medium     Added automatically from request for surgery [id number]      Bacterial vaginosis 06/09/2020     Priority: Medium    Left flank tenderness 08/03/2018     Priority: Medium    Personal history of tobacco use, presenting hazards to health 08/03/2018     Priority: Medium    Recurrent UTI 08/03/2018     Priority: Medium    Incomplete right bundle branch block 02/05/2018     Priority: Medium    Pure hypercholesterolemia 01/03/2018     Priority: Medium    Tobacco abuse counseling 01/03/2018     Priority: Medium    Tobacco abuse 01/03/2018     Priority: Medium    Palpitations 01/03/2018     Priority: Medium    Atypical chest pain 01/03/2018     Priority: Medium    Dyspnea on exertion 01/03/2018     Priority: Medium    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  Kimberley Louis has no plan or code status on file.  Discussed available resources and provided with information. Confirmed code status reflects current choices pending further ACP discussions.  Confirmed/documented legally designated decision makers.  Added by Sandra Anne            Kidney stone on left side 12/21/2015     Priority: Medium    Nephrocalcinosis 12/21/2015     Priority: Medium    Essential hypertension 11/19/2015     Priority: Medium    H/O renal calculi 11/19/2015     Priority: Medium    Enlarged kidney 11/19/2015     Priority: Medium        Past Medical History:    Past Medical History:   Diagnosis Date    Cyst of breast     Depressive disorder     Enlarged kidney 11/19/2015    Essential hypertension 11/19/2015    H/O renal calculi 11/19/2015    H/O renal calculi 11/19/2015    History of blood transfusion     Uncomplicated asthma        Past Surgical History:    Past Surgical History:   Procedure Laterality Date    ABDOMEN SURGERY      AS REMOVAL OF KIDNEY STONE      AS REMOVAL OF KIDNEY STONE      COMBINED CYSTOSCOPY, RETROGRADES, URETEROSCOPY, LASER HOLMIUM LITHOTRIPSY URETER(S), INSERT STENT Left 08/17/2020    Procedure: CYSTOURETEROSCOPY, WITH RETROGRADE PYELOGRAM, HOLMIUM LASER LITHOTRIPSY OF URETERAL CALCULUS, interpretation of fluroscopy;  Surgeon: Sherry Mcpherson MD;  Location: [facility]    LAPAROSCOPIC HYSTERECTOMY SUPRACERVICAL N/A 05/04/2022    Procedure: LAPAROSCOPIC SUPRACERVICAL HYSTERECTOMY;  Surgeon: Bunny Hyatt MD;  Location: [facility]    LAPAROSCOPIC SALPINGECTOMY Bilateral 05/04/2022    Procedure: BILATERAL SALPINGECTOMY, Lysis of Adhesions;  Surgeon: Bunny Hyatt MD;  Location: [facility]    TUBAL LIGATION      ZZC REMOVAL OF KIDNEY STONE         Family History:    Family History   Problem Relation Age of Onset    Diabetes Mother     Emphysema Mother     Cervical Cancer Mother     Throat cancer Mother     Depression Mother     Diabetes Father     Hypertension Father     Hypertension Maternal Grandmother     Diabetes Maternal  Grandmother     Schizophrenia Maternal Grandmother     Depression Maternal Grandmother     Mental Illness Maternal Grandmother     Unknown/Adopted Maternal Grandfather     Lung Cancer Paternal Grandmother     Diabetes Paternal Grandmother     Unknown/Adopted Paternal Grandfather        Social History:  Marital Status:  Single [1]  Social History     Tobacco Use    Smoking status: Every Day     Current packs/day: 0.75     Average packs/day: 0.8 packs/day for 36.1 years (27.1 ttl pk-yrs)     Types: Cigarettes     Start date: 1/1/1989    Smokeless tobacco: Never    Tobacco comments:     pt denied QP 9/22/20   Vaping Use    Vaping status: Never Used   Substance Use Topics    Alcohol use: Not Currently     Comment: occasional    Drug use: No        Medications:    ketorolac (TORADOL) 10 MG tablet  oxyCODONE (ROXICODONE) 5 MG tablet  albuterol (PROAIR HFA) 108 (90 Base) MCG/ACT inhaler  amLODIPine (NORVASC) 10 MG tablet  busPIRone (BUSPAR) 5 MG tablet  metoprolol tartrate (LOPRESSOR) 25 MG tablet  mometasone (ELOCON) 0.1 % external cream  venlafaxine (EFFEXOR XR) 150 MG 24 hr capsule  venlafaxine (EFFEXOR XR) 37.5 MG 24 hr capsule          Review of Systems   Constitutional: Negative.    HENT: Negative.     Eyes: Negative.    Respiratory: Negative.     Cardiovascular: Negative.    Gastrointestinal: Negative.    Endocrine: Negative.    Genitourinary:  Positive for flank pain. Negative for decreased urine volume, dysuria, hematuria, pelvic pain, vaginal bleeding and vaginal discharge.   Skin: Negative.    Allergic/Immunologic: Negative.    Neurological: Negative.    Hematological: Negative.    Psychiatric/Behavioral: Negative.         Physical Exam   BP: (!) 157/84  Pulse: 70  Temp: 98.6  F (37  C)  Resp: 18  Weight: 73.9 kg (163 lb)  SpO2: 97 %      GENERAL APPEARANCE:  The patient is a 45 year old well-developed, well-nourished individual that appears as stated age.  LUNGS:  Breathing is easy.  Breath sounds are equal and  clear bilaterally.  No wheezes, rhonchi, or rales.  HEART:  Regular rate and rhythm with normal S1 and S2.  No murmurs, gallops, or rubs.  ABDOMEN:  Soft.  Left lower abdominal tenderness to palpation.  No mass, guarding, or rebound.  No organomegaly or hernia.  Bowel sounds are present.  Left CVA tenderness to palpation but no flank mass.  No abdominal bruits or thrills present upon auscultation/palpation.  GENITOURINARY: No obvious anterior pelvic tenderness, hernia, mass noted to palpation.  PSYCHIATRIC:  The patient is awake, alert, and oriented x4.  Recent and remote memory is intact.  Appropriate mood and affect.  Calm and cooperative with history and physical exam.  SKIN:  Warm, dry, and well perfused.  Good turgor.  No lesions, nodules, or rashes are noted.  No bruising noted.      ED Course     ED Course as of 02/20/25 1621   Thu Feb 20, 2025   1138 Labs ordered while patient in [facility].   1322 Labs show blood in urine with no signs of infection.  CT abdomen pelvis without IV contrast ordered while patient in [facility].   1343 POCUS renal ultrasound shows questionable very mild hydronephrosis bilaterally.  Ureteral jets present bilaterally making hydronephrosis unlikely.   1343 Ketorolac 30 mg IV ordered.   1429 Pain down to a 2/10.   1614 Patient with left ureteral stone at the UV junction and pain is almost resolved.  As no acute findings on labs will discharge home on ketorolac and oxycodone.  Did educate no other NSAIDs while on ketorolac and no alcohol use or driving while on oxycodone.  Referral for urology deferred by patient.  Follow-up recommendations and return precautions given.     POC US ABDOMEN LIMITED    Date/Time: 2/20/2025 1:44 PM    Performed by: Kirit Aguiar APRN CNP  Authorized by: Kirit Aguiar APRN CNP    Procedure details:     Indications: flank pain      Assessment for:  Hydronephrosis    Left renal:  Visualized    Right renal:  Visualized    Bladder:  Visualized  Left renal  findings:     Ureteral jets: identified      Intra-abdominal fluid: not identified      Perinephric fluid: not identified      Hydronephrosis comment:  Inconclusive  Right renal findings:     Ureteral jets: identified      Intra-abdominal fluid: not identified      Perinephric fluid: not identified      Hydronephrosis comment:  Inconclusive  Bladder findings:     Free pelvic fluid: not identified           Results for orders placed or performed during the hospital encounter of 02/20/25 (from the past 24 hours)   UA with Microscopic reflex to Culture    Specimen: Urine, Midstream   Result Value Ref Range    Color Urine Light Yellow Colorless, Straw, Light Yellow, Yellow    Appearance Urine Clear Clear    Glucose Urine Negative Negative mg/dL    Bilirubin Urine Negative Negative    Ketones Urine Negative Negative mg/dL    Specific Gravity Urine 1.015 1.003 - 1.035    Blood Urine Moderate (A) Negative    pH Urine 6.0 4.7 - 8.0    Protein Albumin Urine Negative Negative mg/dL    Urobilinogen Urine Normal Normal, 2.0 mg/dL    Nitrite Urine Negative Negative    Leukocyte Esterase Urine Negative Negative    Bacteria Urine Few (A) None Seen /HPF    Mucus Urine Present (A) None Seen /LPF    RBC Urine 46 (H) <=2 /HPF    WBC Urine 1 <=5 /HPF    Squamous Epithelials Urine 3 (H) <=1 /HPF    Narrative    Urine Culture not indicated   CBC with platelets   Result Value Ref Range    WBC Count 10.3 4.0 - 11.0 10e3/uL    RBC Count 4.53 3.80 - 5.20 10e6/uL    Hemoglobin 13.7 11.7 - 15.7 g/dL    Hematocrit 40.4 35.0 - 47.0 %    MCV 89 78 - 100 fL    MCH 30.2 26.5 - 33.0 pg    MCHC 33.9 31.5 - 36.5 g/dL    RDW 12.6 10.0 - 15.0 %    Platelet Count 374 150 - 450 10e3/uL   Comprehensive metabolic panel   Result Value Ref Range    Sodium 141 135 - 145 mmol/L    Potassium 3.8 3.4 - 5.3 mmol/L    Carbon Dioxide (CO2) 25 22 - 29 mmol/L    Anion Gap 11 7 - 15 mmol/L    Urea Nitrogen 9.1 6.0 - 20.0 mg/dL    Creatinine 0.74 0.51 - 0.95 mg/dL     GFR Estimate >90 >60 mL/min/1.73m2    Calcium 9.4 8.8 - 10.4 mg/dL    Chloride 105 98 - 107 mmol/L    Glucose 135 (H) 70 - 99 mg/dL    Alkaline Phosphatase 65 40 - 150 U/L    AST 14 0 - 45 U/L    ALT 13 0 - 50 U/L    Protein Total 6.9 6.4 - 8.3 g/dL    Albumin 4.4 3.5 - 5.2 g/dL    Bilirubin Total 0.4 <=1.2 mg/dL   Lipase   Result Value Ref Range    Lipase 19 13 - 60 U/L   Extra Tube    Narrative    The following orders were created for panel order Extra Tube.  Procedure                               Abnormality         Status                     ---------                               -----------         ------                     Extra Blue Top Tube[[id number]]                              Final result               Extra Red Top Tube[[id number]]                               Final result               Extra Green Top (Lithium...[[id number]]                      Final result                 Please view results for these tests on the individual orders.   Extra Blue Top Tube   Result Value Ref Range    Hold Specimen JIC    Extra Red Top Tube   Result Value Ref Range    Hold Specimen JIC    Extra Green Top (Lithium Heparin) Tube   Result Value Ref Range    Hold Specimen JIC    Abd/pelvis CT - no contrast - Stone Protocol    Narrative    EXAM: CT ABDOMEN PELVIS W/O CONTRAST 2/20/2025 3:16 PM    HISTORY: Left flank pain    COMPARISON: CT abdomen pelvis 8/15/2022    TECHNIQUE:   Imaging protocol: Computed tomography of abdomen and pelvis without  contrast.  Acquisition: This CT exam was performed using one or more the  following dose reduction techniques: automated exposure control,  adjustment of the mA and/or kV according to patient size, and/or  iterative reconstruction technique.    FINDINGS:    LOWER CHEST:  Bibasilar atelectasis. No pulmonary mass or focal consolidation.    ABDOMEN/PELVIS:  LIVER: Normal contour. No suspicious liver lesions.  GALLBLADDER: No radio-opaque stones. No gallbladder wall thickening.  BILE  DUCTS: No biliary tract dilatation.  PANCREAS: Within normal limits.  SPLEEN: Within normal limits.  ADRENALS: Within normal limits.    KIDNEYS/URETERS: No soft tissue mass. No hydronephrosis.  Nonobstructing left renal calyceal stones measuring up to 6 mm. 4 x 2  mm stone at the left ureterovesicular junction.  REPRODUCTIVE ORGANS: No pelvic masses.    BOWEL: No bowel dilatation or wall thickening. Colonic diverticulosis  without diverticulitis. Normal appendix.  PERITONEUM/RETROPERITONEUM: No free air or free fluid. Small  fat-containing umbilical hernia.  VESSELS: Atherosclerotic calcification of the aorta without aneurysmal  dilation.  LYMPH NODES: There are no pathologically enlarged lymph nodes.    BONES AND SOFT TISSUES:  No suspicious osseous lesions. Rectus diastasis along the anterior  abdominal wall. Degenerative periarticular changes and spinal  spondylosis.      Impression    IMPRESSION:  4 mm stone at the left ureterovesicular junction without significant  left hydroureteronephrosis.    MOE KENT MD         SYSTEM ID:  [id number]       Medications   ketorolac (TORADOL) injection 30 mg (30 mg Intravenous $Given 2/20/25 1350)       Assessments & Plan (with Medical Decision Making)     I have reviewed the nursing notes.    I have reviewed the findings, diagnosis, plan and need for follow up with the patient.    Summary:  Patient presents to the ER today left flank pain.  Potential diagnosis which have been considered and evaluated include pyelonephritis, ureteral stone, appendicitis, bowel obstruction, diverticulitis, UTI, bowel obstruction, mesenteric ischemia, as well as others. Many of these have been excluded using the various modalities and assessment as noted on the chart. At the present time, the diagnosis given seems to be the most likely left ureteral stone.  Upon arrival, vitals signs are normal.  The patient is alert and oriented.  Cardiac and respiratory examination normal.  Left lower  quadrant abdominal tenderness and left flank pain upon palpation.  No hernia or mass.  Lab work was obtained while patient in lobby showing WBC of 10.3 with hemoglobin of 30.7.  Electrolytes, renal, hepatic functions normal.  Lipase normal at 19.  UA did show moderate blood and 46 RBC's with no signs of infection.  POCUS renal ultrasound shows inconclusive hydronephrosis bilaterally but does have ureteral jets present bilaterally.  CT of abdomen pelvis without IV contrast conducted showing 4 mm stone at the left UV junction without significant left hydronephrosis.  IV established and ketorolac 30 mg IV given for pain control with improvement of pain.  Patient did have improvement after this.  Has lab work is benign and patient's pain under control will discharge home.  Will place patient on ketorolac 10 mg every 6 hours as needed.  Did educate no other NSAIDs while on this medication.  For breakthrough pain control did order oxycodone 5 mg every 6 hours as needed.  Did educate no alcohol use or driving.  Patient deferred urology referral and will follow-up with PCP instead and discussed.  For this reason we will discharge home.  Advised patient to return to ER for new or worsening symptoms.  Patient verbalized understand agrees with plan of care.  Patient discharged home.        Critical Care Time: None    Impression and plan discussed with patient. Questions answered, concerns addressed, indications for urgent re-evaluation reviewed, and  given. Patient/Parent/Caregiver agree with treatment plan and have no further questions at this time.  AVS provided at discharge.    This document was prepared using a combination of typing and voice generated software.  While every attempt was made for accuracy, spelling and grammatical errors may exist.              New Prescriptions    KETOROLAC (TORADOL) 10 MG TABLET    Take 1 tablet (10 mg) by mouth every 6 hours as needed for moderate pain.    OXYCODONE (ROXICODONE) 5  MG TABLET    Take 1 tablet (5 mg) by mouth every 6 hours as needed for breakthrough pain.       Final diagnoses:   Left ureteral stone       2/20/2025   [facility]       Kirit Aguiar APRN CNP  02/20/25 [id number]

## 2025-02-20 NOTE — ED NOTES
2 days of left flank pain that radiates to left groin with twinges of pain on right side Denies dysuria, denies fever.  Hx of kidney stones.  Hx of kidney problems per pt report.  Pt feels like she can't empty bladder as much as yesterday and not drinking as much fluids as normally.

## 2025-02-20 NOTE — ED TRIAGE NOTES
"\"Here for left flank pain that radiates to left back and left abdomen.  I have a history of kidney stones.\"         "

## 2025-02-25 ENCOUNTER — OFFICE VISIT (OUTPATIENT)
Dept: FAMILY MEDICINE | Facility: OTHER | Age: 45
End: 2025-02-25
Attending: NURSE PRACTITIONER
Payer: COMMERCIAL

## 2025-02-25 VITALS
HEIGHT: 64 IN | WEIGHT: 161.2 LBS | HEART RATE: 64 BPM | OXYGEN SATURATION: 97 % | RESPIRATION RATE: 20 BRPM | SYSTOLIC BLOOD PRESSURE: 130 MMHG | TEMPERATURE: 98.6 F | BODY MASS INDEX: 27.52 KG/M2 | DIASTOLIC BLOOD PRESSURE: 82 MMHG

## 2025-02-25 DIAGNOSIS — Z12.11 SCREENING FOR COLON CANCER: ICD-10-CM

## 2025-02-25 DIAGNOSIS — Z12.4 SCREENING FOR CERVICAL CANCER: ICD-10-CM

## 2025-02-25 DIAGNOSIS — N89.8 VAGINAL DISCHARGE: ICD-10-CM

## 2025-02-25 DIAGNOSIS — Z12.31 ENCOUNTER FOR SCREENING MAMMOGRAM FOR BREAST CANCER: ICD-10-CM

## 2025-02-25 DIAGNOSIS — R73.09 ELEVATED GLUCOSE: Primary | ICD-10-CM

## 2025-02-25 DIAGNOSIS — Z11.4 ENCOUNTER FOR SCREENING FOR HIV: ICD-10-CM

## 2025-02-25 DIAGNOSIS — J45.20 MILD INTERMITTENT ASTHMA WITHOUT COMPLICATION: ICD-10-CM

## 2025-02-25 DIAGNOSIS — N20.0 KIDNEY STONE: ICD-10-CM

## 2025-02-25 DIAGNOSIS — Z11.59 ENCOUNTER FOR HCV SCREENING TEST FOR LOW RISK PATIENT: ICD-10-CM

## 2025-02-25 DIAGNOSIS — Z00.00 ROUTINE GENERAL MEDICAL EXAMINATION AT A HEALTH CARE FACILITY: ICD-10-CM

## 2025-02-25 SDOH — HEALTH STABILITY: PHYSICAL HEALTH: ON AVERAGE, HOW MANY DAYS PER WEEK DO YOU ENGAGE IN MODERATE TO STRENUOUS EXERCISE (LIKE A BRISK WALK)?: 5 DAYS

## 2025-02-25 ASSESSMENT — ASTHMA QUESTIONNAIRES
QUESTION_2 LAST FOUR WEEKS HOW OFTEN HAVE YOU HAD SHORTNESS OF BREATH: NOT AT ALL
QUESTION_5 LAST FOUR WEEKS HOW WOULD YOU RATE YOUR ASTHMA CONTROL: WELL CONTROLLED
ACT_TOTALSCORE: 22
QUESTION_1 LAST FOUR WEEKS HOW MUCH OF THE TIME DID YOUR ASTHMA KEEP YOU FROM GETTING AS MUCH DONE AT WORK, SCHOOL OR AT HOME: NONE OF THE TIME
QUESTION_3 LAST FOUR WEEKS HOW OFTEN DID YOUR ASTHMA SYMPTOMS (WHEEZING, COUGHING, SHORTNESS OF BREATH, CHEST TIGHTNESS OR PAIN) WAKE YOU UP AT NIGHT OR EARLIER THAN USUAL IN THE MORNING: NOT AT ALL
QUESTION_4 LAST FOUR WEEKS HOW OFTEN HAVE YOU USED YOUR RESCUE INHALER OR NEBULIZER MEDICATION (SUCH AS ALBUTEROL): TWO OR THREE TIMES PER WEEK
ACT_TOTALSCORE: 22

## 2025-02-25 ASSESSMENT — PAIN SCALES - GENERAL: PAINLEVEL_OUTOF10: NO PAIN (0)

## 2025-02-25 ASSESSMENT — ANXIETY QUESTIONNAIRES
1. FEELING NERVOUS, ANXIOUS, OR ON EDGE: NOT AT ALL
IF YOU CHECKED OFF ANY PROBLEMS ON THIS QUESTIONNAIRE, HOW DIFFICULT HAVE THESE PROBLEMS MADE IT FOR YOU TO DO YOUR WORK, TAKE CARE OF THINGS AT HOME, OR GET ALONG WITH OTHER PEOPLE: NOT DIFFICULT AT ALL
4. TROUBLE RELAXING: NOT AT ALL
5. BEING SO RESTLESS THAT IT IS HARD TO SIT STILL: NOT AT ALL
2. NOT BEING ABLE TO STOP OR CONTROL WORRYING: NOT AT ALL
3. WORRYING TOO MUCH ABOUT DIFFERENT THINGS: SEVERAL DAYS
8. IF YOU CHECKED OFF ANY PROBLEMS, HOW DIFFICULT HAVE THESE MADE IT FOR YOU TO DO YOUR WORK, TAKE CARE OF THINGS AT HOME, OR GET ALONG WITH OTHER PEOPLE?: NOT DIFFICULT AT ALL
7. FEELING AFRAID AS IF SOMETHING AWFUL MIGHT HAPPEN: SEVERAL DAYS
6. BECOMING EASILY ANNOYED OR IRRITABLE: NOT AT ALL
7. FEELING AFRAID AS IF SOMETHING AWFUL MIGHT HAPPEN: SEVERAL DAYS
GAD7 TOTAL SCORE: 2

## 2025-02-25 ASSESSMENT — PATIENT HEALTH QUESTIONNAIRE - PHQ9
SUM OF ALL RESPONSES TO PHQ QUESTIONS 1-9: 0
SUM OF ALL RESPONSES TO PHQ QUESTIONS 1-9: 0

## 2025-02-25 ASSESSMENT — SOCIAL DETERMINANTS OF HEALTH (SDOH): HOW OFTEN DO YOU GET TOGETHER WITH FRIENDS OR RELATIVES?: ONCE A WEEK

## 2025-02-25 NOTE — LETTER
My Asthma Action Plan    Name: Kimberley Louis   YOB: 1980  Date: 2/26/2025   My doctor: ZAHEER Umanzor CNP   My clinic: [facility]        My Rescue Medicine:   Albuterol inhaler (Proair/Ventolin/Proventil HFA)  2-4 puffs EVERY 4 HOURS as needed. Use a spacer if recommended by your provider.   My Asthma Severity:   Intermittent / Exercise Induced  Know your asthma triggers:              GREEN ZONE   Good Control  I feel good  No cough or wheeze  Can work, sleep and play without asthma symptoms       Take your asthma control medicine every day.     If exercise triggers your asthma, take your rescue medication  15 minutes before exercise or sports, and  During exercise if you have asthma symptoms  Spacer to use with inhaler: If you have a spacer, make sure to use it with your inhaler             YELLOW ZONE Getting Worse  I have ANY of these:  I do not feel good  Cough or wheeze  Chest feels tight  Wake up at night   Keep taking your Green Zone medications  Start taking your rescue medicine:  every 20 minutes for up to 1 hour. Then every 4 hours for 24-48 hours.  If you stay in the Yellow Zone for more than 12-24 hours, contact your doctor.  If you do not return to the Green Zone in 12-24 hours or you get worse, start taking your oral steroid medicine if prescribed by your provider.           RED ZONE Medical Alert - Get Help  I have ANY of these:  I feel awful  Medicine is not helping  Breathing getting harder  Trouble walking or talking  Nose opens wide to breathe       Take your rescue medicine NOW  If your provider has prescribed an oral steroid medicine, start taking it NOW  Call your doctor NOW  If you are still in the Red Zone after 20 minutes and you have not reached your doctor:  Take your rescue medicine again and  Call 911 or go to the emergency room right away    See your regular doctor within 2 weeks of an Emergency Room or Urgent Care visit for follow-up  treatment.          Annual Reminders:  Meet with Asthma Educator,  Flu Shot in the Fall, consider Pneumonia Vaccination for patients with asthma (aged 19 and older).    Pharmacy: [facility] [id number] [id number] [facility] [address] [facility] [address]    Electronically signed by ZAHEER Umanzor CNP   Date: 02/26/25                    Asthma Triggers  How To Control Things That Make Your Asthma Worse    Triggers are things that make your asthma worse.  Look at the list below to help you find your triggers and   what you can do about them. You can help prevent asthma flare-ups by staying away from your triggers.      Trigger                                                          What you can do   Cigarette Smoke  Tobacco smoke can make asthma worse. Do not allow smoking in your home, car or around you.  Be sure no one smokes at a child s day care or school.  If you smoke, ask your health care provider for ways to help you quit.  Ask family members to quit too.  Ask your health care provider for a referral to Quit Plan to help you quit smoking, or call 8-102-433-PLAN.     Colds, Flu, Bronchitis  These are common triggers of asthma. Wash your hands often.  Don t touch your eyes, nose or mouth.  Get a flu shot every year.     Dust Mites  These are tiny bugs that live in cloth or carpet. They are too small to see. Wash sheets and blankets in hot water every week.   Encase pillows and mattress in dust mite proof covers.  Avoid having carpet if you can. If you have carpet, vacuum weekly.   Use a dust mask and HEPA vacuum.   Pollen and Outdoor Mold  Some people are allergic to trees, grass, or weed pollen, or molds. Try to keep your windows closed.  Limit time out doors when pollen count is high.   Ask you health care provider about taking medicine during allergy season.     Animal Dander  Some people are allergic to skin flakes, urine or saliva from pets with fur or feathers. Keep pets with fur or feathers out of your home.    If you  can t keep the pet outdoors, then keep the pet out of your bedroom.  Keep the bedroom door closed.  Keep pets off cloth furniture and away from stuffed toys.     Mice, Rats, and Cockroaches  Some people are allergic to the waste from these pests.   Cover food and garbage.  Clean up spills and food crumbs.  Store grease in the refrigerator.   Keep food out of the bedroom.   Indoor Mold  This can be a trigger if your home has high moisture. Fix leaking faucets, pipes, or other sources of water.   Clean moldy surfaces.  Dehumidify basement if it is damp and smelly.   Smoke, Strong Odors, and Sprays  These can reduce air quality. Stay away from strong odors and sprays, such as perfume, powder, hair spray, paints, smoke incense, paint, cleaning products, candles and new carpet.   Exercise or Sports  Some people with asthma have this trigger. Be active!  Ask your doctor about taking medicine before sports or exercise to prevent symptoms.    Warm up for 5-10 minutes before and after sports or exercise.     Other Triggers of Asthma  Cold air:  Cover your nose and mouth with a scarf.  Sometimes laughing or crying can be a trigger.  Some medicines and food can trigger asthma.

## 2025-02-25 NOTE — PATIENT INSTRUCTIONS
Patient Education   Preventive Care Advice   This is general advice given by our system to help you stay healthy. However, your care team may have specific advice just for you. Please talk to your care team about your preventive care needs.  Nutrition  Eat 5 or more servings of fruits and vegetables each day.  Try wheat bread, brown rice and whole grain pasta (instead of white bread, rice, and pasta).  Get enough calcium and vitamin D. Check the label on foods and aim for 100% of the RDA (recommended daily allowance).  Lifestyle  Exercise at least 150 minutes each week  (30 minutes a day, 5 days a week).  Do muscle strengthening activities 2 days a week. These help control your weight and prevent disease.  No smoking.  Wear sunscreen to prevent skin cancer.  Have a dental exam and cleaning every 6 months.  Yearly exams  See your health care team every year to talk about:  Any changes in your health.  Any medicines your care team has prescribed.  Preventive care, family planning, and ways to prevent chronic diseases.  Shots (vaccines)   HPV shots (up to age 26), if you've never had them before.  Hepatitis B shots (up to age 59), if you've never had them before.  COVID-19 shot: Get this shot when it's due.  Flu shot: Get a flu shot every year.  Tetanus shot: Get a tetanus shot every 10 years.  Pneumococcal, hepatitis A, and RSV shots: Ask your care team if you need these based on your risk.  Shingles shot (for age 50 and up)  General health tests  Diabetes screening:  Starting at age 35, Get screened for diabetes at least every 3 years.  If you are younger than age 35, ask your care team if you should be screened for diabetes.  Cholesterol test: At age 39, start having a cholesterol test every 5 years, or more often if advised.  Bone density scan (DEXA): At age 50, ask your care team if you should have this scan for osteoporosis (brittle bones).  Hepatitis C: Get tested at least once in your life.  STIs (sexually  transmitted infections)  Before age 24: Ask your care team if you should be screened for STIs.  After age 24: Get screened for STIs if you're at risk. You are at risk for STIs (including HIV) if:  You are sexually active with more than one person.  You don't use condoms every time.  You or a partner was diagnosed with a sexually transmitted infection.  If you are at risk for HIV, ask about PrEP medicine to prevent HIV.  Get tested for HIV at least once in your life, whether you are at risk for HIV or not.  Cancer screening tests  Cervical cancer screening: If you have a cervix, begin getting regular cervical cancer screening tests starting at age 21.  Breast cancer scan (mammogram): If you've ever had breasts, begin having regular mammograms starting at age 40. This is a scan to check for breast cancer.  Colon cancer screening: It is important to start screening for colon cancer at age 45.  Have a colonoscopy test every 10 years (or more often if you're at risk) Or, ask your provider about stool tests like a FIT test every year or Cologuard test every 3 years.  To learn more about your testing options, visit:   .  For help making a decision, visit:   https://bit.ly/sv56182.  Prostate cancer screening test: If you have a prostate, ask your care team if a prostate cancer screening test (PSA) at age 55 is right for you.  Lung cancer screening: If you are a current or former smoker ages 50 to 80, ask your care team if ongoing lung cancer screenings are right for you.  For informational purposes only. Not to replace the advice of your health care provider. Copyright   2023 [facility]. All rights reserved. Clinically reviewed by the [facility]. MIKA Audio 224186 - REV 01/24.  Safer Sex: Care Instructions  Overview  Safer sex is a way to reduce your risk of getting a sexually transmitted infection (STI). It can also help prevent pregnancy.  Several products can help you practice  safer sex and reduce your chance of STIs. One of the best is a condom. There are internal and external condoms. You can use a special rubber sheet (dental dam) for protection during oral sex. Disposable gloves can keep your hands from touching blood, semen, or other body fluids that can carry infections.  Remember that birth control methods such as diaphragms, IUDs, foams, and birth control pills do not stop you from getting STIs.  Follow-up care is a key part of your treatment and safety. Be sure to make and go to all appointments, and call your doctor if you are having problems. It's also a good idea to know your test results and keep a list of the medicines you take.  How can you care for yourself at home?  Think about getting vaccinated to help prevent hepatitis A, hepatitis B, and human papillomavirus (HPV). They can be spread through sex.  Use a condom every time you have sex. Use an external condom, which goes on the penis. Or use an internal condom, which goes into the vagina or anus.  Make sure you use the right size external condom. A condom that's too small can break easily. A condom that's too big can slip off during sex.  Use a new condom each time you have sex. Be careful not to poke a hole in the condom when you open the wrapper.  Don't use an internal condom and an external condom at the same time.  Never use petroleum jelly (such as Vaseline), grease, hand lotion, baby oil, or anything with oil in it. These products can make holes in the condom.  After intercourse, hold the edge of the condom as you remove it. This will help keep semen from spilling out of the condom.  Do not have sex with anyone who has symptoms of an STI, such as sores on the genitals or mouth.  Do not drink a lot of alcohol or use drugs before sex.  Limit your sex partners. Sex with one partner who has sex only with you can reduce your risk of getting an STI.  Don't share sex toys. But if you do share them, use a condom and clean  "the sex toys between each use.  Talk to any partners before you have sex. Talk about what you feel comfortable with and whether you have any boundaries with sex. And find out if your partner or partners may be at risk for any STI. Keep in mind that a person may be able to spread an STI even if they do not have symptoms. You and any partners may want to get tested for STIs.  Where can you learn more?  Go to https://www.[employer].net/patiented  Enter B608 in the search box to learn more about \"Safer Sex: Care Instructions.\"  Current as of: April 30, 2024  Content Version: 14.3    2024 [employer].   Care instructions adapted under license by your healthcare professional. If you have questions about a medical condition or this instruction, always ask your healthcare professional. [employer] disclaims any warranty or liability for your use of this information.       "

## 2025-02-25 NOTE — PROGRESS NOTES
Preventive Care Visit  RANGE ZAHEER Iqbal CNP, Family Medicine  Feb 25, 2025      Assessment & Plan     Kimberley has a history of a hysterectomy. She believes her cervix was removed. She's having vaginal discharge. Vaginal exam done and cervix was noted on exam. PAP done.     (R73.09) Elevated glucose  (primary encounter diagnosis)  Comment: Check A1C   Plan: Hemoglobin A1c            (Z00.00) Routine general medical examination at a health care facility  Comment: Check labs. Due for colonoscopy. PAP today   Plan: Hepatitis C Screen Reflex to HCV RNA Quant and         Genotype, HIV Antigen Antibody Combo Cascade,         HPV and Gynecologic Cytology Panel -         Recommended for Age 30 -65 Years, TSH with free        T4 reflex, Lipid Profile, MA Screen Bilateral         w/Aroldo, Colonoscopy Screening          Referral, Hemoglobin A1c            (Z12.31) Encounter for screening mammogram for breast cancer  Comment: Due for mammogram. Mammogram ordered   Plan: MA Screen Bilateral w/Aroldo            (Z12.11) Screening for colon cancer  Comment: Due for colonoscopy   Plan: Colonoscopy Screening  Referral            (Z12.4) Screening for cervical cancer  Comment: Due for PAP  Plan: HPV and Gynecologic Cytology Panel -         Recommended for Age 30 -65 Years            (Z11.59) Encounter for HCV screening test for low risk patient  Comment: Add Hep C lab on for health maintenance   Plan: Hepatitis C Screen Reflex to HCV RNA Quant and         Genotype            (Z11.4) Encounter for screening for HIV  Comment: Add HIV lab on for health maintenance   Plan: HIV Antigen Antibody Combo Cascade        (N89.8) Vaginal discharge  Comment: History of BV. Check vaginal swab. She's noted a vaginal odor   Plan: Multiplex Vaginal Panel by PCR            (N20.0) Kidney stone  Comment: In the ER on 2/20/25 with a kidney stone. Feels like she's passed it. She feels like she's passed at least 1  "other stone. CT reviewed with her and CT states non obstructing left renal calyceal stones. She is able to pass urine. Pain has improved   Plan: Follow up with any increase in symptoms or concerns       (J45.20) Mild intermittent asthma without complication  Comment: Stable. ACT 22 on 2/19/2025 from [employer] form completion on chart review. No current symptoms of flare up   Plan: Continue medication regimen     BMI  Estimated body mass index is 27.59 kg/m  as calculated from the following:    Height as of this encounter: 1.628 m (5' 4.09\").    Weight as of this encounter: 73.1 kg (161 lb 3.2 oz).   Weight management plan: Discussed healthy diet and exercise guidelines    Counseling  Appropriate preventive services were addressed with this patient via screening, questionnaire, or discussion as appropriate for fall prevention, nutrition, physical activity, Tobacco-use cessation, social engagement, weight loss and cognition.  Checklist reviewing preventive services available has been given to the patient.  Reviewed patient's diet, addressing concerns and/or questions.   The patient was instructed to see the dentist every 6 months.       See Patient Instructions    Return in about 53 weeks (around 3/3/2026) for Annual Wellness Visit.    Chris Chu is a 45 year old, presenting for the following:  Physical, Hypertension, Lipids, and Kidney Stone(s) Followup        2/25/2025     2:10 PM   Additional Questions   Roomed by Beni Brewer   Accompanied by Self         2/25/2025     2:10 PM   Patient Reported Additional Medications   Patient reports taking the following new medications none          HPI    Hyperlipidemia Follow-Up    Are you regularly taking any medication or supplement to lower your cholesterol?   No  Are you having muscle aches or other side effects that you think could be caused by your cholesterol lowering medication?  No    Hypertension Follow-up    Do you check your blood pressure regularly " outside of the clinic? No   Are you following a low salt diet? No  Are your blood pressures ever more than 140 on the top number (systolic) OR more   than 90 on the bottom number (diastolic), for example 140/90? N/A  Concern - Kidney stones   Onset: Started passing about a week ago and seen in the ER   Description: Has passed 2 kidneys stones in a about a week.   Intensity: moderate  Progression of Symptoms:  improving, but still feelings like some thing could still be there   Accompanying Signs & Symptoms: none   Previous history of similar problem: has had multiple in the past   Precipitating factors:        Worsened by: none   Alleviating factors:        Improved by: water and pain medication from hospital   Therapies tried and outcome: Water    Health Care Directive  Patient does not have a Health Care Directive: Discussed advance care planning with patient; however, patient declined at this time.      2/25/2025   General Health   How would you rate your overall physical health? (!) FAIR   Feel stress (tense, anxious, or unable to sleep) Only a little   (!) STRESS CONCERN      2/25/2025   Nutrition   Three or more servings of calcium each day? (!) I DON'T KNOW   Diet: Regular (no restrictions)   How many servings of fruit and vegetables per day? (!) 0-1   How many sweetened beverages each day? 0-1         2/25/2025   Exercise   Days per week of moderate/strenous exercise 5 days         2/25/2025   Social Factors   Frequency of gathering with friends or relatives Once a week   Worry food won't last until get money to buy more No   Food not last or not have enough money for food? No   Do you have housing? (Housing is defined as stable permanent housing and does not include staying ouside in a car, in a tent, in an abandoned building, in an overnight shelter, or couch-surfing.) Yes   Are you worried about losing your housing? No   Lack of transportation? No   Unable to get utilities (heat,electricity)? No          2/25/2025   Dental   Dentist two times every year? (!) NO          Today's PHQ-9 Score:       2/25/2025     1:56 PM   PHQ-9 SCORE   PHQ-9 Total Score MyChart 0   PHQ-9 Total Score 0        Patient-reported         2/25/2025   Substance Use   Alcohol more than 3/day or more than 7/wk Not Applicable   Do you use any other substances recreationally? No     Social History     Tobacco Use    Smoking status: Former     Current packs/day: 0.75     Average packs/day: 0.8 packs/day for 36.2 years (27.1 ttl pk-yrs)     Types: Cigarettes     Start date: 1/1/1989    Smokeless tobacco: Never    Tobacco comments:     pt denied QP 9/22/20. Quit about 6 months    Vaping Use    Vaping status: Some Days   Substance Use Topics    Alcohol use: Not Currently     Comment: occasional    Drug use: No           2/22/2023   LAST FHS-7 RESULTS   1st degree relative breast or ovarian cancer --       Mammogram Screening - Mammogram every 1-2 years updated in Health Maintenance based on mutual decision making          2/25/2025   One time HIV Screening   Previous HIV test? No         2/25/2025   STI Screening   New sexual partner(s) since last STI/HIV test? (!) YES      History of abnormal Pap smear: No - age 30- 64 PAP with HPV every 5 years recommended        Latest Ref Rng & Units 1/22/2020     1:44 PM 5/5/2016    12:00 AM   PAP / HPV   PAP (Historical)  NIL  NIL    HPV 16 DNA NEG^Negative Negative     HPV 18 DNA NEG^Negative Negative     Other HR HPV NEG^Negative Negative       ASCVD Risk   The 10-year ASCVD risk score (Julissa HOWE, et al., 2019) is: 2.4%    Values used to calculate the score:      Age: 45 years      Sex: Female      Is Non- : No      Diabetic: No      Tobacco smoker: No      Systolic Blood Pressure: 130 mmHg      Is BP treated: Yes      HDL Cholesterol: 42 mg/dL      Total Cholesterol: 242 mg/dL      Reviewed and updated as needed this visit by Provider     Meds                  Review of  "Systems  CONSTITUTIONAL: NEGATIVE for fever, chills, change in weight  INTEGUMENTARY/SKIN: NEGATIVE for worrisome rashes, moles or lesions  EYES: NEGATIVE for vision changes or irritation  ENT/MOUTH: NEGATIVE for ear, mouth and throat problems  RESP: NEGATIVE for significant cough or SOB  BREAST: NEGATIVE for masses, tenderness or discharge  CV: NEGATIVE for chest pain, palpitations or peripheral edema  GI: NEGATIVE for nausea, abdominal pain, heartburn, or change in bowel habits  : NEGATIVE for frequency, dysuria, or hematuria  MUSCULOSKELETAL: NEGATIVE for significant arthralgias or myalgia  NEURO: NEGATIVE for weakness, dizziness or paresthesias  ENDOCRINE: NEGATIVE for temperature intolerance, skin/hair changes  HEME: NEGATIVE for bleeding problems  PSYCHIATRIC: NEGATIVE for changes in mood or affect     Objective    Exam  /82 (BP Location: Right arm, Patient Position: Sitting, Cuff Size: Adult Regular)   Pulse 64   Temp 98.6  F (37  C) (Tympanic)   Resp 20   Ht 1.628 m (5' 4.09\")   Wt 73.1 kg (161 lb 3.2 oz)   LMP 04/20/2022 (Exact Date)   SpO2 97%   BMI 27.59 kg/m     Estimated body mass index is 27.59 kg/m  as calculated from the following:    Height as of this encounter: 1.628 m (5' 4.09\").    Weight as of this encounter: 73.1 kg (161 lb 3.2 oz).    Physical Exam  GENERAL: alert and no distress  EYES: Eyes grossly normal to inspection, PERRL and conjunctivae and sclerae normal  HENT: ear canals and TM's normal, nose and mouth without ulcers or lesions  NECK: no adenopathy, no asymmetry, masses, or scars  RESP: lungs clear to auscultation - no rales, rhonchi or wheezes  BREAST: normal without masses, tenderness or nipple discharge and no palpable axillary masses or adenopathy  CV: regular rate and rhythm, normal S1 S2, no S3 or S4, no murmur, click or rub, no peripheral edema  ABDOMEN: soft, nontender, no hepatosplenomegaly, no masses and bowel sounds normal   (female): normal female " external genitalia, normal urethral meatus, normal vaginal mucosa  MS: no gross musculoskeletal defects noted, no edema  SKIN: no suspicious lesions or rashes  NEURO: Normal strength and tone, mentation intact and speech normal  PSYCH: mentation appears normal, affect normal/bright      Signed Electronically by: ZAHEER Umanzor CNP    Answers submitted by the patient for this visit:  Patient Health Questionnaire (Submitted on 2/25/2025)  PHQ9 TOTAL SCORE: 0  Patient Health Questionnaire (G7) (Submitted on 2/25/2025)  FAUZIA 7 TOTAL SCORE: 2

## 2025-02-26 ENCOUNTER — TELEPHONE (OUTPATIENT)
Dept: FAMILY MEDICINE | Facility: OTHER | Age: 45
End: 2025-02-26

## 2025-02-26 LAB
BACTERIAL VAGINOSIS VAG-IMP: NEGATIVE
CANDIDA DNA VAG QL NAA+PROBE: NOT DETECTED
CANDIDA GLABRATA / CANDIDA KRUSEI DNA: NOT DETECTED
CHOLEST SERPL-MCNC: 242 MG/DL
EST. AVERAGE GLUCOSE BLD GHB EST-MCNC: 111 MG/DL
FASTING STATUS PATIENT QL REPORTED: NO
HBA1C MFR BLD: 5.5 %
HCV AB SERPL QL IA: NONREACTIVE
HDLC SERPL-MCNC: 42 MG/DL
HIV 1+2 AB+HIV1 P24 AG SERPL QL IA: NONREACTIVE
LDLC SERPL CALC-MCNC: 150 MG/DL
NONHDLC SERPL-MCNC: 200 MG/DL
T VAGINALIS DNA VAG QL NAA+PROBE: NOT DETECTED
TRIGL SERPL-MCNC: 252 MG/DL
TSH SERPL DL<=0.005 MIU/L-ACNC: 1.72 UIU/ML (ref 0.3–4.2)

## 2025-02-26 NOTE — TELEPHONE ENCOUNTER
Screening Questions for the Scheduling of Screening Colonoscopies     (If Colonoscopy is diagnostic, Provider should review the chart before scheduling.)    Are you younger than 50 or older than 80?  Yes, 45 year old    Do you take aspirin or fish oil?  No (if yes, tell patient to stop 1 week prior to Colonoscopy)    Do you take warfarin (Coumadin), clopidogrel (Plavix), apixaban (Eliquis), dabigatram (Pradaxa), rivaroxaban (Xarelto) or any blood thinner? No    Do you use oxygen at home?  No    Do you have kidney disease? No    Are you on dialysis? No    Have you had a stroke or heart attack in the last year? No    Have you had a stent in your heart or any blood vessel in the last year? No    Have you had a transplant of any organ?  No    Have you had a colonoscopy or upper endoscopy (EGD) before?  No         Date of scheduled Colonoscopy: 5/16/25    Provider: Dr. Celso harrison

## 2025-03-05 LAB
BKR AP ASSOCIATED HPV REPORT: NORMAL
BKR LAB AP GYN ADEQUACY: NORMAL
BKR LAB AP GYN INTERPRETATION: NORMAL
BKR LAB AP PREVIOUS ABNORMAL: NORMAL
PATH REPORT.COMMENTS IMP SPEC: NORMAL
PATH REPORT.COMMENTS IMP SPEC: NORMAL
PATH REPORT.RELEVANT HX SPEC: NORMAL

## 2025-03-11 LAB
HPV HR 12 DNA CVX QL NAA+PROBE: NEGATIVE
HPV16 DNA CVX QL NAA+PROBE: NEGATIVE
HPV18 DNA CVX QL NAA+PROBE: NEGATIVE
HUMAN PAPILLOMA VIRUS FINAL DIAGNOSIS: NORMAL

## 2025-03-13 ENCOUNTER — TELEPHONE (OUTPATIENT)
Dept: MAMMOGRAPHY | Facility: OTHER | Age: 45
End: 2025-03-13

## 2025-03-13 ENCOUNTER — ANCILLARY PROCEDURE (OUTPATIENT)
Dept: MAMMOGRAPHY | Facility: OTHER | Age: 45
End: 2025-03-13
Attending: NURSE PRACTITIONER
Payer: COMMERCIAL

## 2025-03-13 DIAGNOSIS — Z12.31 ENCOUNTER FOR SCREENING MAMMOGRAM FOR BREAST CANCER: ICD-10-CM

## 2025-03-13 DIAGNOSIS — Z00.00 ROUTINE GENERAL MEDICAL EXAMINATION AT A HEALTH CARE FACILITY: ICD-10-CM

## 2025-03-13 PROCEDURE — 77067 SCR MAMMO BI INCL CAD: CPT | Mod: TC | Performed by: RADIOLOGY

## 2025-03-13 PROCEDURE — 77063 BREAST TOMOSYNTHESIS BI: CPT | Mod: TC | Performed by: RADIOLOGY

## 2025-03-18 ENCOUNTER — HOSPITAL ENCOUNTER (OUTPATIENT)
Dept: ULTRASOUND IMAGING | Facility: HOSPITAL | Age: 45
Discharge: HOME | End: 2025-03-18
Attending: NURSE PRACTITIONER | Admitting: NURSE PRACTITIONER
Payer: COMMERCIAL

## 2025-03-18 DIAGNOSIS — R92.8 ABNORMAL FINDING ON BREAST IMAGING: ICD-10-CM

## 2025-03-18 PROCEDURE — 76642 ULTRASOUND BREAST LIMITED: CPT | Mod: RT

## 2025-03-19 ENCOUNTER — TELEPHONE (OUTPATIENT)
Dept: FAMILY MEDICINE | Facility: OTHER | Age: 45
End: 2025-03-19

## 2025-03-19 DIAGNOSIS — Z12.83 SCREENING FOR SKIN CANCER: Primary | ICD-10-CM

## 2025-03-19 DIAGNOSIS — R92.8 ABNORMAL MAMMOGRAM: ICD-10-CM

## 2025-03-19 NOTE — TELEPHONE ENCOUNTER
Patient agreed to 6 month mammogram and US. Patient also stated she hasn't heard anything from dermatology. I didn't find referral but stated it is for the spot on her nose.

## 2025-04-19 DIAGNOSIS — I10 ESSENTIAL HYPERTENSION: ICD-10-CM

## 2025-04-21 RX ORDER — AMLODIPINE BESYLATE 10 MG/1
10 TABLET ORAL DAILY
Qty: 90 TABLET | Refills: 3 | Status: SHIPPED | OUTPATIENT
Start: 2025-04-21

## 2025-05-12 RX ORDER — TEA TREE OIL 100 %
1 OIL (ML) TOPICAL DAILY
COMMUNITY

## 2025-05-12 RX ORDER — FAMOTIDINE 20 MG
2000 TABLET ORAL DAILY
COMMUNITY

## 2025-05-16 ENCOUNTER — HOSPITAL ENCOUNTER (OUTPATIENT)
Facility: HOSPITAL | Age: 45
Discharge: HOME | End: 2025-05-16
Attending: SURGERY | Admitting: SURGERY
Payer: COMMERCIAL

## 2025-05-16 VITALS
HEIGHT: 64 IN | BODY MASS INDEX: 26.36 KG/M2 | DIASTOLIC BLOOD PRESSURE: 73 MMHG | RESPIRATION RATE: 16 BRPM | TEMPERATURE: 97.8 F | SYSTOLIC BLOOD PRESSURE: 122 MMHG | OXYGEN SATURATION: 98 % | HEART RATE: 69 BPM | WEIGHT: 154.4 LBS

## 2025-05-16 PROCEDURE — 272N000001 HC OR GENERAL SUPPLY STERILE: Performed by: SURGERY

## 2025-05-16 PROCEDURE — G0121 COLON CA SCRN NOT HI RSK IND: HCPCS | Performed by: SURGERY

## 2025-05-16 PROCEDURE — 370N000017 HC ANESTHESIA TECHNICAL FEE, PER MIN: Performed by: SURGERY

## 2025-05-16 PROCEDURE — 360N000075 HC SURGERY LEVEL 2, PER MIN: Performed by: SURGERY

## 2025-05-16 PROCEDURE — 258N000003 HC RX IP 258 OP 636: Performed by: NURSE PRACTITIONER

## 2025-05-16 PROCEDURE — 710N000012 HC RECOVERY PHASE 2, PER MINUTE: Performed by: SURGERY

## 2025-05-16 PROCEDURE — 999N000141 HC STATISTIC PRE-PROCEDURE NURSING ASSESSMENT: Performed by: SURGERY

## 2025-05-16 RX ORDER — ONDANSETRON 2 MG/ML
4 INJECTION INTRAMUSCULAR; INTRAVENOUS EVERY 30 MIN PRN
Status: DISCONTINUED | OUTPATIENT
Start: 2025-05-16 | End: 2025-05-16 | Stop reason: HOSPADM

## 2025-05-16 RX ORDER — FLUMAZENIL 0.1 MG/ML
0.2 INJECTION, SOLUTION INTRAVENOUS
Status: CANCELLED | OUTPATIENT
Start: 2025-05-16 | End: 2025-05-16

## 2025-05-16 RX ORDER — SODIUM CHLORIDE, SODIUM LACTATE, POTASSIUM CHLORIDE, CALCIUM CHLORIDE 600; 310; 30; 20 MG/100ML; MG/100ML; MG/100ML; MG/100ML
INJECTION, SOLUTION INTRAVENOUS CONTINUOUS
Status: DISCONTINUED | OUTPATIENT
Start: 2025-05-16 | End: 2025-05-16 | Stop reason: HOSPADM

## 2025-05-16 RX ORDER — ONDANSETRON 4 MG/1
4 TABLET, ORALLY DISINTEGRATING ORAL EVERY 30 MIN PRN
Status: DISCONTINUED | OUTPATIENT
Start: 2025-05-16 | End: 2025-05-16 | Stop reason: HOSPADM

## 2025-05-16 RX ORDER — NALOXONE HYDROCHLORIDE 0.4 MG/ML
0.1 INJECTION, SOLUTION INTRAMUSCULAR; INTRAVENOUS; SUBCUTANEOUS
Status: DISCONTINUED | OUTPATIENT
Start: 2025-05-16 | End: 2025-05-16 | Stop reason: HOSPADM

## 2025-05-16 RX ORDER — DEXAMETHASONE SODIUM PHOSPHATE 10 MG/ML
4 INJECTION, SOLUTION INTRAMUSCULAR; INTRAVENOUS
Status: DISCONTINUED | OUTPATIENT
Start: 2025-05-16 | End: 2025-05-16 | Stop reason: HOSPADM

## 2025-05-16 RX ORDER — LIDOCAINE 40 MG/G
CREAM TOPICAL
Status: DISCONTINUED | OUTPATIENT
Start: 2025-05-16 | End: 2025-05-16 | Stop reason: HOSPADM

## 2025-05-16 RX ADMIN — SODIUM CHLORIDE, SODIUM LACTATE, POTASSIUM CHLORIDE, AND CALCIUM CHLORIDE: .6; .31; .03; .02 INJECTION, SOLUTION INTRAVENOUS at 09:27

## 2025-05-16 ASSESSMENT — ACTIVITIES OF DAILY LIVING (ADL)
ADLS_ACUITY_SCORE: 41

## 2025-05-16 NOTE — H&P
Surgery Consult Clinic Note      RE: Kimberley Louis  : 1980        Chief Complaint:  Colon cancer screening    History of Present Illness:  I am seeing Kimberley Louis at the request of Magdalena Villafana NP for evaluation regarding meet and greet screening colonoscopy.  This is her first colonoscopy.  She denies family history of colon or rectal cancer, blood in stool, changes in bowel habits, weight loss, abdominal pain.  Previous abdominal surgeries include hysterectomy.   She has no questions regarding  bowel prep.  Reports passing clear liquid stools today.     She specifically denies fevers, chills, nausea, vomiting, chest pain, shortness of breath, palpitations, sore throat, cough, or generalized feeling ill.       Medical history:  Past Medical History:   Diagnosis Date    Cyst of breast     Depressive disorder     Enlarged kidney 2015    Essential hypertension 2015    H/O renal calculi 2015    H/O renal calculi 2015    History of blood transfusion     Mild asthma     Uncomplicated asthma        Surgical history:  Past Surgical History:   Procedure Laterality Date    ABDOMEN SURGERY      AS REMOVAL OF KIDNEY STONE      AS REMOVAL OF KIDNEY STONE      COMBINED CYSTOSCOPY, RETROGRADES, URETEROSCOPY, LASER HOLMIUM LITHOTRIPSY URETER(S), INSERT STENT Left 2020    Procedure: CYSTOURETEROSCOPY, WITH RETROGRADE PYELOGRAM, HOLMIUM LASER LITHOTRIPSY OF URETERAL CALCULUS, interpretation of fluroscopy;  Surgeon: Sherry Mcpherson MD;  Location: [facility]    LAPAROSCOPIC HYSTERECTOMY SUPRACERVICAL N/A 2022    Procedure: LAPAROSCOPIC SUPRACERVICAL HYSTERECTOMY;  Surgeon: Bunny yHatt MD;  Location: [facility]. Cervix remains in place    LAPAROSCOPIC SALPINGECTOMY Bilateral 2022    Procedure: BILATERAL SALPINGECTOMY, Lysis of Adhesions;  Surgeon: Bunny Hyatt MD;  Location: [facility]    TUBAL LIGATION      ZZC REMOVAL OF KIDNEY STONE         Family history:  Family History   Problem  Relation Age of Onset    Diabetes Mother     Emphysema Mother     Cervical Cancer Mother     Throat cancer Mother     Depression Mother     Diabetes Father     Hypertension Father     Hypertension Maternal Grandmother     Diabetes Maternal Grandmother     Schizophrenia Maternal Grandmother     Depression Maternal Grandmother     Mental Illness Maternal Grandmother     Unknown/Adopted Maternal Grandfather     Lung Cancer Paternal Grandmother     Diabetes Paternal Grandmother     Unknown/Adopted Paternal Grandfather        Medications:  Prior to Admission medications    Medication Sig Start Date End Date Taking? Authorizing Provider   albuterol (PROAIR HFA) 108 (90 Base) MCG/ACT inhaler Inhale 2 puffs into the lungs every 6 hours 4/24/24  Yes Jazmyne Villafana APRN CNP   amLODIPine (NORVASC) 10 MG tablet Take 1 tablet by mouth once daily 4/21/25  Yes Jazmyne Villafana APRN CNP   Bacillus Coagulans-Inulin (PROBIOTIC-PREBIOTIC) 1-250 BILLION-MG CAPS Take 1 capsule by mouth daily.   Yes Reported, Patient   metoprolol tartrate (LOPRESSOR) 25 MG tablet Take 1 tablet by mouth twice daily 3/7/25  Yes Jazmyne Villafana APRN CNP   Vitamin D, Cholecalciferol, 25 MCG (1000 UT) CAPS Take 2,000 Units by mouth daily.   Yes Reported, Patient   ketorolac (TORADOL) 10 MG tablet Take 1 tablet (10 mg) by mouth every 6 hours as needed for moderate pain.  Patient not taking: Reported on 2/25/2025 2/20/25   Kirit Aguiar APRN CNP   mometasone (ELOCON) 0.1 % external cream Apply topically daily. 10/30/24   Jazmyne Villafana APRN CNP   oxyCODONE (ROXICODONE) 5 MG tablet Take 1 tablet (5 mg) by mouth every 6 hours as needed for breakthrough pain.  Patient not taking: Reported on 2/25/2025 2/20/25   Kirit Aguiar APRN CNP       Allergies:  The patient has no known allergies.  .  Social history:  Social History     Tobacco Use    Smoking status: Some Days     Current packs/day: 0.75     Average packs/day: 0.8 packs/day for  36.4 years (27.3 ttl pk-yrs)     Types: Cigarettes     Start date: 1/1/1989    Smokeless tobacco: Never    Tobacco comments:     pt denied QP 9/22/20. Quit about 6 months    Substance Use Topics    Alcohol use: Not Currently     Comment: occasional     Marital status: single.    Review of Systems:    Constitutional: Negative for fever, chills.   HENT: Negative for ear pain, congestion, sore throat, and ear discharge.    Eyes: Negative for blurred vision, double vision.   Respiratory: Negative for cough, hemoptysis, shortness of breath, wheezing and stridor.    Cardiovascular: Negative for chest pain, palpitations and orthopnea.   Gastrointestinal: Negative for heartburn, nausea, vomiting, abdominal pain and blood in stool.   Genitourinary: Negative for urgency, frequency   Musculoskeletal: Negative for myalgias, back pain and joint pain.   Neurological: Negative for tingling, speech change and headaches.   Endo/Heme/Allergies: Does not bruise/bleed easily.   Psychiatric/Behavioral: Negative for depression, suicidal ideas and hallucinations. The patient is not nervous/anxious.    Physical Examination:  [facility] 04/20/2022 (Exact Date)   General: Alert and orientedx4, no acute distress, well-developed/well-nourished, ambulating without assistance  HEENT: normocephalic atraumatic, extraocular movements intact, sclerae anicteric, Trachea midline  Chest:   Clear to auscultation bilaterally.  Cardiac: S1S2 , regular rate and rhythm without additional sounds  Abdomen: Soft, non-tender, non-distended  Extremities: Cursory exam unremarkable.  No peripheral edema noted.  Skin: Warm, dry, less than 2 sec cap refill  Neuro: CN 2-12 grossly intact, no focal deficit, GCS 15  Psych: Pleasant, calm, asks appropriate questions      Assessment/Plan:  #1 Colonoscopy    Kimberley Louis and I had a discussion about colonoscopies.  The indications, risks, benefits, althernatives and technical aspects of whole colon colonoscopy were outlined  with risks including, but not limited to, perforation, bleeding and inability to visualize entire colon.  Management of each was reviewed including the risk for life saving surgery and possible admittance to the hospital.  The need of mechanical preparation of the colon was reviewed along with the use of monitored anesthetic care.  Her questions were asked and answered.  We will proceed with colonoscopy with Dr. Houston.  [facility]  [address] [place], [place] [id number]    Referring Provider:  No referring provider defined for this encounter.     Primary Care Provider:  Jazmyne Villafana

## 2025-05-16 NOTE — OR NURSING
Patient and responsible adult given discharge instructions with no questions regarding instructions. Josiah score 20/20. Pain level 0/10.  Discharged from unit via ambulation. Patient discharged to home.

## 2025-05-16 NOTE — OP NOTE
Kimberley Louis MRN# [id number]   YOB: 1980 Age: 45 year old      Date of Admission:  5/16/2025  Date of Service:   5/16/25    Primary care provider: Jazmyne Villafana    PREOPERATIVE DIAGNOSIS:  Colon Cancer Screening        POSTOPERATIVE DIAGNOSIS:  Normal colon          PROCEDURE:  Colonoscopy           INDICATIONS:  Screening colonoscopy.      Specimen: * No specimens in log *    SURGEON: Kb Houston MD    DESCRIPTION OF PROCEDURE: Kimberley Louis was brought into the endoscopy suite and placed in the left lateral decubitus position. After preprocedural pause and attended monitored anesthesia was administered, the external anus was inspected and was normal. Digital rectal exam was noted to have some nodularity. The colonoscope was inserted and advanced under direct visualization to the level of the cecum which was identified by the appendiceal orifice and the ileocecal valve. The prep was excellent.. Upon slow withdrawal of the colonoscope, approximately 95% of the mucosa was directly visualized. The colon was without mucosal abnormality. There was no evidence of  polyps, inflammation, bleeding or AVMs. Retroflexion of the rectum was noted to have hemorhroidal skin tags benign appearing consistent with YARELIS . The extra air was removed from the colon, and the colonoscope withdrawn. The patient tolerated the procedure well and was taken to postanesthesia care unit.     We invite the patient to return in 10 years for follow up screening evaluation.    Kb Houston MD

## 2025-05-16 NOTE — DISCHARGE INSTRUCTIONS
Thank you for allowing [facility] to care for you today.  If you have any questions and/or concerns please reach out to us Monday-Friday 0800-4:00 PM at [phone].  After hours please go to the Urgent Care and/or Emergency Room.  If you feel like it is an emergency call 911.

## 2025-06-20 ENCOUNTER — RESULTS FOLLOW-UP (OUTPATIENT)
Dept: EMERGENCY MEDICINE | Facility: HOSPITAL | Age: 45
End: 2025-06-20

## 2025-06-20 ENCOUNTER — HOSPITAL ENCOUNTER (EMERGENCY)
Facility: HOSPITAL | Age: 45
Discharge: HOME | End: 2025-06-20
Attending: NURSE PRACTITIONER | Admitting: NURSE PRACTITIONER
Payer: COMMERCIAL

## 2025-06-20 VITALS
HEART RATE: 81 BPM | SYSTOLIC BLOOD PRESSURE: 160 MMHG | RESPIRATION RATE: 16 BRPM | BODY MASS INDEX: 27.46 KG/M2 | DIASTOLIC BLOOD PRESSURE: 88 MMHG | TEMPERATURE: 98.6 F | WEIGHT: 160 LBS | OXYGEN SATURATION: 97 %

## 2025-06-20 DIAGNOSIS — N76.0 BACTERIAL VAGINOSIS: Primary | ICD-10-CM

## 2025-06-20 DIAGNOSIS — N89.8 VAGINAL DISCHARGE: ICD-10-CM

## 2025-06-20 DIAGNOSIS — R30.0 DYSURIA: Primary | ICD-10-CM

## 2025-06-20 DIAGNOSIS — B96.89 BACTERIAL VAGINOSIS: Primary | ICD-10-CM

## 2025-06-20 LAB
ALBUMIN UR-MCNC: NEGATIVE MG/DL
APPEARANCE UR: CLEAR
BACTERIAL VAGINOSIS VAG-IMP: POSITIVE
BILIRUB UR QL STRIP: NEGATIVE
CANDIDA DNA VAG QL NAA+PROBE: NOT DETECTED
CANDIDA GLABRATA / CANDIDA KRUSEI DNA: NOT DETECTED
COLOR UR AUTO: ABNORMAL
GLUCOSE UR STRIP-MCNC: NEGATIVE MG/DL
HGB UR QL STRIP: NEGATIVE
KETONES UR STRIP-MCNC: NEGATIVE MG/DL
LEUKOCYTE ESTERASE UR QL STRIP: NEGATIVE
MUCOUS THREADS #/AREA URNS LPF: PRESENT /LPF
NITRATE UR QL: NEGATIVE
PH UR STRIP: 6 [PH] (ref 4.7–8)
RBC URINE: 0 /HPF
SP GR UR STRIP: 1.01 (ref 1–1.03)
SQUAMOUS EPITHELIAL: 0 /HPF
T VAGINALIS DNA VAG QL NAA+PROBE: NOT DETECTED
UROBILINOGEN UR STRIP-MCNC: NORMAL MG/DL
WBC URINE: 1 /HPF

## 2025-06-20 PROCEDURE — 99213 OFFICE O/P EST LOW 20 MIN: CPT | Performed by: NURSE PRACTITIONER

## 2025-06-20 PROCEDURE — 81001 URINALYSIS AUTO W/SCOPE: CPT | Performed by: NURSE PRACTITIONER

## 2025-06-20 PROCEDURE — G0463 HOSPITAL OUTPT CLINIC VISIT: HCPCS | Performed by: NURSE PRACTITIONER

## 2025-06-20 PROCEDURE — 81515 NFCT DS BV&VAGINITIS DNA ALG: CPT | Performed by: NURSE PRACTITIONER

## 2025-06-20 RX ORDER — METRONIDAZOLE 500 MG/1
500 TABLET ORAL 2 TIMES DAILY
Qty: 14 TABLET | Refills: 1 | Status: SHIPPED | OUTPATIENT
Start: 2025-06-20 | End: 2025-06-27

## 2025-06-20 ASSESSMENT — ENCOUNTER SYMPTOMS
DIARRHEA: 0
DYSURIA: 1
FREQUENCY: 1
ABDOMINAL PAIN: 1
CHILLS: 0
NAUSEA: 0
FEVER: 0
HEMATURIA: 0

## 2025-06-20 ASSESSMENT — ACTIVITIES OF DAILY LIVING (ADL): ADLS_ACUITY_SCORE: 41

## 2025-06-20 NOTE — ED PROVIDER NOTES
History     Chief Complaint   Patient presents with    Urinary Frequency     HPI  Kimberley Louis is a 45 year old female who presents to urgent care with concerns of UTI symptoms that started over 3 days ago.  Patient reports painful urination and increased urinary frequency.  This is accompanied by lower abdominal pain and white vaginal discharge.  She denies hematuria or flank pain.  No fevers or chills.  She does have a history of bacterial vaginosis.  She did take Azo.      Has had kidney stones in the past but does not feel pain is similar.  Denies concern for STI.  History of hysterectomy.        Allergies:  No Known Allergies    Problem List:    Patient Active Problem List    Diagnosis Date Noted    Mild asthma      Priority: Medium    MANUEL (obstructive sleep apnea) 10/05/2023     Priority: Medium    S/P laparoscopic supracervical hysterectomy 05/05/2022     Priority: Medium     Bilateral salp      Dysmenorrhea 05/04/2022     Priority: Medium    Menorrhagia 05/04/2022     Priority: Medium    Nephrolithiasis 07/21/2020     Priority: Medium     Added automatically from request for surgery [id number]      Bacterial vaginosis 06/09/2020     Priority: Medium    Left flank tenderness 08/03/2018     Priority: Medium    Personal history of tobacco use, presenting hazards to health 08/03/2018     Priority: Medium    Recurrent UTI 08/03/2018     Priority: Medium    Incomplete right bundle branch block 02/05/2018     Priority: Medium    Pure hypercholesterolemia 01/03/2018     Priority: Medium    Tobacco abuse counseling 01/03/2018     Priority: Medium    Tobacco abuse 01/03/2018     Priority: Medium    Palpitations 01/03/2018     Priority: Medium    Atypical chest pain 01/03/2018     Priority: Medium    Dyspnea on exertion 01/03/2018     Priority: Medium    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  Kimberley Louis has no plan or code status on file. Discussed available resources and provided with information. Confirmed code status reflects current choices pending further ACP discussions.  Confirmed/documented legally designated decision makers.  Added by Sandra Anne            Kidney stone on left side 12/21/2015     Priority: Medium    Nephrocalcinosis 12/21/2015     Priority: Medium    Essential hypertension 11/19/2015     Priority: Medium    H/O renal calculi 11/19/2015     Priority: Medium    Enlarged kidney 11/19/2015     Priority: Medium        Past Medical History:    Past Medical History:   Diagnosis Date    Cyst of breast     Depressive disorder     Enlarged kidney 11/19/2015    Essential hypertension 11/19/2015    H/O renal calculi 11/19/2015    H/O renal calculi 11/19/2015    History of blood transfusion     Mild asthma     Uncomplicated asthma        Past Surgical History:    Past Surgical History:   Procedure Laterality Date    ABDOMEN SURGERY      AS REMOVAL OF KIDNEY STONE      AS REMOVAL OF KIDNEY STONE      COLONOSCOPY N/A 5/16/2025    Procedure: COLONOSCOPY;  Surgeon: Kb Houston MD;  Location: [facility]    COMBINED CYSTOSCOPY, RETROGRADES, URETEROSCOPY, LASER HOLMIUM LITHOTRIPSY URETER(S), INSERT STENT Left 08/17/2020    Procedure: CYSTOURETEROSCOPY, WITH RETROGRADE PYELOGRAM, HOLMIUM LASER LITHOTRIPSY OF URETERAL CALCULUS, interpretation of fluroscopy;  Surgeon: Sherry Mcpherson MD;  Location: [facility]    LAPAROSCOPIC HYSTERECTOMY SUPRACERVICAL N/A 05/04/2022    Procedure: LAPAROSCOPIC SUPRACERVICAL HYSTERECTOMY;  Surgeon: Bunny Hyatt MD;  Location: [facility]. Cervix remains in place    LAPAROSCOPIC SALPINGECTOMY Bilateral 05/04/2022    Procedure: BILATERAL SALPINGECTOMY, Lysis of Adhesions;  Surgeon: Bunny Hyatt MD;  Location: [facility]    TUBAL LIGATION      ZZC REMOVAL OF  KIDNEY STONE         Family History:    Family History   Problem Relation Age of Onset    Diabetes Mother     Emphysema Mother     Cervical Cancer Mother     Throat cancer Mother     Depression Mother     Diabetes Father     Hypertension Father     Hypertension Maternal Grandmother     Diabetes Maternal Grandmother     Schizophrenia Maternal Grandmother     Depression Maternal Grandmother     Mental Illness Maternal Grandmother     Unknown/Adopted Maternal Grandfather     Lung Cancer Paternal Grandmother     Diabetes Paternal Grandmother     Unknown/Adopted Paternal Grandfather        Social History:  Marital Status:  Single [1]  Social History     Tobacco Use    Smoking status: Some Days     Current packs/day: 0.75     Average packs/day: 0.8 packs/day for 36.5 years (27.3 ttl pk-yrs)     Types: Cigarettes     Start date: 1/1/1989    Smokeless tobacco: Never    Tobacco comments:     pt denied QP 9/22/20. Quit about 6 months    Vaping Use    Vaping status: Some Days   Substance Use Topics    Alcohol use: Not Currently     Comment: occasional    Drug use: No        Medications:    albuterol (PROAIR HFA) 108 (90 Base) MCG/ACT inhaler  amLODIPine (NORVASC) 10 MG tablet  Bacillus Coagulans-Inulin (PROBIOTIC-PREBIOTIC) 1-250 BILLION-MG CAPS  ketorolac (TORADOL) 10 MG tablet  metoprolol tartrate (LOPRESSOR) 25 MG tablet  mometasone (ELOCON) 0.1 % external cream  oxyCODONE (ROXICODONE) 5 MG tablet  Vitamin D, Cholecalciferol, 25 MCG (1000 UT) CAPS          Review of Systems   Constitutional:  Negative for chills and fever.   Gastrointestinal:  Positive for abdominal pain. Negative for diarrhea and nausea.   Genitourinary:  Positive for dysuria, frequency and vaginal discharge. Negative for hematuria.   All other systems reviewed and are negative.      Physical Exam   BP: (!) 160/88  Pulse: 81  Temp: 98.6  F (37  C)  Resp: 16  Weight: 72.6 kg (160 lb)  SpO2: 97 %      Physical Exam  Vitals and nursing note reviewed.    Constitutional:       General: She is not in acute distress.     Appearance: Normal appearance. She is not diaphoretic.   HENT:      Head: Atraumatic.      Mouth/Throat:      Mouth: Mucous membranes are moist.   Eyes:      Conjunctiva/sclera: Conjunctivae normal.   Cardiovascular:      Rate and Rhythm: Normal rate and regular rhythm.      Heart sounds: Normal heart sounds.   Pulmonary:      Effort: Pulmonary effort is normal. No respiratory distress.      Breath sounds: Normal breath sounds. No wheezing, rhonchi or rales.   Abdominal:      General: Abdomen is flat.      Palpations: Abdomen is soft.      Tenderness: There is no abdominal tenderness. There is no right CVA tenderness, left CVA tenderness, guarding or rebound.   Musculoskeletal:      Cervical back: Neck supple.      Comments: Left-sided low back tenderness to palpation which patient notes is chronic.   Skin:     General: Skin is warm.      Coloration: Skin is not pale.   Neurological:      Mental Status: She is alert and oriented to person, place, and time.         ED Course        Procedures       Results for orders placed or performed during the hospital encounter of 06/20/25 (from the past 24 hours)   UA with Microscopic reflex to Culture    Specimen: Urine, Clean Catch   Result Value Ref Range    Color Urine Straw Colorless, Straw, Light Yellow, Yellow    Appearance Urine Clear Clear    Glucose Urine Negative Negative mg/dL    Bilirubin Urine Negative Negative    Ketones Urine Negative Negative mg/dL    Specific Gravity Urine 1.008 1.003 - 1.035    Blood Urine Negative Negative    pH Urine 6.0 4.7 - 8.0    Protein Albumin Urine Negative Negative mg/dL    Urobilinogen Urine Normal Normal mg/dL    Nitrite Urine Negative Negative    Leukocyte Esterase Urine Negative Negative    Mucus Urine Present (A) None Seen /LPF    RBC Urine 0 <=2 /HPF    WBC Urine 1 <=5 /HPF    Squamous Epithelials Urine 0 <=1 /HPF    Narrative    Urine Culture not indicated        Medications - No data to display    Assessments & Plan (with Medical Decision Making)   45-year-old female that presented for evaluation of UTI symptoms that started 3 days ago.  She had a soft and nontender abdomen on palpation.  No CVA tenderness appreciated.  No tenderness she does have some low back tenderness to palpation which she states is chronic.  She is afebrile.  Urinalysis negative for infection today.  Wet prep was completed today with results pending.  Patient will be notified of results when available.  Any additional treatment needed will be done at that time.  Patient advised to follow-up with primary doctor as needed.  She will return to urgent care emergency room for any worsening or concerning symptoms.    I have reviewed the nursing notes.    I have reviewed the findings, diagnosis, plan and need for follow up with the patient.  This document was prepared using a combination of typing and voice generated software.  While every attempt was made for accuracy, spelling and grammatical errors may exist.         New Prescriptions    No medications on file       Final diagnoses:   Dysuria   Vaginal discharge       6/20/2025   [facility]       Mpofu, Prudence, CNP  06/20/25 [id number]

## 2025-06-20 NOTE — ED TRIAGE NOTES
Pt presents with urinary frequency and pain x 3 days. Discharge x 2 weeks. PT stated lower abdominal pain. Denied fever. PT has been taking azo yesterday morning.

## 2025-06-20 NOTE — DISCHARGE INSTRUCTIONS
We will notify you of your results when available.     Follow up with your doctor if no improvement in symptoms.    Return to emergency department for worsening or concerning symptoms.

## 2025-08-26 ENCOUNTER — RESULTS FOLLOW-UP (OUTPATIENT)
Dept: FAMILY MEDICINE | Facility: OTHER | Age: 45
End: 2025-08-26

## 2025-08-26 ENCOUNTER — OFFICE VISIT (OUTPATIENT)
Dept: FAMILY MEDICINE | Facility: OTHER | Age: 45
End: 2025-08-26
Attending: NURSE PRACTITIONER
Payer: COMMERCIAL

## 2025-08-26 VITALS
TEMPERATURE: 99.4 F | OXYGEN SATURATION: 97 % | SYSTOLIC BLOOD PRESSURE: 130 MMHG | DIASTOLIC BLOOD PRESSURE: 80 MMHG | HEART RATE: 79 BPM | BODY MASS INDEX: 26.04 KG/M2 | WEIGHT: 151.7 LBS

## 2025-08-26 DIAGNOSIS — H00.036 CELLULITIS OF LEFT EYELID: ICD-10-CM

## 2025-08-26 DIAGNOSIS — H65.191 ACUTE EFFUSION OF RIGHT EAR: ICD-10-CM

## 2025-08-26 DIAGNOSIS — H02.844 SWELLING OF LEFT UPPER EYELID: Primary | ICD-10-CM

## 2025-08-26 LAB
BASOPHILS # BLD AUTO: 0.06 10E3/UL (ref 0–0.2)
BASOPHILS NFR BLD AUTO: 0.5 %
EOSINOPHIL # BLD AUTO: 0.31 10E3/UL (ref 0–0.7)
EOSINOPHIL NFR BLD AUTO: 2.5 %
ERYTHROCYTE [DISTWIDTH] IN BLOOD BY AUTOMATED COUNT: 12.8 % (ref 10–15)
HCT VFR BLD AUTO: 41 % (ref 35–47)
HGB BLD-MCNC: 13.9 G/DL (ref 11.7–15.7)
IMM GRANULOCYTES # BLD: <0.04 10E3/UL
IMM GRANULOCYTES NFR BLD: 0.2 %
LYMPHOCYTES # BLD AUTO: 3.66 10E3/UL (ref 0.8–5.3)
LYMPHOCYTES NFR BLD AUTO: 29.9 %
MCH RBC QN AUTO: 30.7 PG (ref 26.5–33)
MCHC RBC AUTO-ENTMCNC: 33.9 G/DL (ref 31.5–36.5)
MCV RBC AUTO: 90.5 FL (ref 78–100)
MONOCYTES # BLD AUTO: 0.86 10E3/UL (ref 0–1.3)
MONOCYTES NFR BLD AUTO: 7 %
NEUTROPHILS # BLD AUTO: 7.32 10E3/UL (ref 1.6–8.3)
NEUTROPHILS NFR BLD AUTO: 59.9 %
PLATELET # BLD AUTO: 384 10E3/UL (ref 150–450)
RBC # BLD AUTO: 4.53 10E6/UL (ref 3.8–5.2)
WBC # BLD AUTO: 12.23 10E3/UL (ref 4–11)

## 2025-08-26 RX ORDER — ERYTHROMYCIN 5 MG/G
0.5 OINTMENT OPHTHALMIC 4 TIMES DAILY
Qty: 3.5 G | Refills: 0 | Status: SHIPPED | OUTPATIENT
Start: 2025-08-26

## 2025-08-26 RX ORDER — FLUCONAZOLE 150 MG/1
150 TABLET ORAL
Qty: 3 TABLET | Refills: 0 | Status: SHIPPED | OUTPATIENT
Start: 2025-08-26 | End: 2025-09-02

## 2025-08-26 RX ORDER — CLINDAMYCIN HYDROCHLORIDE 300 MG/1
300 CAPSULE ORAL 4 TIMES DAILY
Qty: 28 CAPSULE | Refills: 0 | Status: SHIPPED | OUTPATIENT
Start: 2025-08-26 | End: 2025-09-02

## 2025-08-26 ASSESSMENT — ANXIETY QUESTIONNAIRES
GAD7 TOTAL SCORE: 0
7. FEELING AFRAID AS IF SOMETHING AWFUL MIGHT HAPPEN: NOT AT ALL
1. FEELING NERVOUS, ANXIOUS, OR ON EDGE: NOT AT ALL
8. IF YOU CHECKED OFF ANY PROBLEMS, HOW DIFFICULT HAVE THESE MADE IT FOR YOU TO DO YOUR WORK, TAKE CARE OF THINGS AT HOME, OR GET ALONG WITH OTHER PEOPLE?: NOT DIFFICULT AT ALL
GAD7 TOTAL SCORE: 0
3. WORRYING TOO MUCH ABOUT DIFFERENT THINGS: NOT AT ALL
7. FEELING AFRAID AS IF SOMETHING AWFUL MIGHT HAPPEN: NOT AT ALL
5. BEING SO RESTLESS THAT IT IS HARD TO SIT STILL: NOT AT ALL
GAD7 TOTAL SCORE: 0
IF YOU CHECKED OFF ANY PROBLEMS ON THIS QUESTIONNAIRE, HOW DIFFICULT HAVE THESE PROBLEMS MADE IT FOR YOU TO DO YOUR WORK, TAKE CARE OF THINGS AT HOME, OR GET ALONG WITH OTHER PEOPLE: NOT DIFFICULT AT ALL
4. TROUBLE RELAXING: NOT AT ALL
2. NOT BEING ABLE TO STOP OR CONTROL WORRYING: NOT AT ALL
6. BECOMING EASILY ANNOYED OR IRRITABLE: NOT AT ALL

## 2025-08-26 ASSESSMENT — ASTHMA QUESTIONNAIRES
QUESTION_2 LAST FOUR WEEKS HOW OFTEN HAVE YOU HAD SHORTNESS OF BREATH: ONCE OR TWICE A WEEK
QUESTION_3 LAST FOUR WEEKS HOW OFTEN DID YOUR ASTHMA SYMPTOMS (WHEEZING, COUGHING, SHORTNESS OF BREATH, CHEST TIGHTNESS OR PAIN) WAKE YOU UP AT NIGHT OR EARLIER THAN USUAL IN THE MORNING: ONCE OR TWICE
QUESTION_5 LAST FOUR WEEKS HOW WOULD YOU RATE YOUR ASTHMA CONTROL: SOMEWHAT CONTROLLED
ACT_TOTALSCORE: 19
QUESTION_1 LAST FOUR WEEKS HOW MUCH OF THE TIME DID YOUR ASTHMA KEEP YOU FROM GETTING AS MUCH DONE AT WORK, SCHOOL OR AT HOME: A LITTLE OF THE TIME
QUESTION_4 LAST FOUR WEEKS HOW OFTEN HAVE YOU USED YOUR RESCUE INHALER OR NEBULIZER MEDICATION (SUCH AS ALBUTEROL): ONCE A WEEK OR LESS

## 2025-08-26 ASSESSMENT — PATIENT HEALTH QUESTIONNAIRE - PHQ9
SUM OF ALL RESPONSES TO PHQ QUESTIONS 1-9: 0
SUM OF ALL RESPONSES TO PHQ QUESTIONS 1-9: 0
10. IF YOU CHECKED OFF ANY PROBLEMS, HOW DIFFICULT HAVE THESE PROBLEMS MADE IT FOR YOU TO DO YOUR WORK, TAKE CARE OF THINGS AT HOME, OR GET ALONG WITH OTHER PEOPLE: NOT DIFFICULT AT ALL

## 2025-08-26 ASSESSMENT — PAIN SCALES - GENERAL: PAINLEVEL_OUTOF10: MILD PAIN (1)

## 2025-08-27 ENCOUNTER — TELEPHONE (OUTPATIENT)
Dept: FAMILY MEDICINE | Facility: OTHER | Age: 45
End: 2025-08-27

## 2025-08-27 ENCOUNTER — HOSPITAL ENCOUNTER (EMERGENCY)
Facility: HOSPITAL | Age: 45
Discharge: HOME | End: 2025-08-27
Attending: PHYSICIAN ASSISTANT
Payer: COMMERCIAL

## 2025-08-27 LAB — CRP SERPL-MCNC: 5.22 MG/L

## 2025-08-27 ASSESSMENT — ACTIVITIES OF DAILY LIVING (ADL)
ADLS_ACUITY_SCORE: 41

## 2025-08-27 ASSESSMENT — COLUMBIA-SUICIDE SEVERITY RATING SCALE - C-SSRS
6. HAVE YOU EVER DONE ANYTHING, STARTED TO DO ANYTHING, OR PREPARED TO DO ANYTHING TO END YOUR LIFE?: NO
2. HAVE YOU ACTUALLY HAD ANY THOUGHTS OF KILLING YOURSELF IN THE PAST MONTH?: NO
1. IN THE PAST MONTH, HAVE YOU WISHED YOU WERE DEAD OR WISHED YOU COULD GO TO SLEEP AND NOT WAKE UP?: NO

## (undated) DEVICE — SYRINGE-ASEPTO IRRIGATION

## (undated) DEVICE — CANISTER-SUCTION 2000CC

## (undated) DEVICE — SUTURE-MONOCRYL 3-0 PS-1 Y936H

## (undated) DEVICE — LUBRICANT JELLY 2OZ. TUBE

## (undated) DEVICE — TUBING-INSUFFLATION/LAPAROFLATOR W/FILTER

## (undated) DEVICE — NDL-INSUFFLATION 120MM

## (undated) DEVICE — IRRIGATION-H2O 1000ML

## (undated) DEVICE — IRRIGATION-NACL 1000ML

## (undated) DEVICE — Device

## (undated) DEVICE — SYRINGE-TOOMEY-70CC W/REMOVABLE TIP

## (undated) DEVICE — PACK-BASIN SET-UP

## (undated) DEVICE — BLADE-SURG CLIPPER

## (undated) DEVICE — CATH-URETERAL 5FR 70CM OPEN END

## (undated) DEVICE — TROCAR-5X100MM BLADED W/FIXATION

## (undated) DEVICE — DRSG-NON ADHERING 3 X 8 TELFA

## (undated) DEVICE — GLV-7.0 PROTEXIS PI CLASSIC LF/PF

## (undated) DEVICE — PACK-LAP LAVH-CUSTOM

## (undated) DEVICE — GUIDEWIRE-AMPLATZ PTFE .035 SUPER SWIFT

## (undated) DEVICE — SUCTION-IRRIGATION STRYKEFLOW II (STRYKER)

## (undated) DEVICE — CORD-LAPAROSCOPIC MONOPOLAR-DISPOSABLE

## (undated) DEVICE — IRRIGATION-NACL 3000ML (BAG)

## (undated) DEVICE — SOL-NACL 0.9% 1000ML

## (undated) DEVICE — GOWN-SURG XL LVL 3 REINFORCED

## (undated) DEVICE — CAUTERY PAD-POLYHESIVE II ADULT

## (undated) DEVICE — UROLOK II ADAPTER

## (undated) DEVICE — LABEL-STERILE PREPRINTED FOR OR

## (undated) DEVICE — SCD SLEEVE-KNEE REG.

## (undated) DEVICE — SET-TUR Y-TYPE BLADDER IRRIGATION

## (undated) DEVICE — SUCTION MANIFOLD NEPTUNE 2 SYS 1 PORT 702-025-000

## (undated) DEVICE — TRAY-SKIN PREP DRY

## (undated) DEVICE — VALVE-LUER ACTIVATED-ONELINK

## (undated) DEVICE — BLANKET-BAIR UPPER BODY

## (undated) DEVICE — LIGASURE-5MM BLUNT TIP LAPAROSCOPIC

## (undated) DEVICE — SOL WATER IRRIG 1000ML BOTTLE 2F7114

## (undated) DEVICE — TUBING SUCTION 20FT N620A

## (undated) DEVICE — UTERINE MANIPULATOR-KRONNER MANIPUJECTOR

## (undated) DEVICE — PACK-GYN CYSTO-CUSTOM

## (undated) DEVICE — SUTURE-VICRYL 0 UR-6 J603H

## (undated) DEVICE — CATH TRAY-16FR METER W/STATLOCK LATEX]

## (undated) DEVICE — CONTAINER-STERILE 4OZ WRAPPED (OR)

## (undated) DEVICE — CATH-URETHRAL 14FR

## (undated) DEVICE — BIN-UROLOGY / CYSTO

## (undated) DEVICE — TOPICAL SKIN ADHESIVE EXOFIN

## (undated) DEVICE — ZIPWIRE-STRAIGHT 0.035" X 150CM

## (undated) DEVICE — LIGHT HANDLE COVER

## (undated) DEVICE — CONNECTOR ERBEFLO 2 PORT 20325-215

## (undated) DEVICE — IRRIGATOR-PATHFINDER PLUS BULB W/10" TUBING

## (undated) DEVICE — SPONGE-LAPAROTOMY PADS 18 X 18

## (undated) DEVICE — TROCAR SLEEVE-KII 5X100MM

## (undated) DEVICE — LINA LOOP 5MM MONO 160MM X 80MM

## (undated) DEVICE — SYRINGE-20CC LUER LOCK

## (undated) DEVICE — TRAY-SKIN PREP POVIDONE/IODINE

## (undated) DEVICE — CLEARIFY VISUALIZATION SYSTEM (SCOPE WARMER)

## (undated) DEVICE — FILTER-KIDNEY

## (undated) DEVICE — APPLICATOR-CHLORAPREP 26ML TINTED CHG 2%+ 70% IPA-SURGICAL

## (undated) DEVICE — LIGHT HANDLE COVER FOR SKYTRON LIGHTS

## (undated) DEVICE — GLV-8.5 BIOGEL LATEX

## (undated) DEVICE — TUBING-SEECLEAR LAPAROSCOPIC SMOKE EVACUATION

## (undated) DEVICE — LASER FIBER-FLEXIVA 200

## (undated) DEVICE — SCISSOR-ENDOPATH 5MM CURVED

## (undated) RX ORDER — DEXAMETHASONE SODIUM PHOSPHATE 10 MG/ML
INJECTION, SOLUTION INTRAMUSCULAR; INTRAVENOUS
Status: DISPENSED
Start: 2022-05-04

## (undated) RX ORDER — FENTANYL CITRATE 50 UG/ML
INJECTION, SOLUTION INTRAMUSCULAR; INTRAVENOUS
Status: DISPENSED
Start: 2020-08-17

## (undated) RX ORDER — FENTANYL CITRATE 50 UG/ML
INJECTION, SOLUTION INTRAMUSCULAR; INTRAVENOUS
Status: DISPENSED
Start: 2022-05-04

## (undated) RX ORDER — PROPOFOL 10 MG/ML
INJECTION, EMULSION INTRAVENOUS
Status: DISPENSED
Start: 2020-08-17

## (undated) RX ORDER — PROPOFOL 10 MG/ML
INJECTION, EMULSION INTRAVENOUS
Status: DISPENSED
Start: 2022-05-04

## (undated) RX ORDER — ONDANSETRON 2 MG/ML
INJECTION INTRAMUSCULAR; INTRAVENOUS
Status: DISPENSED
Start: 2022-05-04

## (undated) RX ORDER — KETAMINE HCL IN NACL, ISO-OSM 100MG/10ML
SYRINGE (ML) INJECTION
Status: DISPENSED
Start: 2022-05-04

## (undated) RX ORDER — PROPOFOL 10 MG/ML
INJECTION, EMULSION INTRAVENOUS
Status: DISPENSED
Start: 2025-05-16

## (undated) RX ORDER — LIDOCAINE HYDROCHLORIDE 20 MG/ML
INJECTION, SOLUTION EPIDURAL; INFILTRATION; INTRACAUDAL; PERINEURAL
Status: DISPENSED
Start: 2022-05-04

## (undated) RX ORDER — HYDROMORPHONE HYDROCHLORIDE 1 MG/ML
INJECTION, SOLUTION INTRAMUSCULAR; INTRAVENOUS; SUBCUTANEOUS
Status: DISPENSED
Start: 2022-05-04

## (undated) RX ORDER — DEXAMETHASONE SODIUM PHOSPHATE 10 MG/ML
INJECTION, SOLUTION INTRAMUSCULAR; INTRAVENOUS
Status: DISPENSED
Start: 2020-08-17

## (undated) RX ORDER — LIDOCAINE HYDROCHLORIDE 20 MG/ML
INJECTION, SOLUTION EPIDURAL; INFILTRATION; INTRACAUDAL; PERINEURAL
Status: DISPENSED
Start: 2020-08-17

## (undated) RX ORDER — ONDANSETRON 2 MG/ML
INJECTION INTRAMUSCULAR; INTRAVENOUS
Status: DISPENSED
Start: 2020-08-17